# Patient Record
Sex: FEMALE | Race: WHITE | NOT HISPANIC OR LATINO | Employment: OTHER | URBAN - METROPOLITAN AREA
[De-identification: names, ages, dates, MRNs, and addresses within clinical notes are randomized per-mention and may not be internally consistent; named-entity substitution may affect disease eponyms.]

---

## 2017-01-16 ENCOUNTER — HOSPITAL ENCOUNTER (OUTPATIENT)
Dept: URGENT CARE | Facility: CLINIC | Age: 72
Setting detail: SPECIMEN
Discharge: HOME | End: 2017-01-16
Attending: FAMILY MEDICINE | Admitting: FAMILY MEDICINE

## 2017-01-16 LAB
AMPICILLIN SUSC ISLT: NORMAL
AZTREONAM SUSC ISLT: NORMAL
BACTERIA ISLT: NORMAL
BACTERIA SPEC AEROBE CULT: NORMAL
CEFAZOLIN SUSC ISLT: NORMAL
CEFEPIME SUSC ISLT: NORMAL
CEFTRIAXONE SUSC ISLT: NORMAL
CIPROFLOXACIN SUSC ISLT: NORMAL
COLONY COUNT: NORMAL
ERTAPENEM SUSC ISLT: NORMAL
LEVOFLOXACIN SUSC ISLT: NORMAL
Lab: NORMAL
MEROPENEM SUSC ISLT: NORMAL
MICRO REPORT STATUS: NORMAL
NITROFURANTOIN SUSC ISLT: NORMAL
PIP+TAZO SUSC ISLT: NORMAL
SPECIMEN SOURCE: NORMAL
SUSC METH SPEC: NORMAL
TETRACYCLINE SUSC ISLT: NORMAL
TOBRAMYCIN SUSC ISLT: NORMAL
TRIMETHOPRIM/SULFA: NORMAL

## 2017-06-19 ENCOUNTER — HOSPITAL ENCOUNTER (OUTPATIENT)
Dept: URGENT CARE | Facility: CLINIC | Age: 72
Setting detail: SPECIMEN
Discharge: HOME | End: 2017-06-19
Attending: FAMILY MEDICINE | Admitting: FAMILY MEDICINE

## 2017-09-07 ENCOUNTER — HOSPITAL ENCOUNTER (OUTPATIENT)
Dept: OTHER | Facility: HOSPITAL | Age: 72
Discharge: HOME | End: 2017-09-07
Attending: UROLOGY | Admitting: UROLOGY

## 2017-12-15 ENCOUNTER — HOSPITAL ENCOUNTER (OUTPATIENT)
Dept: FAMILY MEDICINE CLINIC | Facility: CLINIC | Age: 72
Setting detail: SPECIMEN
Discharge: HOME | End: 2017-12-15
Attending: FAMILY MEDICINE | Admitting: FAMILY MEDICINE

## 2017-12-15 LAB
ALBUMIN SERPL-MCNC: 3.8 G/DL (ref 3.5–4.8)
ALBUMIN/GLOB SERPL: 1.3 {RATIO} (ref 1–1.7)
ALP SERPL-CCNC: 79 IU/L (ref 32–91)
ALT SERPL-CCNC: 25 IU/L (ref 14–54)
ANION GAP SERPL CALC-SCNC: 11.6 MMOL/L (ref 10–20)
AST SERPL-CCNC: 25 IU/L (ref 15–41)
BASOPHILS # BLD AUTO: 0 10*3/UL (ref 0–0.2)
BASOPHILS NFR BLD AUTO: 0 % (ref 0–2)
BILIRUB SERPL-MCNC: 1.1 MG/DL (ref 0.3–1.2)
BUN SERPL-MCNC: 14 MG/DL (ref 8–20)
BUN/CREAT SERPL: 20 (ref 5.4–26.2)
CALCIUM SERPL-MCNC: 9.5 MG/DL (ref 8.9–10.3)
CHLORIDE SERPL-SCNC: 104 MMOL/L (ref 101–111)
CONV CO2: 28 MMOL/L (ref 22–32)
CONV TOTAL PROTEIN: 6.7 G/DL (ref 6.1–7.9)
CREAT UR-MCNC: 0.7 MG/DL (ref 0.4–1)
DIFFERENTIAL METHOD BLD: (no result)
EOSINOPHIL # BLD AUTO: 0.1 10*3/UL (ref 0–0.3)
EOSINOPHIL # BLD AUTO: 1 % (ref 0–3)
ERYTHROCYTE [DISTWIDTH] IN BLOOD BY AUTOMATED COUNT: 13.7 % (ref 11.5–14.5)
GLOBULIN UR ELPH-MCNC: 2.9 G/DL (ref 2.5–3.8)
GLUCOSE SERPL-MCNC: 98 MG/DL (ref 65–99)
HCT VFR BLD AUTO: 40.2 % (ref 35–49)
HGB BLD-MCNC: 13.3 G/DL (ref 12–15)
LYMPHOCYTES # BLD AUTO: 1.6 10*3/UL (ref 0.8–4.8)
LYMPHOCYTES NFR BLD AUTO: 20 % (ref 18–42)
MCH RBC QN AUTO: 30.1 PG (ref 26–32)
MCHC RBC AUTO-ENTMCNC: 33.1 G/DL (ref 32–36)
MCV RBC AUTO: 90.9 FL (ref 80–94)
MONOCYTES # BLD AUTO: 1.1 10*3/UL (ref 0.1–1.3)
MONOCYTES NFR BLD AUTO: 13 % (ref 2–11)
NEUTROPHILS # BLD AUTO: 5.3 10*3/UL (ref 2.3–8.6)
NEUTROPHILS NFR BLD AUTO: 66 % (ref 50–75)
NRBC BLD AUTO-RTO: 0 /100{WBCS}
NRBC/RBC NFR BLD MANUAL: 0 10*3/UL
PLATELET # BLD AUTO: 294 10*3/UL (ref 150–450)
PMV BLD AUTO: 8.3 FL (ref 7.4–10.4)
POTASSIUM SERPL-SCNC: 4.6 MMOL/L (ref 3.6–5.1)
RBC # BLD AUTO: 4.42 10*6/UL (ref 4–5.4)
SODIUM SERPL-SCNC: 139 MMOL/L (ref 136–144)
WBC # BLD AUTO: 8 10*3/UL (ref 4.5–11.5)

## 2017-12-18 ENCOUNTER — OFFICE (OUTPATIENT)
Dept: URBAN - METROPOLITAN AREA CLINIC 64 | Facility: CLINIC | Age: 72
End: 2017-12-18
Payer: COMMERCIAL

## 2017-12-18 VITALS
WEIGHT: 170 LBS | SYSTOLIC BLOOD PRESSURE: 110 MMHG | HEART RATE: 79 BPM | HEIGHT: 67 IN | DIASTOLIC BLOOD PRESSURE: 72 MMHG

## 2017-12-18 DIAGNOSIS — R10.13 EPIGASTRIC PAIN: ICD-10-CM

## 2017-12-18 DIAGNOSIS — R19.7 DIARRHEA, UNSPECIFIED: ICD-10-CM

## 2017-12-18 DIAGNOSIS — K62.5 HEMORRHAGE OF ANUS AND RECTUM: ICD-10-CM

## 2017-12-18 LAB
AMYLASE, SERUM: 91 U/L (ref 31–124)
C-REACTIVE PROTEIN, QUANT: 13.2 MG/L — HIGH (ref 0–4.9)
CBC WITH DIFFERENTIAL/PLATELET: BASO (ABSOLUTE): 0 X10E3/UL (ref 0–0.2)
CBC WITH DIFFERENTIAL/PLATELET: BASOS: 0 %
CBC WITH DIFFERENTIAL/PLATELET: EOS (ABSOLUTE): 0.2 X10E3/UL (ref 0–0.4)
CBC WITH DIFFERENTIAL/PLATELET: EOS: 2 %
CBC WITH DIFFERENTIAL/PLATELET: HEMATOCRIT: 40.2 % (ref 34–46.6)
CBC WITH DIFFERENTIAL/PLATELET: HEMATOLOGY COMMENTS: (no result)
CBC WITH DIFFERENTIAL/PLATELET: HEMOGLOBIN: 13.1 G/DL (ref 11.1–15.9)
CBC WITH DIFFERENTIAL/PLATELET: IMMATURE CELLS: (no result)
CBC WITH DIFFERENTIAL/PLATELET: IMMATURE GRANS (ABS): 0 X10E3/UL (ref 0–0.1)
CBC WITH DIFFERENTIAL/PLATELET: IMMATURE GRANULOCYTES: 0 %
CBC WITH DIFFERENTIAL/PLATELET: LYMPHS (ABSOLUTE): 1.5 X10E3/UL (ref 0.7–3.1)
CBC WITH DIFFERENTIAL/PLATELET: LYMPHS: 23 %
CBC WITH DIFFERENTIAL/PLATELET: MCH: 29.7 PG (ref 26.6–33)
CBC WITH DIFFERENTIAL/PLATELET: MCHC: 32.6 G/DL (ref 31.5–35.7)
CBC WITH DIFFERENTIAL/PLATELET: MCV: 91 FL (ref 79–97)
CBC WITH DIFFERENTIAL/PLATELET: MONOCYTES(ABSOLUTE): 0.7 X10E3/UL (ref 0.1–0.9)
CBC WITH DIFFERENTIAL/PLATELET: MONOCYTES: 10 %
CBC WITH DIFFERENTIAL/PLATELET: NEUTROPHILS (ABSOLUTE): 4.3 X10E3/UL (ref 1.4–7)
CBC WITH DIFFERENTIAL/PLATELET: NEUTROPHILS: 65 %
CBC WITH DIFFERENTIAL/PLATELET: NRBC: (no result)
CBC WITH DIFFERENTIAL/PLATELET: PLATELETS: 336 X10E3/UL (ref 150–379)
CBC WITH DIFFERENTIAL/PLATELET: RBC: 4.41 X10E6/UL (ref 3.77–5.28)
CBC WITH DIFFERENTIAL/PLATELET: RDW: 14.2 % (ref 12.3–15.4)
CBC WITH DIFFERENTIAL/PLATELET: WBC: 6.6 X10E3/UL (ref 3.4–10.8)
COMP. METABOLIC PANEL (14): A/G RATIO: 2.2 (ref 1.2–2.2)
COMP. METABOLIC PANEL (14): ALBUMIN, SERUM: 4.3 G/DL (ref 3.5–4.8)
COMP. METABOLIC PANEL (14): ALKALINE PHOSPHATASE, S: 99 IU/L (ref 39–117)
COMP. METABOLIC PANEL (14): ALT (SGPT): 39 IU/L — HIGH (ref 0–32)
COMP. METABOLIC PANEL (14): AST (SGOT): 24 IU/L (ref 0–40)
COMP. METABOLIC PANEL (14): BILIRUBIN, TOTAL: 0.9 MG/DL (ref 0–1.2)
COMP. METABOLIC PANEL (14): BUN/CREATININE RATIO: 30 — HIGH (ref 12–28)
COMP. METABOLIC PANEL (14): BUN: 19 MG/DL (ref 8–27)
COMP. METABOLIC PANEL (14): CALCIUM, SERUM: 9.4 MG/DL (ref 8.7–10.3)
COMP. METABOLIC PANEL (14): CARBON DIOXIDE, TOTAL: 26 MMOL/L (ref 18–29)
COMP. METABOLIC PANEL (14): CHLORIDE, SERUM: 101 MMOL/L (ref 96–106)
COMP. METABOLIC PANEL (14): CREATININE, SERUM: 0.64 MG/DL (ref 0.57–1)
COMP. METABOLIC PANEL (14): EGFR IF AFRICN AM: 105 ML/MIN/1.73 (ref 59–?)
COMP. METABOLIC PANEL (14): EGFR IF NONAFRICN AM: 91 ML/MIN/1.73 (ref 59–?)
COMP. METABOLIC PANEL (14): GLOBULIN, TOTAL: 2 G/DL (ref 1.5–4.5)
COMP. METABOLIC PANEL (14): GLUCOSE, SERUM: 86 MG/DL (ref 65–99)
COMP. METABOLIC PANEL (14): POTASSIUM, SERUM: 4.6 MMOL/L (ref 3.5–5.2)
COMP. METABOLIC PANEL (14): PROTEIN, TOTAL, SERUM: 6.3 G/DL (ref 6–8.5)
COMP. METABOLIC PANEL (14): SODIUM, SERUM: 143 MMOL/L (ref 134–144)
LIPASE, SERUM: 31 U/L (ref 14–72)

## 2017-12-18 PROCEDURE — 99214 OFFICE O/P EST MOD 30 MIN: CPT | Performed by: NURSE PRACTITIONER

## 2017-12-18 RX ORDER — METRONIDAZOLE 500 MG/1
1500 TABLET, FILM COATED ORAL
Qty: 42 | Refills: 0 | Status: ACTIVE
Start: 2017-12-18

## 2018-01-11 ENCOUNTER — ON CAMPUS - OUTPATIENT (OUTPATIENT)
Dept: URBAN - METROPOLITAN AREA HOSPITAL 2 | Facility: HOSPITAL | Age: 73
End: 2018-01-11
Payer: COMMERCIAL

## 2018-01-11 VITALS
DIASTOLIC BLOOD PRESSURE: 74 MMHG | OXYGEN SATURATION: 97 % | HEART RATE: 75 BPM | HEART RATE: 67 BPM | SYSTOLIC BLOOD PRESSURE: 135 MMHG | DIASTOLIC BLOOD PRESSURE: 62 MMHG | WEIGHT: 168 LBS | RESPIRATION RATE: 18 BRPM | OXYGEN SATURATION: 99 % | RESPIRATION RATE: 12 BRPM | DIASTOLIC BLOOD PRESSURE: 73 MMHG | TEMPERATURE: 97.5 F | SYSTOLIC BLOOD PRESSURE: 101 MMHG | SYSTOLIC BLOOD PRESSURE: 111 MMHG | OXYGEN SATURATION: 93 % | HEART RATE: 78 BPM | DIASTOLIC BLOOD PRESSURE: 77 MMHG | RESPIRATION RATE: 16 BRPM | SYSTOLIC BLOOD PRESSURE: 140 MMHG | SYSTOLIC BLOOD PRESSURE: 138 MMHG | DIASTOLIC BLOOD PRESSURE: 79 MMHG | SYSTOLIC BLOOD PRESSURE: 124 MMHG | DIASTOLIC BLOOD PRESSURE: 59 MMHG | SYSTOLIC BLOOD PRESSURE: 92 MMHG | OXYGEN SATURATION: 100 % | HEART RATE: 84 BPM | DIASTOLIC BLOOD PRESSURE: 71 MMHG | OXYGEN SATURATION: 98 % | DIASTOLIC BLOOD PRESSURE: 61 MMHG | HEART RATE: 83 BPM | OXYGEN SATURATION: 95 % | HEART RATE: 82 BPM | HEART RATE: 70 BPM | HEART RATE: 88 BPM | SYSTOLIC BLOOD PRESSURE: 137 MMHG | SYSTOLIC BLOOD PRESSURE: 110 MMHG | DIASTOLIC BLOOD PRESSURE: 66 MMHG | HEIGHT: 67 IN | HEART RATE: 71 BPM

## 2018-01-11 DIAGNOSIS — K57.30 DIVERTICULOSIS OF LARGE INTESTINE WITHOUT PERFORATION OR ABS: ICD-10-CM

## 2018-01-11 DIAGNOSIS — K62.5 HEMORRHAGE OF ANUS AND RECTUM: ICD-10-CM

## 2018-01-11 DIAGNOSIS — Z48.815 ENCOUNTER FOR SURGICAL AFTERCARE FOLLOWING SURGERY ON THE DI: ICD-10-CM

## 2018-01-11 DIAGNOSIS — R10.13 EPIGASTRIC PAIN: ICD-10-CM

## 2018-01-11 PROCEDURE — 45378 DIAGNOSTIC COLONOSCOPY: CPT | Performed by: INTERNAL MEDICINE

## 2018-01-11 PROCEDURE — 43235 EGD DIAGNOSTIC BRUSH WASH: CPT | Performed by: INTERNAL MEDICINE

## 2018-01-11 RX ORDER — HYOSCYAMINE SULFATE 0.12 MG/1
0.38 TABLET ORAL; SUBLINGUAL
Qty: 60 | Refills: 6 | Status: COMPLETED
Start: 2018-01-11 | End: 2018-01-29

## 2018-01-11 RX ADMIN — HYOSCYAMINE SULFATE: 0.12 TABLET, ORALLY DISINTEGRATING ORAL; SUBLINGUAL at 16:00

## 2018-01-11 RX ADMIN — PROPOFOL: 10 INJECTION, EMULSION INTRAVENOUS at 15:15

## 2018-01-18 ENCOUNTER — HOSPITAL ENCOUNTER (OUTPATIENT)
Dept: FAMILY MEDICINE CLINIC | Facility: CLINIC | Age: 73
Setting detail: SPECIMEN
Discharge: HOME | End: 2018-01-18
Attending: FAMILY MEDICINE | Admitting: FAMILY MEDICINE

## 2018-01-18 LAB
AZTREONAM SUSC ISLT: ABNORMAL
BACTERIA ISLT: ABNORMAL
BACTERIA SPEC AEROBE CULT: ABNORMAL
BILIRUB UR QL STRIP: NEGATIVE MG/DL
CASTS URNS QL MICRO: ABNORMAL /[LPF]
CEFAZOLIN SUSC ISLT: ABNORMAL
CEFEPIME SUSC ISLT: ABNORMAL
CEFTRIAXONE SUSC ISLT: ABNORMAL
CHOLEST SERPL-MCNC: 177 MG/DL
CHOLEST/HDLC SERPL: 2.3 {RATIO}
CIPROFLOXACIN SUSC ISLT: ABNORMAL
COLONY COUNT: ABNORMAL
COLOR UR: YELLOW
CONV BACTERIA IN URINE MICRO: ABNORMAL
CONV CLARITY OF URINE: CLEAR
CONV HYALINE CASTS IN URINE MICRO: 5 /[LPF] (ref 0–5)
CONV LDL CHOLESTEROL DIRECT: 82 MG/DL (ref 0–100)
CONV PROTEIN IN URINE BY AUTOMATED TEST STRIP: NEGATIVE MG/DL
CONV SMALL ROUND CELLS: ABNORMAL /[HPF]
CONV UROBILINOGEN IN URINE BY AUTOMATED TEST STRIP: 0.2 MG/DL
CULTURE INDICATED?: ABNORMAL
ERTAPENEM SUSC ISLT: ABNORMAL
GLUCOSE UR QL: NEGATIVE MG/DL
HDLC SERPL-MCNC: 78 MG/DL
HGB UR QL STRIP: NEGATIVE
KETONES UR QL STRIP: NEGATIVE MG/DL
LDLC/HDLC SERPL: 1 {RATIO}
LEUKOCYTE ESTERASE UR QL STRIP: ABNORMAL
LEVOFLOXACIN SUSC ISLT: ABNORMAL
LIPID INTERPRETATION: NORMAL
Lab: ABNORMAL
MEROPENEM SUSC ISLT: ABNORMAL
MICRO REPORT STATUS: ABNORMAL
NITRITE UR QL STRIP: NEGATIVE
NITROFURANTOIN SUSC ISLT: ABNORMAL
PH UR STRIP.AUTO: 5 [PH] (ref 4.5–8)
PIP+TAZO SUSC ISLT: ABNORMAL
RBC #/AREA URNS HPF: 1 /[HPF] (ref 0–3)
SP GR UR: 1.02 (ref 1–1.03)
SPECIMEN SOURCE: ABNORMAL
SPERM URNS QL MICRO: ABNORMAL /[HPF]
SQUAMOUS SPT QL MICRO: 1 /[HPF] (ref 0–5)
SUSC METH SPEC: ABNORMAL
TETRACYCLINE SUSC ISLT: ABNORMAL
TOBRAMYCIN SUSC ISLT: ABNORMAL
TRIGL SERPL-MCNC: 68 MG/DL
TRIMETHOPRIM/SULFA: ABNORMAL
UNIDENT CRYS URNS QL MICRO: ABNORMAL /[HPF]
VLDLC SERPL CALC-MCNC: 17.1 MG/DL
WBC #/AREA URNS HPF: 30 /[HPF] (ref 0–5)
YEAST SPEC QL WET PREP: ABNORMAL /[HPF]

## 2018-01-19 LAB
HCV AB SER DONR QL: NORMAL
HCV AB SER DONR QL: NORMAL

## 2018-01-29 ENCOUNTER — OFFICE (OUTPATIENT)
Dept: URBAN - METROPOLITAN AREA CLINIC 64 | Facility: CLINIC | Age: 73
End: 2018-01-29
Payer: COMMERCIAL

## 2018-01-29 VITALS
HEIGHT: 67 IN | DIASTOLIC BLOOD PRESSURE: 60 MMHG | SYSTOLIC BLOOD PRESSURE: 105 MMHG | HEART RATE: 80 BPM | WEIGHT: 176 LBS

## 2018-01-29 DIAGNOSIS — R74.0 NONSPECIFIC ELEVATION OF LEVELS OF TRANSAMINASE AND LACTIC A: ICD-10-CM

## 2018-01-29 DIAGNOSIS — R10.13 EPIGASTRIC PAIN: ICD-10-CM

## 2018-01-29 DIAGNOSIS — K62.5 HEMORRHAGE OF ANUS AND RECTUM: ICD-10-CM

## 2018-01-29 DIAGNOSIS — R19.7 DIARRHEA, UNSPECIFIED: ICD-10-CM

## 2018-01-29 PROCEDURE — 99213 OFFICE O/P EST LOW 20 MIN: CPT | Performed by: NURSE PRACTITIONER

## 2018-04-23 ENCOUNTER — OFFICE (OUTPATIENT)
Dept: URBAN - METROPOLITAN AREA CLINIC 64 | Facility: CLINIC | Age: 73
End: 2018-04-23
Payer: COMMERCIAL

## 2018-04-23 VITALS
SYSTOLIC BLOOD PRESSURE: 109 MMHG | WEIGHT: 175 LBS | HEIGHT: 67 IN | HEART RATE: 92 BPM | DIASTOLIC BLOOD PRESSURE: 67 MMHG

## 2018-04-23 DIAGNOSIS — R74.0 NONSPECIFIC ELEVATION OF LEVELS OF TRANSAMINASE AND LACTIC A: ICD-10-CM

## 2018-04-23 DIAGNOSIS — R19.7 DIARRHEA, UNSPECIFIED: ICD-10-CM

## 2018-04-23 PROCEDURE — 99214 OFFICE O/P EST MOD 30 MIN: CPT | Performed by: NURSE PRACTITIONER

## 2018-04-23 RX ORDER — HYOSCYAMINE SULFATE 0.12 MG/1
0.38 TABLET ORAL; SUBLINGUAL
Qty: 60 | Refills: 12 | Status: COMPLETED
Start: 2018-04-23 | End: 2024-04-01

## 2018-05-11 ENCOUNTER — HOSPITAL ENCOUNTER (OUTPATIENT)
Dept: FAMILY MEDICINE CLINIC | Facility: CLINIC | Age: 73
Setting detail: SPECIMEN
Discharge: HOME | End: 2018-05-11
Attending: FAMILY MEDICINE | Admitting: FAMILY MEDICINE

## 2018-05-11 LAB
BACTERIA ISLT: ABNORMAL
BACTERIA SPEC AEROBE CULT: ABNORMAL
COLONY COUNT: ABNORMAL
LEVOFLOXACIN SUSC ISLT: ABNORMAL
Lab: ABNORMAL
MEROPENEM SUSC ISLT: ABNORMAL
MICRO REPORT STATUS: ABNORMAL
NITROFURANTOIN SUSC ISLT: ABNORMAL
SPECIMEN SOURCE: ABNORMAL
SUSC METH SPEC: ABNORMAL
TIGECYCLINE ISLT MIC: ABNORMAL
TOBRAMYCIN SUSC ISLT: ABNORMAL
TRIMETHOPRIM/SULFA: ABNORMAL

## 2018-07-23 ENCOUNTER — OFFICE (OUTPATIENT)
Dept: URBAN - METROPOLITAN AREA CLINIC 64 | Facility: CLINIC | Age: 73
End: 2018-07-23
Payer: COMMERCIAL

## 2018-07-23 VITALS
WEIGHT: 172 LBS | HEART RATE: 83 BPM | SYSTOLIC BLOOD PRESSURE: 112 MMHG | HEIGHT: 67 IN | DIASTOLIC BLOOD PRESSURE: 60 MMHG

## 2018-07-23 DIAGNOSIS — R14.0 ABDOMINAL DISTENSION (GASEOUS): ICD-10-CM

## 2018-07-23 DIAGNOSIS — K58.0 IRRITABLE BOWEL SYNDROME WITH DIARRHEA: ICD-10-CM

## 2018-07-23 DIAGNOSIS — R15.2 FECAL URGENCY: ICD-10-CM

## 2018-07-23 PROCEDURE — 99214 OFFICE O/P EST MOD 30 MIN: CPT | Performed by: NURSE PRACTITIONER

## 2018-07-23 RX ORDER — RIFAXIMIN 550 MG/1
1650 TABLET ORAL
Qty: 42 | Refills: 2 | Status: COMPLETED
Start: 2018-07-23 | End: 2018-10-24

## 2018-10-24 ENCOUNTER — OFFICE (OUTPATIENT)
Dept: URBAN - METROPOLITAN AREA CLINIC 64 | Facility: CLINIC | Age: 73
End: 2018-10-24
Payer: COMMERCIAL

## 2018-10-24 VITALS
HEIGHT: 67 IN | HEART RATE: 74 BPM | DIASTOLIC BLOOD PRESSURE: 67 MMHG | WEIGHT: 169 LBS | SYSTOLIC BLOOD PRESSURE: 102 MMHG

## 2018-10-24 DIAGNOSIS — K58.0 IRRITABLE BOWEL SYNDROME WITH DIARRHEA: ICD-10-CM

## 2018-10-24 PROCEDURE — 99213 OFFICE O/P EST LOW 20 MIN: CPT | Performed by: INTERNAL MEDICINE

## 2018-10-24 RX ORDER — SACCHAROMYCES BOULARDII 50 MG
CAPSULE ORAL
Qty: 0 | Refills: 0 | Status: COMPLETED
End: 2018-10-24 | Stop reason: NON-AVAIL

## 2019-04-11 ENCOUNTER — HOSPITAL ENCOUNTER (OUTPATIENT)
Dept: FAMILY MEDICINE CLINIC | Facility: CLINIC | Age: 74
Setting detail: SPECIMEN
Discharge: HOME | End: 2019-04-11
Attending: FAMILY MEDICINE | Admitting: FAMILY MEDICINE

## 2019-04-11 LAB
ALBUMIN SERPL-MCNC: 3.9 G/DL (ref 3.5–4.8)
ALBUMIN/GLOB SERPL: 1.4 {RATIO} (ref 1–1.7)
ALP SERPL-CCNC: 75 IU/L (ref 32–91)
ALT SERPL-CCNC: 30 IU/L (ref 14–54)
ANION GAP SERPL CALC-SCNC: 16.7 MMOL/L (ref 10–20)
AST SERPL-CCNC: 29 IU/L (ref 15–41)
BASOPHILS # BLD AUTO: 0 10*3/UL (ref 0–0.2)
BASOPHILS NFR BLD AUTO: 1 % (ref 0–2)
BILIRUB SERPL-MCNC: 1.1 MG/DL (ref 0.3–1.2)
BUN SERPL-MCNC: 15 MG/DL (ref 8–20)
BUN/CREAT SERPL: 21.4 (ref 5.4–26.2)
CALCIUM SERPL-MCNC: 9.2 MG/DL (ref 8.9–10.3)
CHLORIDE SERPL-SCNC: 103 MMOL/L (ref 101–111)
CHOLEST SERPL-MCNC: 160 MG/DL
CHOLEST/HDLC SERPL: 2.1 {RATIO}
CONV CO2: 26 MMOL/L (ref 22–32)
CONV LDL CHOLESTEROL DIRECT: 61 MG/DL (ref 0–100)
CONV TOTAL PROTEIN: 6.6 G/DL (ref 6.1–7.9)
CREAT UR-MCNC: 0.7 MG/DL (ref 0.4–1)
DIFFERENTIAL METHOD BLD: (no result)
EOSINOPHIL # BLD AUTO: 0.1 10*3/UL (ref 0–0.3)
EOSINOPHIL # BLD AUTO: 3 % (ref 0–3)
ERYTHROCYTE [DISTWIDTH] IN BLOOD BY AUTOMATED COUNT: 14.3 % (ref 11.5–14.5)
GLOBULIN UR ELPH-MCNC: 2.7 G/DL (ref 2.5–3.8)
GLUCOSE SERPL-MCNC: 88 MG/DL (ref 65–99)
HCT VFR BLD AUTO: 38.4 % (ref 35–49)
HDLC SERPL-MCNC: 77 MG/DL
HGB BLD-MCNC: 12.6 G/DL (ref 12–15)
LDLC/HDLC SERPL: 0.8 {RATIO}
LIPID INTERPRETATION: ABNORMAL
LYMPHOCYTES # BLD AUTO: 1.5 10*3/UL (ref 0.8–4.8)
LYMPHOCYTES NFR BLD AUTO: 29 % (ref 18–42)
MCH RBC QN AUTO: 30 PG (ref 26–32)
MCHC RBC AUTO-ENTMCNC: 32.8 G/DL (ref 32–36)
MCV RBC AUTO: 91.4 FL (ref 80–94)
MONOCYTES # BLD AUTO: 0.6 10*3/UL (ref 0.1–1.3)
MONOCYTES NFR BLD AUTO: 12 % (ref 2–11)
NEUTROPHILS # BLD AUTO: 2.8 10*3/UL (ref 2.3–8.6)
NEUTROPHILS NFR BLD AUTO: 55 % (ref 50–75)
NRBC BLD AUTO-RTO: 0 /100{WBCS}
NRBC/RBC NFR BLD MANUAL: 0 10*3/UL
PLATELET # BLD AUTO: 331 10*3/UL (ref 150–450)
PMV BLD AUTO: 8.7 FL (ref 7.4–10.4)
POTASSIUM SERPL-SCNC: 4.7 MMOL/L (ref 3.6–5.1)
RBC # BLD AUTO: 4.2 10*6/UL (ref 4–5.4)
SODIUM SERPL-SCNC: 141 MMOL/L (ref 136–144)
TRIGL SERPL-MCNC: 117 MG/DL
VLDLC SERPL CALC-MCNC: 22.7 MG/DL
WBC # BLD AUTO: 5 10*3/UL (ref 4.5–11.5)

## 2019-09-10 PROBLEM — R26.9 GAIT ABNORMALITY: Status: ACTIVE | Noted: 2018-01-09

## 2019-09-10 PROBLEM — M54.50 LOW BACK PAIN: Status: ACTIVE | Noted: 2017-03-02

## 2019-09-10 PROBLEM — Z12.31 VISIT FOR SCREENING MAMMOGRAM: Status: ACTIVE | Noted: 2019-04-10

## 2019-09-10 PROBLEM — Z00.00 ENCOUNTER FOR GENERAL ADULT MEDICAL EXAMINATION WITHOUT ABNORMAL FINDINGS: Status: ACTIVE | Noted: 2019-04-10

## 2019-09-10 PROBLEM — M25.551 HIP PAIN, BILATERAL: Status: ACTIVE | Noted: 2018-01-09

## 2019-09-10 PROBLEM — M85.80 OSTEOPENIA: Status: ACTIVE | Noted: 2018-01-17

## 2019-09-10 PROBLEM — M25.552 HIP PAIN, BILATERAL: Status: ACTIVE | Noted: 2018-01-09

## 2019-09-10 PROBLEM — M41.9 SCOLIOSIS: Status: ACTIVE | Noted: 2017-03-08

## 2019-09-10 RX ORDER — MONTELUKAST SODIUM 4 MG/1
TABLET, CHEWABLE ORAL EVERY 24 HOURS
COMMUNITY
Start: 2017-12-06 | End: 2019-09-11

## 2019-09-10 RX ORDER — CYCLOBENZAPRINE HCL 5 MG
TABLET ORAL
COMMUNITY
Start: 2017-03-02 | End: 2019-09-11

## 2019-09-10 RX ORDER — ERGOCALCIFEROL 1.25 MG/1
1 CAPSULE ORAL
COMMUNITY
Start: 2018-04-13 | End: 2019-09-11

## 2019-09-10 RX ORDER — DICYCLOMINE HCL 20 MG
TABLET ORAL
COMMUNITY
Start: 2016-04-11 | End: 2019-09-11

## 2019-09-10 RX ORDER — FEXOFENADINE HCL 180 MG/1
180 TABLET ORAL DAILY
COMMUNITY
Start: 2014-09-29 | End: 2022-10-03 | Stop reason: HOSPADM

## 2019-09-11 ENCOUNTER — OFFICE VISIT (OUTPATIENT)
Dept: FAMILY MEDICINE CLINIC | Facility: CLINIC | Age: 74
End: 2019-09-11

## 2019-09-11 ENCOUNTER — LAB (OUTPATIENT)
Dept: FAMILY MEDICINE CLINIC | Facility: CLINIC | Age: 74
End: 2019-09-11

## 2019-09-11 VITALS
WEIGHT: 179 LBS | RESPIRATION RATE: 16 BRPM | SYSTOLIC BLOOD PRESSURE: 115 MMHG | OXYGEN SATURATION: 98 % | HEART RATE: 97 BPM | BODY MASS INDEX: 27.13 KG/M2 | DIASTOLIC BLOOD PRESSURE: 59 MMHG | TEMPERATURE: 97.3 F | HEIGHT: 68 IN

## 2019-09-11 DIAGNOSIS — G89.29 CHRONIC BILATERAL LOW BACK PAIN WITHOUT SCIATICA: Primary | ICD-10-CM

## 2019-09-11 DIAGNOSIS — M25.552 HIP PAIN, BILATERAL: ICD-10-CM

## 2019-09-11 DIAGNOSIS — M25.551 HIP PAIN, BILATERAL: ICD-10-CM

## 2019-09-11 DIAGNOSIS — E55.9 VITAMIN D DEFICIENCY: ICD-10-CM

## 2019-09-11 DIAGNOSIS — M54.50 CHRONIC BILATERAL LOW BACK PAIN WITHOUT SCIATICA: ICD-10-CM

## 2019-09-11 DIAGNOSIS — R26.89 BALANCE PROBLEM: ICD-10-CM

## 2019-09-11 DIAGNOSIS — M54.50 CHRONIC BILATERAL LOW BACK PAIN WITHOUT SCIATICA: Primary | ICD-10-CM

## 2019-09-11 DIAGNOSIS — G89.29 CHRONIC BILATERAL LOW BACK PAIN WITHOUT SCIATICA: ICD-10-CM

## 2019-09-11 LAB
ALBUMIN SERPL-MCNC: 3.8 G/DL (ref 3.5–4.8)
ALBUMIN/GLOB SERPL: 1.5 G/DL (ref 1–1.7)
ALP SERPL-CCNC: 62 U/L (ref 32–91)
ALT SERPL W P-5'-P-CCNC: 18 U/L (ref 14–54)
ANION GAP SERPL CALCULATED.3IONS-SCNC: 12.8 MMOL/L (ref 5–15)
AST SERPL-CCNC: 21 U/L (ref 15–41)
BILIRUB SERPL-MCNC: 1.2 MG/DL (ref 0.3–1.2)
BUN BLD-MCNC: 19 MG/DL (ref 8–20)
BUN/CREAT SERPL: 21.1 (ref 5.4–26.2)
CALCIUM SPEC-SCNC: 9.2 MG/DL (ref 8.9–10.3)
CHLORIDE SERPL-SCNC: 105 MMOL/L (ref 101–111)
CO2 SERPL-SCNC: 26 MMOL/L (ref 22–32)
CREAT BLD-MCNC: 0.9 MG/DL (ref 0.4–1)
CRP SERPL-MCNC: 0.23 MG/DL (ref 0–0.7)
ERYTHROCYTE [SEDIMENTATION RATE] IN BLOOD: 17 MM/HR (ref 0–30)
GFR SERPL CREATININE-BSD FRML MDRD: 62 ML/MIN/1.73
GLOBULIN UR ELPH-MCNC: 2.5 GM/DL (ref 2.5–3.8)
GLUCOSE BLD-MCNC: 115 MG/DL (ref 65–99)
POTASSIUM BLD-SCNC: 3.8 MMOL/L (ref 3.6–5.1)
PROT SERPL-MCNC: 6.3 G/DL (ref 6.1–7.9)
SODIUM BLD-SCNC: 140 MMOL/L (ref 136–144)
VIT B12 BLD-MCNC: 258 PG/ML (ref 180–914)

## 2019-09-11 PROCEDURE — 82607 VITAMIN B-12: CPT | Performed by: FAMILY MEDICINE

## 2019-09-11 PROCEDURE — 86431 RHEUMATOID FACTOR QUANT: CPT | Performed by: FAMILY MEDICINE

## 2019-09-11 PROCEDURE — 85652 RBC SED RATE AUTOMATED: CPT | Performed by: FAMILY MEDICINE

## 2019-09-11 PROCEDURE — 86038 ANTINUCLEAR ANTIBODIES: CPT | Performed by: FAMILY MEDICINE

## 2019-09-11 PROCEDURE — 86140 C-REACTIVE PROTEIN: CPT | Performed by: FAMILY MEDICINE

## 2019-09-11 PROCEDURE — 36415 COLL VENOUS BLD VENIPUNCTURE: CPT | Performed by: FAMILY MEDICINE

## 2019-09-11 PROCEDURE — 99214 OFFICE O/P EST MOD 30 MIN: CPT | Performed by: FAMILY MEDICINE

## 2019-09-11 PROCEDURE — 80053 COMPREHEN METABOLIC PANEL: CPT | Performed by: FAMILY MEDICINE

## 2019-09-11 PROCEDURE — 82306 VITAMIN D 25 HYDROXY: CPT | Performed by: FAMILY MEDICINE

## 2019-09-11 RX ORDER — SODIUM PHOSPHATE,MONO-DIBASIC 19G-7G/118
ENEMA (ML) RECTAL 2 TIMES DAILY
COMMUNITY
End: 2020-07-01

## 2019-09-11 RX ORDER — NITROFURANTOIN 25; 75 MG/1; MG/1
100 CAPSULE ORAL DAILY
COMMUNITY
End: 2019-11-18

## 2019-09-11 RX ORDER — PHENOL 1.4 %
600 AEROSOL, SPRAY (ML) MUCOUS MEMBRANE DAILY
COMMUNITY

## 2019-09-11 RX ORDER — MELATONIN
1000 DAILY
COMMUNITY

## 2019-09-11 RX ORDER — GABAPENTIN 100 MG/1
100 CAPSULE ORAL 3 TIMES DAILY
Qty: 90 CAPSULE | Refills: 0 | Status: SHIPPED | OUTPATIENT
Start: 2019-09-11 | End: 2019-10-07 | Stop reason: SDUPTHER

## 2019-09-11 RX ORDER — ACETAMINOPHEN 500 MG
1 TABLET ORAL EVERY 6 HOURS PRN
COMMUNITY
Start: 2020-01-26

## 2019-09-11 NOTE — PROGRESS NOTES
Subjective   Chief Complaint   Patient presents with   • Back Pain   • Osteoarthritis     Discussed pain, balance issues     Abida Becker is a 74 y.o. female.     Patient Care Team:  Darlene Matute MD as PCP - General  Maggie Pop APRN as PCP - Claims Attributed  Provider, No Known as PCP - Family Medicine    She is coming in today to discuss her chronic pain and arthritis.  She has been dealing with a back pain and leg pain for a long time.  She had low back surgery due to scoliosis in 2011.  She however reports that over the last several months may be even longer her pain has been getting worse.  She has harder time to move around and she feels more stiff at times.  She is currently taking only Tylenol over-the-counter as needed, but that helps only some.  She was previously advised by her GI doctor not to take anti-inflammatories due to her GI issues.  She is concerned about the way she walks and her balance overall and possible risk of fall.  She however denies any recent falls.  She wants to know about some options which she can do to improve her functionality.  She was seen by orthopedist and back specialist in the past.  Her medications are being reviewed today and she takes vitamin D and calcium.  She has tried over-the-counter medicines to help with inflammation, but did not have a lot of success with it.         The following portions of the patient's history were reviewed and updated as appropriate: allergies, current medications, past family history, past medical history, past social history, past surgical history and problem list.  Past Medical History:   Diagnosis Date   • GERD (gastroesophageal reflux disease)    • History of recurrent UTIs    • IBS (irritable bowel syndrome)    • Osteoarthritis    • Osteoporosis    • Pulmonary embolism (CMS/HCC) 2011    after scoliosis surgery   • Vitamin D deficiency      Past Surgical History:   Procedure Laterality Date   • BREAST BIOPSY     •  "CHOLECYSTECTOMY     • HERNIA REPAIR     • REIMPLANT URETER IN BLADDER     • SPINAL FUSION       The patient has a family history of  Family History   Problem Relation Age of Onset   • Heart failure Mother      Social History     Socioeconomic History   • Marital status: Single     Spouse name: Not on file   • Number of children: Not on file   • Years of education: Not on file   • Highest education level: Not on file   Tobacco Use   • Smoking status: Never Smoker   • Smokeless tobacco: Never Used   Substance and Sexual Activity   • Alcohol use: No     Frequency: Never   • Drug use: No   • Sexual activity: No       Review of Systems   Constitutional: Negative for activity change, fatigue and fever.   Respiratory: Negative for cough, shortness of breath and wheezing.    Cardiovascular: Negative for chest pain, palpitations and leg swelling.   Gastrointestinal: Negative for constipation, diarrhea and indigestion.   Musculoskeletal: Positive for arthralgias, back pain, gait problem and myalgias. Negative for joint swelling, neck pain and bursitis.   Skin: Negative for color change, dry skin and rash.   Neurological: Negative for tremors, weakness, light-headedness, numbness, headache and memory problem.     Visit Vitals  /59 (BP Location: Left arm, Patient Position: Sitting, Cuff Size: Large Adult)   Pulse 97   Temp 97.3 °F (36.3 °C) (Oral)   Resp 16   Ht 172.7 cm (68\")   Wt 81.2 kg (179 lb)   SpO2 98%   BMI 27.22 kg/m²       Current Outpatient Medications:   •  acetaminophen (TYLENOL) 500 MG tablet, Take 500 mg by mouth Every 6 (Six) Hours As Needed., Disp: , Rfl:   •  calcium carbonate (OS-STEVEN) 600 MG tablet, Take 600 mg by mouth Daily., Disp: , Rfl:   •  cholecalciferol (VITAMIN D3) 1000 units tablet, Take 1,000 Units by mouth Daily., Disp: , Rfl:   •  fexofenadine (ALLEGRA) 180 MG tablet, FEXOFENADINE  MG TABS, Disp: , Rfl:   •  glucosamine-chondroitin 500-400 MG capsule capsule, Take  by mouth 2 (Two) " Times a Day., Disp: , Rfl:   •  Mirabegron ER (MYRBETRIQ) 50 MG tablet sustained-release 24 hour 24 hr tablet, Daily., Disp: , Rfl:   •  nitrofurantoin, macrocrystal-monohydrate, (MACROBID) 100 MG capsule, Take 100 mg by mouth Daily., Disp: , Rfl:   •  gabapentin (NEURONTIN) 100 MG capsule, Take 1 capsule by mouth 3 (Three) Times a Day., Disp: 90 capsule, Rfl: 0    Objective   Physical Exam   Constitutional: She is oriented to person, place, and time. She appears well-developed and well-nourished.   Slow gait with a cane, but stable.   HENT:   Head: Normocephalic and atraumatic.   Eyes: Conjunctivae and EOM are normal. Pupils are equal, round, and reactive to light.   Neck: Normal range of motion. Neck supple.   Cardiovascular: Normal rate, regular rhythm, normal heart sounds and intact distal pulses. Exam reveals no gallop.   No murmur heard.  Pulmonary/Chest: Effort normal and breath sounds normal. No respiratory distress. She has no rales. She exhibits no tenderness.   Musculoskeletal: Normal range of motion. She exhibits no edema, tenderness or deformity.   Neurological: She is alert and oriented to person, place, and time. She displays normal reflexes. No cranial nerve deficit. Coordination normal.   Skin: Skin is warm and dry.   Nursing note and vitals reviewed.                Assessment/Plan   Problems Addressed this Visit        Digestive    Vitamin D deficiency    Relevant Orders    Vitamin D 25 Hydroxy       Nervous and Auditory    Hip pain, bilateral    Relevant Orders    DELLA    Rheumatoid Factor    C-reactive Protein    Sedimentation Rate    Comprehensive Metabolic Panel    Vitamin B12    Ambulatory Referral to Physical Therapy Evaluate and treat    Low back pain - Primary    Relevant Orders    DELLA    Rheumatoid Factor    C-reactive Protein    Sedimentation Rate    Comprehensive Metabolic Panel    Vitamin B12    Ambulatory Referral to Physical Therapy Evaluate and treat       Other    Balance problem       We talked at length about her osteoarthritis and chronic pain.  Her symptoms are getting worse and it affects her daily living and her functionality.  I will be getting blood work to check and rule out inflammation.  She may continue Tylenol over-the-counter.  She cannot take anti-inflammatories due to GI issues.  She will continue vitamin D supplementation and I will recheck level.  I will start her on the gabapentin to see if this will add to her pain control.  I will be referring her to physical therapy to work on her pain balance.       Requested Prescriptions     Signed Prescriptions Disp Refills   • gabapentin (NEURONTIN) 100 MG capsule 90 capsule 0     Sig: Take 1 capsule by mouth 3 (Three) Times a Day.

## 2019-09-12 LAB — 25(OH)D3 SERPL-MCNC: 41.8 NG/ML (ref 30–100)

## 2019-09-13 LAB
ANA SER QL: NEGATIVE
CHROMATIN AB SERPL-ACNC: <20 IU/ML (ref 0–20)

## 2019-09-24 ENCOUNTER — TREATMENT (OUTPATIENT)
Dept: PHYSICAL THERAPY | Facility: CLINIC | Age: 74
End: 2019-09-24

## 2019-09-24 DIAGNOSIS — G89.29 CHRONIC BILATERAL LOW BACK PAIN WITHOUT SCIATICA: Primary | ICD-10-CM

## 2019-09-24 DIAGNOSIS — M54.50 CHRONIC BILATERAL LOW BACK PAIN WITHOUT SCIATICA: Primary | ICD-10-CM

## 2019-09-24 PROCEDURE — 97162 PT EVAL MOD COMPLEX 30 MIN: CPT | Performed by: PHYSICAL THERAPIST

## 2019-09-24 PROCEDURE — 97110 THERAPEUTIC EXERCISES: CPT | Performed by: PHYSICAL THERAPIST

## 2019-09-24 NOTE — PROGRESS NOTES
Physical Therapy Initial Evaluation and Plan of Care    Patient: Abida Becker   : 1945  Diagnosis/ICD-10 Code:  Chronic bilateral low back pain without sciatica [M54.5, G89.29]  Referring practitioner: Darlene Matute MD  Date of Initial Visit: 2019  Today's Date: 2019  Patient seen for 1 sessions           Subjective Questionnaire: Oswestry: 46%      Subjective Evaluation    History of Present Illness  Mechanism of injury: Patient presents to physical therapy with chief complaint of pain in lower back and right hip.  Patient reports that she had back surgery in  (thoracic-lumbar fusion T12-L4).  Patient reports that two months ago she began having pain in right hip, as well as bilateral feet.  Reports that she saw MD for her feet and has begun wearing compression garmets which have helped pain in feet.  Patient continues to have right hip pain, which is worse with standing and in the morning.  Patient reports balance issues when walking on uneven surfaces.  Patient wishes to have improved balance and less pain in her lower back/right hip with household chore activities.     Quality of life: good    Pain  Current pain ratin  At best pain ratin  At worst pain rating: 10  Quality: knife-like and throbbing (constant)  Relieving factors: change in position  Aggravating factors: sleeping, prolonged positioning, standing and stairs  Progression: no change    Diagnostic Tests  X-ray: normal    Patient Goals  Patient goals for therapy: decreased pain, increased strength, independence with ADLs/IADLs and improved balance             Objective       Palpation     Additional Palpation Details  TTP at L5-S1 facet joint on right   Noted firm mass in right lateral gluteus muna, patient reports pain with palpation.  Recommended patient see MD to have this area examined further.     Neurological Testing     Additional Neurological Details  Dermatomes:  wnl     Active Range of Motion      Additional Active Range of Motion Details  Lumbar:  Flexion wnl  R SB 75% limited  LB 75% limited   Extension 90% limited and painful     Strength/Myotome Testing     Left Hip   Planes of Motion   Flexion: 5  Abduction: 5  External rotation: 5  Internal rotation: 5    Right Hip   Planes of Motion   Flexion: 5  Abduction: 5  External rotation: 5  Internal rotation: 5    Left Knee   Flexion: 4  Extension: 4+    Right Knee   Flexion: 4+  Extension: 4+    Left Ankle/Foot   Dorsiflexion: 4    Right Ankle/Foot   Dorsiflexion: 4+    Tests     Lumbar     Left   Positive quadrant.   Negative passive SLR.     Right   Positive quadrant.   Negative passive SLR.          Assessment & Plan     Assessment  Impairments: abnormal muscle firing, abnormal or restricted ROM, impaired balance, impaired physical strength, lacks appropriate home exercise program and pain with function  Assessment details: Patient presents to physical therapy with s/s congruent with MD diagnosis of low back pain/right hip pain.  Patient demonstrates severe limitations in lumbar AROM, weakness in BLE's (specificially in right hip abduction), and pain with ADL's.  Observed abnormal soft tissue in gluteals on right, recommended to patient that she see MD for further examination of this area.  Patient appropriate for PT intervention in order to address these deficits so that she may return to ADL's with less pain in right hip and lumbar spine.   Prognosis: good  Functional Limitations: carrying objects, lifting, sleeping, walking, pulling, pushing, uncomfortable because of pain, moving in bed, sitting and standing  Goals  Plan Goals: In two weeks, patient will report at least 25% reduction in pain level.    In two weeks, patient will demonstrate at least 25% improvement in AROM in lumbar spine.     In four weeks, patient will demonstrate at least 50% improvement in lumbar AROM.    In four weeks, patient will demonstrate lumbar AROM WFL without pain level  over 2/10.  In four weeks, patient will demonstrate decreased perceived disability by decreasing score on Oswestry by at least 12%.     Plan  Therapy options: will be seen for skilled physical therapy services  Planned modality interventions: cryotherapy, TENS and thermotherapy (hydrocollator packs)  Planned therapy interventions: abdominal trunk stabilization, home exercise program, IADL retraining, manual therapy, neuromuscular re-education, soft tissue mobilization, strengthening, stretching and therapeutic activities  Frequency: 2x week  Plan details: 30 visits or 90 day recertification period        Manual Therapy:         mins  36274;  Therapeutic Exercise:    30     mins  44461;     Neuromuscular Luiza:        mins  61117;    Therapeutic Activity:          mins  38843;     Gait Training:           mins  42436;     Ultrasound:          mins  65445;    Electrical Stimulation:         mins  17406 ( );  Dry Needling          mins self-pay    Timed Treatment:   30   mins   Total Treatment:     55   mins    PT SIGNATURE: Jas Middleton PT   DATE TREATMENT INITIATED: 9/24/2019    Initial Certification  Certification Period: 12/23/2019  I certify that the therapy services are furnished while this patient is under my care.  The services outlined above are required by this patient, and will be reviewed every 90 days.     PHYSICIAN: Darlene Matute MD      DATE:     Please sign and return via fax to [phone].. Thank you, [facility].

## 2019-09-27 ENCOUNTER — OFFICE VISIT (OUTPATIENT)
Dept: FAMILY MEDICINE CLINIC | Facility: CLINIC | Age: 74
End: 2019-09-27

## 2019-09-27 VITALS
TEMPERATURE: 97 F | OXYGEN SATURATION: 98 % | RESPIRATION RATE: 16 BRPM | DIASTOLIC BLOOD PRESSURE: 76 MMHG | SYSTOLIC BLOOD PRESSURE: 128 MMHG | BODY MASS INDEX: 26.67 KG/M2 | HEIGHT: 68 IN | WEIGHT: 176 LBS | HEART RATE: 80 BPM

## 2019-09-27 DIAGNOSIS — R26.89 BALANCE PROBLEM: ICD-10-CM

## 2019-09-27 DIAGNOSIS — M25.562 ACUTE PAIN OF LEFT KNEE: ICD-10-CM

## 2019-09-27 DIAGNOSIS — R26.9 GAIT ABNORMALITY: ICD-10-CM

## 2019-09-27 DIAGNOSIS — M25.551 HIP PAIN, RIGHT: Primary | ICD-10-CM

## 2019-09-27 PROBLEM — M25.552 HIP PAIN, BILATERAL: Status: RESOLVED | Noted: 2018-01-09 | Resolved: 2019-09-27

## 2019-09-27 PROCEDURE — 99214 OFFICE O/P EST MOD 30 MIN: CPT | Performed by: FAMILY MEDICINE

## 2019-09-27 NOTE — PROGRESS NOTES
Subjective   Chief Complaint   Patient presents with   • Knee Pain     left   • Hip Pain     right   • Balance Issues     Abida Becker is a 74 y.o. female.     Patient Care Team:  Darlene Matute MD as PCP - General  Maggie Pop APRN as PCP - Claims Attributed  Provider, No Known as PCP - Family Medicine    She is coming in today to talk about her left knee pain, but also other issues.  She has ongoing chronic pain in her back in her hips and her knees.  She is been having issues with balance and difficulty walking due to her osteoarthritis.  She reports that about 1 week ago when she was walking downstairs she suddenly felt pretty decent noise and sharp pain in her knee and she felt like something a ruptured in her knee.  Since then she has been having increased pain especially when she is trying to talk up and down stairs.  She is pretty decent when she walks straight, but she is afraid to make turns while walking as the pain is so much worse.  She reports that prior to that incident she had more pain in the right hip and she was trying to protect it when walking, however since the incident because pain in her left knee is pretty significant the right hip does not really hurt that bad, however she is very concerned about that as well.  She recently started physical therapy to work on her back and balance issues as well as her gait problems.  She had her first evaluation there last week.         The following portions of the patient's history were reviewed and updated as appropriate: allergies, current medications, past family history, past medical history, past social history, past surgical history and problem list.  Past Medical History:   Diagnosis Date   • GERD (gastroesophageal reflux disease)    • History of recurrent UTIs    • IBS (irritable bowel syndrome)    • Osteoarthritis    • Osteoporosis    • Pulmonary embolism (CMS/[facility]) 2011    after scoliosis surgery   • Vitamin D deficiency   "    Past Surgical History:   Procedure Laterality Date   • BREAST BIOPSY     • CHOLECYSTECTOMY     • HERNIA REPAIR     • REIMPLANT URETER IN BLADDER     • SPINAL FUSION       The patient has a family history of  Family History   Problem Relation Age of Onset   • Heart failure Mother      Social History     Socioeconomic History   • Marital status: Single     Spouse name: Not on file   • Number of children: Not on file   • Years of education: Not on file   • Highest education level: Not on file   Tobacco Use   • Smoking status: Never Smoker   • Smokeless tobacco: Never Used   Substance and Sexual Activity   • Alcohol use: No     Frequency: Never   • Drug use: No   • Sexual activity: No       Review of Systems   Constitutional: Negative for fatigue and fever.   Musculoskeletal: Positive for arthralgias, back pain, gait problem and joint swelling. Negative for myalgias, neck pain, neck stiffness and bursitis.   Skin: Negative for pallor, rash and skin lesions.   Neurological: Negative for tremors, weakness and numbness.     Visit Vitals  /76 (BP Location: Right arm, Patient Position: Sitting, Cuff Size: Adult)   Pulse 80   Temp 97 °F (36.1 °C) (Oral)   Resp 16   Ht 172.7 cm (68\")   Wt 79.8 kg (176 lb)   SpO2 98%   BMI 26.76 kg/m²       Current Outpatient Medications:   •  acetaminophen (TYLENOL) 500 MG tablet, Take 500 mg by mouth Every 6 (Six) Hours As Needed., Disp: , Rfl:   •  calcium carbonate (OS-STEVEN) 600 MG tablet, Take 600 mg by mouth Daily., Disp: , Rfl:   •  cholecalciferol (VITAMIN D3) 1000 units tablet, Take 1,000 Units by mouth Daily., Disp: , Rfl:   •  fexofenadine (ALLEGRA) 180 MG tablet, FEXOFENADINE  MG TABS, Disp: , Rfl:   •  gabapentin (NEURONTIN) 100 MG capsule, Take 1 capsule by mouth 3 (Three) Times a Day., Disp: 90 capsule, Rfl: 0  •  glucosamine-chondroitin 500-400 MG capsule capsule, Take  by mouth 2 (Two) Times a Day., Disp: , Rfl:   •  Mirabegron ER (MYRBETRIQ) 50 MG tablet " sustained-release 24 hour 24 hr tablet, Daily., Disp: , Rfl:   •  nitrofurantoin, macrocrystal-monohydrate, (MACROBID) 100 MG capsule, Take 100 mg by mouth Daily., Disp: , Rfl:     Objective   Physical Exam   Constitutional: She is oriented to person, place, and time. She appears well-developed and well-nourished.   Her gait is slow and antalgic, but stable.   HENT:   Head: Normocephalic and atraumatic.   Eyes: Conjunctivae and EOM are normal. Pupils are equal, round, and reactive to light.   Neck: Normal range of motion. Neck supple.   Musculoskeletal:   Her left knee is slightly swollen, there is palpation tenderness on the medial joint line.  Full range of motion, crepitance noted with passive range of motion.  The right hip show some decreased range of motion due to pain.   Neurological: She is alert and oriented to person, place, and time.   Skin: Skin is warm and dry.   Nursing note and vitals reviewed.         Assessment/Plan   Problems Addressed this Visit        Nervous and Auditory    Hip pain, right - Primary       Musculoskeletal and Integument    Acute pain of left knee    Relevant Orders    XR Knee 1 or 2 View Left (In Office)       Other    Gait abnormality    Balance problem        She has history of pretty advanced degenerative disc disease and osteoarthritis.  I suspect that her history of the event as well as the symptoms and exam are due to possible soft tissue injury.  I will get x-ray of the left knee and plan to schedule MRI once this result is available.  I will also get x-ray of the right hip.  Considering her ongoing balance issues and gait issues I stressed with her today multiple times to be very careful when she walks and avoid any situation which can increase the risk of fall.  She may take Tylenol as needed for pain as she typically does that.  I also advised for her to try elastic bandage on the left knee when active.             Requested Prescriptions      No prescriptions requested  or ordered in this encounter

## 2019-09-30 ENCOUNTER — TREATMENT (OUTPATIENT)
Dept: PHYSICAL THERAPY | Facility: CLINIC | Age: 74
End: 2019-09-30

## 2019-10-02 ENCOUNTER — TREATMENT (OUTPATIENT)
Dept: PHYSICAL THERAPY | Facility: CLINIC | Age: 74
End: 2019-10-02

## 2019-10-02 DIAGNOSIS — G89.29 CHRONIC BILATERAL LOW BACK PAIN WITHOUT SCIATICA: Primary | ICD-10-CM

## 2019-10-02 DIAGNOSIS — M54.50 CHRONIC BILATERAL LOW BACK PAIN WITHOUT SCIATICA: Primary | ICD-10-CM

## 2019-10-02 PROCEDURE — 97140 MANUAL THERAPY 1/> REGIONS: CPT | Performed by: PHYSICAL THERAPIST

## 2019-10-02 PROCEDURE — 97110 THERAPEUTIC EXERCISES: CPT | Performed by: PHYSICAL THERAPIST

## 2019-10-02 NOTE — PROGRESS NOTES
Physical Therapy Daily Progress Note      Patient: Abida Becker   : 1945  Diagnosis/ICD-10 Code:  Chronic bilateral low back pain without sciatica [M54.5, G89.29]  Referring practitioner: Darlene Matute MD  Date of Initial Visit: Type: THERAPY  Noted: 2019  Today's Date: 10/2/2019  Patient seen for 2 sessions         Abida Becker reports:  Increased pain in right hip, as well as increased tingling in RLE into right foot.      Objective   See Exercise, Manual, and Modality Logs for complete treatment.       Assessment/Plan     Patient educated on proper technique with positional distraction.  Reports feeling better after today's treatment.     Progress per Plan of Care           Manual Therapy:    10     mins  67777;  Therapeutic Exercise:    15     mins  09827;     Neuromuscular Luiza:        mins  00412;    Therapeutic Activity:          mins  76476;     Gait Training:           mins  35992;     Ultrasound:          mins  72782;    Electrical Stimulation:         mins  02913 ( );  Dry Needling          mins self-pay    Timed Treatment:   25   mins   Total Treatment:     25   mins    Jas Middleton PT  Physical Therapist

## 2019-10-03 DIAGNOSIS — M25.562 ACUTE PAIN OF LEFT KNEE: Primary | ICD-10-CM

## 2019-10-07 ENCOUNTER — TREATMENT (OUTPATIENT)
Dept: PHYSICAL THERAPY | Facility: CLINIC | Age: 74
End: 2019-10-07

## 2019-10-07 DIAGNOSIS — M54.50 CHRONIC BILATERAL LOW BACK PAIN WITHOUT SCIATICA: Primary | ICD-10-CM

## 2019-10-07 DIAGNOSIS — G89.29 CHRONIC BILATERAL LOW BACK PAIN WITHOUT SCIATICA: Primary | ICD-10-CM

## 2019-10-07 PROCEDURE — 97110 THERAPEUTIC EXERCISES: CPT | Performed by: PHYSICAL THERAPIST

## 2019-10-07 PROCEDURE — 97140 MANUAL THERAPY 1/> REGIONS: CPT | Performed by: PHYSICAL THERAPIST

## 2019-10-07 RX ORDER — GABAPENTIN 100 MG/1
CAPSULE ORAL
Qty: 90 CAPSULE | Refills: 0 | Status: SHIPPED | OUTPATIENT
Start: 2019-10-07 | End: 2019-11-12 | Stop reason: SDUPTHER

## 2019-10-07 NOTE — PROGRESS NOTES
Physical Therapy Daily Progress Note    Patient: Abida Becker   : 1945  Diagnosis/ICD-10 Code:  Chronic bilateral low back pain without sciatica [M54.5, G89.29]  Referring practitioner: Darlene Matute MD  Date of Initial Visit: Type: THERAPY  Noted: 2019  Today's Date: 10/7/2019  Patient seen for 3 sessions         Abida Becker reports:  Feeling much better with decreased pain in lumbar spine.      Objective   See Exercise, Manual, and Modality Logs for complete treatment.       Assessment/Plan     Tolerates manual therapy and progression of exercise well.  Hypomobility continues to be present in lumbar spine.     Progress per Plan of Care           Manual Therapy:    10     mins  80713;  Therapeutic Exercise:    15     mins  45517;     Neuromuscular Luiza:       mins  78330;    Therapeutic Activity:          mins  16573;     Gait Training:           mins  83307;     Ultrasound:          mins  43722;    Electrical Stimulation:         mins  11474 (MC );  Dry Needling          mins self-pay    Timed Treatment:   25   mins   Total Treatment:     25   mins    Jas Middleton PT  Physical Therapist

## 2019-10-09 ENCOUNTER — TREATMENT (OUTPATIENT)
Dept: PHYSICAL THERAPY | Facility: CLINIC | Age: 74
End: 2019-10-09

## 2019-10-09 DIAGNOSIS — M54.50 CHRONIC BILATERAL LOW BACK PAIN WITHOUT SCIATICA: Primary | ICD-10-CM

## 2019-10-09 DIAGNOSIS — G89.29 CHRONIC BILATERAL LOW BACK PAIN WITHOUT SCIATICA: Primary | ICD-10-CM

## 2019-10-09 PROCEDURE — 97110 THERAPEUTIC EXERCISES: CPT | Performed by: PHYSICAL THERAPIST

## 2019-10-09 NOTE — PROGRESS NOTES
Physical Therapy Daily Progress Note      Patient: Abida Becker   : 1945  Diagnosis/ICD-10 Code:  Chronic bilateral low back pain without sciatica [M54.5, G89.29]  Referring practitioner: Darlene Matute MD  Date of Initial Visit: Type: THERAPY  Noted: 2019  Today's Date: 10/9/2019  Patient seen for 4 sessions         Abida Becker reports: Lower back feeling much better.  Reports no pain at this time.      Objective   See Exercise, Manual, and Modality Logs for complete treatment.       Assessment/Plan     Demonstrates proper technique with home exercises.  Patient to continue with I HEP over the next week and follow-up if symptoms return.   Progress per Plan of Care and Anticipate DC next Visit           Manual Therapy:         mins  93139;  Therapeutic Exercise:    30     mins  58754;     Neuromuscular Luiza:        mins  96323;    Therapeutic Activity:          mins  47417;     Gait Training:           mins  36999;     Ultrasound:          mins  59184;    Electrical Stimulation:         mins  82647 ( );  Dry Needling          mins self-pay    Timed Treatment:   30   mins   Total Treatment:     30   mins    Jas Middleton PT  Physical Therapist

## 2019-10-14 ENCOUNTER — TELEPHONE (OUTPATIENT)
Dept: FAMILY MEDICINE CLINIC | Facility: CLINIC | Age: 74
End: 2019-10-14

## 2019-10-14 NOTE — TELEPHONE ENCOUNTER
Please get a hand written order for the CT of the left knee without contrast.  I cannot find order in our computer system for that.  Thank you.

## 2019-10-16 ENCOUNTER — TELEPHONE (OUTPATIENT)
Dept: FAMILY MEDICINE CLINIC | Facility: CLINIC | Age: 74
End: 2019-10-16

## 2019-10-16 NOTE — TELEPHONE ENCOUNTER
Patient is calling about MRI that was ordered but she couldn't have it done because of her electronic device implant and she should get with you about what to do about her knee.

## 2019-10-16 NOTE — TELEPHONE ENCOUNTER
We already got a phone call about that from the radiology and they recommended to get a CT instead.  I put order in for the CT.

## 2019-10-28 DIAGNOSIS — M25.562 ACUTE PAIN OF LEFT KNEE: Primary | ICD-10-CM

## 2019-11-12 RX ORDER — GABAPENTIN 100 MG/1
CAPSULE ORAL
Qty: 90 CAPSULE | Refills: 0 | Status: SHIPPED | OUTPATIENT
Start: 2019-11-12 | End: 2020-02-15

## 2019-11-18 ENCOUNTER — OFFICE VISIT (OUTPATIENT)
Dept: ORTHOPEDIC SURGERY | Facility: CLINIC | Age: 74
End: 2019-11-18

## 2019-11-18 VITALS
DIASTOLIC BLOOD PRESSURE: 64 MMHG | SYSTOLIC BLOOD PRESSURE: 97 MMHG | HEART RATE: 87 BPM | WEIGHT: 180 LBS | BODY MASS INDEX: 28.25 KG/M2 | HEIGHT: 67 IN

## 2019-11-18 DIAGNOSIS — M17.12 PRIMARY OSTEOARTHRITIS OF LEFT KNEE: Primary | ICD-10-CM

## 2019-11-18 DIAGNOSIS — E66.3 OVERWEIGHT (BMI 25.0-29.9): ICD-10-CM

## 2019-11-18 DIAGNOSIS — S83.412A TEAR OF MCL (MEDIAL COLLATERAL LIGAMENT) OF KNEE, LEFT, INITIAL ENCOUNTER: ICD-10-CM

## 2019-11-18 PROBLEM — H52.00 HYPERMETROPIA: Status: ACTIVE | Noted: 2019-11-18

## 2019-11-18 PROBLEM — H25.813 COMBINED FORMS OF AGE-RELATED CATARACT, BILATERAL: Status: ACTIVE | Noted: 2019-11-18

## 2019-11-18 PROBLEM — H25.10 SENILE NUCLEAR SCLEROSIS: Status: ACTIVE | Noted: 2019-11-18

## 2019-11-18 PROBLEM — H52.4 BILATERAL PRESBYOPIA: Status: ACTIVE | Noted: 2019-11-18

## 2019-11-18 PROCEDURE — 99203 OFFICE O/P NEW LOW 30 MIN: CPT | Performed by: PHYSICIAN ASSISTANT

## 2019-11-18 RX ORDER — LANOLIN ALCOHOL/MO/W.PET/CERES
1000 CREAM (GRAM) TOPICAL DAILY
COMMUNITY

## 2019-11-18 NOTE — PATIENT INSTRUCTIONS
"Osteoarthritis    Osteoarthritis is a type of arthritis that affects tissue that covers the ends of bones in joints (cartilage). Cartilage acts as a cushion between the bones and helps them move smoothly. Osteoarthritis results when cartilage in the joints gets worn down. Osteoarthritis is sometimes called \"wear and tear\" arthritis.  Osteoarthritis is the most common form of arthritis. It often occurs in older people. It is a condition that gets worse over time (a progressive condition). Joints that are most often affected by this condition are in:  · Fingers.  · Toes.  · Hips.  · Knees.  · Spine, including neck and lower back.  What are the causes?  This condition is caused by age-related wearing down of cartilage that covers the ends of bones.  What increases the risk?  The following factors may make you more likely to develop this condition:  · Older age.  · Being overweight or obese.  · Overuse of joints, such as in athletes.  · Past injury of a joint.  · Past surgery on a joint.  · Family history of osteoarthritis.  What are the signs or symptoms?  The main symptoms of this condition are pain, swelling, and stiffness in the joint. The joint may lose its shape over time. Small pieces of bone or cartilage may break off and float inside of the joint, which may cause more pain and damage to the joint. Small deposits of bone (osteophytes) may grow on the edges of the joint. Other symptoms may include:  · A grating or scraping feeling inside the joint when you move it.  · Popping or creaking sounds when you move.  Symptoms may affect one or more joints. Osteoarthritis in a major joint, such as your knee or hip, can make it painful to walk or exercise. If you have osteoarthritis in your hands, you might not be able to  items, twist your hand, or control small movements of your hands and fingers (fine motor skills).  How is this diagnosed?  This condition may be diagnosed based on:  · Your medical history.  · A " physical exam.  · Your symptoms.  · X-rays of the affected joint(s).  · Blood tests to rule out other types of arthritis.  How is this treated?  There is no cure for this condition, but treatment can help to control pain and improve joint function. Treatment plans may include:  · A prescribed exercise program that allows for rest and joint relief. You may work with a physical therapist.  · A weight control plan.  · Pain relief techniques, such as:  ? Applying heat and cold to the joint.  ? Electric pulses delivered to nerve endings under the skin (transcutaneous electrical nerve stimulation, or TENS).  ? Massage.  ? Certain nutritional supplements.  · NSAIDs or prescription medicines to help relieve pain.  · Medicine to help relieve pain and inflammation (corticosteroids). This can be given by mouth (orally) or as an injection.  · Assistive devices, such as a brace, wrap, splint, specialized glove, or cane.  · Surgery, such as:  ? An osteotomy. This is done to reposition the bones and relieve pain or to remove loose pieces of bone and cartilage.  ? Joint replacement surgery. You may need this surgery if you have very bad (advanced) osteoarthritis.  Follow these instructions at home:  Activity  · Rest your affected joints as directed by your health care provider.  · Do not drive or use heavy machinery while taking prescription pain medicine.  · Exercise as directed. Your health care provider or physical therapist may recommend specific types of exercise, such as:  ? Strengthening exercises. These are done to strengthen the muscles that support joints that are affected by arthritis. They can be performed with weights or with exercise bands to add resistance.  ? Aerobic activities. These are exercises, such as brisk walking or water aerobics, that get your heart pumping.  ? Range-of-motion activities. These keep your joints easy to move.  ? Balance and agility exercises.  Managing pain, stiffness, and swelling          · If directed, apply heat to the affected area as often as told by your health care provider. Use the heat source that your health care provider recommends, such as a moist heat pack or a heating pad.  ? If you have a removable assistive device, remove it as told by your health care provider.  ? Place a towel between your skin and the heat source. If your health care provider tells you to keep the assistive device on while you apply heat, place a towel between the assistive device and the heat source.  ? Leave the heat on for 20-30 minutes.  ? Remove the heat if your skin turns bright red. This is especially important if you are unable to feel pain, heat, or cold. You may have a greater risk of getting burned.  · If directed, put ice on the affected joint:  ? If you have a removable assistive device, remove it as told by your health care provider.  ? Put ice in a plastic bag.  ? Place a towel between your skin and the bag. If your health care provider tells you to keep the assistive device on during icing, place a towel between the assistive device and the bag.  ? Leave the ice on for 20 minutes, 2-3 times a day.  General instructions  · Take over-the-counter and prescription medicines only as told by your health care provider.  · Maintain a healthy weight. Follow instructions from your health care provider for weight control. These may include dietary restrictions.  · Do not use any products that contain nicotine or tobacco, such as cigarettes and e-cigarettes. These can delay bone healing. If you need help quitting, ask your health care provider.  · Use assistive devices as directed by your health care provider.  · Keep all follow-up visits as told by your health care provider. This is important.  Where to find more information  · National Jackson of Arthritis and Musculoskeletal and Skin Diseases: www.niams.nih.gov  · National Jackson on Aging: www.carlee.nih.gov  · American College of Rheumatology:  www.rheumatology.org  Contact a health care provider if:  · Your skin turns red.  · You develop a rash.  · You have pain that gets worse.  · You have a fever along with joint or muscle aches.  Get help right away if:  · You lose a lot of weight.  · You suddenly lose your appetite.  · You have night sweats.  Summary  · Osteoarthritis is a type of arthritis that affects tissue covering the ends of bones in joints (cartilage).  · This condition is caused by age-related wearing down of cartilage that covers the ends of bones.  · The main symptom of this condition is pain, swelling, and stiffness in the joint.  · There is no cure for this condition, but treatment can help to control pain and improve joint function.  This information is not intended to replace advice given to you by your health care provider. Make sure you discuss any questions you have with your health care provider.  Document Released: 12/18/2006 Document Revised: 09/24/2018 Document Reviewed: 08/21/2017  [employer] Patient Education © 2019 [employer]      Medial Collateral Knee Ligament Sprain    The medial collateral ligament (MCL) is a tough band of tissue in the knee that connects the thigh bone to the shin bone. Your MCL prevents your knee from moving too far inward and helps to keep your knee stable. An MCL sprain is an injury that is caused by stretching the MCL too far. The injury can involve a tear in the MCL.  What are the causes?  This condition may be caused by:  · A hard, direct hit (blow) to the inside of your knee (common).  · Your knee falling inward when you run, change directions quickly (cut), jump, or pivot.  · Repeatedly overstretching the MCL.  What increases the risk?  The following factors make you more likely to develop this condition:  · Playing contact sports, such as wrestling or football.  · Participating in sports that involve cutting, like hockey, skiing, or soccer.  · Having weak hip and core muscles.  What  are the signs or symptoms?  Symptoms of this condition include:  · A popping sound at the time of injury.  · Pain on the inside of the knee.  · Swelling in the knee.  · Bruising around the knee.  · Tenderness when pressing the inside of the knee.  · Feeling unstable when you stand, like your knee will give way.  · Difficulty walking on uneven surfaces.  How is this diagnosed?  This condition may be diagnosed based on:  · Your medical history.  · A physical exam.  · Tests, such as an X-ray or MRI.  During your physical exam, your health care provider will check for pain, limited motion, and instability.  How is this treated?  Treatment for this condition depends on how severe the injury is. Treatment may include:  · Keeping weight off the knee until swelling and pain improve.  · Raising (elevating) the knee above the level of your heart. This helps to reduce swelling.  · Icing the knee. This helps to reduce swelling.  · Taking an NSAID. This helps to reduce pain and swelling.  · Using a knee brace, elastic sleeve, or crutches while the injury heals.  · Using a knee brace when participating in athletic activities.  · Doing rehab exercises (physical therapy).  · Surgery. This may be needed if:  ? Your MCL tore all the way through.  ? Your knee is unstable.  ? Your knee is not getting better with other treatments.  Follow these instructions at home:  If you have a brace or sleeve:  · Wear it as told by your health care provider. Remove it only as told by your health care provider.  · Loosen the brace or remove the sleeve if your toes tingle, become numb, or turn cold and blue.  · Do not let your brace or sleeve get wet if it is not waterproof.  · Keep the brace or sleeve clean.  Managing pain, stiffness, and swelling  · If directed, apply ice to the inside of your knee.  ? Put ice in a plastic bag.  ? Place a towel between your skin and the bag.  ? Leave the ice on for 20 minutes, 2-3 times a day.  · Move your foot and  toes often to avoid stiffness and to lessen swelling.  · Elevate your knee above the level of your heart while you are sitting or lying down.  Driving  · Ask your health care provider when it is safe to drive if you have a brace or sleeve on your leg.  Activity  · Return to your normal activities as told by your health care provider. Ask your health care provider what activities are safe for you.  · Do exercises as told by your health care provider.  Safety  · Do not use the injured limb to support your body weight until your health care provider says that you can. Use crutches as told by your health care provider.  General instructions  · Take over-the-counter and prescription medicines only as told by your health care provider.  · Keep all follow-up visits as told by your health care provider. This is important.  How is this prevented?  · Warm up and stretch before being active.  · Cool down and stretch after being active.  · Give your body time to rest between periods of activity.  · Make sure to use equipment that fits you.  · Be safe and responsible while being active to avoid falls.  · Do at least 150 minutes of moderate-intensity exercise each week, such as brisk walking or water aerobics.  · Maintain physical fitness, including:  ? Strength.  ? Flexibility.  ? Cardiovascular fitness.  ? Endurance.  Contact a health care provider if:  · Your symptoms do not improve.  · Your symptoms get worse.  This information is not intended to replace advice given to you by your health care provider. Make sure you discuss any questions you have with your health care provider.  Document Released: 12/18/2006 Document Revised: 08/22/2017 Document Reviewed: 10/29/2016  [employer] Interactive Patient Education © 2019 [employer]

## 2019-11-18 NOTE — PROGRESS NOTES
ORTHOPEDIC VISIT    Referring Provider: No ref. provider found  Primary Care Provider: Darlene Matute MD         Subjective:  Chief Complaint:  Chief Complaint   Patient presents with   • Left Knee - Pain, Edema, Initial Evaluation       HPI:  Abida Becker is a 74 y.o. female who presents for her initial visit for left knee pain ongoing for the last 6 weeks.  She denies any injury.  She describes a sharp, shooting pain on the medial aspect of the knee with weightbearing and certain movements.  She denies any radiation, numbness, or tingling.  She does report that the pain is improving with time and wrapping.  She has not taken any anti-inflammatories.  She has used Tylenol which is helping.  She denies any mechanical symptoms but does report some instability.  She does have slight stiffness in the knee but is not bothered by this.  Referred for consultation by Dr. Matute.    Past Medical History:   Diagnosis Date   • GERD (gastroesophageal reflux disease)    • History of recurrent UTIs    • IBS (irritable bowel syndrome)    • Osteoarthritis    • Osteoporosis    • Pulmonary embolism (CMS/HCC) 2011    after scoliosis surgery   • Vitamin D deficiency        Past Surgical History:   Procedure Laterality Date   • BREAST BIOPSY     • CHOLECYSTECTOMY     • HERNIA REPAIR     • REIMPLANT URETER IN BLADDER     • SPINAL FUSION         Family History   Problem Relation Age of Onset   • Heart failure Mother    • Cancer Father        Social History     Occupational History   • Not on file   Tobacco Use   • Smoking status: Never Smoker   • Smokeless tobacco: Never Used   Substance and Sexual Activity   • Alcohol use: No     Frequency: Never   • Drug use: No   • Sexual activity: No        Medications:    Current Outpatient Medications:   •  acetaminophen (TYLENOL) 500 MG tablet, Take 500 mg by mouth Every 6 (Six) Hours As Needed., Disp: , Rfl:   •  calcium carbonate (OS-STEVEN) 600 MG tablet, Take 600 mg by mouth Daily.,  "Disp: , Rfl:   •  cholecalciferol (VITAMIN D3) 1000 units tablet, Take 1,000 Units by mouth Daily., Disp: , Rfl:   •  CRANBERRY CONCENTRATE PO, Take  by mouth Daily., Disp: , Rfl:   •  fexofenadine (ALLEGRA) 180 MG tablet, FEXOFENADINE  MG TABS, Disp: , Rfl:   •  gabapentin (NEURONTIN) 100 MG capsule, TAKE ONE CAPSULE BY MOUTH THREE TIMES A DAY, Disp: 90 capsule, Rfl: 0  •  glucosamine-chondroitin 500-400 MG capsule capsule, Take  by mouth 2 (Two) Times a Day., Disp: , Rfl:   •  Mirabegron ER (MYRBETRIQ) 50 MG tablet sustained-release 24 hour 24 hr tablet, Daily., Disp: , Rfl:   •  Riboflavin (B2 PO), Take  by mouth Daily., Disp: , Rfl:   •  vitamin B-12 (CYANOCOBALAMIN) 1000 MCG tablet, Take 1,000 mcg by mouth Daily., Disp: , Rfl:     Allergies:  Allergies   Allergen Reactions   • Penicillins Nausea And Vomiting   • Amoxicillin Other (See Comments)      unsure of type of reaction - states it was so long ago she can't remember          Review of Systems:  Gen -no fever, chills , sweats, headache   Eyes - no irritation or discharge   ENT -  no ear pain , runny nose , sore throat , difficulty swallowing   Resp - no cough , congestion , excessive expectoration   [facility] - no chest pain , palpitations.   Abd - no pain , nausea , vomiting , diarrhea   Skin - no rash , lesions.   Neuro - no dizziness  Patient reports urinary problems.    Please see HPI for any other pertinent positives.  All other systems were reviewed and are negative.       Objective   Objective:    BP 97/64 (BP Location: Left arm, Patient Position: Sitting, Cuff Size: Adult)   Pulse 87   Ht 170.2 cm (67\")   Wt 81.6 kg (180 lb)   BMI 28.19 kg/m²     Physical Examination:  Alert, oriented, overweight individual in no acute distress, ambulating unassisted  Left lower extremity shows no erythema, rashes, or open skin lesions. There is a minimal amount of swelling. It is grossly well aligned, and the patient is neurovascularly intact distally. The " knee is stable to varus and valgus stress, there is no patellar maltracking or crepitus noted, and plantar and dorsiflexion is 5/5. There is mild tenderness to palpation over the MCL and with range of motion, which is about 0-115.         Imaging:  Reviewed outside xrays of left knee done at [facility], my impression below: Shows mild to moderate tricompartmental DJD.  No fractures or dislocations are appreciated.  Osteopenia present.  Reviewed CT report of left knee done at [facility], summary of impression below:  1. Minimal thickening of approximately ligament with mild adjacent edema. A tiny ossification along the medial femoral condyle. These findings are suspicious for partial tearing medial collateral ligament and possible incomplete medial capsular avulsion.   2. Osteopenia.   3. Moderate medial compartment and mild to moderate lateral patellofemoral compartment arthritis. 3 ossified intra-articular bodies are seen within the anterior joint space.             Assessment:  1. Primary osteoarthritis of left knee    2. Tear of MCL (medial collateral ligament) of knee, left, initial encounter    3. Overweight (BMI 25.0-29.9)                 Plan:  She is not interested in any medications or injections for her arthritis.  For her partial tear of the MCL, we will try home exercises and a brace.  She is deferred formal physical therapy at this time.  Weight loss is also recommended.  I would like to see her back in 6 weeks for recheck.  Natural history and expected course discussed. Questions answered.  Educational materials distributed.  Rest, ice, compression, and elevation (RICE) therapy.  Reduction in offending activity.  OTC analgesics as needed.  physical therapy  bracing  weight loss  activtiy modification  assistive devices             MARLO Pagan  11/18/19  9:15 AM

## 2019-12-30 ENCOUNTER — OFFICE VISIT (OUTPATIENT)
Dept: FAMILY MEDICINE CLINIC | Facility: CLINIC | Age: 74
End: 2019-12-30

## 2019-12-30 VITALS
DIASTOLIC BLOOD PRESSURE: 71 MMHG | BODY MASS INDEX: 26.37 KG/M2 | TEMPERATURE: 98.5 F | WEIGHT: 174 LBS | HEART RATE: 109 BPM | OXYGEN SATURATION: 91 % | HEIGHT: 68 IN | SYSTOLIC BLOOD PRESSURE: 105 MMHG

## 2019-12-30 DIAGNOSIS — J01.01 ACUTE RECURRENT MAXILLARY SINUSITIS: Primary | ICD-10-CM

## 2019-12-30 DIAGNOSIS — K21.9 GASTROESOPHAGEAL REFLUX DISEASE WITHOUT ESOPHAGITIS: ICD-10-CM

## 2019-12-30 DIAGNOSIS — Z12.31 VISIT FOR SCREENING MAMMOGRAM: ICD-10-CM

## 2019-12-30 DIAGNOSIS — J30.1 SEASONAL ALLERGIC RHINITIS DUE TO POLLEN: ICD-10-CM

## 2019-12-30 PROBLEM — H52.00 HYPERMETROPIA: Status: RESOLVED | Noted: 2019-11-18 | Resolved: 2019-12-30

## 2019-12-30 PROBLEM — H52.4 BILATERAL PRESBYOPIA: Status: RESOLVED | Noted: 2019-11-18 | Resolved: 2019-12-30

## 2019-12-30 PROBLEM — H25.10 SENILE NUCLEAR SCLEROSIS: Status: RESOLVED | Noted: 2019-11-18 | Resolved: 2019-12-30

## 2019-12-30 PROBLEM — H25.813 COMBINED FORMS OF AGE-RELATED CATARACT, BILATERAL: Status: RESOLVED | Noted: 2019-11-18 | Resolved: 2019-12-30

## 2019-12-30 PROCEDURE — 99214 OFFICE O/P EST MOD 30 MIN: CPT | Performed by: FAMILY MEDICINE

## 2019-12-30 RX ORDER — AZITHROMYCIN 250 MG/1
250 TABLET, FILM COATED ORAL DAILY
Qty: 6 TABLET | Refills: 0 | Status: SHIPPED | OUTPATIENT
Start: 2019-12-30 | End: 2020-01-04

## 2019-12-30 NOTE — PROGRESS NOTES
Subjective   Chief Complaint   Patient presents with   • Sinusitis   • URI   • Allergic Rhinitis   • Nasal Congestion   • Heartburn     Abida Becker is a 74 y.o. female.     Patient Care Team:  Darlene Matute MD as PCP - General  Maggie Pop APRN as PCP - Claims Attributed    She is coming in today to talk about her upper respiratory issues.  She has been dealing with some congestion, postnasal drainage, and some pressure in her sinuses day.  She has been causing some as well. She denies any breathing issues. She noted some abdominal bloating and discomfort in the epigastric area yesterday, but it is better now. She is being treated for GERD. She has good appetite. She denies N/V/D.       The following portions of the patient's history were reviewed and updated as appropriate: allergies, current medications, past family history, past medical history, past social history, past surgical history and problem list.  Past Medical History:   Diagnosis Date   • GERD (gastroesophageal reflux disease)    • History of recurrent UTIs    • IBS (irritable bowel syndrome)    • Osteoarthritis    • Osteoporosis    • Pulmonary embolism (CMS/HCC) 2011    after scoliosis surgery   • Vitamin D deficiency      Past Surgical History:   Procedure Laterality Date   • BREAST BIOPSY     • CHOLECYSTECTOMY     • HERNIA REPAIR     • REIMPLANT URETER IN BLADDER     • SPINAL FUSION       The patient has a family history of  Family History   Problem Relation Age of Onset   • Heart failure Mother    • Cancer Father      Social History     Socioeconomic History   • Marital status: Single     Spouse name: Not on file   • Number of children: Not on file   • Years of education: Not on file   • Highest education level: Not on file   Tobacco Use   • Smoking status: Never Smoker   • Smokeless tobacco: Never Used   Substance and Sexual Activity   • Alcohol use: No     Frequency: Never   • Drug use: No   • Sexual activity: Never  "      Review of Systems   Constitutional: Negative for activity change, appetite change, chills and fever.   HENT: Positive for congestion, postnasal drip and sinus pressure. Negative for ear pain, sore throat and swollen glands.    Respiratory: Negative for cough, choking, chest tightness, shortness of breath, wheezing and stridor.    Cardiovascular: Negative for chest pain.   Gastrointestinal: Positive for abdominal pain, GERD and indigestion.   Skin: Negative for dry skin and rash.     Visit Vitals  /71 (BP Location: Left arm, Patient Position: Sitting, Cuff Size: Adult)   Pulse 109   Temp 98.5 °F (36.9 °C) (Axillary)   Ht 172.7 cm (68\")   Wt 78.9 kg (174 lb)   SpO2 91%   BMI 26.46 kg/m²       Current Outpatient Medications:   •  acetaminophen (TYLENOL) 500 MG tablet, Take 500 mg by mouth Every 6 (Six) Hours As Needed., Disp: , Rfl:   •  azithromycin (ZITHROMAX) 250 MG tablet, Take 1 tablet by mouth Daily for 5 days. Take 2 tablets the first day, then 1 tablet daily for 4 days., Disp: 6 tablet, Rfl: 0  •  calcium carbonate (OS-STEVEN) 600 MG tablet, Take 600 mg by mouth Daily., Disp: , Rfl:   •  cholecalciferol (VITAMIN D3) 1000 units tablet, Take 1,000 Units by mouth Daily., Disp: , Rfl:   •  CRANBERRY CONCENTRATE PO, Take  by mouth Daily., Disp: , Rfl:   •  fexofenadine (ALLEGRA) 180 MG tablet, FEXOFENADINE  MG TABS, Disp: , Rfl:   •  gabapentin (NEURONTIN) 100 MG capsule, TAKE ONE CAPSULE BY MOUTH THREE TIMES A DAY, Disp: 90 capsule, Rfl: 0  •  glucosamine-chondroitin 500-400 MG capsule capsule, Take  by mouth 2 (Two) Times a Day., Disp: , Rfl:   •  Mirabegron ER (MYRBETRIQ) 50 MG tablet sustained-release 24 hour 24 hr tablet, Daily., Disp: , Rfl:   •  Riboflavin (B2 PO), Take  by mouth Daily., Disp: , Rfl:   •  vitamin B-12 (CYANOCOBALAMIN) 1000 MCG tablet, Take 1,000 mcg by mouth Daily., Disp: , Rfl:     Objective   Physical Exam   Constitutional: She appears well-developed and well-nourished. No " distress.   HENT:   Head: Normocephalic and atraumatic.   Right Ear: External ear normal.   Left Ear: External ear normal.   Mouth/Throat: Oropharynx is clear and moist. No oropharyngeal exudate.   Palpation tenderness to both maxillary sinuses noted. Congestion noted.   Eyes: Pupils are equal, round, and reactive to light. Conjunctivae and EOM are normal.   Neck: Normal range of motion. Neck supple.   Cardiovascular: Normal rate, regular rhythm, normal heart sounds and intact distal pulses.   Pulmonary/Chest: Effort normal and breath sounds normal. No respiratory distress. She has no wheezes. She has no rales.   Abdominal: Soft. Bowel sounds are normal. She exhibits no distension and no mass. There is no tenderness. There is no rebound and no guarding. No hernia.   Skin: Skin is warm and dry. No rash noted.   Nursing note and vitals reviewed.           Assessment/Plan   Problems Addressed this Visit        Respiratory    Allergic rhinitis    Acute recurrent maxillary sinusitis - Primary       Digestive    Gastroesophageal reflux disease       Other    Visit for screening mammogram    Relevant Orders    Mammo Screening Digital Tomosynthesis Bilateral With CAD        I will start her on Zpak to treat her sinusitis. She will also continue her allergy medications. I suspect that her abdominal issues are due to GERD flare up. I advised for her to increase her PPI to 2 pills a day for the next week or so. She is to call us back if not getting better. She will need to keep up with good hydration.             Requested Prescriptions     Signed Prescriptions Disp Refills   • azithromycin (ZITHROMAX) 250 MG tablet 6 tablet 0     Sig: Take 1 tablet by mouth Daily for 5 days. Take 2 tablets the first day, then 1 tablet daily for 4 days.

## 2020-01-06 ENCOUNTER — OFFICE VISIT (OUTPATIENT)
Dept: ORTHOPEDIC SURGERY | Facility: CLINIC | Age: 75
End: 2020-01-06

## 2020-01-06 VITALS
WEIGHT: 174 LBS | HEART RATE: 96 BPM | BODY MASS INDEX: 27.31 KG/M2 | HEIGHT: 67 IN | DIASTOLIC BLOOD PRESSURE: 71 MMHG | SYSTOLIC BLOOD PRESSURE: 115 MMHG

## 2020-01-06 DIAGNOSIS — S83.412D TEAR OF MEDIAL COLLATERAL LIGAMENT OF LEFT KNEE, SUBSEQUENT ENCOUNTER: ICD-10-CM

## 2020-01-06 DIAGNOSIS — M17.12 PRIMARY OSTEOARTHRITIS OF LEFT KNEE: Primary | ICD-10-CM

## 2020-01-06 DIAGNOSIS — E66.3 OVERWEIGHT (BMI 25.0-29.9): ICD-10-CM

## 2020-01-06 PROCEDURE — 99212 OFFICE O/P EST SF 10 MIN: CPT | Performed by: PHYSICIAN ASSISTANT

## 2020-01-06 NOTE — PROGRESS NOTES
ORTHO FOLLOW UP       Subjective:    HPI:   Abida Becker is a 74 y.o. female who presents in follow-up for her left knee DJD and MCL tear.  She has been wearing a knee brace, which she reports helps tremendously with her feelings of instability.  She reports that the pain has improved and she is still not interested in any treatment options for her arthritis at this time.    Past Medical History:   Diagnosis Date   • GERD (gastroesophageal reflux disease)    • History of recurrent UTIs    • IBS (irritable bowel syndrome)    • Osteoarthritis    • Osteoporosis    • Pulmonary embolism (CMS/HCC) 2011    after scoliosis surgery   • Vitamin D deficiency        Past Surgical History:   Procedure Laterality Date   • BREAST BIOPSY     • CHOLECYSTECTOMY     • HERNIA REPAIR     • REIMPLANT URETER IN BLADDER     • SPINAL FUSION         Social History     Occupational History   • Not on file   Tobacco Use   • Smoking status: Never Smoker   • Smokeless tobacco: Never Used   Substance and Sexual Activity   • Alcohol use: No     Frequency: Never   • Drug use: No   • Sexual activity: Never        Medications:    Current Outpatient Medications:   •  acetaminophen (TYLENOL) 500 MG tablet, Take 500 mg by mouth Every 6 (Six) Hours As Needed., Disp: , Rfl:   •  calcium carbonate (OS-STEVEN) 600 MG tablet, Take 600 mg by mouth Daily., Disp: , Rfl:   •  cholecalciferol (VITAMIN D3) 1000 units tablet, Take 1,000 Units by mouth Daily., Disp: , Rfl:   •  CRANBERRY CONCENTRATE PO, Take  by mouth Daily., Disp: , Rfl:   •  fexofenadine (ALLEGRA) 180 MG tablet, FEXOFENADINE  MG TABS, Disp: , Rfl:   •  gabapentin (NEURONTIN) 100 MG capsule, TAKE ONE CAPSULE BY MOUTH THREE TIMES A DAY, Disp: 90 capsule, Rfl: 0  •  glucosamine-chondroitin 500-400 MG capsule capsule, Take  by mouth 2 (Two) Times a Day., Disp: , Rfl:   •  Mirabegron ER (MYRBETRIQ) 50 MG tablet sustained-release 24 hour 24 hr tablet, Daily., Disp: , Rfl:   •  Riboflavin (B2  "PO), Take  by mouth Daily., Disp: , Rfl:   •  vitamin B-12 (CYANOCOBALAMIN) 1000 MCG tablet, Take 1,000 mcg by mouth Daily., Disp: , Rfl:     Allergies:  Allergies   Allergen Reactions   • Penicillins Nausea And Vomiting   • Amoxicillin Other (See Comments)      unsure of type of reaction - states it was so long ago she can't remember        Review of Systems:  Gen -no fever, chills , sweats, headache   Eyes - no irritation or discharge   ENT -  no ear pain , runny nose , sore throat , difficulty swallowing   Resp - no cough , congestion , excessive expectoration   CVS - no chest pain , palpitations.   Abd - no pain , nausea , vomiting , diarrhea   Skin - no rash , lesions.   Neuro - no dizziness    Please see HPI for any other pertinent positives.  All other systems were reviewed and are negative.       Objective   Objective:    /71 (BP Location: Left arm, Patient Position: Sitting, Cuff Size: Adult)   Pulse 96   Ht 170.2 cm (67\")   Wt 78.9 kg (174 lb)   BMI 27.25 kg/m²     Physical Examination:  Alert, oriented, overweight individual in no acute distress, ambulating unassisted  Left lower extremity shows no erythema, rashes, or open skin lesions. There is a minimal amount of swelling. It is grossly well aligned, and the patient is neurovascularly intact distally. The knee is stable to varus and valgus stress, there is no patellar maltracking or crepitus noted, and plantar and dorsiflexion is 5/5. There is no tenderness to palpation or with range of motion, which is about 0-115.         Imaging:  no diagnostic testing performed this visit            Assessment:  1. Primary osteoarthritis of left knee    2. Tear of medial collateral ligament of left knee, subsequent encounter    3. Overweight (BMI 25.0-29.9)                 Plan:  She may follow-up as needed.             MARLO Pagan  01/06/20  9:14 AM      "

## 2020-02-15 RX ORDER — GABAPENTIN 100 MG/1
CAPSULE ORAL
Qty: 90 CAPSULE | Refills: 0 | Status: SHIPPED | OUTPATIENT
Start: 2020-02-15 | End: 2020-06-01

## 2020-06-01 RX ORDER — GABAPENTIN 100 MG/1
CAPSULE ORAL
Qty: 90 CAPSULE | Refills: 0 | Status: SHIPPED | OUTPATIENT
Start: 2020-06-01 | End: 2020-09-23

## 2020-07-01 ENCOUNTER — OFFICE VISIT (OUTPATIENT)
Dept: FAMILY MEDICINE CLINIC | Facility: CLINIC | Age: 75
End: 2020-07-01

## 2020-07-01 VITALS
DIASTOLIC BLOOD PRESSURE: 67 MMHG | SYSTOLIC BLOOD PRESSURE: 106 MMHG | HEART RATE: 84 BPM | WEIGHT: 178 LBS | HEIGHT: 67 IN | TEMPERATURE: 97 F | OXYGEN SATURATION: 95 % | BODY MASS INDEX: 27.94 KG/M2 | RESPIRATION RATE: 16 BRPM

## 2020-07-01 DIAGNOSIS — Z00.00 MEDICARE ANNUAL WELLNESS VISIT, SUBSEQUENT: Primary | ICD-10-CM

## 2020-07-01 DIAGNOSIS — Z78.0 POSTMENOPAUSAL: ICD-10-CM

## 2020-07-01 DIAGNOSIS — M25.562 CHRONIC PAIN OF LEFT KNEE: ICD-10-CM

## 2020-07-01 DIAGNOSIS — Z12.31 VISIT FOR SCREENING MAMMOGRAM: ICD-10-CM

## 2020-07-01 DIAGNOSIS — G89.29 CHRONIC PAIN OF LEFT KNEE: ICD-10-CM

## 2020-07-01 PROCEDURE — G0439 PPPS, SUBSEQ VISIT: HCPCS | Performed by: FAMILY MEDICINE

## 2020-07-01 NOTE — PROGRESS NOTES
Subsequent Medicare Wellness Visit   The ABC's of the Annual Wellness Visit    Chief Complaint   Patient presents with   • Medicare Wellness-subsequent       HPI:  Abida Becker, -1945, is a 75 y.o. female who presents for a Subsequent Medicare Wellness Visit.  She reports that few days ago she tripped and she injured her left knee by twisting.  She has been having pain on the inner side of that knee ever since.  The pain is especially worse when she is trying to walk and put weight on it.  She has been using brace since then and that seems to be helping.  She had issues with that knee last year and had x-rays and MRI, which showed ligamental tear and subsequently she was seen by orthopedist and they recommended just a brace.  She has ongoing issues with arthritis and chronic pain and she currently takes gabapentin once a day.  She has had some balance issues in the past, but she denies any falls for quite some time.  She denies any issues with depression or any memory problems. Patient denies any chest pain, shortness of breath, dizziness, nausea, vomiting, or diarrhea. No visual issues reported. No headaches, numbness or tingling. No urinary issues. Patient has good appetite and denies any weight changes. No swelling reported. No emotional issues.      Recent Hospitalizations:  No hospitalization(s) within the last year..    Current Medical Providers:  Patient Care Team:  Darlene Matute MD as PCP - General  Maggie Pop APRN as PCP - Claims Attributed    Health Habits and Functional and Cognitive Screening and Depression Screening:  Functional & Cognitive Status 2020   Do you have difficulty preparing food and eating? No   Do you have difficulty bathing yourself, getting dressed or grooming yourself? No   Do you have difficulty using the toilet? No   Do you have difficulty moving around from place to place? No   Do you have trouble with steps or getting out of a bed or a chair? No    Current Diet Well Balanced Diet   Dental Exam Up to date   Eye Exam Up to date   Exercise (times per week) 0 times per week   Current Exercise Activities Include None   Do you need help using the phone?  No   Are you deaf or do you have serious difficulty hearing?  No   Do you need help with transportation? No   Do you need help shopping? No   Do you need help preparing meals?  No   Do you need help with housework?  No   Do you need help with laundry? No   Do you need help taking your medications? No   Do you need help managing money? No   Do you ever drive or ride in a car without wearing a seat belt? No   Have you felt unusual stress, anger or loneliness in the last month? No   Who do you live with? Alone   If you need help, do you have trouble finding someone available to you? No   Have you been bothered in the last four weeks by sexual problems? No   Do you have difficulty concentrating, remembering or making decisions? No       Compared to one year ago, the patient feels her physical health is the same and her mental health is the same.    Depression Screen:  PHQ-2/PHQ-9 Depression Screening 7/1/2020   Little interest or pleasure in doing things 0   Feeling down, depressed, or hopeless 0   Total Score 0         Past Medical/Family/Social History:  The following portions of the patient's history were reviewed and updated as appropriate: allergies, current medications, past family history, past medical history, past social history, past surgical history and problem list.    Allergies   Allergen Reactions   • Penicillins Nausea And Vomiting   • Amoxicillin Other (See Comments)      unsure of type of reaction - states it was so long ago she can't remember          Current Outpatient Medications:   •  acetaminophen (TYLENOL) 500 MG tablet, Take 500 mg by mouth Every 6 (Six) Hours As Needed., Disp: , Rfl:   •  calcium carbonate (OS-STEVEN) 600 MG tablet, Take 600 mg by mouth Daily., Disp: , Rfl:   •  cholecalciferol  (VITAMIN D3) 1000 units tablet, Take 1,000 Units by mouth Daily., Disp: , Rfl:   •  CRANBERRY CONCENTRATE PO, Take  by mouth Daily., Disp: , Rfl:   •  diclofenac (VOLTAREN) 1 % gel gel, Apply 4 g topically to the appropriate area as directed 4 (Four) Times a Day As Needed (pain)., Disp: 1 tube, Rfl: 0  •  fexofenadine (ALLEGRA) 180 MG tablet, FEXOFENADINE  MG TABS, Disp: , Rfl:   •  gabapentin (NEURONTIN) 100 MG capsule, TAKE ONE CAPSULE BY MOUTH THREE TIMES A DAY, Disp: 90 capsule, Rfl: 0  •  Mirabegron ER (MYRBETRIQ) 50 MG tablet sustained-release 24 hour 24 hr tablet, Daily., Disp: , Rfl:   •  Riboflavin (B2 PO), Take  by mouth Daily., Disp: , Rfl:   •  vitamin B-12 (CYANOCOBALAMIN) 1000 MCG tablet, Take 1,000 mcg by mouth Daily., Disp: , Rfl:     Aspirin use counseling: Does not need ASA (and currently is not on it)    Current medication list contains no high risk medications.  No harmful drug interactions have been identified.     Family History   Problem Relation Age of Onset   • Heart failure Mother    • Cancer Father        Social History     Tobacco Use   • Smoking status: Never Smoker   • Smokeless tobacco: Never Used   Substance Use Topics   • Alcohol use: No     Frequency: Never       Past Surgical History:   Procedure Laterality Date   • BREAST BIOPSY     • CHOLECYSTECTOMY     • HERNIA REPAIR     • REIMPLANT URETER IN BLADDER     • SPINAL FUSION         Patient Active Problem List   Diagnosis   • Allergic rhinitis   • Gait abnormality   • Gastroesophageal reflux disease   • Hip pain, right   • Irritable bowel syndrome   • Low back pain   • Osteopenia   • Recurrent urinary tract infection   • Scoliosis   • Vaginitis, atrophic   • Medicare annual wellness visit, subsequent   • Balance problem   • Vitamin D deficiency   • Acute pain of left knee   • Primary osteoarthritis of left knee   • Tear of MCL (medial collateral ligament) of knee, left, initial encounter   • Acute recurrent maxillary  "sinusitis   • Overweight (BMI 25.0-29.9)   • Visit for screening mammogram   • Postmenopausal   • Chronic pain of left knee       Review of Systems   Constitutional: Negative for activity change, fatigue and fever.   Respiratory: Negative for cough, shortness of breath and wheezing.    Cardiovascular: Negative for chest pain, palpitations and leg swelling.   Gastrointestinal: Negative for constipation, diarrhea and indigestion.   Musculoskeletal: Positive for arthralgias, back pain, gait problem and joint swelling. Negative for myalgias and neck pain.   Skin: Negative for color change, dry skin and rash.   Neurological: Negative for tremors, weakness, numbness and headache.       Objective     Vitals:    07/01/20 1304   BP: 106/67   BP Location: Left arm   Patient Position: Sitting   Cuff Size: Large Adult   Pulse: 84   Resp: 16   Temp: 97 °F (36.1 °C)   TempSrc: Temporal   SpO2: 95%   Weight: 80.7 kg (178 lb)   Height: 170.2 cm (67\")       Patient's Body mass index is 27.88 kg/m². BMI is above normal parameters. Recommendations include: educational material.      No exam data present    The patient has no evidence of cognitve impairment.     Physical Exam   Constitutional: She is oriented to person, place, and time. She appears well-developed and well-nourished.   HENT:   Head: Normocephalic and atraumatic.   Eyes: Pupils are equal, round, and reactive to light. Conjunctivae and EOM are normal.   Neck: Normal range of motion. Neck supple.   Cardiovascular: Normal rate, regular rhythm, normal heart sounds and intact distal pulses. Exam reveals no gallop.   No murmur heard.  Pulmonary/Chest: Effort normal and breath sounds normal. No respiratory distress. She has no rales. She exhibits no tenderness.   Musculoskeletal: Normal range of motion. She exhibits no edema or deformity.   There is some chronic deformity of the left knee noted, there is palpation tenderness along the medial joint line.  Full range of motion, " crepitus noted with passive movement.   Neurological: She is alert and oriented to person, place, and time.   Skin: Skin is warm and dry.   Nursing note and vitals reviewed.      Recent Lab Results:     Lab Results   Component Value Date    CHOL 160 04/11/2019    TRIG 117 04/11/2019    HDL 77 04/11/2019    LDLHDL 0.8 04/11/2019       Assessment/Plan   Age-appropriate Screening Schedule:  Refer to the list below for future screening recommendations based on patient's age, sex and/or medical conditions.      Health Maintenance   Topic Date Due   • ZOSTER VACCINE (2 of 3) 10/27/2013   • MAMMOGRAM  01/22/2020   • DXA SCAN  01/22/2020   • INFLUENZA VACCINE  08/01/2020   • COLONOSCOPY  01/11/2028   • TDAP/TD VACCINES (2 - Td) 12/12/2028       Medicare Risks and Personalized Health Plan:  Advance Directive Discussion  Breast Cancer/Mammogram Screening  Cardiovascular risk  Chronic Pain   Colon Cancer Screening  Dementia/Memory   Depression/Dysphoria  Diabetic Lab Screening   Fall Risk  Immunizations Discussed/Encouraged (specific immunizations; Influenza )      CMS-Preventive Services Quick Reference  Medicare Preventive Services Addressed:  Annual Wellness Visit (AWV)  Bone Density Measurements  Cardiovascular Disease Screening Tests (may do this order every 5 years in beneficiaries without signs or symptoms of cardiovascular disease)  Colorectal Cancer Screening, Colonoscopy  Screening Mammography     Advance Care Planning:  ACP discussion was held with the patient during this visit. Patient has an advance directive in EMR which is still valid.     Diagnoses and all orders for this visit:    1. Medicare annual wellness visit, subsequent (Primary)    2. Visit for screening mammogram  -     Mammo Screening Digital Tomosynthesis Bilateral With CAD; Future    3. Postmenopausal  -     DEXA Bone Density Axial; Future    4. Chronic pain of left knee  -     Ambulatory Referral to Orthopedic Surgery    Other orders  -      diclofenac (VOLTAREN) 1 % gel gel; Apply 4 g topically to the appropriate area as directed 4 (Four) Times a Day As Needed (pain).  Dispense: 1 tube; Refill: 0    Medicare wellness exam was done today.  I reviewed her medical problems and her medications.  If it comes to her left knee issues at this point I recommend to just follow-up with orthopedist as she already has a lot of arthritis in that knee and CAT scan from last year showed ligamental injury.  She possibly could have hurt soft tissue with her current injury again.  We talked about possible need for knee replacement down the road and she will discuss it with the orthopedist.  I gave her prescription for a topical anti-inflammatories.  I reviewed her health maintenance and I gave her order for mammogram and bone density and she was encouraged to schedule those tests.  Her last colonoscopy was in January 2018.  I also reviewed her immunization and she is up to speed with pneumonia shots.  Healthy lifestyle was discussed and encouraged.  Fall prevention was also discussed and stressed.    An After Visit Summary and PPPS with all of these plans were given to the patient.      Follow Up:  Return in about 1 year (around 7/1/2021) for Medicare Wellness.

## 2020-07-06 ENCOUNTER — OFFICE VISIT (OUTPATIENT)
Dept: ORTHOPEDIC SURGERY | Facility: CLINIC | Age: 75
End: 2020-07-06

## 2020-07-06 VITALS
HEIGHT: 67 IN | DIASTOLIC BLOOD PRESSURE: 62 MMHG | WEIGHT: 178 LBS | BODY MASS INDEX: 27.94 KG/M2 | SYSTOLIC BLOOD PRESSURE: 98 MMHG | HEART RATE: 97 BPM

## 2020-07-06 DIAGNOSIS — M17.12 PRIMARY OSTEOARTHRITIS OF LEFT KNEE: Primary | ICD-10-CM

## 2020-07-06 DIAGNOSIS — E66.3 OVERWEIGHT WITH BODY MASS INDEX (BMI) OF 27 TO 27.9 IN ADULT: ICD-10-CM

## 2020-07-06 DIAGNOSIS — S83.412S TEAR OF MCL (MEDIAL COLLATERAL LIGAMENT) OF KNEE, LEFT, SEQUELA: ICD-10-CM

## 2020-07-06 PROBLEM — S83.412D TEAR OF MCL (MEDIAL COLLATERAL LIGAMENT) OF KNEE, LEFT, SUBSEQUENT ENCOUNTER: Status: ACTIVE | Noted: 2019-11-18

## 2020-07-06 PROCEDURE — 99213 OFFICE O/P EST LOW 20 MIN: CPT | Performed by: PHYSICIAN ASSISTANT

## 2020-07-06 RX ORDER — FAMOTIDINE 20 MG/1
20 TABLET, FILM COATED ORAL DAILY
COMMUNITY
End: 2022-10-03 | Stop reason: HOSPADM

## 2020-07-06 RX ORDER — LOPERAMIDE HYDROCHLORIDE 2 MG/1
2 CAPSULE ORAL 4 TIMES DAILY PRN
COMMUNITY
End: 2022-09-15

## 2020-07-06 NOTE — PATIENT INSTRUCTIONS
Medial Collateral Knee Ligament Sprain    The medial collateral ligament (MCL) is a tough band of tissue in the knee that connects the thigh bone to the shin bone. Your MCL prevents your knee from moving too far inward and helps to keep your knee stable. An MCL sprain is a stretch or tear in the MCL.  What are the causes?  This condition may be caused by:  · A hard, direct hit (trauma) to the inside of your knee. This is a common cause.  · Your knee falling inward when you run, change directions quickly (cut), jump, or pivot.  · Repeatedly overstretching the MCL.  What increases the risk?  The following factors make you more likely to develop this condition:  · Playing contact sports, such as wrestling or football.  · Participating in sports that involve sudden movements of cutting, twisting, or turning. These movements are common in hockey, skiing, and soccer.  · Having weak hip and core muscles.  What are the signs or symptoms?  Symptoms of this condition include:  · Feeling or hearing a popping at the time of injury.  · Pain on the inside of the knee.  · Swelling in the knee.  · Bruising around the knee.  · Tenderness when pressing the inside of the knee.  · Feeling unstable when you stand, like your knee will give way.  · Difficulty walking on uneven surfaces.  How is this diagnosed?  This condition may be diagnosed based on:  · Your medical history.  · A physical exam.  · Imaging tests, such as an X-ray, ultrasound, or MRI.  During your physical exam, your health care provider will check for pain, limited motion, and instability.  How is this treated?  Treatment for this condition depends on how severe the injury is. Treatment may include:  · Keeping weight off the knee until swelling and pain improve.  · Raising (elevating) the knee above the level of your heart. This helps to reduce swelling.  · Icing the knee. This helps to reduce swelling.  · Taking an NSAID, such as ibuprofen. This helps to reduce pain and  swelling.  · Using a knee brace, elastic sleeve, or crutches while the injury heals.  · Using a knee brace when participating in athletic activities.  · Doing rehab exercises (physical therapy).  · Surgery. This may be needed if:  ? Your MCL tore all the way through.  ? Your knee is unstable.  ? Your knee is not getting better with other treatments.  Follow these instructions at home:  If you have a brace or sleeve:  · Wear it as told by your health care provider. Remove it only as told by your health care provider.  · Loosen the brace or remove the sleeve if your toes tingle, become numb, or turn cold and blue.  · Keep the brace or sleeve clean.  · If the brace or sleeve is not waterproof:  ? Do not let it get wet.  ? Cover it with a watertight covering when you take a bath or shower.  Managing pain, stiffness, and swelling    · If directed, put ice on the inside area of your knee.  ? If you have a removable brace or sleeve, remove it as told by your health care provider.  ? Put ice in a plastic bag.  ? Place a towel between your skin and the bag.  ? Leave the ice on for 20 minutes, 2-3 times a day.  · Move your foot and toes often to reduce stiffness and swelling.  · Elevate the injured area above the level of your heart while you are sitting or lying down.  Activity  · Ask your health care provider when it is safe to drive if you have a brace or sleeve on your leg.  · Return to your normal activities as told by your health care provider. Ask your health care provider what activities are safe for you.  · Do exercises as told by your health care provider.  · Do not use the injured leg to support your body weight until your health care provider says that you can. Use crutches as told by your health care provider.  General instructions  · Take over-the-counter and prescription medicines only as told by your health care provider.  · Do not use any products that contain nicotine or tobacco, such as cigarettes,  e-cigarettes, and chewing tobacco. These can delay healing. If you need help quitting, ask your health care provider.  · Keep all follow-up visits as told by your health care provider. This is important.  How is this prevented?  · Warm up and stretch before being active.  · Cool down and stretch after being active.  · Give your body time to rest between periods of activity.  · Make sure to use equipment that fits you.  · Be safe and responsible while being active. This will help you avoid falls.  · Do at least 150 minutes of moderate-intensity exercise each week, such as brisk walking or water aerobics.  · Maintain physical fitness, including:  ? Strength.  ? Flexibility.  ? Cardiovascular fitness.  ? Endurance.  Contact a health care provider if:  · Your symptoms do not improve.  · Your symptoms get worse.  Summary  · An MCL sprain is a knee injury that is caused by stretching the MCL too far. The injury can involve a tear in the MCL.  · Treatment for this condition depends on how severe the injury is. It may include rest, wearing a brace, or surgery.  · Do not use the injured leg to support your body weight until your health care provider says that you can. Use crutches as told by your health care provider.  · Contact a health care provider if your symptoms do not get better or they get worse.  · Keep all follow-up visits as told by your health care provider. This is important.  This information is not intended to replace advice given to you by your health care provider. Make sure you discuss any questions you have with your health care provider.  Document Released: 12/18/2006 Document Revised: 08/07/2019 Document Reviewed: 08/07/2019  [employer] Patient Education © 2020 [employer]

## 2020-07-06 NOTE — PROGRESS NOTES
ORTHO FOLLOW UP       Subjective:    HPI:   Abida Becker is a 75 y.o. female who presents in follow-up for her left knee.  She reports that approximately 1 week ago she was walking to the mailbox and felt sharp pain along the medial aspect of her knee.  She reports that she was not wearing her knee brace at the time.  She states that after the incident she did resume wearing her knee brace and her knee is feeling much better currently.  She reports no current problems with arthritic type pain.  Her previous imaging and CT were reviewed today.      Past Medical History:   Diagnosis Date   • GERD (gastroesophageal reflux disease)    • History of recurrent UTIs    • IBS (irritable bowel syndrome)    • Osteoarthritis    • Osteoporosis    • Pulmonary embolism (CMS/HCC) 2011    after scoliosis surgery   • Vitamin D deficiency        Past Surgical History:   Procedure Laterality Date   • BREAST BIOPSY     • CHOLECYSTECTOMY     • HERNIA REPAIR     • REIMPLANT URETER IN BLADDER     • SPINAL FUSION         Social History     Occupational History   • Not on file   Tobacco Use   • Smoking status: Never Smoker   • Smokeless tobacco: Never Used   Substance and Sexual Activity   • Alcohol use: No     Frequency: Never   • Drug use: No   • Sexual activity: Never      The following portions of the patient's history were reviewed and updated as appropriate: allergies, current medications, past family history, past medical history, past social history, past surgical history and problem list.    Medications:    Current Outpatient Medications:   •  acetaminophen (TYLENOL) 500 MG tablet, Take 500 mg by mouth Every 6 (Six) Hours As Needed., Disp: , Rfl:   •  calcium carbonate (OS-STEVEN) 600 MG tablet, Take 600 mg by mouth Daily., Disp: , Rfl:   •  cholecalciferol (VITAMIN D3) 1000 units tablet, Take 1,000 Units by mouth Daily., Disp: , Rfl:   •  CRANBERRY CONCENTRATE PO, Take  by mouth Daily., Disp: , Rfl:   •  famotidine (PEPCID) 20  "MG tablet, Take 20 mg by mouth 2 (Two) Times a Day., Disp: , Rfl:   •  fexofenadine (ALLEGRA) 180 MG tablet, FEXOFENADINE  MG TABS, Disp: , Rfl:   •  gabapentin (NEURONTIN) 100 MG capsule, TAKE ONE CAPSULE BY MOUTH THREE TIMES A DAY, Disp: 90 capsule, Rfl: 0  •  loperamide (IMODIUM) 2 MG capsule, Take 2 mg by mouth 4 (Four) Times a Day As Needed for Diarrhea., Disp: , Rfl:   •  Mirabegron ER (MYRBETRIQ) 50 MG tablet sustained-release 24 hour 24 hr tablet, Daily., Disp: , Rfl:   •  Riboflavin (B2 PO), Take  by mouth Daily., Disp: , Rfl:   •  vitamin B-12 (CYANOCOBALAMIN) 1000 MCG tablet, Take 1,000 mcg by mouth Daily., Disp: , Rfl:     Allergies:  Allergies   Allergen Reactions   • Amoxicillin Other (See Comments)      unsure of type of reaction - states it was so long ago she can't remember    • Penicillins Nausea And Vomiting       Review of Systems:  Gen -no fever, chills , sweats, headache   Eyes - no irritation or discharge   ENT -  no ear pain , runny nose , sore throat , difficulty swallowing   Resp - no cough , congestion , excessive expectoration   CVS - no chest pain , palpitations.   Abd - no pain , nausea , vomiting , diarrhea   Skin - no rash , lesions.   Neuro - no dizziness    Please see HPI for any other pertinent positives.  All other systems were reviewed and are negative.       Objective   Objective:    BP 98/62 (BP Location: Right arm, Patient Position: Sitting, Cuff Size: Large Adult)   Pulse 97   Ht 170.2 cm (67\")   Wt 80.7 kg (178 lb)   BMI 27.88 kg/m²     Physical Examination:  Alert, oriented, overweight individual in no acute distress, ambulating unassisted  Left lower extremity shows no erythema, rashes, or open skin lesions. There is a minimal amount of swelling.  There is a valgus malalignment, and the patient is neurovascularly intact distally. The knee is stable to varus and valgus stress, there is no patellar maltracking or crepitus noted, and plantar and dorsiflexion is " 5/5. There is mild to moderate tenderness to palpation over the MCL but not with range of motion, which is about 0-130.         Imaging:  no diagnostic testing performed this visit            Assessment:  1. Primary osteoarthritis of left knee    2. Tear of MCL (medial collateral ligament) of knee, left, sequela    3. Overweight with body mass index (BMI) of 27 to 27.9 in adult                 Plan:  The patient has had a previous MCL tear, and it appears as though she may have reinjured her MCL.  She is going to be predisposed to reinjury due to her previous injury and do to over stretching of her MCL as she has a valgus malalignment due to to her arthritis.  She will continue with her bracing at this time and her topical medication, both of which have helped substantially.  She is not interested in treatment for her arthritis at this time.  She may follow-up as needed.             MARLO Pagan  07/06/20  14:00

## 2020-09-23 RX ORDER — GABAPENTIN 100 MG/1
CAPSULE ORAL
Qty: 90 CAPSULE | Refills: 0 | Status: SHIPPED | OUTPATIENT
Start: 2020-09-23 | End: 2021-01-11

## 2020-11-20 ENCOUNTER — OFFICE VISIT (OUTPATIENT)
Dept: FAMILY MEDICINE CLINIC | Facility: CLINIC | Age: 75
End: 2020-11-20

## 2020-11-20 VITALS
TEMPERATURE: 97.7 F | HEIGHT: 67 IN | BODY MASS INDEX: 27.94 KG/M2 | RESPIRATION RATE: 16 BRPM | SYSTOLIC BLOOD PRESSURE: 119 MMHG | DIASTOLIC BLOOD PRESSURE: 74 MMHG | OXYGEN SATURATION: 97 % | WEIGHT: 178 LBS | HEART RATE: 77 BPM

## 2020-11-20 DIAGNOSIS — N64.4 BREAST PAIN, RIGHT: Primary | ICD-10-CM

## 2020-11-20 PROCEDURE — 99213 OFFICE O/P EST LOW 20 MIN: CPT | Performed by: FAMILY MEDICINE

## 2020-11-20 NOTE — PROGRESS NOTES
Subjective   Chief Complaint   Patient presents with   • Breast Pain     Right; on and off for 1 week     Abida Becker is a 75 y.o. female.     Patient Care Team:  Darlene Matute MD as PCP - General    She is coming in today due to right breast pain.  She reports that about a week ago she started noticing discomfort and burning sensation in the right breast.  The pain seems to be getting worse and it even kept her up last night.  She pretty much feels constant discomfort, but once in a while she feels even shooting pain in her breast.  She also has warm feeling in the breast.  She feels that the pain is somehow radiating into the right side of the back.  She has ongoing chronic issues with degenerative disc disease affecting whole spine and she wonders if this might be the cause of it.  She is up to speed with her mammogram.  She denies feeling any lumps in her breast.  She denies any skin changes.  No rashes.  No nipple discharge.       The following portions of the patient's history were reviewed and updated as appropriate: allergies, current medications, past family history, past medical history, past social history, past surgical history and problem list.  Past Medical History:   Diagnosis Date   • GERD (gastroesophageal reflux disease)    • History of recurrent UTIs    • IBS (irritable bowel syndrome)    • Osteoarthritis    • Osteoporosis    • Pulmonary embolism (CMS/HCC) 2011    after scoliosis surgery   • Vitamin D deficiency      Past Surgical History:   Procedure Laterality Date   • BREAST BIOPSY     • CHOLECYSTECTOMY     • HERNIA REPAIR     • REIMPLANT URETER IN BLADDER     • SPINAL FUSION       The patient has a family history of  Family History   Problem Relation Age of Onset   • Heart failure Mother    • Cancer Father      Social History     Socioeconomic History   • Marital status: Single     Spouse name: Not on file   • Number of children: Not on file   • Years of education: Not on file   •  "Highest education level: Not on file   Tobacco Use   • Smoking status: Never Smoker   • Smokeless tobacco: Never Used   Substance and Sexual Activity   • Alcohol use: No     Frequency: Never   • Drug use: No   • Sexual activity: Never       Review of Systems   Constitutional: Negative for chills, fatigue and fever.   Genitourinary: Positive for breast pain. Negative for breast discharge and breast lump.   Skin: Negative for color change, pallor, rash and skin lesions.   Hematological: Negative for adenopathy. Does not bruise/bleed easily.     Visit Vitals  /74 (BP Location: Left arm, Patient Position: Sitting, Cuff Size: Large Adult)   Pulse 77   Temp 97.7 °F (36.5 °C) (Temporal)   Resp 16   Ht 170.2 cm (67\")   Wt 80.7 kg (178 lb)   SpO2 97%   BMI 27.88 kg/m²       Current Outpatient Medications:   •  acetaminophen (TYLENOL) 500 MG tablet, Take 500 mg by mouth Every 6 (Six) Hours As Needed., Disp: , Rfl:   •  calcium carbonate (OS-STEVEN) 600 MG tablet, Take 600 mg by mouth Daily., Disp: , Rfl:   •  cholecalciferol (VITAMIN D3) 1000 units tablet, Take 1,000 Units by mouth Daily., Disp: , Rfl:   •  CRANBERRY CONCENTRATE PO, Take  by mouth Daily., Disp: , Rfl:   •  famotidine (PEPCID) 20 MG tablet, Take 20 mg by mouth 2 (Two) Times a Day., Disp: , Rfl:   •  fexofenadine (ALLEGRA) 180 MG tablet, FEXOFENADINE  MG TABS, Disp: , Rfl:   •  gabapentin (NEURONTIN) 100 MG capsule, TAKE ONE CAPSULE BY MOUTH THREE TIMES A DAY, Disp: 90 capsule, Rfl: 0  •  loperamide (IMODIUM) 2 MG capsule, Take 2 mg by mouth 4 (Four) Times a Day As Needed for Diarrhea., Disp: , Rfl:   •  Mirabegron ER (MYRBETRIQ) 50 MG tablet sustained-release 24 hour 24 hr tablet, Daily., Disp: , Rfl:   •  Riboflavin (B2 PO), Take  by mouth Daily., Disp: , Rfl:   •  vitamin B-12 (CYANOCOBALAMIN) 1000 MCG tablet, Take 1,000 mcg by mouth Daily., Disp: , Rfl:     Objective   Physical Exam  Constitutional:       General: She is not in acute distress.     " Appearance: Normal appearance. She is well-developed. She is not diaphoretic.   HENT:      Head: Normocephalic and atraumatic.   Eyes:      Conjunctiva/sclera: Conjunctivae normal.      Pupils: Pupils are equal, round, and reactive to light.   Neck:      Musculoskeletal: Normal range of motion and neck supple.   Pulmonary:      Effort: Pulmonary effort is normal. No respiratory distress.   Chest:      Breasts:         Right: Tenderness present. No swelling, bleeding, inverted nipple, mass, nipple discharge or skin change.         Left: No swelling, bleeding, inverted nipple, mass, nipple discharge, skin change or tenderness.      Comments: There is a palpation tenderness noted on the lateral part of the right breast, I do not appreciate any lumps.  There are no skin associated changes, no rashes, no bruising.  No nipple discharge.  Musculoskeletal: Normal range of motion.   Neurological:      General: No focal deficit present.      Mental Status: She is alert and oriented to person, place, and time. Mental status is at baseline.      Cranial Nerves: No cranial nerve deficit.              No visits with results within 7 Day(s) from this visit.   Latest known visit with results is:   Lab on 09/11/2019   Component Date Value Ref Range Status   • Antinuclear Antibodies (DELLA) 09/11/2019 Negative  Negative Final   • Rheumatoid Factor Quantitative 09/11/2019 <20.0  0.0 - 20.0 IU/mL Final                 Assessment/Plan   Problems Addressed this Visit        Nervous and Auditory    Breast pain, right - Primary    Relevant Orders    Mammo Diagnostic Digital Tomosynthesis Right With CAD    US Breast Right Limited      Diagnoses       Codes Comments    Breast pain, right    -  Primary ICD-10-CM: N64.4  ICD-9-CM: 611.71         Her last screening mammogram was in July of this year and it was negative.  I will be getting a right breast diagnostic mammogram and ultrasound and further recommendations will follow.  Her symptoms of  right breast pain possibly might be originating in the back and not in the breast and this was discussed with the patient today.             Requested Prescriptions      No prescriptions requested or ordered in this encounter

## 2020-12-15 DIAGNOSIS — N64.4 BREAST PAIN, RIGHT: ICD-10-CM

## 2021-01-11 RX ORDER — GABAPENTIN 100 MG/1
CAPSULE ORAL
Qty: 90 CAPSULE | Refills: 0 | Status: SHIPPED | OUTPATIENT
Start: 2021-01-11 | End: 2021-04-25

## 2021-04-02 ENCOUNTER — OFFICE VISIT (OUTPATIENT)
Dept: FAMILY MEDICINE CLINIC | Facility: CLINIC | Age: 76
End: 2021-04-02

## 2021-04-02 VITALS
RESPIRATION RATE: 16 BRPM | TEMPERATURE: 97.1 F | HEART RATE: 86 BPM | DIASTOLIC BLOOD PRESSURE: 59 MMHG | HEIGHT: 67 IN | SYSTOLIC BLOOD PRESSURE: 118 MMHG | BODY MASS INDEX: 28.88 KG/M2 | WEIGHT: 184 LBS | OXYGEN SATURATION: 96 %

## 2021-04-02 DIAGNOSIS — M54.16 RIGHT LUMBAR RADICULOPATHY: Primary | ICD-10-CM

## 2021-04-02 PROBLEM — R14.0 ABDOMINAL BLOATING: Status: ACTIVE | Noted: 2021-04-02

## 2021-04-02 PROBLEM — G25.0 TREMOR, ESSENTIAL: Status: ACTIVE | Noted: 2021-04-02

## 2021-04-02 PROCEDURE — 99213 OFFICE O/P EST LOW 20 MIN: CPT | Performed by: FAMILY MEDICINE

## 2021-04-02 RX ORDER — METHYLPREDNISOLONE 4 MG/1
TABLET ORAL
Qty: 1 EACH | Refills: 0 | Status: SHIPPED | OUTPATIENT
Start: 2021-04-02 | End: 2021-06-22

## 2021-04-02 NOTE — PROGRESS NOTES
Subjective   Chief Complaint   Patient presents with   • Leg Pain     Right     Abida Becker is a 75 y.o. female.     Patient Care Team:  Darlene Matute MD as PCP - General    She is coming in today due to right leg symptoms.  She tells me that for the last 2 weeks or so she has been noticing some numbness and tingling feeling and also some discomfort on the side of the right leg, this is at the knee level but spreads slightly beneath the level of the knee and goes up to her thigh.  She has been having ongoing problems with chronic pain due to osteoarthritis in several joints and also advanced degenerative disc disease.  She does not feel any worsening of the lower back pain.  She is currently on gabapentin.  She typically takes only 1 capsule a day, but lately she tried to increase the dose and started taking couple of capsules a day.  She thinks that this might be helping some.       The following portions of the patient's history were reviewed and updated as appropriate: allergies, current medications, past family history, past medical history, past social history, past surgical history and problem list.  Past Medical History:   Diagnosis Date   • GERD (gastroesophageal reflux disease)    • History of recurrent UTIs    • IBS (irritable bowel syndrome)    • Osteoarthritis    • Osteoporosis    • Pulmonary embolism (CMS/HCC) 2011    after scoliosis surgery   • Vitamin D deficiency      Past Surgical History:   Procedure Laterality Date   • BREAST BIOPSY     • CHOLECYSTECTOMY     • HERNIA REPAIR     • REIMPLANT URETER IN BLADDER     • SPINAL FUSION       The patient has a family history of  Family History   Problem Relation Age of Onset   • Heart failure Mother    • Cancer Father      Social History     Socioeconomic History   • Marital status: Single     Spouse name: Not on file   • Number of children: Not on file   • Years of education: Not on file   • Highest education level: Not on file   Tobacco Use   •  "Smoking status: Never Smoker   • Smokeless tobacco: Never Used   Substance and Sexual Activity   • Alcohol use: No   • Drug use: No   • Sexual activity: Never       Review of Systems   Constitutional: Negative for activity change, fatigue and fever.   Respiratory: Negative for shortness of breath and wheezing.    Cardiovascular: Negative for chest pain, palpitations and leg swelling.   Musculoskeletal: Positive for arthralgias, back pain and gait problem.   Skin: Negative for rash.   Neurological: Positive for tremors and numbness. Negative for weakness and headache.     Visit Vitals  /59 (BP Location: Left arm, Patient Position: Sitting, Cuff Size: Adult)   Pulse 86   Temp 97.1 °F (36.2 °C) (Temporal)   Resp 16   Ht 170.2 cm (67\")   Wt 83.5 kg (184 lb)   SpO2 96%   BMI 28.82 kg/m²       Current Outpatient Medications:   •  acetaminophen (TYLENOL) 500 MG tablet, Take 500 mg by mouth Every 6 (Six) Hours As Needed., Disp: , Rfl:   •  calcium carbonate (OS-STEVEN) 600 MG tablet, Take 600 mg by mouth Daily., Disp: , Rfl:   •  cholecalciferol (VITAMIN D3) 1000 units tablet, Take 1,000 Units by mouth Daily., Disp: , Rfl:   •  CRANBERRY CONCENTRATE PO, Take  by mouth Daily., Disp: , Rfl:   •  famotidine (PEPCID) 20 MG tablet, Take 20 mg by mouth 2 (Two) Times a Day., Disp: , Rfl:   •  fexofenadine (ALLEGRA) 180 MG tablet, FEXOFENADINE  MG TABS, Disp: , Rfl:   •  gabapentin (NEURONTIN) 100 MG capsule, TAKE ONE CAPSULE BY MOUTH THREE TIMES A DAY, Disp: 90 capsule, Rfl: 0  •  loperamide (IMODIUM) 2 MG capsule, Take 2 mg by mouth 4 (Four) Times a Day As Needed for Diarrhea., Disp: , Rfl:   •  methylPREDNISolone (Medrol) 4 MG dose pack, Take as directed on package instructions., Disp: 1 each, Rfl: 0  •  Mirabegron ER (MYRBETRIQ) 50 MG tablet sustained-release 24 hour 24 hr tablet, Daily., Disp: , Rfl:   •  Riboflavin (B2 PO), Take  by mouth Daily., Disp: , Rfl:   •  vitamin B-12 (CYANOCOBALAMIN) 1000 MCG tablet, Take " 1,000 mcg by mouth Daily., Disp: , Rfl:     Objective   Physical Exam  Constitutional:       General: She is not in acute distress.     Appearance: Normal appearance. She is well-developed. She is not ill-appearing or diaphoretic.      Comments: Patient is in no distress, patient has normal voice and speech.  Normal respiratory effort.   HENT:      Head: Normocephalic and atraumatic.   Pulmonary:      Effort: Pulmonary effort is normal.   Musculoskeletal:      Cervical back: Normal range of motion and neck supple.   Neurological:      General: No focal deficit present.      Mental Status: She is alert and oriented to person, place, and time. Mental status is at baseline.   Psychiatric:         Mood and Affect: Mood normal.         Assessment/Plan   Diagnoses and all orders for this visit:    1. Right lumbar radiculopathy (Primary)  -     methylPREDNISolone (Medrol) 4 MG dose pack; Take as directed on package instructions.  Dispense: 1 each; Refill: 0      I suspect that her right leg symptoms are due to right lumbar radiculopathy.  I will be starting her on steroid pack.  She may continue gabapentin.  She has had over the years multiple x-rays done.  I advised her to monitor her symptoms and if no improvement she will need to follow-up with her spine specialist and possibly even consider epidural shots.          Return if symptoms worsen or fail to improve, for Recheck.    Requested Prescriptions     Signed Prescriptions Disp Refills   • methylPREDNISolone (Medrol) 4 MG dose pack 1 each 0     Sig: Take as directed on package instructions.

## 2021-04-25 RX ORDER — GABAPENTIN 100 MG/1
CAPSULE ORAL
Qty: 90 CAPSULE | Refills: 0 | Status: SHIPPED | OUTPATIENT
Start: 2021-04-25 | End: 2021-06-16

## 2021-06-16 RX ORDER — GABAPENTIN 100 MG/1
CAPSULE ORAL
Qty: 90 CAPSULE | Refills: 0 | Status: SHIPPED | OUTPATIENT
Start: 2021-06-16 | End: 2021-06-22 | Stop reason: SDUPTHER

## 2021-06-22 ENCOUNTER — OFFICE VISIT (OUTPATIENT)
Dept: FAMILY MEDICINE CLINIC | Facility: CLINIC | Age: 76
End: 2021-06-22

## 2021-06-22 ENCOUNTER — LAB (OUTPATIENT)
Dept: FAMILY MEDICINE CLINIC | Facility: CLINIC | Age: 76
End: 2021-06-22

## 2021-06-22 VITALS
HEIGHT: 67 IN | OXYGEN SATURATION: 95 % | WEIGHT: 182 LBS | TEMPERATURE: 97.1 F | BODY MASS INDEX: 28.56 KG/M2 | SYSTOLIC BLOOD PRESSURE: 111 MMHG | RESPIRATION RATE: 16 BRPM | DIASTOLIC BLOOD PRESSURE: 69 MMHG | HEART RATE: 85 BPM

## 2021-06-22 DIAGNOSIS — K21.9 GASTROESOPHAGEAL REFLUX DISEASE WITHOUT ESOPHAGITIS: ICD-10-CM

## 2021-06-22 DIAGNOSIS — E55.9 VITAMIN D DEFICIENCY: ICD-10-CM

## 2021-06-22 DIAGNOSIS — R53.83 OTHER FATIGUE: Primary | ICD-10-CM

## 2021-06-22 DIAGNOSIS — Z91.81 AT HIGH RISK FOR FALLS: ICD-10-CM

## 2021-06-22 DIAGNOSIS — K58.0 IRRITABLE BOWEL SYNDROME WITH DIARRHEA: ICD-10-CM

## 2021-06-22 DIAGNOSIS — M15.9 OSTEOARTHRITIS, GENERALIZED: ICD-10-CM

## 2021-06-22 PROBLEM — R79.9 ABNORMAL FINDING OF BLOOD CHEMISTRY, UNSPECIFIED: Status: ACTIVE | Noted: 2021-06-22

## 2021-06-22 LAB
25(OH)D3 SERPL-MCNC: 42.1 NG/ML
ALBUMIN SERPL-MCNC: 4.4 G/DL (ref 3.5–5.2)
ALBUMIN/GLOB SERPL: 2.1 G/DL
ALP SERPL-CCNC: 73 U/L (ref 39–117)
ALT SERPL W P-5'-P-CCNC: 13 U/L (ref 1–33)
ANION GAP SERPL CALCULATED.3IONS-SCNC: 8.7 MMOL/L (ref 5–15)
AST SERPL-CCNC: 14 U/L (ref 1–32)
BASOPHILS # BLD AUTO: 0.04 10*3/MM3 (ref 0–0.2)
BASOPHILS NFR BLD AUTO: 0.7 % (ref 0–1.5)
BILIRUB SERPL-MCNC: 1.1 MG/DL (ref 0–1.2)
BILIRUB UR QL STRIP: NEGATIVE
BUN SERPL-MCNC: 21 MG/DL (ref 8–23)
BUN/CREAT SERPL: 33.9 (ref 7–25)
CALCIUM SPEC-SCNC: 9.2 MG/DL (ref 8.6–10.5)
CHLORIDE SERPL-SCNC: 107 MMOL/L (ref 98–107)
CHOLEST SERPL-MCNC: 149 MG/DL (ref 0–200)
CLARITY UR: ABNORMAL
CO2 SERPL-SCNC: 27.3 MMOL/L (ref 22–29)
COLOR UR: ABNORMAL
CREAT SERPL-MCNC: 0.62 MG/DL (ref 0.57–1)
DEPRECATED RDW RBC AUTO: 48.2 FL (ref 37–54)
EOSINOPHIL # BLD AUTO: 0.11 10*3/MM3 (ref 0–0.4)
EOSINOPHIL NFR BLD AUTO: 2 % (ref 0.3–6.2)
ERYTHROCYTE [DISTWIDTH] IN BLOOD BY AUTOMATED COUNT: 13.9 % (ref 12.3–15.4)
GFR SERPL CREATININE-BSD FRML MDRD: 94 ML/MIN/1.73
GLOBULIN UR ELPH-MCNC: 2.1 GM/DL
GLUCOSE SERPL-MCNC: 88 MG/DL (ref 65–99)
GLUCOSE UR STRIP-MCNC: NEGATIVE MG/DL
HCT VFR BLD AUTO: 40 % (ref 34–46.6)
HDLC SERPL-MCNC: 72 MG/DL (ref 40–60)
HGB BLD-MCNC: 12.7 G/DL (ref 12–15.9)
HGB UR QL STRIP.AUTO: NEGATIVE
IMM GRANULOCYTES # BLD AUTO: 0.01 10*3/MM3 (ref 0–0.05)
IMM GRANULOCYTES NFR BLD AUTO: 0.2 % (ref 0–0.5)
KETONES UR QL STRIP: ABNORMAL
LDLC SERPL CALC-MCNC: 62 MG/DL (ref 0–100)
LDLC/HDLC SERPL: 0.86 {RATIO}
LEUKOCYTE ESTERASE UR QL STRIP.AUTO: NEGATIVE
LYMPHOCYTES # BLD AUTO: 1.58 10*3/MM3 (ref 0.7–3.1)
LYMPHOCYTES NFR BLD AUTO: 28.6 % (ref 19.6–45.3)
MCH RBC QN AUTO: 29.9 PG (ref 26.6–33)
MCHC RBC AUTO-ENTMCNC: 31.8 G/DL (ref 31.5–35.7)
MCV RBC AUTO: 94.1 FL (ref 79–97)
MONOCYTES # BLD AUTO: 0.56 10*3/MM3 (ref 0.1–0.9)
MONOCYTES NFR BLD AUTO: 10.1 % (ref 5–12)
NEUTROPHILS NFR BLD AUTO: 3.22 10*3/MM3 (ref 1.7–7)
NEUTROPHILS NFR BLD AUTO: 58.4 % (ref 42.7–76)
NITRITE UR QL STRIP: NEGATIVE
NRBC BLD AUTO-RTO: 0 /100 WBC (ref 0–0.2)
PH UR STRIP.AUTO: 5.5 [PH] (ref 5–8)
PLATELET # BLD AUTO: 314 10*3/MM3 (ref 140–450)
PMV BLD AUTO: 11.3 FL (ref 6–12)
POTASSIUM SERPL-SCNC: 4.3 MMOL/L (ref 3.5–5.2)
PROT SERPL-MCNC: 6.5 G/DL (ref 6–8.5)
PROT UR QL STRIP: ABNORMAL
RBC # BLD AUTO: 4.25 10*6/MM3 (ref 3.77–5.28)
SODIUM SERPL-SCNC: 143 MMOL/L (ref 136–145)
SP GR UR STRIP: >=1.03 (ref 1–1.03)
TRIGL SERPL-MCNC: 75 MG/DL (ref 0–150)
TSH SERPL DL<=0.05 MIU/L-ACNC: 1.36 UIU/ML (ref 0.27–4.2)
UROBILINOGEN UR QL STRIP: ABNORMAL
VIT B12 BLD-MCNC: 632 PG/ML (ref 211–946)
VLDLC SERPL-MCNC: 15 MG/DL (ref 5–40)
WBC # BLD AUTO: 5.52 10*3/MM3 (ref 3.4–10.8)

## 2021-06-22 PROCEDURE — 80053 COMPREHEN METABOLIC PANEL: CPT | Performed by: FAMILY MEDICINE

## 2021-06-22 PROCEDURE — 36415 COLL VENOUS BLD VENIPUNCTURE: CPT | Performed by: FAMILY MEDICINE

## 2021-06-22 PROCEDURE — 84443 ASSAY THYROID STIM HORMONE: CPT | Performed by: FAMILY MEDICINE

## 2021-06-22 PROCEDURE — 85025 COMPLETE CBC W/AUTO DIFF WBC: CPT | Performed by: FAMILY MEDICINE

## 2021-06-22 PROCEDURE — 82306 VITAMIN D 25 HYDROXY: CPT | Performed by: FAMILY MEDICINE

## 2021-06-22 PROCEDURE — 80061 LIPID PANEL: CPT | Performed by: FAMILY MEDICINE

## 2021-06-22 PROCEDURE — 82607 VITAMIN B-12: CPT | Performed by: FAMILY MEDICINE

## 2021-06-22 PROCEDURE — 99214 OFFICE O/P EST MOD 30 MIN: CPT | Performed by: FAMILY MEDICINE

## 2021-06-22 PROCEDURE — 81003 URINALYSIS AUTO W/O SCOPE: CPT | Performed by: FAMILY MEDICINE

## 2021-06-22 RX ORDER — GABAPENTIN 100 MG/1
200 CAPSULE ORAL 2 TIMES DAILY
Qty: 120 CAPSULE | Refills: 0 | Status: SHIPPED | OUTPATIENT
Start: 2021-06-22 | End: 2021-07-18

## 2021-06-22 RX ORDER — DICYCLOMINE HYDROCHLORIDE 10 MG/1
10 CAPSULE ORAL 3 TIMES DAILY PRN
Qty: 90 CAPSULE | Refills: 0 | Status: SHIPPED | OUTPATIENT
Start: 2021-06-22 | End: 2021-07-18

## 2021-06-22 NOTE — PROGRESS NOTES
Subjective   Chief Complaint   Patient presents with   • Fatigue   • GI Problem   • Osteoarthritis   • Pain     Abida Becker is a 76 y.o. female.     Patient Care Team:  Darlene Matute MD as PCP - General  [place] MD as Consulting Physician (Urology)    She is coming in today to discuss some of her symptoms and concerns.  For quite some time she has noted some fatigue and tiredness.  She has been having ongoing issues with chronic pain and aches in multiple joints and sometimes even in her muscles.  This definitely affects her life and the way she walks.  She is afraid of falling and she took few falls even in the past.  She was previously diagnosed with osteoarthritis in multiple joints as well as degenerative disc disease.  She currently takes gabapentin 100 mg twice a day and she wonders if this actually can be increased.  She also wants to talk about her ongoing issues with bowel movements.  For many years she has noted some stool urgency and even at times not being able to make it to the bathroom.  She was previously evaluated by GI and had colonoscopy in the past.  She currently does not take any medication, but she was advised by GI to take some Lomotil over-the-counter, however that throws her into the constipation and she does not like to deal with that.       The following portions of the patient's history were reviewed and updated as appropriate: allergies, current medications, past family history, past medical history, past social history, past surgical history and problem list.  Past Medical History:   Diagnosis Date   • GERD (gastroesophageal reflux disease)    • History of recurrent UTIs    • IBS (irritable bowel syndrome)    • Osteoarthritis    • Osteoporosis    • Pulmonary embolism (CMS/HCC) 2011    after scoliosis surgery   • Vitamin D deficiency      Past Surgical History:   Procedure Laterality Date   • BREAST BIOPSY     • CHOLECYSTECTOMY     • COLONOSCOPY  01/18/2018   • HERNIA  "REPAIR     • REIMPLANT URETER IN BLADDER     • SPINAL FUSION       The patient has a family history of  Family History   Problem Relation Age of Onset   • Heart failure Mother    • Cancer Father      Social History     Socioeconomic History   • Marital status: Single     Spouse name: Not on file   • Number of children: Not on file   • Years of education: Not on file   • Highest education level: Not on file   Tobacco Use   • Smoking status: Never Smoker   • Smokeless tobacco: Never Used   Substance and Sexual Activity   • Alcohol use: No   • Drug use: No   • Sexual activity: Never       Review of Systems   Constitutional: Positive for fatigue. Negative for activity change and fever.   Respiratory: Negative for cough, shortness of breath and wheezing.    Cardiovascular: Negative for chest pain, palpitations and leg swelling.   Gastrointestinal: Positive for diarrhea. Negative for constipation and indigestion.   Musculoskeletal: Positive for arthralgias, back pain, gait problem and neck pain.   Skin: Negative for color change, dry skin and rash.   Neurological: Negative for tremors and headache.     Visit Vitals  /69 (BP Location: Left arm, Patient Position: Sitting, Cuff Size: Adult)   Pulse 85   Temp 97.1 °F (36.2 °C) (Temporal)   Resp 16   Ht 170.2 cm (67\")   Wt 82.6 kg (182 lb)   SpO2 95%   BMI 28.51 kg/m²       Current Outpatient Medications:   •  acetaminophen (TYLENOL) 500 MG tablet, Take 500 mg by mouth Every 6 (Six) Hours As Needed., Disp: , Rfl:   •  calcium carbonate (OS-STEVEN) 600 MG tablet, Take 600 mg by mouth Daily., Disp: , Rfl:   •  cholecalciferol (VITAMIN D3) 1000 units tablet, Take 1,000 Units by mouth Daily., Disp: , Rfl:   •  CRANBERRY CONCENTRATE PO, Take  by mouth Daily., Disp: , Rfl:   •  dicyclomine (Bentyl) 10 MG capsule, Take 1 capsule by mouth 3 (Three) Times a Day As Needed (IBS)., Disp: 90 capsule, Rfl: 0  •  famotidine (PEPCID) 20 MG tablet, Take 20 mg by mouth 2 (Two) Times a Day., " Disp: , Rfl:   •  fexofenadine (ALLEGRA) 180 MG tablet, FEXOFENADINE  MG TABS, Disp: , Rfl:   •  gabapentin (NEURONTIN) 100 MG capsule, Take 2 capsules by mouth 2 (two) times a day for 30 days., Disp: 120 capsule, Rfl: 0  •  loperamide (IMODIUM) 2 MG capsule, Take 2 mg by mouth 4 (Four) Times a Day As Needed for Diarrhea., Disp: , Rfl:   •  Mirabegron ER (MYRBETRIQ) 50 MG tablet sustained-release 24 hour 24 hr tablet, Daily., Disp: , Rfl:   •  Riboflavin (B2 PO), Take  by mouth Daily., Disp: , Rfl:   •  vitamin B-12 (CYANOCOBALAMIN) 1000 MCG tablet, Take 1,000 mcg by mouth Daily., Disp: , Rfl:     Objective   Physical Exam  Vitals and nursing note reviewed.   Constitutional:       General: She is not in acute distress.     Appearance: Normal appearance. She is well-developed. She is not ill-appearing.   HENT:      Head: Normocephalic and atraumatic.   Cardiovascular:      Rate and Rhythm: Normal rate and regular rhythm.      Heart sounds: Normal heart sounds. No murmur heard.   No gallop.    Pulmonary:      Effort: Pulmonary effort is normal. No respiratory distress.      Breath sounds: Normal breath sounds. No wheezing, rhonchi or rales.   Chest:      Chest wall: No tenderness.   Musculoskeletal:      Cervical back: Normal range of motion and neck supple.   Neurological:      General: No focal deficit present.      Mental Status: She is alert and oriented to person, place, and time. Mental status is at baseline.   Psychiatric:         Mood and Affect: Mood normal.         No visits with results within 7 Day(s) from this visit.   Latest known visit with results is:   Lab on 09/11/2019   Component Date Value Ref Range Status   • Antinuclear Antibodies (DELLA) 09/11/2019 Negative  Negative Final   • Rheumatoid Factor Quantitative 09/11/2019 <20.0  0.0 - 20.0 IU/mL Final         Assessment/Plan   Diagnoses and all orders for this visit:    1. Other fatigue (Primary)  -     CBC Auto Differential  -      Comprehensive Metabolic Panel  -     Lipid Panel  -     TSH  -     Urinalysis With Culture If Indicated - Urine, Clean Catch  -     Vitamin B12    2. Gastroesophageal reflux disease without esophagitis  -     CBC Auto Differential  -     Comprehensive Metabolic Panel  -     Lipid Panel  -     TSH    3. Vitamin D deficiency  -     Vitamin D 25 Hydroxy    4. Irritable bowel syndrome with diarrhea  -     dicyclomine (Bentyl) 10 MG capsule; Take 1 capsule by mouth 3 (Three) Times a Day As Needed (IBS).  Dispense: 90 capsule; Refill: 0    5. Osteoarthritis, generalized  -     gabapentin (NEURONTIN) 100 MG capsule; Take 2 capsules by mouth 2 (two) times a day for 30 days.  Dispense: 120 capsule; Refill: 0  -     Ambulatory Referral to Physical Therapy Evaluate and treat    6. At high risk for falls  -     Ambulatory Referral to Physical Therapy Evaluate and treat      I reviewed her medical problems and her medications.  Considering her reported fatigue I will be getting blood work to rule out medical causes of it.  Her pain is not well controlled.  I will be increasing her gabapentin to 200 mg twice a day and even that might need to be increased down the road.  I will be also referring her to physical therapy as she definitely is in a very high risk of fall.  We also talked at length about her GI issues due to irritable bowel syndrome.  I reviewed her records and she is up to speed with her colonoscopy and the last one was in 1/2018.  I will be starting her on dicyclomine to see if this will help with her symptoms.  I asked her to schedule a follow-up appointment with me for reevaluation on her issues in 1 month.          Return in about 4 weeks (around 7/20/2021) for Medicare Wellness.    Requested Prescriptions     Signed Prescriptions Disp Refills   • gabapentin (NEURONTIN) 100 MG capsule 120 capsule 0     Sig: Take 2 capsules by mouth 2 (two) times a day for 30 days.   • dicyclomine (Bentyl) 10 MG capsule 90 capsule  0     Sig: Take 1 capsule by mouth 3 (Three) Times a Day As Needed (IBS).

## 2021-07-18 DIAGNOSIS — K58.0 IRRITABLE BOWEL SYNDROME WITH DIARRHEA: ICD-10-CM

## 2021-07-18 DIAGNOSIS — M15.9 OSTEOARTHRITIS, GENERALIZED: ICD-10-CM

## 2021-07-18 RX ORDER — GABAPENTIN 100 MG/1
CAPSULE ORAL
Qty: 90 CAPSULE | Refills: 0 | Status: SHIPPED | OUTPATIENT
Start: 2021-07-18 | End: 2021-07-20

## 2021-07-18 RX ORDER — DICYCLOMINE HYDROCHLORIDE 10 MG/1
CAPSULE ORAL
Qty: 90 CAPSULE | Refills: 0 | Status: SHIPPED | OUTPATIENT
Start: 2021-07-18 | End: 2022-01-31

## 2021-07-20 ENCOUNTER — OFFICE VISIT (OUTPATIENT)
Dept: FAMILY MEDICINE CLINIC | Facility: CLINIC | Age: 76
End: 2021-07-20

## 2021-07-20 VITALS
RESPIRATION RATE: 16 BRPM | WEIGHT: 183 LBS | BODY MASS INDEX: 28.72 KG/M2 | DIASTOLIC BLOOD PRESSURE: 71 MMHG | SYSTOLIC BLOOD PRESSURE: 114 MMHG | OXYGEN SATURATION: 96 % | HEIGHT: 67 IN | TEMPERATURE: 97.1 F | HEART RATE: 87 BPM

## 2021-07-20 DIAGNOSIS — M15.9 OSTEOARTHRITIS, GENERALIZED: ICD-10-CM

## 2021-07-20 DIAGNOSIS — K58.0 IRRITABLE BOWEL SYNDROME WITH DIARRHEA: ICD-10-CM

## 2021-07-20 DIAGNOSIS — Z00.00 MEDICARE ANNUAL WELLNESS VISIT, SUBSEQUENT: Primary | ICD-10-CM

## 2021-07-20 PROBLEM — N64.4 BREAST PAIN, RIGHT: Status: RESOLVED | Noted: 2020-11-20 | Resolved: 2021-07-20

## 2021-07-20 PROBLEM — R14.0 ABDOMINAL BLOATING: Status: RESOLVED | Noted: 2021-04-02 | Resolved: 2021-07-20

## 2021-07-20 PROBLEM — J01.01 ACUTE RECURRENT MAXILLARY SINUSITIS: Status: RESOLVED | Noted: 2019-12-30 | Resolved: 2021-07-20

## 2021-07-20 PROCEDURE — G0439 PPPS, SUBSEQ VISIT: HCPCS | Performed by: FAMILY MEDICINE

## 2021-07-20 RX ORDER — GABAPENTIN 300 MG/1
300 CAPSULE ORAL 2 TIMES DAILY
Qty: 60 CAPSULE | Refills: 0 | Status: SHIPPED | OUTPATIENT
Start: 2021-07-20 | End: 2021-08-25

## 2021-07-20 NOTE — PROGRESS NOTES
Subsequent Medicare Wellness Visit   The ABC's of the Annual Wellness Visit    Chief Complaint   Patient presents with   • Medicare Wellness-subsequent   • Follow-up no new medications       HPI:  Abida Becker, -1945, is a 76 y.o. female who presents for a Subsequent Medicare Wellness Visit.  She still has ongoing issues with chronic pain due to osteoarthritis and degenerative disc disease.  She has had issues with balance and even prior falls.  She recently started in physical therapy and that seems to be helping.  She is currently on gabapentin and about a month ago her dose was increased to 200 mg twice a day and she is not sure if this is helping that much.  She was also given prescription for dicyclomine at her last appointment and she tells me that this is actually helping with her GI issues and IBS with diarrhea symptoms.  She denies any major memory problems or depressive symptoms. Patient does not report any chest pain, shortness of breath, dizziness, nausea, vomiting, or diarrhea, visual issues, headaches, numbness or tingling. No urinary issues reported like urgency, frequency, or discomfort upon urination.  No significant weight changes reported.  No swelling reported.  No rashes or any other skin issues reported. No emotional issues or insomnia.      Recent Hospitalizations:  No hospitalization(s) within the last year..    Current Medical Providers:  Patient Care Team:  Darlene Matute MD as PCP - General  [place] MD as Consulting Physician (Urology)  Banet, Duane Edward, MD as Consulting Physician (Dermatology)    Health Habits and Functional and Cognitive Screening and Depression Screening:  Functional & Cognitive Status 2021   Do you have difficulty preparing food and eating? No   Do you have difficulty bathing yourself, getting dressed or grooming yourself? No   Do you have difficulty using the toilet? No   Do you have difficulty moving around from place to place? No   Do  you have trouble with steps or getting out of a bed or a chair? No   Current Diet Well Balanced Diet   Dental Exam Up to date   Eye Exam Up to date   Exercise (times per week) 0 times per week   Current Exercises Include No Regular Exercise   Current Exercise Activities Include -   Do you need help using the phone?  No   Are you deaf or do you have serious difficulty hearing?  No   Do you need help with transportation? No   Do you need help shopping? No   Do you need help preparing meals?  No   Do you need help with housework?  No   Do you need help with laundry? No   Do you need help taking your medications? No   Do you need help managing money? No   Do you ever drive or ride in a car without wearing a seat belt? No   Have you felt unusual stress, anger or loneliness in the last month? No   Who do you live with? Alone   If you need help, do you have trouble finding someone available to you? No   Have you been bothered in the last four weeks by sexual problems? -   Do you have difficulty concentrating, remembering or making decisions? No       Compared to one year ago, the patient feels her physical health is the same and her mental health is the same.    Depression Screen:  PHQ-2/PHQ-9 Depression Screening 6/22/2021   Little interest or pleasure in doing things 0   Feeling down, depressed, or hopeless 0   Total Score 0         Past Medical/Family/Social History:  The following portions of the patient's history were reviewed and updated as appropriate: allergies, current medications, past family history, past medical history, past social history, past surgical history and problem list.    Allergies   Allergen Reactions   • Amoxicillin Other (See Comments)      unsure of type of reaction - states it was so long ago she can't remember    • Penicillins Nausea And Vomiting         Current Outpatient Medications:   •  acetaminophen (TYLENOL) 500 MG tablet, Take 500 mg by mouth Every 6 (Six) Hours As Needed., Disp: , Rfl:    •  calcium carbonate (OS-STEVEN) 600 MG tablet, Take 600 mg by mouth Daily., Disp: , Rfl:   •  cholecalciferol (VITAMIN D3) 1000 units tablet, Take 1,000 Units by mouth Daily., Disp: , Rfl:   •  CRANBERRY CONCENTRATE PO, Take  by mouth Daily., Disp: , Rfl:   •  dicyclomine (BENTYL) 10 MG capsule, TAKE ONE CAPSULE BY MOUTH THREE TIMES A DAY AS NEEDED FOR IBS, Disp: 90 capsule, Rfl: 0  •  famotidine (PEPCID) 20 MG tablet, Take 20 mg by mouth 2 (Two) Times a Day., Disp: , Rfl:   •  fexofenadine (ALLEGRA) 180 MG tablet, FEXOFENADINE  MG TABS, Disp: , Rfl:   •  gabapentin (NEURONTIN) 300 MG capsule, Take 1 capsule by mouth 2 (two) times a day., Disp: 60 capsule, Rfl: 0  •  loperamide (IMODIUM) 2 MG capsule, Take 2 mg by mouth 4 (Four) Times a Day As Needed for Diarrhea., Disp: , Rfl:   •  Mirabegron ER (MYRBETRIQ) 50 MG tablet sustained-release 24 hour 24 hr tablet, Daily., Disp: , Rfl:   •  Riboflavin (B2 PO), Take  by mouth Daily., Disp: , Rfl:   •  vitamin B-12 (CYANOCOBALAMIN) 1000 MCG tablet, Take 1,000 mcg by mouth Daily., Disp: , Rfl:     Aspirin use counseling: Does not need ASA (and currently is not on it)    Current medication list contains no high risk medications.  No harmful drug interactions have been identified.     Family History   Problem Relation Age of Onset   • Heart failure Mother    • Cancer Father        Social History     Tobacco Use   • Smoking status: Never Smoker   • Smokeless tobacco: Never Used   Substance Use Topics   • Alcohol use: No       Past Surgical History:   Procedure Laterality Date   • BREAST BIOPSY     • CHOLECYSTECTOMY     • COLONOSCOPY  01/18/2018   • HERNIA REPAIR     • REIMPLANT URETER IN BLADDER     • SPINAL FUSION         Patient Active Problem List   Diagnosis   • Allergic rhinitis   • Gait abnormality   • Gastroesophageal reflux disease   • Hip pain, right   • Low back pain   • Osteopenia   • Recurrent urinary tract infection   • Scoliosis   • Vaginitis, atrophic   •  "Medicare annual wellness visit, subsequent   • Balance problem   • Vitamin D deficiency   • Acute pain of left knee   • Primary osteoarthritis of left knee   • Tear of MCL (medial collateral ligament) of knee, left, subsequent encounter   • Overweight (BMI 25.0-29.9)   • Visit for screening mammogram   • Postmenopausal   • Chronic pain of left knee   • Right lumbar radiculopathy   • Tremor, essential   • Other fatigue   • Abnormal finding of blood chemistry, unspecified    • Osteoarthritis, generalized   • Irritable bowel syndrome with diarrhea       Review of Systems   Constitutional: Negative for activity change, fatigue and fever.   Respiratory: Negative for cough, shortness of breath and wheezing.    Cardiovascular: Negative for chest pain, palpitations and leg swelling.   Gastrointestinal: Negative for constipation, diarrhea and indigestion.   Musculoskeletal: Positive for arthralgias and back pain. Negative for gait problem.   Skin: Negative for color change, dry skin and rash.   Neurological: Negative for tremors and headache.       Objective     Vitals:    07/20/21 1053   BP: 114/71   BP Location: Left arm   Patient Position: Sitting   Cuff Size: Adult   Pulse: 87   Resp: 16   Temp: 97.1 °F (36.2 °C)   TempSrc: Temporal   SpO2: 96%   Weight: 83 kg (183 lb)   Height: 170.2 cm (67\")       Patient's Body mass index is 28.66 kg/m². indicating that she is overweight (BMI 25-29.9). Obesity-related health conditions include the following: dyslipidemias and osteoarthritis. Obesity is unchanged. BMI is is above average; BMI management plan is completed. We discussed portion control and increasing exercise..      No exam data present    The patient has no evidence of cognitve impairment.     Physical Exam  Constitutional:       General: She is not in acute distress.     Appearance: Normal appearance. She is well-developed. She is not ill-appearing or diaphoretic.      Comments: Patient is in no distress, patient has " normal voice and speech.  Normal respiratory effort.   HENT:      Head: Normocephalic and atraumatic.   Pulmonary:      Effort: Pulmonary effort is normal.   Musculoskeletal:      Cervical back: Normal range of motion and neck supple.   Neurological:      General: No focal deficit present.      Mental Status: She is alert and oriented to person, place, and time. Mental status is at baseline.   Psychiatric:         Mood and Affect: Mood normal.         Recent Lab Results:     Lab Results   Component Value Date    CHOL 149 06/22/2021    TRIG 75 06/22/2021    HDL 72 (H) 06/22/2021    VLDL 15 06/22/2021    LDLHDL 0.86 06/22/2021       Assessment/Plan   Age-appropriate Screening Schedule:  Refer to the list below for future screening recommendations based on patient's age, sex and/or medical conditions.      Health Maintenance   Topic Date Due   • ZOSTER VACCINE (2 of 3) 10/27/2013   • INFLUENZA VACCINE  10/01/2021   • DXA SCAN  07/13/2022   • MAMMOGRAM  12/14/2022   • TDAP/TD VACCINES (2 - Td or Tdap) 12/12/2028       Medicare Risks and Personalized Health Plan:  Advance Directive Discussion  Breast Cancer/Mammogram Screening  Cardiovascular risk  Chronic Pain   Colon Cancer Screening  Dementia/Memory   Depression/Dysphoria  Diabetic Lab Screening   Fall Risk  Immunizations Discussed/Encouraged (specific immunizations; Shingrix )      CMS-Preventive Services Quick Reference  Medicare Preventive Services Addressed:  Annual Wellness Visit (AWV)  Bone Density Measurements  Cardiovascular Disease Screening Tests (may do this order every 5 years in beneficiaries without signs or symptoms of cardiovascular disease)  Colorectal Cancer Screening, Colonoscopy  Screening Mammography     Advance Care Planning:  ACP discussion was held with the patient during this visit. Patient has an advance directive in EMR which is still valid.     Diagnoses and all orders for this visit:    1. Medicare annual wellness visit, subsequent  (Primary)    2. Irritable bowel syndrome with diarrhea    3. Osteoarthritis, generalized    Other orders  -     gabapentin (NEURONTIN) 300 MG capsule; Take 1 capsule by mouth 2 (two) times a day.  Dispense: 60 capsule; Refill: 0    Medicare wellness exam was done today.  I reviewed her medical problems and her medications.  Considering her ongoing issues with chronic pain I will be further increasing gabapentin to 300 mg twice a day.  I reviewed and discuss her recent labs with the patient.  I also reviewed her health maintenance.  Her last colonoscopy was in 1/2018.  Her last mammogram was in 12/2020 and her last DEXA scan was in 7/2020.  Her immunization was reviewed as well.  She is up to speed with her both pneumonia shots and she already completed her COVID-19 vaccination series.  We also discussed Shingrix, which was recommended and she was advised to check on that with her pharmacy.  Healthy lifestyle was reinforced.  She definitely is in a high risk of fall and fall prevention was stressed.  She will continue physical therapy to work on her chronic pain and risk of fall.    An After Visit Summary and PPPS with all of these plans were given to the patient.      Follow Up:  Return in about 1 year (around 7/20/2022) for Medicare Wellness.        Requested Prescriptions     Signed Prescriptions Disp Refills   • gabapentin (NEURONTIN) 300 MG capsule 60 capsule 0     Sig: Take 1 capsule by mouth 2 (two) times a day.

## 2021-07-20 NOTE — PATIENT INSTRUCTIONS

## 2021-08-25 RX ORDER — GABAPENTIN 300 MG/1
CAPSULE ORAL
Qty: 60 CAPSULE | Refills: 0 | Status: SHIPPED | OUTPATIENT
Start: 2021-08-25 | End: 2021-09-26

## 2021-09-26 RX ORDER — GABAPENTIN 300 MG/1
CAPSULE ORAL
Qty: 180 CAPSULE | Refills: 0 | Status: SHIPPED | OUTPATIENT
Start: 2021-09-26 | End: 2021-10-27

## 2021-10-27 RX ORDER — GABAPENTIN 300 MG/1
CAPSULE ORAL
Qty: 180 CAPSULE | Refills: 0 | Status: SHIPPED | OUTPATIENT
Start: 2021-10-27 | End: 2022-04-16

## 2021-12-22 ENCOUNTER — TELEPHONE (OUTPATIENT)
Dept: FAMILY MEDICINE CLINIC | Facility: CLINIC | Age: 76
End: 2021-12-22

## 2021-12-22 NOTE — TELEPHONE ENCOUNTER
Thank you for clarifying.  So I am taking that as she is going to call her urology office now, correct question

## 2021-12-22 NOTE — TELEPHONE ENCOUNTER
Which office is she referring to?  Who tested her urine sample?  I have not seen her since 7/2021.  As she gone to see her urologist or urgent care?

## 2021-12-22 NOTE — TELEPHONE ENCOUNTER
Caller: Abida Becker    Relationship: Self    Best call back number:     [phone]    What is the best time to reach you: ANYTIME    Who are you requesting to speak with (clinical staff, provider,  specific staff member):     Do you know the name of the person who called:     What was the call regarding: PATIENT IS CALLING IN STATING THAT SHE HAS HAD URINE TESTED FOR A UTI AND THE OFFICE IS TELLING HER THAT HER SAMPLE HAS NOT BEEN READ YET.  SHE SAID SHE IS FALLING APART AND NEEDS MEDICATION FOR THE PAIN SHE IS HAVING.  SHE WANTS TO BE CALLED BACK TO DISCUSS.  PATIENT SAID THE URINE WAS TESTED A WEEK BEFORE LAST ON 12/14/21  SHE USES THE [email or web address] ON [facility].        Do you require a callback: YES

## 2022-01-24 ENCOUNTER — OFFICE VISIT (OUTPATIENT)
Dept: ORTHOPEDIC SURGERY | Facility: CLINIC | Age: 77
End: 2022-01-24

## 2022-01-24 VITALS
SYSTOLIC BLOOD PRESSURE: 114 MMHG | HEIGHT: 67 IN | BODY MASS INDEX: 30.51 KG/M2 | WEIGHT: 194.4 LBS | HEART RATE: 89 BPM | DIASTOLIC BLOOD PRESSURE: 70 MMHG

## 2022-01-24 DIAGNOSIS — M25.561 CHRONIC PAIN OF BOTH KNEES: ICD-10-CM

## 2022-01-24 DIAGNOSIS — M17.0 PRIMARY OSTEOARTHRITIS OF BOTH KNEES: Primary | ICD-10-CM

## 2022-01-24 DIAGNOSIS — M25.562 CHRONIC PAIN OF BOTH KNEES: ICD-10-CM

## 2022-01-24 DIAGNOSIS — G89.29 CHRONIC PAIN OF BOTH KNEES: ICD-10-CM

## 2022-01-24 DIAGNOSIS — E66.9 OBESITY (BMI 30.0-34.9): ICD-10-CM

## 2022-01-24 PROBLEM — E66.811 OBESITY (BMI 30.0-34.9): Status: ACTIVE | Noted: 2022-01-24

## 2022-01-24 PROCEDURE — 20610 DRAIN/INJ JOINT/BURSA W/O US: CPT | Performed by: PHYSICIAN ASSISTANT

## 2022-01-24 PROCEDURE — 99213 OFFICE O/P EST LOW 20 MIN: CPT | Performed by: PHYSICIAN ASSISTANT

## 2022-01-24 RX ORDER — NAPROXEN SODIUM 220 MG
220 TABLET ORAL DAILY PRN
COMMUNITY
End: 2022-04-22

## 2022-01-24 RX ORDER — LEVOFLOXACIN 500 MG/1
TABLET, FILM COATED ORAL
COMMUNITY
Start: 2022-01-13 | End: 2022-04-22

## 2022-01-24 RX ORDER — LIDOCAINE HYDROCHLORIDE 10 MG/ML
2 INJECTION, SOLUTION EPIDURAL; INFILTRATION; INTRACAUDAL; PERINEURAL
Status: COMPLETED | OUTPATIENT
Start: 2022-01-24 | End: 2022-01-24

## 2022-01-24 RX ORDER — TRIAMCINOLONE ACETONIDE 40 MG/ML
80 INJECTION, SUSPENSION INTRA-ARTICULAR; INTRAMUSCULAR
Status: COMPLETED | OUTPATIENT
Start: 2022-01-24 | End: 2022-01-24

## 2022-01-24 RX ADMIN — LIDOCAINE HYDROCHLORIDE 2 ML: 10 INJECTION, SOLUTION EPIDURAL; INFILTRATION; INTRACAUDAL; PERINEURAL at 12:42

## 2022-01-24 RX ADMIN — TRIAMCINOLONE ACETONIDE 80 MG: 40 INJECTION, SUSPENSION INTRA-ARTICULAR; INTRAMUSCULAR at 12:42

## 2022-01-24 NOTE — PROGRESS NOTES
ORTHOPEDIC VISIT    Referring Provider: No ref. provider found  Primary Care Provider: Darlene Matute MD         Subjective:  Chief Complaint:  Chief Complaint   Patient presents with   • Left Knee - Follow-up   • Right Knee - Follow-up, Initial Evaluation     X 6 WEEKS, NKI       HPI:  Abida Becker is a 76 y.o. female who presents for initial visit for bilateral knee pain ongoing for the last few months.  She denies any specific injury.  She describes both a dull, achy pain, as well as an intermittent sharp, shooting pain, mainly located on the lateral aspect of her knees.  The pain does increase at night.  Pain does radiate up her legs.  She denies any numbness or tingling.  She does report popping, but denies any other mechanical symptoms.  The pain does not increase with weightbearing.  She is using Aleve, which does help significantly with her discomfort.  She denies any previous history of surgery or injections in the knee.  She is using a knee brace on the left knee.    Past Medical History:   Diagnosis Date   • GERD (gastroesophageal reflux disease)    • History of recurrent UTIs    • IBS (irritable bowel syndrome)    • Osteoarthritis    • Osteoporosis    • Pulmonary embolism (HCC) 2011    after scoliosis surgery   • Vitamin D deficiency        Past Surgical History:   Procedure Laterality Date   • BREAST BIOPSY     • CHOLECYSTECTOMY     • COLONOSCOPY  01/18/2018   • HERNIA REPAIR     • REIMPLANT URETER IN BLADDER     • SPINAL FUSION         Family History   Problem Relation Age of Onset   • Heart failure Mother    • Cancer Father        Social History     Occupational History   • Not on file   Tobacco Use   • Smoking status: Never Smoker   • Smokeless tobacco: Never Used   Substance and Sexual Activity   • Alcohol use: No   • Drug use: No   • Sexual activity: Never        Medications:    Current Outpatient Medications:   •  acetaminophen (TYLENOL) 500 MG tablet, Take 500 mg by mouth Every 6 (Six)  Hours As Needed., Disp: , Rfl:   •  calcium carbonate (OS-STEVEN) 600 MG tablet, Take 600 mg by mouth Daily., Disp: , Rfl:   •  cholecalciferol (VITAMIN D3) 1000 units tablet, Take 1,000 Units by mouth Daily., Disp: , Rfl:   •  CRANBERRY CONCENTRATE PO, Take  by mouth Daily., Disp: , Rfl:   •  dicyclomine (BENTYL) 10 MG capsule, TAKE ONE CAPSULE BY MOUTH THREE TIMES A DAY AS NEEDED FOR IBS, Disp: 90 capsule, Rfl: 0  •  famotidine (PEPCID) 20 MG tablet, Take 20 mg by mouth 2 (Two) Times a Day., Disp: , Rfl:   •  fexofenadine (ALLEGRA) 180 MG tablet, FEXOFENADINE  MG TABS, Disp: , Rfl:   •  gabapentin (NEURONTIN) 300 MG capsule, TAKE ONE CAPSULE BY MOUTH TWICE A DAY, Disp: 180 capsule, Rfl: 0  •  levoFLOXacin (LEVAQUIN) 500 MG tablet, , Disp: , Rfl:   •  loperamide (IMODIUM) 2 MG capsule, Take 2 mg by mouth 4 (Four) Times a Day As Needed for Diarrhea., Disp: , Rfl:   •  Mirabegron ER (MYRBETRIQ) 50 MG tablet sustained-release 24 hour 24 hr tablet, Daily., Disp: , Rfl:   •  naproxen sodium (ALEVE) 220 MG tablet, Take 220 mg by mouth Daily As Needed., Disp: , Rfl:   •  Riboflavin (B2 PO), Take  by mouth Daily., Disp: , Rfl:   •  vitamin B-12 (CYANOCOBALAMIN) 1000 MCG tablet, Take 1,000 mcg by mouth Daily., Disp: , Rfl:     Allergies:  Allergies   Allergen Reactions   • Amoxicillin Other (See Comments)      unsure of type of reaction - states it was so long ago she can't remember    • Penicillins Nausea And Vomiting         Review of Systems:  Gen -no fever, chills , sweats, headache   Eyes - no irritation or discharge   ENT -  no ear pain , runny nose , sore throat , difficulty swallowing   Resp - no cough , congestion , excessive expectoration   CVS - no chest pain , palpitations.   Abd - no pain , nausea , vomiting , diarrhea   Skin - no rash , lesions.   Neuro - no dizziness    Please see HPI for any other pertinent positives.  All other systems were reviewed and are negative.       Objective   Objective:    BP  "114/70 (BP Location: Left arm, Patient Position: Sitting, Cuff Size: Large Adult)   Pulse 89   Ht 170.2 cm (67\")   Wt 88.2 kg (194 lb 6.4 oz)   BMI 30.45 kg/m²     Physical Examination:  Alert, oriented, obese individual in no acute distress, ambulating unassisted  Right lower extremity shows no erythema, rashes, or open skin lesions. There is a minimal amount of swelling.  There is a slight valgus malalignment, and the patient is neurovascularly intact distally. The knee is stable to varus and valgus stress, there is no crepitus noted, and plantar and dorsiflexion is 5/5.  Patella seems to track lateral..  There is no tenderness to palpation or with range of motion, which is about 0-130.  Left lower extremity shows no erythema, rashes, or open skin lesions. There is a minimal amount of swelling.  There is a slight valgus malalignment, and the patient is neurovascularly intact distally. The knee is stable to varus and valgus stress, there is no crepitus noted, and plantar and dorsiflexion is 5/5.  Patella seems to track lateral.  There is no tenderness to palpation or with range of motion, which is about 0-130.             Imaging:  xrays obtained today  bilateral Knee X-Ray    Date of exam: 1/24/2022    Indication: Bilateral knee pain    AP, Lateral, [place] views    Findings:Shows mild to moderate tricompartmental DJD. There is subchondral sclerosis, subchondral cysts, and osteophytosis present., No fractures or dislocations are appreciated and patellar sublaxation on the right.    subnormal joint spaces    Hardware appropriately positioned not applicable    yes prior studies available for comparison.    This patient's x-ray report was graded according to the Kellgren and Swapnil classification.  This took into account the joint space narrowing, osteophyte formation, sclerosis of the distal femur/proximal tibia along with deformity of those bones.  The findings were indicative of K L grade 3.    X-RAY was " ordered and reviewed by MARLO Pagan          Assessment:  1. Primary osteoarthritis of both knees    2. Chronic pain of both knees    3. Obesity (BMI 30.0-34.9)                 Plan:  I am recommending treatment with conservative measures at this time.  Weight loss is highly recommended.  She should continue her bracing on the left knee and she will be fitted for hinged knee brace for her right knee today to help with stability and fall prevention.  She will continue her Aleve as needed.  Exercise program with low or no impact.  Bilateral intra-articular steroid injections today, risks and benefits were discussed and postinjection instructions were given.  I will plan to see her back in 3 months or as needed.  Natural history and expected course discussed. Questions answered.  Educational materials distributed.  Rest, ice, compression, and elevation (RICE) therapy.  NSAIDs per medication orders.  OTC analgesics as needed.  Arthrocentesis. See procedure note.  cortisone injections  viscosupplementation  physical therapy  bracing  weight loss  activtiy modification  assistive devices  Large Joint Arthrocentesis  Date/Time: 1/24/2022 12:42 PM  Consent given by: patient  Timeout: Immediately prior to procedure a time out was called to verify the correct patient, procedure, equipment, support staff and site/side marked as required   Supporting Documentation  Indications: pain   Procedure Details  Location: knee - Knee joint: Bilateral.  Preparation: Patient was prepped and draped in the usual sterile fashion  Needle size: 25 G  Approach: anteromedial  Medications administered: 2 mL lidocaine PF 1% 1 %; 80 mg triamcinolone acetonide 40 MG/ML  Patient tolerance: patient tolerated the procedure well with no immediate complications      After consent was obtained and a time out was properly performed, the right knee was prepped with alcohol and chlorhexidine. Sterile technique was used, along with a 25 gauge needle,  "to inject 2 cc of 1% lidocaine and 2 cc of 40 mg/mL kenalog into the knee. The patient tolerated the procedure well.  After consent was obtained and a time out was properly performed, the left knee was prepped with alcohol and chlorhexidine. Sterile technique was used, along with a 25 gauge needle, to inject 2 cc of 1% lidocaine and 2 cc of 40 mg/mL kenalog into the knee. The patient tolerated the procedure well.           MARLO Pagan  01/24/22  12:38 EST    \"Please note that portions of this note were completed with a voice recognition program\".   "

## 2022-01-24 NOTE — PATIENT INSTRUCTIONS
"Osteoarthritis    Osteoarthritis is a type of arthritis. It refers to joint pain or joint disease. Osteoarthritis affects tissue that covers the ends of bones in joints (cartilage). Cartilage acts as a cushion between the bones and helps them move smoothly. Osteoarthritis occurs when cartilage in the joints gets worn down. Osteoarthritis is sometimes called \"wear and tear\" arthritis.  Osteoarthritis is the most common form of arthritis. It often occurs in older people. It is a condition that gets worse over time. The joints most often affected by this condition are in the fingers, toes, hips, knees, and spine, including the neck and lower back.  What are the causes?  This condition is caused by the wearing down of cartilage that covers the ends of bones.  What increases the risk?  The following factors may make you more likely to develop this condition:  · Being age 50 or older.  · Obesity.  · Overuse of joints.  · Past injury of a joint.  · Past surgery on a joint.  · Family history of osteoarthritis.  What are the signs or symptoms?  The main symptoms of this condition are pain, swelling, and stiffness in the joint. Other symptoms may include:  · An enlarged joint.  · More pain and further damage caused by small pieces of bone or cartilage that break off and float inside of the joint.  · Small deposits of bone (osteophytes) that grow on the edges of the joint.  · A grating or scraping feeling inside the joint when you move it.  · Popping or creaking sounds when you move.  · Difficulty walking or exercising.  · An inability to  items, twist your hand(s), or control the movements of your hands and fingers.  How is this diagnosed?  This condition may be diagnosed based on:  · Your medical history.  · A physical exam.  · Your symptoms.  · X-rays of the affected joint(s).  · Blood tests to rule out other types of arthritis.  How is this treated?  There is no cure for this condition, but treatment can help control " pain and improve joint function. Treatment may include a combination of therapies, such as:  · Pain relief techniques, such as:  ? Applying heat and cold to the joint.  ? Massage.  ? A form of talk therapy called cognitive behavioral therapy (CBT). This therapy helps you set goals and follow up on the changes that you make.  · Medicines for pain and inflammation. The medicines can be taken by mouth or applied to the skin. They include:  ? NSAIDs, such as ibuprofen.  ? Prescription medicines.  ? Strong anti-inflammatory medicines (corticosteroids).  ? Certain nutritional supplements.  · A prescribed exercise program. You may work with a physical therapist.  · Assistive devices, such as a brace, wrap, splint, specialized glove, or cane.  · A weight control plan.  · Surgery, such as:  ? An osteotomy. This is done to reposition the bones and relieve pain or to remove loose pieces of bone and cartilage.  ? Joint replacement surgery. You may need this surgery if you have advanced osteoarthritis.  Follow these instructions at home:  Activity  · Rest your affected joints as told by your health care provider.  · Exercise as told by your health care provider. He or she may recommend specific types of exercise, such as:  ? Strengthening exercises. These are done to strengthen the muscles that support joints affected by arthritis.  ? Aerobic activities. These are exercises, such as brisk walking or water aerobics, that increase your heart rate.  ? Range-of-motion activities. These help your joints move more easily.  ? Balance and agility exercises.  Managing pain, stiffness, and swelling         · If directed, apply heat to the affected area as often as told by your health care provider. Use the heat source that your health care provider recommends, such as a moist heat pack or a heating pad.  ? If you have a removable assistive device, remove it as told by your health care provider.  ? Place a towel between your skin and the  heat source. If your health care provider tells you to keep the assistive device on while you apply heat, place a towel between the assistive device and the heat source.  ? Leave the heat on for 20-30 minutes.  ? Remove the heat if your skin turns bright red. This is especially important if you are unable to feel pain, heat, or cold. You may have a greater risk of getting burned.  · If directed, put ice on the affected area. To do this:  ? If you have a removable assistive device, remove it as told by your health care provider.  ? Put ice in a plastic bag.  ? Place a towel between your skin and the bag. If your health care provider tells you to keep the assistive device on during icing, place a towel between the assistive device and the bag.  ? Leave the ice on for 20 minutes, 2-3 times a day.  ? Move your fingers or toes often to reduce stiffness and swelling.  ? Raise (elevate) the injured area above the level of your heart while you are sitting or lying down.  General instructions  · Take over-the-counter and prescription medicines only as told by your health care provider.  · Maintain a healthy weight. Follow instructions from your health care provider for weight control.  · Do not use any products that contain nicotine or tobacco, such as cigarettes, e-cigarettes, and chewing tobacco. If you need help quitting, ask your health care provider.  · Use assistive devices as told by your health care provider.  · Keep all follow-up visits as told by your health care provider. This is important.  Where to find more information  · National [place] of Arthritis and Musculoskeletal and Skin Diseases: www.niams.nih.gov  · National [place] on Aging: www.[place].nih.gov  · American College of Rheumatology: www.rheumatology.org  Contact a health care provider if:  · You have redness, swelling, or a feeling of warmth in a joint that gets worse.  · You have a fever along with joint or muscle aches.  · You develop a rash.  · You  have trouble doing your normal activities.  Get help right away if:  · You have pain that gets worse and is not relieved by pain medicine.  Summary  · Osteoarthritis is a type of arthritis that affects tissue covering the ends of bones in joints (cartilage).  · This condition is caused by the wearing down of cartilage that covers the ends of bones.  · The main symptom of this condition is pain, swelling, and stiffness in the joint.  · There is no cure for this condition, but treatment can help control pain and improve joint function.  This information is not intended to replace advice given to you by your health care provider. Make sure you discuss any questions you have with your health care provider.  Document Revised: 12/14/2020 Document Reviewed: 12/14/2020  [employer] Patient Education © 2021 [employer]      Knee Injection  A knee injection is a procedure to get medicine into your knee joint to relieve the pain, swelling, and stiffness of arthritis. Your health care provider uses a needle to inject medicine, which may also help to lubricate and cushion your knee joint. You may need more than one injection.  Tell a health care provider about:  · Any allergies you have.  · All medicines you are taking, including vitamins, herbs, eye drops, creams, and over-the-counter medicines.  · Any problems you or family members have had with anesthetic medicines.  · Any blood disorders you have.  · Any surgeries you have had.  · Any medical conditions you have.  · Whether you are pregnant or may be pregnant.  What are the risks?  Generally, this is a safe procedure. However, problems may occur, including:  · Infection.  · Bleeding.  · Symptoms that get worse.  · Damage to the area around your knee.  · Allergic reaction to any of the medicines.  · Skin reactions from repeated injections.  What happens before the procedure?  · Ask your health care provider about:  ? Changing or stopping your regular medicines. This is  especially important if you are taking diabetes medicines or blood thinners.  ? Taking medicines such as aspirin and ibuprofen. These medicines can thin your blood. Do not take these medicines unless your health care provider tells you to take them.  ? Taking over-the-counter medicines, vitamins, herbs, and supplements.  · Plan to have a responsible adult take you home from the hospital or clinic.  What happens during the procedure?    · You will sit or lie down in a position for your knee to be treated.  · The skin over your kneecap will be cleaned with a germ-killing soap.  · You will be given a medicine that numbs the area (local anesthetic). You may feel some stinging.  · The medicine will be injected into your knee. The needle is carefully placed between your kneecap and your knee. The medicine is injected into the joint space.  · The needle will be removed at the end of the procedure.  · A bandage (dressing) may be placed over the injection site.  The procedure may vary among health care providers and hospitals.  What can I expect after the procedure?  · Your blood pressure, heart rate, breathing rate, and blood oxygen level will be monitored until you leave the hospital or clinic.  · You may have to move your knee through its full range of motion. This helps to get all the medicine into your joint space.  · You will be watched to make sure that you do not have a reaction to the injected medicine.  · You may feel more pain, swelling, and warmth than you did before the injection. This reaction may last about 1-2 days.  Follow these instructions at home:  Medicines  · Take over-the-counter and prescription medicines only as told by your health care provider.  · Ask your health care provider if the medicine prescribed to you requires you to avoid driving or using machinery.  · Do not take medicines such as aspirin and ibuprofen unless your health care provider tells you to take them.  Injection site  care  · Follow instructions from your health care provider about:  ? How to take care of your puncture site.  ? When and how you should change your dressing.  ? When you should remove your dressing.  · Check your injection area every day for signs of infection. Check for:  ? More redness, swelling, or pain after 2 days.  ? Fluid or blood.  ? Pus or a bad smell.  ? Warmth.  Managing pain, stiffness, and swelling    · If directed, put ice on the injection area. To do this:  ? Put ice in a plastic bag.  ? Place a towel between your skin and the bag.  ? Leave the ice on for 20 minutes, 2-3 times per day.  ? Remove the ice if your skin turns bright red. This is very important. If you cannot feel pain, heat, or cold, you have a greater risk of damage to the area.  · Do not apply heat to your knee.  · Raise (elevate) the injection area above the level of your heart while you are sitting or lying down.    General instructions  · If you were given a dressing, keep it dry until your health care provider says it can be removed. Ask your health care provider when you can start showering or bathing.  · Avoid strenuous activities for as long as directed by your health care provider. Ask your health care provider when you can return to your normal activities.  · Keep all follow-up visits. This is important. You may need more injections.  Contact a health care provider if you have:  · A fever.  · Warmth in your injection area.  · Fluid, blood, or pus coming from your injection site.  · Symptoms at your injection site that last longer than 2 days after your procedure.  Get help right away if:  · Your knee turns very red.  · Your knee becomes very swollen.  · Your knee is in severe pain.  Summary  · A knee injection is a procedure to get medicine into your knee joint to relieve the pain, swelling, and stiffness of arthritis.  · A needle is carefully placed between your kneecap and your knee to inject medicine into the joint  space.  · Before the procedure, ask your health care provider about changing or stopping your regular medicines, especially if you are taking diabetes medicines or blood thinners.  · Contact your health care provider if you have any problems or questions after your procedure.  This information is not intended to replace advice given to you by your health care provider. Make sure you discuss any questions you have with your health care provider.  Document Revised: 06/02/2021 Document Reviewed: 06/02/2021  [employer] Patient Education © 2021 [employer]

## 2022-01-31 DIAGNOSIS — K58.0 IRRITABLE BOWEL SYNDROME WITH DIARRHEA: ICD-10-CM

## 2022-01-31 RX ORDER — DICYCLOMINE HYDROCHLORIDE 10 MG/1
CAPSULE ORAL
Qty: 90 CAPSULE | Refills: 0 | Status: SHIPPED | OUTPATIENT
Start: 2022-01-31 | End: 2022-05-10

## 2022-02-07 ENCOUNTER — LAB (OUTPATIENT)
Dept: LAB | Facility: HOSPITAL | Age: 77
End: 2022-02-07

## 2022-02-07 ENCOUNTER — HOSPITAL ENCOUNTER (OUTPATIENT)
Dept: CARDIOLOGY | Facility: HOSPITAL | Age: 77
Discharge: HOME | End: 2022-02-07

## 2022-02-07 ENCOUNTER — TRANSCRIBE ORDERS (OUTPATIENT)
Dept: ADMINISTRATIVE | Facility: HOSPITAL | Age: 77
End: 2022-02-07

## 2022-02-07 DIAGNOSIS — Z01.818 PRE-OP TESTING: Primary | ICD-10-CM

## 2022-02-07 DIAGNOSIS — Z01.818 PRE-OP TESTING: ICD-10-CM

## 2022-02-07 LAB
ANION GAP SERPL CALCULATED.3IONS-SCNC: 10.9 MMOL/L (ref 5–15)
BASOPHILS # BLD AUTO: 0.04 10*3/MM3 (ref 0–0.2)
BASOPHILS NFR BLD AUTO: 0.5 % (ref 0–1.5)
BUN SERPL-MCNC: 20 MG/DL (ref 8–23)
BUN/CREAT SERPL: 29 (ref 7–25)
CALCIUM SPEC-SCNC: 9.3 MG/DL (ref 8.6–10.5)
CHLORIDE SERPL-SCNC: 102 MMOL/L (ref 98–107)
CO2 SERPL-SCNC: 26.1 MMOL/L (ref 22–29)
CREAT SERPL-MCNC: 0.69 MG/DL (ref 0.57–1)
DEPRECATED RDW RBC AUTO: 47.1 FL (ref 37–54)
EOSINOPHIL # BLD AUTO: 0.05 10*3/MM3 (ref 0–0.4)
EOSINOPHIL NFR BLD AUTO: 0.6 % (ref 0.3–6.2)
ERYTHROCYTE [DISTWIDTH] IN BLOOD BY AUTOMATED COUNT: 13.8 % (ref 12.3–15.4)
GFR SERPL CREATININE-BSD FRML MDRD: 83 ML/MIN/1.73
GLUCOSE SERPL-MCNC: 86 MG/DL (ref 65–99)
HCT VFR BLD AUTO: 42.1 % (ref 34–46.6)
HGB BLD-MCNC: 13.6 G/DL (ref 12–15.9)
IMM GRANULOCYTES # BLD AUTO: 0.03 10*3/MM3 (ref 0–0.05)
IMM GRANULOCYTES NFR BLD AUTO: 0.4 % (ref 0–0.5)
LYMPHOCYTES # BLD AUTO: 1.86 10*3/MM3 (ref 0.7–3.1)
LYMPHOCYTES NFR BLD AUTO: 23 % (ref 19.6–45.3)
MCH RBC QN AUTO: 29.7 PG (ref 26.6–33)
MCHC RBC AUTO-ENTMCNC: 32.3 G/DL (ref 31.5–35.7)
MCV RBC AUTO: 91.9 FL (ref 79–97)
MONOCYTES # BLD AUTO: 0.73 10*3/MM3 (ref 0.1–0.9)
MONOCYTES NFR BLD AUTO: 9 % (ref 5–12)
NEUTROPHILS NFR BLD AUTO: 5.36 10*3/MM3 (ref 1.7–7)
NEUTROPHILS NFR BLD AUTO: 66.5 % (ref 42.7–76)
NRBC BLD AUTO-RTO: 0 /100 WBC (ref 0–0.2)
PLATELET # BLD AUTO: 441 10*3/MM3 (ref 140–450)
PMV BLD AUTO: 11 FL (ref 6–12)
POTASSIUM SERPL-SCNC: 4.1 MMOL/L (ref 3.5–5.2)
RBC # BLD AUTO: 4.58 10*6/MM3 (ref 3.77–5.28)
SODIUM SERPL-SCNC: 139 MMOL/L (ref 136–145)
WBC NRBC COR # BLD: 8.07 10*3/MM3 (ref 3.4–10.8)

## 2022-02-07 PROCEDURE — 93010 ELECTROCARDIOGRAM REPORT: CPT | Performed by: INTERNAL MEDICINE

## 2022-02-07 PROCEDURE — 85025 COMPLETE CBC W/AUTO DIFF WBC: CPT

## 2022-02-07 PROCEDURE — 80048 BASIC METABOLIC PNL TOTAL CA: CPT

## 2022-02-07 PROCEDURE — 36415 COLL VENOUS BLD VENIPUNCTURE: CPT

## 2022-02-07 PROCEDURE — 93005 ELECTROCARDIOGRAM TRACING: CPT

## 2022-02-14 LAB — QT INTERVAL: 385 MS

## 2022-04-16 RX ORDER — GABAPENTIN 300 MG/1
CAPSULE ORAL
Qty: 180 CAPSULE | Refills: 0 | Status: SHIPPED | OUTPATIENT
Start: 2022-04-16 | End: 2022-08-02

## 2022-04-21 ENCOUNTER — TELEPHONE (OUTPATIENT)
Dept: ORTHOPEDIC SURGERY | Facility: CLINIC | Age: 77
End: 2022-04-21

## 2022-04-21 NOTE — TELEPHONE ENCOUNTER
Caller: LIZA GRAVES    Relationship to patient: SELF    Best call back number: [phone]    Chief complaint: BILATERAL KNEE PAIN    Type of visit: CORTISONE INJECTION    Requested date: ASAP    If rescheduling, when is the original appointment: N/A    Additional notes: LAST INJECTION 1/24

## 2022-04-22 ENCOUNTER — OFFICE VISIT (OUTPATIENT)
Dept: FAMILY MEDICINE CLINIC | Facility: CLINIC | Age: 77
End: 2022-04-22

## 2022-04-22 ENCOUNTER — LAB (OUTPATIENT)
Dept: FAMILY MEDICINE CLINIC | Facility: CLINIC | Age: 77
End: 2022-04-22

## 2022-04-22 VITALS
RESPIRATION RATE: 16 BRPM | SYSTOLIC BLOOD PRESSURE: 94 MMHG | DIASTOLIC BLOOD PRESSURE: 57 MMHG | TEMPERATURE: 98 F | OXYGEN SATURATION: 97 % | HEIGHT: 67 IN | HEART RATE: 75 BPM | BODY MASS INDEX: 29.32 KG/M2 | WEIGHT: 186.8 LBS

## 2022-04-22 DIAGNOSIS — K21.9 GASTROESOPHAGEAL REFLUX DISEASE WITHOUT ESOPHAGITIS: Primary | ICD-10-CM

## 2022-04-22 DIAGNOSIS — M81.0 AGE-RELATED OSTEOPOROSIS WITHOUT CURRENT PATHOLOGICAL FRACTURE: ICD-10-CM

## 2022-04-22 DIAGNOSIS — R15.9 INCONTINENCE OF FECES WITH FECAL URGENCY: ICD-10-CM

## 2022-04-22 DIAGNOSIS — Z12.31 VISIT FOR SCREENING MAMMOGRAM: ICD-10-CM

## 2022-04-22 DIAGNOSIS — E55.9 VITAMIN D DEFICIENCY: ICD-10-CM

## 2022-04-22 DIAGNOSIS — R15.2 INCONTINENCE OF FECES WITH FECAL URGENCY: ICD-10-CM

## 2022-04-22 DIAGNOSIS — K44.9 HIATAL HERNIA: ICD-10-CM

## 2022-04-22 PROBLEM — R79.9 ABNORMAL FINDING OF BLOOD CHEMISTRY, UNSPECIFIED: Status: RESOLVED | Noted: 2021-06-22 | Resolved: 2022-04-22

## 2022-04-22 PROBLEM — M85.80 OSTEOPENIA: Status: RESOLVED | Noted: 2018-01-17 | Resolved: 2022-04-22

## 2022-04-22 LAB
25(OH)D3 SERPL-MCNC: 36.6 NG/ML (ref 30–100)
ANION GAP SERPL CALCULATED.3IONS-SCNC: 9.6 MMOL/L (ref 5–15)
BASOPHILS # BLD AUTO: 0.04 10*3/MM3 (ref 0–0.2)
BASOPHILS NFR BLD AUTO: 0.7 % (ref 0–1.5)
BUN SERPL-MCNC: 18 MG/DL (ref 8–23)
BUN/CREAT SERPL: 24.7 (ref 7–25)
CALCIUM SPEC-SCNC: 8.9 MG/DL (ref 8.6–10.5)
CHLORIDE SERPL-SCNC: 105 MMOL/L (ref 98–107)
CO2 SERPL-SCNC: 26.4 MMOL/L (ref 22–29)
CREAT SERPL-MCNC: 0.73 MG/DL (ref 0.57–1)
DEPRECATED RDW RBC AUTO: 50.8 FL (ref 37–54)
EGFRCR SERPLBLD CKD-EPI 2021: 86.4 ML/MIN/1.73
EOSINOPHIL # BLD AUTO: 0.16 10*3/MM3 (ref 0–0.4)
EOSINOPHIL NFR BLD AUTO: 2.9 % (ref 0.3–6.2)
ERYTHROCYTE [DISTWIDTH] IN BLOOD BY AUTOMATED COUNT: 14.2 % (ref 12.3–15.4)
GLUCOSE SERPL-MCNC: 80 MG/DL (ref 65–99)
HCT VFR BLD AUTO: 40.6 % (ref 34–46.6)
HGB BLD-MCNC: 12.7 G/DL (ref 12–15.9)
IMM GRANULOCYTES # BLD AUTO: 0.03 10*3/MM3 (ref 0–0.05)
IMM GRANULOCYTES NFR BLD AUTO: 0.5 % (ref 0–0.5)
LYMPHOCYTES # BLD AUTO: 1.28 10*3/MM3 (ref 0.7–3.1)
LYMPHOCYTES NFR BLD AUTO: 23 % (ref 19.6–45.3)
MCH RBC QN AUTO: 30.4 PG (ref 26.6–33)
MCHC RBC AUTO-ENTMCNC: 31.3 G/DL (ref 31.5–35.7)
MCV RBC AUTO: 97.1 FL (ref 79–97)
MONOCYTES # BLD AUTO: 0.55 10*3/MM3 (ref 0.1–0.9)
MONOCYTES NFR BLD AUTO: 9.9 % (ref 5–12)
NEUTROPHILS NFR BLD AUTO: 3.51 10*3/MM3 (ref 1.7–7)
NEUTROPHILS NFR BLD AUTO: 63 % (ref 42.7–76)
NRBC BLD AUTO-RTO: 0.2 /100 WBC (ref 0–0.2)
PLATELET # BLD AUTO: 344 10*3/MM3 (ref 140–450)
PMV BLD AUTO: 10.7 FL (ref 6–12)
POTASSIUM SERPL-SCNC: 4.3 MMOL/L (ref 3.5–5.2)
RBC # BLD AUTO: 4.18 10*6/MM3 (ref 3.77–5.28)
SODIUM SERPL-SCNC: 141 MMOL/L (ref 136–145)
WBC NRBC COR # BLD: 5.57 10*3/MM3 (ref 3.4–10.8)

## 2022-04-22 PROCEDURE — 82306 VITAMIN D 25 HYDROXY: CPT | Performed by: FAMILY MEDICINE

## 2022-04-22 PROCEDURE — 80048 BASIC METABOLIC PNL TOTAL CA: CPT | Performed by: FAMILY MEDICINE

## 2022-04-22 PROCEDURE — 36415 COLL VENOUS BLD VENIPUNCTURE: CPT | Performed by: FAMILY MEDICINE

## 2022-04-22 PROCEDURE — 85025 COMPLETE CBC W/AUTO DIFF WBC: CPT | Performed by: FAMILY MEDICINE

## 2022-04-22 PROCEDURE — 99214 OFFICE O/P EST MOD 30 MIN: CPT | Performed by: FAMILY MEDICINE

## 2022-04-22 RX ORDER — MONTELUKAST SODIUM 4 MG/1
1 TABLET, CHEWABLE ORAL 2 TIMES DAILY PRN
Qty: 30 TABLET | Refills: 0 | Status: SHIPPED | OUTPATIENT
Start: 2022-04-22 | End: 2022-05-03

## 2022-04-22 RX ORDER — PANTOPRAZOLE SODIUM 40 MG/1
40 TABLET, DELAYED RELEASE ORAL DAILY
Qty: 30 TABLET | Refills: 0 | Status: SHIPPED | OUTPATIENT
Start: 2022-04-22 | End: 2022-05-18

## 2022-04-22 NOTE — PROGRESS NOTES
Subjective   Chief Complaint   Patient presents with   • Abdominal Pain     Belching a lot/ x 2 weeks   • GI Problem   • Heartburn   • Osteoporosis   • BM issues     Abida Becker is a 76 y.o. female.     Patient Care Team:  Darlene Matute MD as PCP - General  ColumbianaRolando MD as Consulting Physician (Urology)  Banet, Duane Edward, MD as Consulting Physician (Dermatology)    She is coming in today to address some of her symptoms and concerns and also follow-up on her medical issues.  She tells me that for the last 2 weeks she has been experiencing some burping and upset in her epigastric area.  It comes and goes.  She feels that her symptoms are similar to her issues with hiatal hernia.  She had surgery for hiatal hernia about 10 years ago.  She currently takes Pepcid over-the-counter.  No vomiting or diarrhea reported.  She also has been having issues with stool urgency and has multiple situations when she was not able to make it to the bathroom.  This has been going on for many years and she feels that this started actually after her lower back surgery over 10 years ago.  She was previously evaluated by GI and even had colonoscopy since then.  Her appetite is good and she denies any chest pains, shortness of breath, dizziness, or syncope.  We are also addressing her osteoporosis, her last DEXA scan was in 6/2020, she currently takes calcium and vitamin D supplementation, but she is not on any prescription medication.       The following portions of the patient's history were reviewed and updated as appropriate: allergies, current medications, past family history, past medical history, past social history, past surgical history and problem list.  Past Medical History:   Diagnosis Date   • GERD (gastroesophageal reflux disease)    • History of recurrent UTIs    • IBS (irritable bowel syndrome)    • Osteoarthritis    • Osteoporosis    • Pulmonary embolism (HCC) 2011    after scoliosis surgery   • Vitamin D  "deficiency      Past Surgical History:   Procedure Laterality Date   • BREAST BIOPSY     • CHOLECYSTECTOMY     • COLONOSCOPY  01/18/2018   • HIATAL HERNIA REPAIR  2013   • REIMPLANT URETER IN BLADDER     • SPINAL FUSION       The patient has a family history of  Family History   Problem Relation Age of Onset   • Heart failure Mother    • Cancer Father      Social History     Socioeconomic History   • Marital status: Single   Tobacco Use   • Smoking status: Never Smoker   • Smokeless tobacco: Never Used   Substance and Sexual Activity   • Alcohol use: No   • Drug use: No   • Sexual activity: Never       Review of Systems   Gastrointestinal: Positive for diarrhea. Negative for abdominal pain, constipation, nausea, vomiting, GERD and indigestion.     Visit Vitals  BP 94/57 (BP Location: Left arm, Patient Position: Sitting, Cuff Size: Adult)   Pulse 75   Temp 98 °F (36.7 °C)   Resp 16   Ht 170.2 cm (67.01\")   Wt 84.7 kg (186 lb 12.8 oz)   SpO2 97%   BMI 29.25 kg/m²       Current Outpatient Medications:   •  acetaminophen (TYLENOL) 500 MG tablet, Take 500 mg by mouth Every 6 (Six) Hours As Needed., Disp: , Rfl:   •  calcium carbonate (OS-STEVEN) 600 MG tablet, Take 600 mg by mouth Daily., Disp: , Rfl:   •  cholecalciferol (VITAMIN D3) 1000 units tablet, Take 1,000 Units by mouth Daily., Disp: , Rfl:   •  colestipol (Colestid) 1 g tablet, Take 1 tablet by mouth 2 (Two) Times a Day As Needed (stool urgency)., Disp: 30 tablet, Rfl: 0  •  CRANBERRY CONCENTRATE PO, Take  by mouth Daily., Disp: , Rfl:   •  dicyclomine (BENTYL) 10 MG capsule, TAKE ONE CAPSULE BY MOUTH THREE TIMES A DAY AS NEEDED FOR IBS, Disp: 90 capsule, Rfl: 0  •  famotidine (PEPCID) 20 MG tablet, Take 20 mg by mouth 2 (Two) Times a Day., Disp: , Rfl:   •  fexofenadine (ALLEGRA) 180 MG tablet, FEXOFENADINE  MG TABS, Disp: , Rfl:   •  gabapentin (NEURONTIN) 300 MG capsule, TAKE ONE CAPSULE BY MOUTH TWICE A DAY, Disp: 180 capsule, Rfl: 0  •  loperamide " (IMODIUM) 2 MG capsule, Take 2 mg by mouth 4 (Four) Times a Day As Needed for Diarrhea., Disp: , Rfl:   •  Mirabegron ER (MYRBETRIQ) 50 MG tablet sustained-release 24 hour 24 hr tablet, Daily., Disp: , Rfl:   •  pantoprazole (PROTONIX) 40 MG EC tablet, Take 1 tablet by mouth Daily., Disp: 30 tablet, Rfl: 0  •  Riboflavin (B2 PO), Take  by mouth Daily., Disp: , Rfl:   •  vitamin B-12 (CYANOCOBALAMIN) 1000 MCG tablet, Take 1,000 mcg by mouth Daily., Disp: , Rfl:     Objective   Physical Exam  Constitutional:       General: She is not in acute distress.     Appearance: Normal appearance. She is well-developed. She is not ill-appearing or diaphoretic.      Comments: Patient is in no distress, patient has normal voice and speech.  Normal respiratory effort.   HENT:      Head: Normocephalic and atraumatic.   Pulmonary:      Effort: Pulmonary effort is normal.   Abdominal:      General: There is no distension.      Palpations: There is no mass.      Tenderness: There is no right CVA tenderness, left CVA tenderness, guarding or rebound.      Comments: There is a very mild palpation tenderness in the epigastric area noted.   Musculoskeletal:      Cervical back: Normal range of motion and neck supple.   Neurological:      General: No focal deficit present.      Mental Status: She is alert and oriented to person, place, and time. Mental status is at baseline.   Psychiatric:         Mood and Affect: Mood normal.       BMP    BMP 6/22/21 2/7/22   BUN 21 20   Creatinine 0.62 0.69   Sodium 143 139   Potassium 4.3 4.1   Chloride 107 102   CO2 27.3 26.1   Calcium 9.2 9.3               Assessment/Plan   Diagnoses and all orders for this visit:    1. Gastroesophageal reflux disease without esophagitis (Primary)  -     pantoprazole (PROTONIX) 40 MG EC tablet; Take 1 tablet by mouth Daily.  Dispense: 30 tablet; Refill: 0  -     Ambulatory Referral to Gastroenterology  -     Basic Metabolic Panel  -     CBC Auto Differential    2. Hiatal  hernia  -     pantoprazole (PROTONIX) 40 MG EC tablet; Take 1 tablet by mouth Daily.  Dispense: 30 tablet; Refill: 0  -     Ambulatory Referral to Gastroenterology    3. Incontinence of feces with fecal urgency  -     colestipol (Colestid) 1 g tablet; Take 1 tablet by mouth 2 (Two) Times a Day As Needed (stool urgency).  Dispense: 30 tablet; Refill: 0  -     Ambulatory Referral to Gastroenterology    4. Age-related osteoporosis without current pathological fracture  -     Ambulatory Referral to Orthopedic Surgery    5. Vitamin D deficiency  -     Vitamin D 25 Hydroxy    6. Visit for screening mammogram  -     Mammo Screening Digital Tomosynthesis Bilateral With CAD; Future      I reviewed her symptoms and concerns.  Her GI symptoms are poorly controlled.  I will be starting her on PPI to help with her acid reflux symptoms.  We also addressed her stool incontinence, which has been going on for many years.  I gave her prescription for colestipol to try.  Her last colonoscopy was in 2018.  I will be referring her to see the GI to address her symptoms to see if she needs another endoscopy.  I also reviewed her DEXA scan from 6/2020 which showed bone loss in osteoporosis range with increased risk of fracture.  I will be referring her to see an osteoporosis clinic to discuss Prolia injections.  She is not the best candidate for biphosphonate's due to GI problems.  I also reviewed her health maintenance.  Her last mammogram was in 12/2020 and the new order was given and she will be scheduling the test.            Return in about 6 months (around 10/22/2022) for Medicare Wellness.    Requested Prescriptions     Signed Prescriptions Disp Refills   • pantoprazole (PROTONIX) 40 MG EC tablet 30 tablet 0     Sig: Take 1 tablet by mouth Daily.   • colestipol (Colestid) 1 g tablet 30 tablet 0     Sig: Take 1 tablet by mouth 2 (Two) Times a Day As Needed (stool urgency).

## 2022-04-26 ENCOUNTER — TELEPHONE (OUTPATIENT)
Dept: ORTHOPEDIC SURGERY | Facility: CLINIC | Age: 77
End: 2022-04-26

## 2022-04-26 NOTE — TELEPHONE ENCOUNTER
Caller: LIZA GRAVES    Relationship to patient: SELF    Best call back number:     Chief complaint: AGE RELATED OSTEOPOROSIS    Type of visit: NEW PROBLEM    Requested date: ASAP    Additional notes: THIS IS A NEW PROBLEM, BUT WHEN I SELECTED THAT TEMPLATE TO SCHEDULE THE APPOINTMENT NO DATES SHOWED UP EVEN PAST 10/2022 SO I WAS UNABLE TO SCHEDULE THE PATIENT AND IT WAS AFTER 4:30 SO I COULD NOT TRANSFER TO THE OFFICE.

## 2022-05-03 DIAGNOSIS — R15.2 INCONTINENCE OF FECES WITH FECAL URGENCY: ICD-10-CM

## 2022-05-03 DIAGNOSIS — R15.9 INCONTINENCE OF FECES WITH FECAL URGENCY: ICD-10-CM

## 2022-05-03 RX ORDER — MONTELUKAST SODIUM 4 MG/1
TABLET, CHEWABLE ORAL
Qty: 30 TABLET | Refills: 0 | Status: SHIPPED | OUTPATIENT
Start: 2022-05-03 | End: 2022-06-13

## 2022-05-06 ENCOUNTER — OFFICE VISIT (OUTPATIENT)
Dept: ORTHOPEDIC SURGERY | Facility: CLINIC | Age: 77
End: 2022-05-06

## 2022-05-06 VITALS
HEART RATE: 77 BPM | WEIGHT: 184.4 LBS | BODY MASS INDEX: 28.94 KG/M2 | HEIGHT: 67 IN | SYSTOLIC BLOOD PRESSURE: 124 MMHG | DIASTOLIC BLOOD PRESSURE: 76 MMHG

## 2022-05-06 DIAGNOSIS — E66.3 OVERWEIGHT (BMI 25.0-29.9): ICD-10-CM

## 2022-05-06 DIAGNOSIS — M17.0 PRIMARY OSTEOARTHRITIS OF BOTH KNEES: Primary | ICD-10-CM

## 2022-05-06 PROCEDURE — 20610 DRAIN/INJ JOINT/BURSA W/O US: CPT | Performed by: PHYSICIAN ASSISTANT

## 2022-05-06 RX ORDER — LIDOCAINE HYDROCHLORIDE 10 MG/ML
2 INJECTION, SOLUTION EPIDURAL; INFILTRATION; INTRACAUDAL; PERINEURAL
Status: COMPLETED | OUTPATIENT
Start: 2022-05-06 | End: 2022-05-06

## 2022-05-06 RX ORDER — TRIAMCINOLONE ACETONIDE 40 MG/ML
80 INJECTION, SUSPENSION INTRA-ARTICULAR; INTRAMUSCULAR
Status: COMPLETED | OUTPATIENT
Start: 2022-05-06 | End: 2022-05-06

## 2022-05-06 RX ORDER — SULFAMETHOXAZOLE AND TRIMETHOPRIM 800; 160 MG/1; MG/1
TABLET ORAL
COMMUNITY
Start: 2022-05-04 | End: 2022-05-17

## 2022-05-06 RX ADMIN — TRIAMCINOLONE ACETONIDE 80 MG: 40 INJECTION, SUSPENSION INTRA-ARTICULAR; INTRAMUSCULAR at 10:06

## 2022-05-06 RX ADMIN — LIDOCAINE HYDROCHLORIDE 2 ML: 10 INJECTION, SOLUTION EPIDURAL; INFILTRATION; INTRACAUDAL; PERINEURAL at 10:06

## 2022-05-06 NOTE — PROGRESS NOTES
ORTHO FOLLOW UP       Subjective:    HPI:   Abida Becker is a 76 y.o. female who presents for follow-up for her bilateral knee pain with a known history of bilateral knee DJD.  She last had bilateral intra-articular steroid injections on 1/24/2022, which did help to reduce her pain.      Past Medical History:   Diagnosis Date   • GERD (gastroesophageal reflux disease)    • History of recurrent UTIs    • IBS (irritable bowel syndrome)    • Osteoarthritis    • Osteoporosis    • Primary osteoarthritis of both knees 1/24/2022   • Pulmonary embolism (HCC) 2011    after scoliosis surgery   • Vitamin D deficiency        Past Surgical History:   Procedure Laterality Date   • BREAST BIOPSY     • CHOLECYSTECTOMY     • COLONOSCOPY  01/18/2018   • HIATAL HERNIA REPAIR  2013   • REIMPLANT URETER IN BLADDER     • SPINAL FUSION         Social History     Occupational History   • Not on file   Tobacco Use   • Smoking status: Never Smoker   • Smokeless tobacco: Never Used   Substance and Sexual Activity   • Alcohol use: No   • Drug use: No   • Sexual activity: Never      The following portions of the patient's history were reviewed and updated as appropriate: allergies, current medications, past family history, past medical history, past social history, past surgical history and problem list.    Medications:    Current Outpatient Medications:   •  acetaminophen (TYLENOL) 500 MG tablet, Take 500 mg by mouth Every 6 (Six) Hours As Needed., Disp: , Rfl:   •  calcium carbonate (OS-STEVEN) 600 MG tablet, Take 600 mg by mouth Daily., Disp: , Rfl:   •  cholecalciferol (VITAMIN D3) 1000 units tablet, Take 1,000 Units by mouth Daily., Disp: , Rfl:   •  colestipol (COLESTID) 1 g tablet, TAKE ONE TABLET BY MOUTH TWICE A DAY AS NEEDED, Disp: 30 tablet, Rfl: 0  •  CRANBERRY CONCENTRATE PO, Take  by mouth Daily., Disp: , Rfl:   •  dicyclomine (BENTYL) 10 MG capsule, TAKE ONE CAPSULE BY MOUTH THREE TIMES A DAY AS NEEDED FOR IBS, Disp: 90  "capsule, Rfl: 0  •  famotidine (PEPCID) 20 MG tablet, Take 20 mg by mouth 2 (Two) Times a Day., Disp: , Rfl:   •  fexofenadine (ALLEGRA) 180 MG tablet, FEXOFENADINE  MG TABS, Disp: , Rfl:   •  gabapentin (NEURONTIN) 300 MG capsule, TAKE ONE CAPSULE BY MOUTH TWICE A DAY, Disp: 180 capsule, Rfl: 0  •  loperamide (IMODIUM) 2 MG capsule, Take 2 mg by mouth 4 (Four) Times a Day As Needed for Diarrhea., Disp: , Rfl:   •  Mirabegron ER (MYRBETRIQ) 50 MG tablet sustained-release 24 hour 24 hr tablet, Daily., Disp: , Rfl:   •  pantoprazole (PROTONIX) 40 MG EC tablet, Take 1 tablet by mouth Daily., Disp: 30 tablet, Rfl: 0  •  Riboflavin (B2 PO), Take  by mouth Daily., Disp: , Rfl:   •  sulfamethoxazole-trimethoprim (BACTRIM DS,SEPTRA DS) 800-160 MG per tablet, , Disp: , Rfl:   •  vitamin B-12 (CYANOCOBALAMIN) 1000 MCG tablet, Take 1,000 mcg by mouth Daily., Disp: , Rfl:     Allergies:  Allergies   Allergen Reactions   • Amoxicillin Other (See Comments)      unsure of type of reaction - states it was so long ago she can't remember    • Penicillins Nausea And Vomiting       Review of Systems:  Gen -no fever, chills , sweats, headache   Eyes - no irritation or discharge   ENT -  no ear pain , runny nose , sore throat , difficulty swallowing   Resp - no cough , congestion , excessive expectoration   CVS - no chest pain , palpitations.   Abd - no pain , nausea , vomiting , diarrhea   Skin - no rash , lesions.   Neuro - no dizziness    Please see HPI for any other pertinent positives.  All other systems were reviewed and are negative.       Objective   Objective:    /76 (BP Location: Right arm, Patient Position: Sitting, Cuff Size: Large Adult)   Pulse 77   Ht 170.2 cm (67\")   Wt 83.6 kg (184 lb 6.4 oz)   BMI 28.88 kg/m²     Physical Examination:  Alert, oriented, obese individual in no acute distress, ambulating unassisted  Right lower extremity shows no erythema, rashes, or open skin lesions. There is a minimal " "amount of swelling.  There is a slight valgus malalignment, and the patient is neurovascularly intact distally. The knee is stable to varus and valgus stress, there is no crepitus noted, and plantar and dorsiflexion is 5/5.  Patella seems to track lateral..  There is no tenderness to palpation or with range of motion, which is about 0-130.  Left lower extremity shows no erythema, rashes, or open skin lesions. There is a minimal amount of swelling.  There is a slight valgus malalignment, and the patient is neurovascularly intact distally. The knee is stable to varus and valgus stress, there is no crepitus noted, and plantar and dorsiflexion is 5/5.  Patella seems to track lateral.  There is no tenderness to palpation or with range of motion, which is about 0-130.         Imaging:  no diagnostic testing performed this visit            Assessment:  1. Primary osteoarthritis of both knees    2. Overweight (BMI 25.0-29.9)                 Plan:  Bilateral intra-articular steroid injections today, risks and benefits were discussed and postinjection instructions were given.  Continue weight loss efforts.  Follow-up in 3 months.  - Large Joint Arthrocentesis: bilateral knee on 5/6/2022 10:06 AM  Indications: pain  Details: 25 G needle, anterolateral approach  Medications (Right): 2 mL lidocaine PF 1% 1 %; 80 mg triamcinolone acetonide 40 MG/ML  Medications (Left): 2 mL lidocaine PF 1% 1 %; 80 mg triamcinolone acetonide 40 MG/ML  Outcome: tolerated well, no immediate complications  Procedure, treatment alternatives, risks and benefits explained, specific risks discussed. Consent was given by the patient. Immediately prior to procedure a time out was called to verify the correct patient, procedure, equipment, support staff and site/side marked as required. Patient was prepped and draped in the usual sterile fashion.                    MARLO Pagan  05/06/22  10:05 EDT    \"Please note that portions of this note were " "completed with a voice recognition program\".   "

## 2022-05-10 DIAGNOSIS — K58.0 IRRITABLE BOWEL SYNDROME WITH DIARRHEA: ICD-10-CM

## 2022-05-10 RX ORDER — DICYCLOMINE HYDROCHLORIDE 10 MG/1
CAPSULE ORAL
Qty: 90 CAPSULE | Refills: 0 | Status: SHIPPED | OUTPATIENT
Start: 2022-05-10 | End: 2022-06-11

## 2022-05-16 ENCOUNTER — TELEPHONE (OUTPATIENT)
Dept: FAMILY MEDICINE CLINIC | Facility: CLINIC | Age: 77
End: 2022-05-16

## 2022-05-16 NOTE — TELEPHONE ENCOUNTER
The fact that she is vaccinated and boosted should give her a significant protection against severe illness.  I recommend to take over-the-counter medications like Tylenol or ibuprofen as needed for fever and discomfort.  She may also try some Mucinex if needed.  She would qualify for a monoclonal antibody injection or possibly medication called Paxlovid, if she is interested in that she will need to schedule a video appointment with me to address that and that can be scheduled for tomorrow.  If any concerning symptoms which she needs an in person evaluation for I recommend for her to go to the urgent care.  Continued monitoring is also recommended.  Thank you.

## 2022-05-16 NOTE — TELEPHONE ENCOUNTER
Caller: Abida Becker    Relationship to patient: Self    Best call back number: [phone]    Date of positive COVID19 test: 05/16/2022 ON COVID HOME TEST    COVID19 symptoms: FEVER .7 THIS MORNING, SNEEZING, PAIN IN EYES, TEETH PAIN, CHILLS     Additional information or concerns: PATIENT STATED THAT THE SNEEZING STARTED Sunday, 05/16/2022 AND THEN SHE STARTED DEVELOPING THE OTHER SYMPTOMS AFTER. PATIENT STATED THAT SHE DID GET HER FOURTH COVID SHOT ON 05/12/2022. PATIENT WOULD LIKE TO KNOW WHAT SHE SHOULD DO NEXT. PLEASE ADVISE.     What is the patients preferred pharmacy:   [facility] [address] [place] [address], [place] [facility] [address] [phone] [phone] FX

## 2022-05-18 DIAGNOSIS — K44.9 HIATAL HERNIA: ICD-10-CM

## 2022-05-18 DIAGNOSIS — K21.9 GASTROESOPHAGEAL REFLUX DISEASE WITHOUT ESOPHAGITIS: ICD-10-CM

## 2022-05-18 RX ORDER — PANTOPRAZOLE SODIUM 40 MG/1
TABLET, DELAYED RELEASE ORAL
Qty: 30 TABLET | Refills: 0 | Status: SHIPPED | OUTPATIENT
Start: 2022-05-18 | End: 2022-06-21

## 2022-06-01 ENCOUNTER — TELEPHONE (OUTPATIENT)
Dept: FAMILY MEDICINE CLINIC | Facility: CLINIC | Age: 77
End: 2022-06-01

## 2022-06-01 RX ORDER — HYDROCHLOROTHIAZIDE 25 MG/1
25 TABLET ORAL DAILY
Qty: 30 TABLET | Refills: 0 | Status: SHIPPED | OUTPATIENT
Start: 2022-06-01 | End: 2022-06-28

## 2022-06-01 NOTE — TELEPHONE ENCOUNTER
Please advise the patient that I can give her a few water pills to try to take as needed until she is seen here for evaluation.  She can possibly consider to go to urgent care to get evaluated sooner.  Thank you.

## 2022-06-01 NOTE — TELEPHONE ENCOUNTER
Caller: Abida Becker    Relationship to patient: Self    Best call back number: [phone]    Patient is needing: PATIENT STATES HER FEET AND ANKLES ARE SWELLING AGAIN THIS MORNING. PATIENT HAS APPT WITH DR SCHULZ 6-8-22  IS THERE ANYTHING SHE CAN DO TO REDUCE SWELLING UNTIL SHE CAN BE SEEN?  PLEASE ADVISE

## 2022-06-01 NOTE — TELEPHONE ENCOUNTER
I called the patient back and she would like some water pills called into the [facility] on [facility] in [place]. Thank You

## 2022-06-10 ENCOUNTER — OFFICE (OUTPATIENT)
Dept: URBAN - METROPOLITAN AREA CLINIC 64 | Facility: CLINIC | Age: 77
End: 2022-06-10
Payer: COMMERCIAL

## 2022-06-10 VITALS
HEIGHT: 67 IN | DIASTOLIC BLOOD PRESSURE: 64 MMHG | WEIGHT: 175 LBS | HEART RATE: 80 BPM | SYSTOLIC BLOOD PRESSURE: 102 MMHG

## 2022-06-10 DIAGNOSIS — K21.9 GASTRO-ESOPHAGEAL REFLUX DISEASE WITHOUT ESOPHAGITIS: ICD-10-CM

## 2022-06-10 DIAGNOSIS — R15.9 FULL INCONTINENCE OF FECES: ICD-10-CM

## 2022-06-10 DIAGNOSIS — R15.2 FECAL URGENCY: ICD-10-CM

## 2022-06-10 DIAGNOSIS — K44.9 DIAPHRAGMATIC HERNIA WITHOUT OBSTRUCTION OR GANGRENE: ICD-10-CM

## 2022-06-10 PROCEDURE — 99204 OFFICE O/P NEW MOD 45 MIN: CPT | Performed by: INTERNAL MEDICINE

## 2022-06-11 DIAGNOSIS — K58.0 IRRITABLE BOWEL SYNDROME WITH DIARRHEA: ICD-10-CM

## 2022-06-11 RX ORDER — DICYCLOMINE HYDROCHLORIDE 10 MG/1
CAPSULE ORAL
Qty: 90 CAPSULE | Refills: 0 | Status: SHIPPED | OUTPATIENT
Start: 2022-06-11 | End: 2022-07-11

## 2022-06-13 DIAGNOSIS — R15.9 INCONTINENCE OF FECES WITH FECAL URGENCY: ICD-10-CM

## 2022-06-13 DIAGNOSIS — R15.2 INCONTINENCE OF FECES WITH FECAL URGENCY: ICD-10-CM

## 2022-06-13 RX ORDER — MONTELUKAST SODIUM 4 MG/1
TABLET, CHEWABLE ORAL
Qty: 30 TABLET | Refills: 0 | Status: SHIPPED | OUTPATIENT
Start: 2022-06-13 | End: 2022-06-26

## 2022-06-21 DIAGNOSIS — K21.9 GASTROESOPHAGEAL REFLUX DISEASE WITHOUT ESOPHAGITIS: ICD-10-CM

## 2022-06-21 DIAGNOSIS — K44.9 HIATAL HERNIA: ICD-10-CM

## 2022-06-21 RX ORDER — PANTOPRAZOLE SODIUM 40 MG/1
TABLET, DELAYED RELEASE ORAL
Qty: 30 TABLET | Refills: 0 | Status: SHIPPED | OUTPATIENT
Start: 2022-06-21 | End: 2022-07-23

## 2022-06-26 DIAGNOSIS — R15.9 INCONTINENCE OF FECES WITH FECAL URGENCY: ICD-10-CM

## 2022-06-26 DIAGNOSIS — R15.2 INCONTINENCE OF FECES WITH FECAL URGENCY: ICD-10-CM

## 2022-06-26 RX ORDER — MONTELUKAST SODIUM 4 MG/1
TABLET, CHEWABLE ORAL
Qty: 30 TABLET | Refills: 0 | Status: SHIPPED | OUTPATIENT
Start: 2022-06-26 | End: 2022-07-08

## 2022-06-28 RX ORDER — HYDROCHLOROTHIAZIDE 25 MG/1
TABLET ORAL
Qty: 30 TABLET | Refills: 0 | Status: SHIPPED | OUTPATIENT
Start: 2022-06-28 | End: 2022-07-27

## 2022-07-08 DIAGNOSIS — R15.2 INCONTINENCE OF FECES WITH FECAL URGENCY: ICD-10-CM

## 2022-07-08 DIAGNOSIS — R15.9 INCONTINENCE OF FECES WITH FECAL URGENCY: ICD-10-CM

## 2022-07-08 RX ORDER — MONTELUKAST SODIUM 4 MG/1
TABLET, CHEWABLE ORAL
Qty: 30 TABLET | Refills: 0 | Status: SHIPPED | OUTPATIENT
Start: 2022-07-08 | End: 2022-07-23

## 2022-07-11 DIAGNOSIS — K58.0 IRRITABLE BOWEL SYNDROME WITH DIARRHEA: ICD-10-CM

## 2022-07-11 RX ORDER — DICYCLOMINE HYDROCHLORIDE 10 MG/1
CAPSULE ORAL
Qty: 90 CAPSULE | Refills: 0 | Status: SHIPPED | OUTPATIENT
Start: 2022-07-11

## 2022-07-23 DIAGNOSIS — K21.9 GASTROESOPHAGEAL REFLUX DISEASE WITHOUT ESOPHAGITIS: ICD-10-CM

## 2022-07-23 DIAGNOSIS — K44.9 HIATAL HERNIA: ICD-10-CM

## 2022-07-23 DIAGNOSIS — R15.2 INCONTINENCE OF FECES WITH FECAL URGENCY: ICD-10-CM

## 2022-07-23 DIAGNOSIS — R15.9 INCONTINENCE OF FECES WITH FECAL URGENCY: ICD-10-CM

## 2022-07-23 RX ORDER — MONTELUKAST SODIUM 4 MG/1
TABLET, CHEWABLE ORAL
Qty: 30 TABLET | Refills: 0 | Status: SHIPPED | OUTPATIENT
Start: 2022-07-23 | End: 2022-10-08 | Stop reason: SDUPTHER

## 2022-07-23 RX ORDER — PANTOPRAZOLE SODIUM 40 MG/1
TABLET, DELAYED RELEASE ORAL
Qty: 30 TABLET | Refills: 0 | Status: SHIPPED | OUTPATIENT
Start: 2022-07-23 | End: 2022-08-23

## 2022-07-27 RX ORDER — HYDROCHLOROTHIAZIDE 25 MG/1
TABLET ORAL
Qty: 30 TABLET | Refills: 0 | Status: SHIPPED | OUTPATIENT
Start: 2022-07-27 | End: 2022-08-23

## 2022-08-02 RX ORDER — GABAPENTIN 300 MG/1
CAPSULE ORAL
Qty: 180 CAPSULE | Refills: 0 | Status: SHIPPED | OUTPATIENT
Start: 2022-08-02 | End: 2022-10-03 | Stop reason: HOSPADM

## 2022-08-15 ENCOUNTER — OFFICE VISIT (OUTPATIENT)
Dept: FAMILY MEDICINE CLINIC | Facility: CLINIC | Age: 77
End: 2022-08-15

## 2022-08-15 ENCOUNTER — LAB (OUTPATIENT)
Dept: FAMILY MEDICINE CLINIC | Facility: CLINIC | Age: 77
End: 2022-08-15

## 2022-08-15 ENCOUNTER — LAB (OUTPATIENT)
Dept: LAB | Facility: HOSPITAL | Age: 77
End: 2022-08-15

## 2022-08-15 VITALS
WEIGHT: 177.6 LBS | SYSTOLIC BLOOD PRESSURE: 108 MMHG | BODY MASS INDEX: 27.88 KG/M2 | DIASTOLIC BLOOD PRESSURE: 71 MMHG | RESPIRATION RATE: 16 BRPM | OXYGEN SATURATION: 96 % | HEART RATE: 77 BPM | HEIGHT: 67 IN | TEMPERATURE: 98 F

## 2022-08-15 DIAGNOSIS — K21.9 GASTROESOPHAGEAL REFLUX DISEASE WITHOUT ESOPHAGITIS: ICD-10-CM

## 2022-08-15 DIAGNOSIS — M81.0 AGE-RELATED OSTEOPOROSIS WITHOUT CURRENT PATHOLOGICAL FRACTURE: ICD-10-CM

## 2022-08-15 DIAGNOSIS — Z00.00 MEDICARE ANNUAL WELLNESS VISIT, SUBSEQUENT: Primary | ICD-10-CM

## 2022-08-15 DIAGNOSIS — T14.8XXA BRUISING: ICD-10-CM

## 2022-08-15 LAB
BASOPHILS # BLD AUTO: 0.04 10*3/MM3 (ref 0–0.2)
BASOPHILS NFR BLD AUTO: 0.7 % (ref 0–1.5)
CHOLEST SERPL-MCNC: 192 MG/DL (ref 0–200)
DEPRECATED RDW RBC AUTO: 44.9 FL (ref 37–54)
EOSINOPHIL # BLD AUTO: 0.08 10*3/MM3 (ref 0–0.4)
EOSINOPHIL NFR BLD AUTO: 1.5 % (ref 0.3–6.2)
ERYTHROCYTE [DISTWIDTH] IN BLOOD BY AUTOMATED COUNT: 12.8 % (ref 12.3–15.4)
HCT VFR BLD AUTO: 39.4 % (ref 34–46.6)
HDLC SERPL-MCNC: 104 MG/DL (ref 40–60)
HGB BLD-MCNC: 12.8 G/DL (ref 12–15.9)
IMM GRANULOCYTES # BLD AUTO: 0.02 10*3/MM3 (ref 0–0.05)
IMM GRANULOCYTES NFR BLD AUTO: 0.4 % (ref 0–0.5)
INR PPP: 0.96 (ref 0.93–1.1)
LDLC SERPL CALC-MCNC: 78 MG/DL (ref 0–100)
LDLC/HDLC SERPL: 0.74 {RATIO}
LYMPHOCYTES # BLD AUTO: 1.26 10*3/MM3 (ref 0.7–3.1)
LYMPHOCYTES NFR BLD AUTO: 23.4 % (ref 19.6–45.3)
MCH RBC QN AUTO: 30.9 PG (ref 26.6–33)
MCHC RBC AUTO-ENTMCNC: 32.5 G/DL (ref 31.5–35.7)
MCV RBC AUTO: 95.2 FL (ref 79–97)
MONOCYTES # BLD AUTO: 0.54 10*3/MM3 (ref 0.1–0.9)
MONOCYTES NFR BLD AUTO: 10 % (ref 5–12)
NEUTROPHILS NFR BLD AUTO: 3.44 10*3/MM3 (ref 1.7–7)
NEUTROPHILS NFR BLD AUTO: 64 % (ref 42.7–76)
NRBC BLD AUTO-RTO: 0 /100 WBC (ref 0–0.2)
PLATELET # BLD AUTO: 353 10*3/MM3 (ref 140–450)
PMV BLD AUTO: 10.3 FL (ref 6–12)
PROTHROMBIN TIME: 9.9 SECONDS (ref 9.6–11.7)
RBC # BLD AUTO: 4.14 10*6/MM3 (ref 3.77–5.28)
TRIGL SERPL-MCNC: 54 MG/DL (ref 0–150)
VLDLC SERPL-MCNC: 10 MG/DL (ref 5–40)
WBC NRBC COR # BLD: 5.38 10*3/MM3 (ref 3.4–10.8)

## 2022-08-15 PROCEDURE — 36415 COLL VENOUS BLD VENIPUNCTURE: CPT | Performed by: FAMILY MEDICINE

## 2022-08-15 PROCEDURE — 80061 LIPID PANEL: CPT | Performed by: FAMILY MEDICINE

## 2022-08-15 PROCEDURE — 85610 PROTHROMBIN TIME: CPT | Performed by: FAMILY MEDICINE

## 2022-08-15 PROCEDURE — 1159F MED LIST DOCD IN RCRD: CPT | Performed by: FAMILY MEDICINE

## 2022-08-15 PROCEDURE — 85025 COMPLETE CBC W/AUTO DIFF WBC: CPT | Performed by: FAMILY MEDICINE

## 2022-08-15 PROCEDURE — 1125F AMNT PAIN NOTED PAIN PRSNT: CPT | Performed by: FAMILY MEDICINE

## 2022-08-15 PROCEDURE — G0439 PPPS, SUBSEQ VISIT: HCPCS | Performed by: FAMILY MEDICINE

## 2022-08-15 PROCEDURE — 1170F FXNL STATUS ASSESSED: CPT | Performed by: FAMILY MEDICINE

## 2022-08-15 NOTE — PROGRESS NOTES
Subsequent Medicare Wellness Visit   The ABC's of the Annual Wellness Visit    Chief Complaint   Patient presents with   • Bleeding/Bruising     X 2 months all over body   • Medicare Wellness-subsequent       HPI:  Abida Becker, -1945, is a 77 y.o. female who presents for a Subsequent Medicare Wellness Visit.  She lives by herself and she is fully independent with activities of daily living.  She denies any issues with depression or significant memory problems.  No balance issues or frequent falls are being reported.  She noted over the last couple of months bruising which is primarily affecting her shins and forearms.  She does not take baby aspirin and she does not take any anti-inflammatory on a regular basis. Patient does not report any chest pain, shortness of breath, dizziness, nausea, vomiting, or diarrhea, visual issues, headaches, numbness or tingling. No urinary issues reported like urgency, frequency, or discomfort upon urination.  No significant weight changes reported.  No swelling reported.  No rashes or any other skin issues reported. No emotional issues or insomnia.    Recent Hospitalizations:  No hospitalization(s) within the last year..    Current Medical Providers:  Patient Care Team:  Darlene Matute MD as PCP - Rolando Gunter MD as Consulting Physician (Urology)  Banet, Duane Edward, MD as Consulting Physician (Dermatology)    Health Habits and Functional and Cognitive Screening and Depression Screening:  Functional & Cognitive Status 8/15/2022   Do you have difficulty preparing food and eating? No   Do you have difficulty bathing yourself, getting dressed or grooming yourself? No   Do you have difficulty using the toilet? No   Do you have difficulty moving around from place to place? No   Do you have trouble with steps or getting out of a bed or a chair? No   Current Diet Well Balanced Diet   Dental Exam Up to date   Eye Exam Up to date   Exercise (times per week) 0  times per week   Current Exercises Include No Regular Exercise   Current Exercise Activities Include -   Do you need help using the phone?  No   Are you deaf or do you have serious difficulty hearing?  No   Do you need help with transportation? No   Do you need help shopping? No   Do you need help preparing meals?  No   Do you need help with housework?  No   Do you need help with laundry? No   Do you need help taking your medications? No   Do you need help managing money? No   Do you ever drive or ride in a car without wearing a seat belt? No   Have you felt unusual stress, anger or loneliness in the last month? No   Who do you live with? Alone   If you need help, do you have trouble finding someone available to you? No   Have you been bothered in the last four weeks by sexual problems? -   Do you have difficulty concentrating, remembering or making decisions? No       Compared to one year ago, the patient feels her physical health is the same and her mental health is the same.    Depression Screen:  PHQ-2/PHQ-9 Depression Screening 8/15/2022   Retired PHQ-9 Total Score -   Retired Total Score -   Little Interest or Pleasure in Doing Things 0-->not at all   Feeling Down, Depressed or Hopeless 0-->not at all   PHQ-9: Brief Depression Severity Measure Score 0         Past Medical/Family/Social History:  The following portions of the patient's history were reviewed and updated as appropriate: allergies, current medications, past family history, past medical history, past social history, past surgical history and problem list.    Allergies   Allergen Reactions   • Amoxicillin Other (See Comments)      unsure of type of reaction - states it was so long ago she can't remember    • Penicillins Nausea And Vomiting         Current Outpatient Medications:   •  acetaminophen (TYLENOL) 500 MG tablet, Take 500 mg by mouth Every 6 (Six) Hours As Needed., Disp: , Rfl:   •  calcium carbonate (OS-STEVEN) 600 MG tablet, Take 600 mg by  mouth Daily., Disp: , Rfl:   •  cholecalciferol (VITAMIN D3) 1000 units tablet, Take 1,000 Units by mouth Daily., Disp: , Rfl:   •  colestipol (COLESTID) 1 g tablet, TAKE ONE TABLET BY MOUTH TWICE A DAY AS NEEDED, Disp: 30 tablet, Rfl: 0  •  CRANBERRY CONCENTRATE PO, Take  by mouth Daily., Disp: , Rfl:   •  dicyclomine (BENTYL) 10 MG capsule, TAKE ONE CAPSULE BY MOUTH THREE TIMES A DAY AS NEEDED FOR IBS, Disp: 90 capsule, Rfl: 0  •  famotidine (PEPCID) 20 MG tablet, Take 20 mg by mouth 2 (Two) Times a Day., Disp: , Rfl:   •  fexofenadine (ALLEGRA) 180 MG tablet, FEXOFENADINE  MG TABS, Disp: , Rfl:   •  gabapentin (NEURONTIN) 300 MG capsule, TAKE ONE CAPSULE BY MOUTH TWICE A DAY, Disp: 180 capsule, Rfl: 0  •  hydroCHLOROthiazide (HYDRODIURIL) 25 MG tablet, TAKE ONE TABLET BY MOUTH DAILY, Disp: 30 tablet, Rfl: 0  •  loperamide (IMODIUM) 2 MG capsule, Take 2 mg by mouth 4 (Four) Times a Day As Needed for Diarrhea., Disp: , Rfl:   •  Mirabegron ER (MYRBETRIQ) 50 MG tablet sustained-release 24 hour 24 hr tablet, Daily., Disp: , Rfl:   •  pantoprazole (PROTONIX) 40 MG EC tablet, TAKE ONE TABLET BY MOUTH DAILY, Disp: 30 tablet, Rfl: 0  •  Riboflavin (B2 PO), Take  by mouth Daily., Disp: , Rfl:   •  vitamin B-12 (CYANOCOBALAMIN) 1000 MCG tablet, Take 1,000 mcg by mouth Daily., Disp: , Rfl:     Aspirin use counseling: Does not need ASA (and currently is not on it)    Current medication list contains no high risk medications.  No harmful drug interactions have been identified.     Family History   Problem Relation Age of Onset   • Heart failure Mother    • Cancer Father        Social History     Tobacco Use   • Smoking status: Never Smoker   • Smokeless tobacco: Never Used   Substance Use Topics   • Alcohol use: No       Past Surgical History:   Procedure Laterality Date   • BREAST BIOPSY     • CHOLECYSTECTOMY     • COLONOSCOPY  01/18/2018   • HIATAL HERNIA REPAIR  2013   • REIMPLANT URETER IN BLADDER     • SPINAL  "FUSION         Patient Active Problem List   Diagnosis   • Allergic rhinitis   • Gait abnormality   • Gastroesophageal reflux disease   • Hip pain, right   • Low back pain   • Recurrent urinary tract infection   • Scoliosis   • Vaginitis, atrophic   • Medicare annual wellness visit, subsequent   • Balance problem   • Vitamin D deficiency   • Acute pain of left knee   • Primary osteoarthritis of left knee   • Tear of MCL (medial collateral ligament) of knee, left, subsequent encounter   • Overweight (BMI 25.0-29.9)   • Visit for screening mammogram   • Postmenopausal   • Chronic pain of left knee   • Right lumbar radiculopathy   • Tremor, essential   • Other fatigue   • Osteoarthritis, generalized   • Irritable bowel syndrome with diarrhea   • Primary osteoarthritis of both knees   • Obesity (BMI 30.0-34.9)   • Hiatal hernia   • Incontinence of feces with fecal urgency   • Age-related osteoporosis without current pathological fracture   • Bruising       Review of Systems   Constitutional: Negative for activity change, fatigue and fever.   Respiratory: Negative for cough, shortness of breath and wheezing.    Cardiovascular: Negative for chest pain, palpitations and leg swelling.   Gastrointestinal: Negative for constipation, diarrhea and indigestion.   Skin: Negative for color change, dry skin and rash.   Neurological: Negative for tremors and headache.   Hematological: Bruises/bleeds easily.       Objective     Vitals:    08/15/22 0843   BP: 108/71   BP Location: Left arm   Patient Position: Sitting   Cuff Size: Adult   Pulse: 77   Resp: 16   Temp: 98 °F (36.7 °C)   SpO2: 96%   Weight: 80.6 kg (177 lb 9.6 oz)   Height: 170.2 cm (67.01\")   PainSc:   2       BMI is >= 25 and <30. (Overweight) The following options were offered after discussion;: exercise counseling/recommendations      No exam data present    The patient has no evidence of cognitve impairment.     Physical Exam  Vitals and nursing note reviewed. "   Constitutional:       General: She is not in acute distress.     Appearance: Normal appearance. She is well-developed. She is not ill-appearing.   HENT:      Head: Normocephalic and atraumatic.   Cardiovascular:      Rate and Rhythm: Normal rate and regular rhythm.      Heart sounds: Normal heart sounds. No murmur heard.    No gallop.   Pulmonary:      Effort: Pulmonary effort is normal. No respiratory distress.      Breath sounds: Normal breath sounds. No wheezing, rhonchi or rales.   Chest:      Chest wall: No tenderness.   Musculoskeletal:      Cervical back: Normal range of motion and neck supple.   Skin:     Comments: Few small petechial bruises noted on both forearms and shins.   Neurological:      General: No focal deficit present.      Mental Status: She is alert and oriented to person, place, and time. Mental status is at baseline.   Psychiatric:         Mood and Affect: Mood normal.         Recent Lab Results:     Lab Results   Component Value Date    CHOL 149 06/22/2021    TRIG 75 06/22/2021    HDL 72 (H) 06/22/2021    VLDL 15 06/22/2021    LDLHDL 0.86 06/22/2021       Assessment & Plan   Age-appropriate Screening Schedule:  Refer to the list below for future screening recommendations based on patient's age, sex and/or medical conditions.      Health Maintenance   Topic Date Due   • ZOSTER VACCINE (2 of 3) 10/27/2013   • DXA SCAN  07/13/2022   • INFLUENZA VACCINE  10/01/2022   • TDAP/TD VACCINES (2 - Td or Tdap) 12/12/2028       Medicare Risks and Personalized Health Plan:  Advance Directive Discussion  Breast Cancer/Mammogram Screening  Cardiovascular risk  Colon Cancer Screening  Dementia/Memory   Depression/Dysphoria  Diabetic Lab Screening   Fall Risk  Immunizations Discussed/Encouraged (specific immunizations; Shingrix and COVID19 )  Osteoporosis Risk      CMS-Preventive Services Quick Reference  Medicare Preventive Services Addressed:  Annual Wellness Visit (AWV)  Bone Density  Measurements  Cardiovascular Disease Screening Tests (may do this order every 5 years in beneficiaries without signs or symptoms of cardiovascular disease)  Colorectal Cancer Screening, Colonoscopy  Screening Mammography     Advance Care Planning:  ACP discussion was held with the patient during this visit. Patient has an advance directive in EMR which is still valid.     Diagnoses and all orders for this visit:    1. Medicare annual wellness visit, subsequent (Primary)    2. Bruising  -     CBC Auto Differential  -     Protime-INR    3. Gastroesophageal reflux disease without esophagitis  -     Lipid Panel    4. Age-related osteoporosis without current pathological fracture    Medicare wellness exam was done today.  I reviewed her medical problems and her medications.  I will be getting some blood work today.  I also reviewed her health maintenance.  Her last mammogram was in 4/2022.  Her last DEXA scan was in 7/2020 and she is to follow-up with osteoporosis clinic.  Her last colonoscopy was in 1/2018 and patient reports that no recall was recommended.  She is vaccinated and boosted against COVID-19.  She is up to speed with the pneumonia shot.  Healthy lifestyle was reinforced.    An After Visit Summary and PPPS with all of these plans were given to the patient.      Follow Up:  Return in about 1 year (around 8/15/2023) for Medicare Wellness.        Requested Prescriptions      No prescriptions requested or ordered in this encounter

## 2022-08-18 ENCOUNTER — LAB (OUTPATIENT)
Dept: LAB | Facility: HOSPITAL | Age: 77
End: 2022-08-18

## 2022-08-18 ENCOUNTER — OFFICE VISIT (OUTPATIENT)
Dept: ORTHOPEDIC SURGERY | Facility: CLINIC | Age: 77
End: 2022-08-18

## 2022-08-18 VITALS
HEIGHT: 66 IN | DIASTOLIC BLOOD PRESSURE: 75 MMHG | BODY MASS INDEX: 28.93 KG/M2 | HEART RATE: 75 BPM | SYSTOLIC BLOOD PRESSURE: 117 MMHG | WEIGHT: 180 LBS | OXYGEN SATURATION: 95 %

## 2022-08-18 DIAGNOSIS — Z98.890 PERSONAL HISTORY OF SPINE SURGERY: ICD-10-CM

## 2022-08-18 DIAGNOSIS — M81.0 AGE-RELATED OSTEOPOROSIS WITHOUT CURRENT PATHOLOGICAL FRACTURE: Primary | ICD-10-CM

## 2022-08-18 DIAGNOSIS — Z82.62 FAMILY HX OSTEOPOROSIS: ICD-10-CM

## 2022-08-18 DIAGNOSIS — E55.9 VITAMIN D DEFICIENCY: ICD-10-CM

## 2022-08-18 DIAGNOSIS — Z91.81 AT HIGH RISK FOR FALLS: ICD-10-CM

## 2022-08-18 DIAGNOSIS — M81.0 AGE-RELATED OSTEOPOROSIS WITHOUT CURRENT PATHOLOGICAL FRACTURE: ICD-10-CM

## 2022-08-18 DIAGNOSIS — R26.89 BALANCE PROBLEM: ICD-10-CM

## 2022-08-18 LAB
25(OH)D3 SERPL-MCNC: 35.1 NG/ML (ref 30–100)
ALBUMIN SERPL-MCNC: 4.6 G/DL (ref 3.5–5.2)
ALBUMIN/GLOB SERPL: 2 G/DL
ALP SERPL-CCNC: 75 U/L (ref 39–117)
ALT SERPL W P-5'-P-CCNC: 19 U/L (ref 1–33)
ANION GAP SERPL CALCULATED.3IONS-SCNC: 12.3 MMOL/L (ref 5–15)
AST SERPL-CCNC: 19 U/L (ref 1–32)
BILIRUB SERPL-MCNC: 0.9 MG/DL (ref 0–1.2)
BUN SERPL-MCNC: 16 MG/DL (ref 8–23)
BUN/CREAT SERPL: 20 (ref 7–25)
CA-I BLD-MCNC: 5.1 MG/DL (ref 4.6–5.4)
CA-I SERPL ISE-MCNC: 1.28 MMOL/L (ref 1.15–1.35)
CALCIUM SPEC-SCNC: 9.4 MG/DL (ref 8.6–10.5)
CHLORIDE SERPL-SCNC: 101 MMOL/L (ref 98–107)
CO2 SERPL-SCNC: 26.7 MMOL/L (ref 22–29)
CREAT SERPL-MCNC: 0.8 MG/DL (ref 0.57–1)
EGFRCR SERPLBLD CKD-EPI 2021: 76.9 ML/MIN/1.73
GLOBULIN UR ELPH-MCNC: 2.3 GM/DL
GLUCOSE SERPL-MCNC: 89 MG/DL (ref 65–99)
MAGNESIUM SERPL-MCNC: 2.5 MG/DL (ref 1.6–2.4)
PHOSPHATE SERPL-MCNC: 3.6 MG/DL (ref 2.5–4.5)
POTASSIUM SERPL-SCNC: 4.1 MMOL/L (ref 3.5–5.2)
PROT SERPL-MCNC: 6.9 G/DL (ref 6–8.5)
PTH-INTACT SERPL-MCNC: 93.1 PG/ML (ref 15–65)
SODIUM SERPL-SCNC: 140 MMOL/L (ref 136–145)

## 2022-08-18 PROCEDURE — 82330 ASSAY OF CALCIUM: CPT

## 2022-08-18 PROCEDURE — 82306 VITAMIN D 25 HYDROXY: CPT

## 2022-08-18 PROCEDURE — 80053 COMPREHEN METABOLIC PANEL: CPT

## 2022-08-18 PROCEDURE — 84100 ASSAY OF PHOSPHORUS: CPT

## 2022-08-18 PROCEDURE — 83970 ASSAY OF PARATHORMONE: CPT

## 2022-08-18 PROCEDURE — 99215 OFFICE O/P EST HI 40 MIN: CPT | Performed by: PHYSICIAN ASSISTANT

## 2022-08-18 PROCEDURE — 36415 COLL VENOUS BLD VENIPUNCTURE: CPT

## 2022-08-18 PROCEDURE — 83735 ASSAY OF MAGNESIUM: CPT

## 2022-08-18 NOTE — PROGRESS NOTES
ORTHOPEDIC VISIT    Referring Provider: No ref. provider found  Primary Care Provider: Darlene Matute MD         Subjective:  Chief Complaint:  Chief Complaint   Patient presents with   • Osteoporosis       HPI:  Abida Becker is a 77 y.o. female who presents for her initial visit for bone health evaluation.  The patient complains of Complains of: back pain, loss of height and Joint pain.  And denies Denies: generalized bone pain and Any significant fractures over the age of 50.  Their risk factors include risk factors: low calcium intake, Vitamin D deficiency, Low sun exposure, Family history of fracture, Elevated falls risk, Hypogonadism and Medication use.  The patient has the following associated illnesses: Associated illnesses: Esophagitis, Gastritis, Vitamin D deficiency and PE, scoliosis, tremor, balance problems, history of spine surgery with hardware.  Treatment to date has included Treatment to date: Calcium supplementation, Vitamin D supplementation and Weightbearing exercise.  The patient has never taken any osteoporosis medications.  She currently takes 600 mg of calcium + 1000 international units of D daily.  Fracture history over the age of 50 includes: None. Family history of osteoporosis includes: Mother, who sustained a hip fracture, and father, who sustained vertebral fractures.  She reports menopause in her early 50s.  She does not smoke or drink alcohol.  Physical activity includes: Stationary bike.  She denies any history of steroid use, cancer, radiation treatment, chronic diarrhea, heart attack, or stroke.  She may have had a kidney stone many years ago.  She is on long-term PPIs and Pepcid for GERD.  She currently takes gabapentin.  She does see a dentist regularly.  She has fallen in the last year, does at times feel unsteady with walking, and is worried about falling.  She scored an 8 out of 14 on the STEADI risk for falling assessment.  Chart review completed today, along with  recent and previous labs, as well as any previous imaging.  She did have a DEXA scan on 7/13/2020 at [facility] radiology, which revealed a T score of -2.2 in the left femoral neck and left one third radius, with FRAX scores of 22% and 10%.  Referred for consultation by Dr. Matute.    KHANG Fall Risk Assessment was completed, and patient is at HIGH risk for falls. Assessment completed on:8/18/2022      Past Medical History:   Diagnosis Date   • GERD (gastroesophageal reflux disease)    • History of recurrent UTIs    • IBS (irritable bowel syndrome)    • Osteoarthritis    • Osteoporosis    • Primary osteoarthritis of both knees 1/24/2022   • Pulmonary embolism (HCC) 2011    after scoliosis surgery   • Vitamin D deficiency        Past Surgical History:   Procedure Laterality Date   • BREAST BIOPSY     • CHOLECYSTECTOMY     • COLONOSCOPY  01/18/2018   • HIATAL HERNIA REPAIR  2013   • REIMPLANT URETER IN BLADDER     • SPINAL FUSION         Family History   Problem Relation Age of Onset   • Heart failure Mother    • Cancer Father        Social History     Occupational History   • Not on file   Tobacco Use   • Smoking status: Never Smoker   • Smokeless tobacco: Never Used   Vaping Use   • Vaping Use: Never used   Substance and Sexual Activity   • Alcohol use: No   • Drug use: No   • Sexual activity: Never        Medications:    Current Outpatient Medications:   •  acetaminophen (TYLENOL) 500 MG tablet, Take 500 mg by mouth Every 6 (Six) Hours As Needed., Disp: , Rfl:   •  calcium carbonate (OS-STEVEN) 600 MG tablet, Take 600 mg by mouth Daily., Disp: , Rfl:   •  cholecalciferol (VITAMIN D3) 1000 units tablet, Take 1,000 Units by mouth Daily., Disp: , Rfl:   •  colestipol (COLESTID) 1 g tablet, TAKE ONE TABLET BY MOUTH TWICE A DAY AS NEEDED, Disp: 30 tablet, Rfl: 0  •  CRANBERRY CONCENTRATE PO, Take  by mouth Daily., Disp: , Rfl:   •  dicyclomine (BENTYL) 10 MG capsule, TAKE ONE CAPSULE BY MOUTH THREE TIMES A DAY AS NEEDED  "FOR IBS, Disp: 90 capsule, Rfl: 0  •  famotidine (PEPCID) 20 MG tablet, Take 20 mg by mouth 2 (Two) Times a Day., Disp: , Rfl:   •  fexofenadine (ALLEGRA) 180 MG tablet, FEXOFENADINE  MG TABS, Disp: , Rfl:   •  gabapentin (NEURONTIN) 300 MG capsule, TAKE ONE CAPSULE BY MOUTH TWICE A DAY, Disp: 180 capsule, Rfl: 0  •  hydroCHLOROthiazide (HYDRODIURIL) 25 MG tablet, TAKE ONE TABLET BY MOUTH DAILY, Disp: 30 tablet, Rfl: 0  •  loperamide (IMODIUM) 2 MG capsule, Take 2 mg by mouth 4 (Four) Times a Day As Needed for Diarrhea., Disp: , Rfl:   •  Mirabegron ER (MYRBETRIQ) 50 MG tablet sustained-release 24 hour 24 hr tablet, Daily., Disp: , Rfl:   •  pantoprazole (PROTONIX) 40 MG EC tablet, TAKE ONE TABLET BY MOUTH DAILY, Disp: 30 tablet, Rfl: 0  •  Riboflavin (B2 PO), Take  by mouth Daily., Disp: , Rfl:   •  vitamin B-12 (CYANOCOBALAMIN) 1000 MCG tablet, Take 1,000 mcg by mouth Daily., Disp: , Rfl:     Allergies:  Allergies   Allergen Reactions   • Amoxicillin Other (See Comments)      unsure of type of reaction - states it was so long ago she can't remember    • Penicillins Nausea And Vomiting         Review of Systems:  Gen -no fever, chills , sweats, headache   Eyes - no irritation or discharge   ENT -  no ear pain , runny nose , sore throat , difficulty swallowing   Resp - no cough , congestion , excessive expectoration   CVS - no chest pain , palpitations.   Abd - no pain , nausea , vomiting , diarrhea   Skin - no rash , lesions.   Neuro - no dizziness    Please see HPI for any other pertinent positives.  All other systems were reviewed and are negative.       Objective   Objective:    /75 (BP Location: Left arm, Patient Position: Sitting, Cuff Size: Adult)   Pulse 75   Ht 167.6 cm (66\")   Wt 81.6 kg (180 lb)   SpO2 95%   BMI 29.05 kg/m²     Physical Examination:  Overweight individual in no acute distress, patient is alert and cooperative with the exam, appears to have normal mood and affect with a " normal attention span and concentration, ambulating unassisted  normocephalic, atraumatic, extraocular movements intact, conjunctiva and sclera are clear, grossly normal hearing  no lymphadenopathy or thyromegaly  normal respiratory effort  abdomen is nontender, without guarding or rebound and nondistended  no gross joint abnormalities  no LE edema noted  cranial nerves II-XII grossly intact with no focal defects  skin intact without lesions or rashes visible         Imagin2020-DEXA scan at [facility] radiology, revealed a T score of -2.2 in the left femoral neck and left one third radius and -2.0 in the left total hip, with FRAX scores of 22% and 10%.            Assessment:  1. Age-related osteoporosis without current pathological fracture    2. Vitamin D deficiency    3. Family hx osteoporosis    4. Personal history of spine surgery    5. At high risk for falls    6. Balance problem                 Plan:  Today, I would like to check a CMP, ionized calcium, magnesium, phosphorus, PTH, vitamin D level to rule out secondary causes.  I would also like to get a new DEXA scan at this time.  I do recommend 1200 mg of calcium daily, either through diet or supplementation, as well as 2000 international units of D3.  According to her hip FRAX score of 10%, she is at very high risk for hip fracture in the next 10 years.  We did discuss multiple treatment options today.  Provided nothing significantly changes on her new DEXA scan and pending labs, I have recommended treatment with Prolia.  We did discuss the risks and benefits of this medication today, including but not limited to, rash, hypocalcemia, osteonecrosis of the jaw, atypical femur fractures, and the risk of multiple vertebral fractures upon discontinuation.  She voiced understanding and would like to proceed.  She will be given an osteoporosis folder today containing everything discussed, including weightbearing exercises and fall prevention.  She will also  "be referred for formal physical therapy.  She will be notified when medication has been approved and the injection is available here in the office.  I will plan to see her yearly and as needed, and she should call with any questions or concerns.  More than 40 minutes was spent with previsit planning, chart review, face-to-face encounter with counseling and education, and final documentation of the visit.  PATIENT EDUCATION:    Education was provided to: patient  Patient response: expressed understanding and receptive  Topics discussed: medical condition, workup results, treatment options, therapeutic risks and benefits, medication use, health promotion, diet and nutrition, support systems available, drug interactions, compliance with medication, osteoporosis risks, Prolia  Informed how: verbally, demonstration, literature               MARLO Pagan  08/18/22  17:21 EDT    \"Please note that portions of this note were completed with a voice recognition program\".   "

## 2022-08-18 NOTE — PATIENT INSTRUCTIONS
Osteoporosis    Osteoporosis is when the bones get thin and weak. This can cause your bones to break (fracture) more easily.  What are the causes?  The exact cause of this condition is not known.  What increases the risk?  Having family members with this condition.  Not eating enough healthy foods.  Taking certain medicines.  Being female.  Being age 50 or older.  Smoking or using other products that contain nicotine or tobacco, such as e-cigarettes or chewing tobacco.  Not exercising.  Being of  or  ancestry.  Having a small body frame.  What are the signs or symptoms?  A broken bone might be the first sign, especially if the break results from a fall or injury that usually would not cause a bone to break.  Other signs and symptoms include:  Pain in the neck or low back.  Being hunched over (stooped posture).  Getting shorter.  How is this treated?  Eating more foods with more calcium and vitamin D in them.  Doing exercises.  Stopping tobacco use.  Limiting how much alcohol you drink.  Taking medicines to slow bone loss or help make the bones stronger.  Taking supplements of calcium and vitamin D every day.  Taking medicines to replace chemicals in the body (hormone replacement medicines).  Monitoring your levels of calcium and vitamin D.  The goal of treatment is to strengthen your bones and lower your risk for a bone break.  Follow these instructions at home:  Eating and drinking  Eat plenty of calcium and vitamin D. These nutrients are good for your bones. Good sources of calcium and vitamin D include:  Some fish, such as salmon and tuna.  Foods that have calcium and vitamin D added to them (fortified foods), such as some breakfast cereals.  Egg yolks.  Cheese.  Liver.    Activity  Do exercises as told by your doctor. Ask your doctor what exercises are safe for you. You should do:  Exercises that make your muscles work to hold your body weight up (weight-bearing exercises). These include lolyd chi,  yoga, and walking.  Exercises to make your muscles stronger. One example is lifting weights.  Lifestyle  Do not drink alcohol if:  Your doctor tells you not to drink.  You are pregnant, may be pregnant, or are planning to become pregnant.  If you drink alcohol:  Limit how much you use to:  0-1 drink a day for women.  0-2 drinks a day for men.  Know how much alcohol is in your drink. In the [place] one drink equals one 12 oz bottle of beer (355 mL), one 5 oz glass of wine (148 mL), or one 1½ oz glass of hard liquor (44 mL).  Do not smoke or use any products that contain nicotine or tobacco. If you need help quitting, ask your doctor.  Preventing falls  Use tools to help you move around (mobility aids) as needed. These include canes, walkers, scooters, and crutches.  Keep rooms well-lit.  Put away things on the floor that could make you trip. These include cords and rugs.  Install safety rails on stairs. Install grab bars in bathrooms.  Use rubber mats in slippery areas, like bathrooms.  Wear shoes that:  Fit you well.  Support your feet.  Have closed toes.  Have rubber soles or low heels.  Tell your doctor about all of the medicines you are taking. Some medicines can make you more likely to fall.  General instructions  Take over-the-counter and prescription medicines only as told by your doctor.  Keep all follow-up visits.  Contact a doctor if:  You have not been tested (screened) for osteoporosis and you are:  A woman who is age 65 or older.  A man who is age 70 or older.  Get help right away if:  You fall.  You get hurt.  Summary  Osteoporosis happens when your bones get thin and weak.  Weak bones can break (fracture) more easily.  Eat plenty of calcium and vitamin D. These are good for your bones.  Tell your doctor about all of the medicines that you take.  This information is not intended to replace advice given to you by your health care provider. Make sure you discuss any questions you have with your health care  provider.  Document Revised: 06/03/2021 Document Reviewed: 06/03/2021  Elsevier Patient Education © 2021 [employer]

## 2022-08-23 DIAGNOSIS — K44.9 HIATAL HERNIA: ICD-10-CM

## 2022-08-23 DIAGNOSIS — K21.9 GASTROESOPHAGEAL REFLUX DISEASE WITHOUT ESOPHAGITIS: ICD-10-CM

## 2022-08-23 RX ORDER — HYDROCHLOROTHIAZIDE 25 MG/1
TABLET ORAL
Qty: 30 TABLET | Refills: 0 | Status: SHIPPED | OUTPATIENT
Start: 2022-08-23 | End: 2022-09-21

## 2022-08-23 RX ORDER — PANTOPRAZOLE SODIUM 40 MG/1
TABLET, DELAYED RELEASE ORAL
Qty: 90 TABLET | Refills: 0 | Status: SHIPPED | OUTPATIENT
Start: 2022-08-23 | End: 2022-10-27 | Stop reason: SDUPTHER

## 2022-08-26 ENCOUNTER — TELEPHONE (OUTPATIENT)
Dept: ORTHOPEDIC SURGERY | Facility: CLINIC | Age: 77
End: 2022-08-26

## 2022-08-26 NOTE — TELEPHONE ENCOUNTER
Caller: Abida Becker    Relationship to patient: Self    Best call back number: [phone]    Chief complaint: BILATERAL KNEE PAIN    Type of visit: INJECTION    Requested date: AS SOON AS CAN BE SCHEDULED    Additional notes: PT LAST RECEIVED ON 5.6.2022. WAS WALKING THIS AM AND HER LEFT KNEE GAVE OUT.

## 2022-08-29 ENCOUNTER — OFFICE VISIT (OUTPATIENT)
Dept: ORTHOPEDIC SURGERY | Facility: CLINIC | Age: 77
End: 2022-08-29

## 2022-08-29 VITALS — WEIGHT: 180 LBS | HEIGHT: 66 IN | BODY MASS INDEX: 28.93 KG/M2 | HEART RATE: 77 BPM

## 2022-08-29 DIAGNOSIS — E66.3 OVERWEIGHT (BMI 25.0-29.9): ICD-10-CM

## 2022-08-29 DIAGNOSIS — M17.0 PRIMARY OSTEOARTHRITIS OF BOTH KNEES: Primary | ICD-10-CM

## 2022-08-29 PROCEDURE — 20610 DRAIN/INJ JOINT/BURSA W/O US: CPT | Performed by: PHYSICIAN ASSISTANT

## 2022-08-29 RX ORDER — METHYLPREDNISOLONE ACETATE 80 MG/ML
160 INJECTION, SUSPENSION INTRA-ARTICULAR; INTRALESIONAL; INTRAMUSCULAR; SOFT TISSUE
Status: COMPLETED | OUTPATIENT
Start: 2022-08-29 | End: 2022-08-29

## 2022-08-29 RX ORDER — LIDOCAINE HYDROCHLORIDE 10 MG/ML
2 INJECTION, SOLUTION EPIDURAL; INFILTRATION; INTRACAUDAL; PERINEURAL
Status: COMPLETED | OUTPATIENT
Start: 2022-08-29 | End: 2022-08-29

## 2022-08-29 RX ORDER — SULFAMETHOXAZOLE AND TRIMETHOPRIM 800; 160 MG/1; MG/1
TABLET ORAL
COMMUNITY
Start: 2022-08-24 | End: 2022-09-15

## 2022-08-29 RX ADMIN — METHYLPREDNISOLONE ACETATE 160 MG: 80 INJECTION, SUSPENSION INTRA-ARTICULAR; INTRALESIONAL; INTRAMUSCULAR; SOFT TISSUE at 10:06

## 2022-08-29 RX ADMIN — LIDOCAINE HYDROCHLORIDE 2 ML: 10 INJECTION, SOLUTION EPIDURAL; INFILTRATION; INTRACAUDAL; PERINEURAL at 10:06

## 2022-08-29 NOTE — PROGRESS NOTES
ORTHO FOLLOW UP       Subjective:    HPI:   Abida Becker is a 77 y.o. female who presents for follow-up for her bilateral knee pain with a known history of bilateral knee DJD.  She last had bilateral intra-articular steroid injections on 5/6/2022, which did help to reduce her pain.      Past Medical History:   Diagnosis Date   • GERD (gastroesophageal reflux disease)    • History of recurrent UTIs    • IBS (irritable bowel syndrome)    • Osteoarthritis    • Osteoporosis    • Primary osteoarthritis of both knees 1/24/2022   • Pulmonary embolism (HCC) 2011    after scoliosis surgery   • Vitamin D deficiency        Past Surgical History:   Procedure Laterality Date   • BREAST BIOPSY     • CHOLECYSTECTOMY     • COLONOSCOPY  01/18/2018   • HIATAL HERNIA REPAIR  2013   • REIMPLANT URETER IN BLADDER     • SPINAL FUSION         Social History     Occupational History   • Not on file   Tobacco Use   • Smoking status: Never Smoker   • Smokeless tobacco: Never Used   Vaping Use   • Vaping Use: Never used   Substance and Sexual Activity   • Alcohol use: No   • Drug use: No   • Sexual activity: Never      The following portions of the patient's history were reviewed and updated as appropriate: allergies, current medications, past family history, past medical history, past social history, past surgical history and problem list.    Medications:    Current Outpatient Medications:   •  acetaminophen (TYLENOL) 500 MG tablet, Take 500 mg by mouth Every 6 (Six) Hours As Needed., Disp: , Rfl:   •  calcium carbonate (OS-STEVEN) 600 MG tablet, Take 600 mg by mouth Daily., Disp: , Rfl:   •  cholecalciferol (VITAMIN D3) 1000 units tablet, Take 1,000 Units by mouth Daily., Disp: , Rfl:   •  colestipol (COLESTID) 1 g tablet, TAKE ONE TABLET BY MOUTH TWICE A DAY AS NEEDED, Disp: 30 tablet, Rfl: 0  •  CRANBERRY CONCENTRATE PO, Take  by mouth Daily., Disp: , Rfl:   •  dicyclomine (BENTYL) 10 MG capsule, TAKE ONE CAPSULE BY MOUTH THREE TIMES A  "DAY AS NEEDED FOR IBS, Disp: 90 capsule, Rfl: 0  •  famotidine (PEPCID) 20 MG tablet, Take 20 mg by mouth 2 (Two) Times a Day., Disp: , Rfl:   •  fexofenadine (ALLEGRA) 180 MG tablet, FEXOFENADINE  MG TABS, Disp: , Rfl:   •  gabapentin (NEURONTIN) 300 MG capsule, TAKE ONE CAPSULE BY MOUTH TWICE A DAY, Disp: 180 capsule, Rfl: 0  •  hydroCHLOROthiazide (HYDRODIURIL) 25 MG tablet, TAKE ONE TABLET BY MOUTH DAILY, Disp: 30 tablet, Rfl: 0  •  loperamide (IMODIUM) 2 MG capsule, Take 2 mg by mouth 4 (Four) Times a Day As Needed for Diarrhea., Disp: , Rfl:   •  Mirabegron ER (MYRBETRIQ) 50 MG tablet sustained-release 24 hour 24 hr tablet, Daily., Disp: , Rfl:   •  pantoprazole (PROTONIX) 40 MG EC tablet, TAKE ONE TABLET BY MOUTH DAILY, Disp: 90 tablet, Rfl: 0  •  Riboflavin (B2 PO), Take  by mouth Daily., Disp: , Rfl:   •  sulfamethoxazole-trimethoprim (BACTRIM DS,SEPTRA DS) 800-160 MG per tablet, , Disp: , Rfl:   •  vitamin B-12 (CYANOCOBALAMIN) 1000 MCG tablet, Take 1,000 mcg by mouth Daily., Disp: , Rfl:     Allergies:  Allergies   Allergen Reactions   • Amoxicillin Other (See Comments)      unsure of type of reaction - states it was so long ago she can't remember    • Penicillins Nausea And Vomiting       Review of Systems:  Gen -no fever, chills , sweats, headache   Eyes - no irritation or discharge   ENT -  no ear pain , runny nose , sore throat , difficulty swallowing   Resp - no cough , congestion , excessive expectoration   CVS - no chest pain , palpitations.   Abd - no pain , nausea , vomiting , diarrhea   Skin - no rash , lesions.   Neuro - no dizziness    Please see HPI for any other pertinent positives.  All other systems were reviewed and are negative.       Objective   Objective:    Pulse 77   Ht 167.6 cm (66\")   Wt 81.6 kg (180 lb)   BMI 29.05 kg/m²     Physical Examination:  Alert, oriented, obese individual in no acute distress, ambulating unassisted  Right lower extremity shows no erythema, " rashes, or open skin lesions. There is a minimal amount of swelling.  There is a slight valgus malalignment, and the patient is neurovascularly intact distally. The knee is stable to varus and valgus stress, there is no crepitus noted, and plantar and dorsiflexion is 5/5.  Patella seems to track lateral..  There is no tenderness to palpation or with range of motion, which is about 0-130.  Left lower extremity shows no erythema, rashes, or open skin lesions. There is a minimal amount of swelling.  There is a slight valgus malalignment, and the patient is neurovascularly intact distally. The knee is stable to varus and valgus stress, there is no crepitus noted, and plantar and dorsiflexion is 5/5.  Patella seems to track lateral.  There is no tenderness to palpation or with range of motion, which is about 0-130.         Imaging:  no diagnostic testing performed this visit            Assessment:  1. Primary osteoarthritis of both knees    2. Overweight (BMI 25.0-29.9)                 Plan:  Bilateral intra-articular steroid injections today, risks and benefits were discussed and postinjection instructions were given.  Continue weight loss efforts.  Follow-up in 3 months.  - Large Joint Arthrocentesis: bilateral knee on 8/29/2022 10:06 AM  Indications: pain  Details: 25 G needle, anterolateral approach  Medications (Right): 2 mL lidocaine PF 1% 1 %; 160 mg methylPREDNISolone acetate 80 MG/ML  Medications (Left): 2 mL lidocaine PF 1% 1 %; 160 mg methylPREDNISolone acetate 80 MG/ML  Outcome: tolerated well, no immediate complications  Procedure, treatment alternatives, risks and benefits explained, specific risks discussed. Consent was given by the patient. Immediately prior to procedure a time out was called to verify the correct patient, procedure, equipment, support staff and site/side marked as required. Patient was prepped and draped in the usual sterile fashion.                    Inocencia Wade,  "PA  08/29/22  10:05 EDT    \"Please note that portions of this note were completed with a voice recognition program\".   "

## 2022-09-19 ENCOUNTER — TELEPHONE (OUTPATIENT)
Dept: ORTHOPEDIC SURGERY | Facility: CLINIC | Age: 77
End: 2022-09-19

## 2022-09-21 RX ORDER — HYDROCHLOROTHIAZIDE 25 MG/1
TABLET ORAL
Qty: 30 TABLET | Refills: 0 | Status: SHIPPED | OUTPATIENT
Start: 2022-09-21 | End: 2022-10-03 | Stop reason: HOSPADM

## 2022-09-27 NOTE — TELEPHONE ENCOUNTER
[employer] Prolia SOB returned. No PA req, 0% OOP for Prolia and admin fee.    Primary medical benefit COVERAGE DETAILS: Benefits subject to a $233.00 deductible ($233.00 met) and 20% co-insurance for the administration and cost of Prolia. The benefits provided on this Verification of Benefits form are Medical enefits and are the patient's In-Network benefits for Prolia. If you would like Pharmacy Benefits for Prolia, please call [phone].    Secondary medical benefit COVERAGE DETAILS: For the Secondary MD Purchase access option, this plan is a Medicare Supplement Plan G and it covers the Medicare Part B co-insurance and 100% of the excess charges. This plan does not cover the Medicare Part B deductible.    Call placed to patient, advised of benefits and that can start Prolia now if she would like. Patient agreeable with this and appt given per patient request.

## 2022-09-29 ENCOUNTER — OFFICE VISIT (OUTPATIENT)
Dept: FAMILY MEDICINE CLINIC | Facility: CLINIC | Age: 77
End: 2022-09-29

## 2022-09-29 VITALS
RESPIRATION RATE: 16 BRPM | DIASTOLIC BLOOD PRESSURE: 76 MMHG | SYSTOLIC BLOOD PRESSURE: 112 MMHG | HEIGHT: 66 IN | BODY MASS INDEX: 28.61 KG/M2 | TEMPERATURE: 99.6 F | OXYGEN SATURATION: 90 % | WEIGHT: 178 LBS | HEART RATE: 108 BPM

## 2022-09-29 DIAGNOSIS — R51.9 NONINTRACTABLE HEADACHE, UNSPECIFIED CHRONICITY PATTERN, UNSPECIFIED HEADACHE TYPE: ICD-10-CM

## 2022-09-29 DIAGNOSIS — R50.9 FEVER, UNSPECIFIED FEVER CAUSE: Primary | ICD-10-CM

## 2022-09-29 LAB
EXPIRATION DATE: NORMAL
FLUAV AG UPPER RESP QL IA.RAPID: NOT DETECTED
FLUBV AG UPPER RESP QL IA.RAPID: NOT DETECTED
INTERNAL CONTROL: NORMAL
Lab: NORMAL
SARS-COV-2 AG UPPER RESP QL IA.RAPID: NOT DETECTED

## 2022-09-29 PROCEDURE — 87428 SARSCOV & INF VIR A&B AG IA: CPT | Performed by: FAMILY MEDICINE

## 2022-09-29 PROCEDURE — 99213 OFFICE O/P EST LOW 20 MIN: CPT | Performed by: FAMILY MEDICINE

## 2022-09-29 NOTE — ASSESSMENT & PLAN NOTE
COVID-19 and flu test negative.  Discussed conservative management encourage fluid and rest.  Call us back if symptoms do not get better.

## 2022-09-29 NOTE — PROGRESS NOTES
Subjective   Abida Becker is a 77 y.o. female.     Fever   This is a new problem. The current episode started yesterday. The maximum temperature noted was 101 to 101.9 F. Associated symptoms include congestion, ear pain, headaches and muscle aches. Pertinent negatives include no abdominal pain, chest pain, coughing, nausea, sore throat or wheezing. She has tried nothing for the symptoms.        The following portions of the patient's history were reviewed and updated as appropriate: past medical history, past social history, past surgical history and problem list.    Review of Systems   Constitutional: Positive for fatigue and fever.        Body aches   HENT: Positive for congestion and ear pain. Negative for sore throat.    Respiratory: Negative for cough and wheezing.    Cardiovascular: Negative for chest pain.   Gastrointestinal: Negative for abdominal pain and nausea.   Neurological: Positive for headache.       Objective   Physical Exam  Vitals reviewed.   Constitutional:       General: She is not in acute distress.  HENT:      Right Ear: Tympanic membrane, ear canal and external ear normal.      Left Ear: Tympanic membrane, ear canal and external ear normal.      Nose: No rhinorrhea.   Eyes:      Conjunctiva/sclera: Conjunctivae normal.   Cardiovascular:      Heart sounds: Normal heart sounds.   Pulmonary:      Effort: Pulmonary effort is normal.      Breath sounds: No wheezing.   Neurological:      Mental Status: She is alert and oriented to person, place, and time.       Vitals:    09/29/22 1145   BP: 112/76   Pulse: 108   Resp: 16   Temp: 99.6 °F (37.6 °C)   SpO2: 90%     Current Outpatient Medications on File Prior to Visit   Medication Sig Dispense Refill   • acetaminophen (TYLENOL) 500 MG tablet Take 500 mg by mouth Every 6 (Six) Hours As Needed.     • calcium carbonate (OS-STEVEN) 600 MG tablet Take 600 mg by mouth Daily.     • cholecalciferol (VITAMIN D3) 1000 units tablet Take 1,000 Units by mouth  Daily.     • colestipol (COLESTID) 1 g tablet TAKE ONE TABLET BY MOUTH TWICE A DAY AS NEEDED 30 tablet 0   • CRANBERRY CONCENTRATE PO Take  by mouth Daily.     • dicyclomine (BENTYL) 10 MG capsule TAKE ONE CAPSULE BY MOUTH THREE TIMES A DAY AS NEEDED FOR IBS 90 capsule 0   • famotidine (PEPCID) 20 MG tablet Take 20 mg by mouth 2 (Two) Times a Day.     • fexofenadine (ALLEGRA) 180 MG tablet FEXOFENADINE  MG TABS     • gabapentin (NEURONTIN) 300 MG capsule TAKE ONE CAPSULE BY MOUTH TWICE A  capsule 0   • hydroCHLOROthiazide (HYDRODIURIL) 25 MG tablet TAKE ONE TABLET BY MOUTH DAILY 30 tablet 0   • Mirabegron ER (MYRBETRIQ) 50 MG tablet sustained-release 24 hour 24 hr tablet Daily.     • pantoprazole (PROTONIX) 40 MG EC tablet TAKE ONE TABLET BY MOUTH DAILY 90 tablet 0   • Riboflavin (B2 PO) Take  by mouth Daily.     • vitamin B-12 (CYANOCOBALAMIN) 1000 MCG tablet Take 1,000 mcg by mouth Daily.       No current facility-administered medications on file prior to visit.           Assessment & Plan   Problems Addressed this Visit        Neuro    Nonintractable headache    Relevant Orders    POCT SARS-CoV-2 Antigen SURYA (Completed)       Symptoms and Signs    Fever - Primary     COVID-19 and flu test negative.  Discussed conservative management encourage fluid and rest.  Call us back if symptoms do not get better.         Relevant Orders    POCT SARS-CoV-2 Antigen SURYA (Completed)      Diagnoses       Codes Comments    Fever, unspecified fever cause    -  Primary ICD-10-CM: R50.9  ICD-9-CM: 780.60     Nonintractable headache, unspecified chronicity pattern, unspecified headache type     ICD-10-CM: R51.9  ICD-9-CM: 784.0

## 2022-09-30 ENCOUNTER — TELEPHONE (OUTPATIENT)
Dept: FAMILY MEDICINE CLINIC | Facility: CLINIC | Age: 77
End: 2022-09-30

## 2022-09-30 NOTE — TELEPHONE ENCOUNTER
Caller: Abida Becker    Relationship to patient: Self    Best call back number: [phone]    Where are you experiencing symptoms: HEADACHE, HEAD CONGESTION, DID NOT SLEEP WELL LAST NIGHT    PATIENT WAS SEEN ON 9/29 AND WAS ASKED TO CALL IF NOT FEELING BETTER    If a prescription is needed, what is your preferred pharmacy:   [facility] [id number] - [place], [place] - [address] - [phone]  - [phone] FX  [address] [place] [address]  Phone: [phone] Fax: [phone]

## 2022-09-30 NOTE — TELEPHONE ENCOUNTER
Patient was seen by Dr. Hoffman. Please send her this message as I am out of the office this week. Thank you.

## 2022-10-01 ENCOUNTER — APPOINTMENT (OUTPATIENT)
Dept: CT IMAGING | Facility: HOSPITAL | Age: 77
End: 2022-10-01

## 2022-10-01 ENCOUNTER — HOSPITAL ENCOUNTER (OUTPATIENT)
Facility: HOSPITAL | Age: 77
Setting detail: OBSERVATION
Discharge: HOME | End: 2022-10-03
Attending: EMERGENCY MEDICINE | Admitting: HOSPITALIST

## 2022-10-01 DIAGNOSIS — R26.9 GAIT ABNORMALITY: ICD-10-CM

## 2022-10-01 DIAGNOSIS — J12.3 HUMAN METAPNEUMOVIRUS (HMPV) PNEUMONIA: ICD-10-CM

## 2022-10-01 DIAGNOSIS — J18.9 MULTIFOCAL PNEUMONIA: Primary | ICD-10-CM

## 2022-10-01 PROBLEM — J12.9 VIRAL PNEUMONIA: Status: ACTIVE | Noted: 2022-10-01

## 2022-10-01 PROBLEM — I10 ESSENTIAL HYPERTENSION: Status: ACTIVE | Noted: 2022-10-01

## 2022-10-01 LAB
ALBUMIN SERPL-MCNC: 3.9 G/DL (ref 3.5–5.2)
ALBUMIN/GLOB SERPL: 1.4 G/DL
ALP SERPL-CCNC: 94 U/L (ref 39–117)
ALT SERPL W P-5'-P-CCNC: 21 U/L (ref 1–33)
ANION GAP SERPL CALCULATED.3IONS-SCNC: 14 MMOL/L (ref 5–15)
AST SERPL-CCNC: 21 U/L (ref 1–32)
B PARAPERT DNA SPEC QL NAA+PROBE: NOT DETECTED
B PERT DNA SPEC QL NAA+PROBE: NOT DETECTED
BASOPHILS # BLD AUTO: 0 10*3/MM3 (ref 0–0.2)
BASOPHILS NFR BLD AUTO: 0.5 % (ref 0–1.5)
BILIRUB SERPL-MCNC: 0.7 MG/DL (ref 0–1.2)
BUN SERPL-MCNC: 12 MG/DL (ref 8–23)
BUN/CREAT SERPL: 19.4 (ref 7–25)
C PNEUM DNA NPH QL NAA+NON-PROBE: NOT DETECTED
CALCIUM SPEC-SCNC: 9.1 MG/DL (ref 8.6–10.5)
CHLORIDE SERPL-SCNC: 97 MMOL/L (ref 98–107)
CO2 SERPL-SCNC: 25 MMOL/L (ref 22–29)
CREAT SERPL-MCNC: 0.62 MG/DL (ref 0.57–1)
D DIMER PPP FEU-MCNC: 0.51 MG/L (FEU) (ref 0–0.59)
D-LACTATE SERPL-SCNC: 0.6 MMOL/L (ref 0.5–2)
DEPRECATED RDW RBC AUTO: 42.4 FL (ref 37–54)
EGFRCR SERPLBLD CKD-EPI 2021: 93 ML/MIN/1.73
EOSINOPHIL # BLD AUTO: 0 10*3/MM3 (ref 0–0.4)
EOSINOPHIL NFR BLD AUTO: 0.2 % (ref 0.3–6.2)
ERYTHROCYTE [DISTWIDTH] IN BLOOD BY AUTOMATED COUNT: 13.4 % (ref 12.3–15.4)
FLUAV SUBTYP SPEC NAA+PROBE: NOT DETECTED
FLUBV RNA ISLT QL NAA+PROBE: NOT DETECTED
GLOBULIN UR ELPH-MCNC: 2.7 GM/DL
GLUCOSE SERPL-MCNC: 108 MG/DL (ref 65–99)
HADV DNA SPEC NAA+PROBE: NOT DETECTED
HCOV 229E RNA SPEC QL NAA+PROBE: NOT DETECTED
HCOV HKU1 RNA SPEC QL NAA+PROBE: NOT DETECTED
HCOV NL63 RNA SPEC QL NAA+PROBE: NOT DETECTED
HCOV OC43 RNA SPEC QL NAA+PROBE: NOT DETECTED
HCT VFR BLD AUTO: 40.4 % (ref 34–46.6)
HGB BLD-MCNC: 13.3 G/DL (ref 12–15.9)
HMPV RNA NPH QL NAA+NON-PROBE: DETECTED
HOLD SPECIMEN: NORMAL
HOLD SPECIMEN: NORMAL
HPIV1 RNA ISLT QL NAA+PROBE: NOT DETECTED
HPIV2 RNA SPEC QL NAA+PROBE: NOT DETECTED
HPIV3 RNA NPH QL NAA+PROBE: NOT DETECTED
HPIV4 P GENE NPH QL NAA+PROBE: NOT DETECTED
LYMPHOCYTES # BLD AUTO: 0.6 10*3/MM3 (ref 0.7–3.1)
LYMPHOCYTES NFR BLD AUTO: 13.2 % (ref 19.6–45.3)
M PNEUMO IGG SER IA-ACNC: NOT DETECTED
MCH RBC QN AUTO: 30 PG (ref 26.6–33)
MCHC RBC AUTO-ENTMCNC: 33 G/DL (ref 31.5–35.7)
MCV RBC AUTO: 90.8 FL (ref 79–97)
MONOCYTES # BLD AUTO: 0.6 10*3/MM3 (ref 0.1–0.9)
MONOCYTES NFR BLD AUTO: 13.4 % (ref 5–12)
NEUTROPHILS NFR BLD AUTO: 3.4 10*3/MM3 (ref 1.7–7)
NEUTROPHILS NFR BLD AUTO: 72.7 % (ref 42.7–76)
NRBC BLD AUTO-RTO: 0 /100 WBC (ref 0–0.2)
PLATELET # BLD AUTO: 256 10*3/MM3 (ref 140–450)
PMV BLD AUTO: 7.3 FL (ref 6–12)
POTASSIUM SERPL-SCNC: 4 MMOL/L (ref 3.5–5.2)
PROCALCITONIN SERPL-MCNC: 0.03 NG/ML (ref 0–0.25)
PROT SERPL-MCNC: 6.6 G/DL (ref 6–8.5)
RBC # BLD AUTO: 4.44 10*6/MM3 (ref 3.77–5.28)
RHINOVIRUS RNA SPEC NAA+PROBE: NOT DETECTED
RSV RNA NPH QL NAA+NON-PROBE: NOT DETECTED
SARS-COV-2 RNA NPH QL NAA+NON-PROBE: NOT DETECTED
SODIUM SERPL-SCNC: 136 MMOL/L (ref 136–145)
TROPONIN T SERPL-MCNC: <0.01 NG/ML (ref 0–0.03)
WBC NRBC COR # BLD: 4.7 10*3/MM3 (ref 3.4–10.8)
WHOLE BLOOD HOLD COAG: NORMAL
WHOLE BLOOD HOLD SPECIMEN: NORMAL

## 2022-10-01 PROCEDURE — 96360 HYDRATION IV INFUSION INIT: CPT

## 2022-10-01 PROCEDURE — 71250 CT THORAX DX C-: CPT

## 2022-10-01 PROCEDURE — 94799 UNLISTED PULMONARY SVC/PX: CPT

## 2022-10-01 PROCEDURE — 96372 THER/PROPH/DIAG INJ SC/IM: CPT

## 2022-10-01 PROCEDURE — 84484 ASSAY OF TROPONIN QUANT: CPT | Performed by: EMERGENCY MEDICINE

## 2022-10-01 PROCEDURE — G0378 HOSPITAL OBSERVATION PER HR: HCPCS

## 2022-10-01 PROCEDURE — 84145 PROCALCITONIN (PCT): CPT | Performed by: EMERGENCY MEDICINE

## 2022-10-01 PROCEDURE — 94761 N-INVAS EAR/PLS OXIMETRY MLT: CPT

## 2022-10-01 PROCEDURE — 83605 ASSAY OF LACTIC ACID: CPT

## 2022-10-01 PROCEDURE — 93005 ELECTROCARDIOGRAM TRACING: CPT | Performed by: EMERGENCY MEDICINE

## 2022-10-01 PROCEDURE — 99284 EMERGENCY DEPT VISIT MOD MDM: CPT

## 2022-10-01 PROCEDURE — 85025 COMPLETE CBC W/AUTO DIFF WBC: CPT | Performed by: EMERGENCY MEDICINE

## 2022-10-01 PROCEDURE — 94640 AIRWAY INHALATION TREATMENT: CPT

## 2022-10-01 PROCEDURE — 87040 BLOOD CULTURE FOR BACTERIA: CPT | Performed by: EMERGENCY MEDICINE

## 2022-10-01 PROCEDURE — 0202U NFCT DS 22 TRGT SARS-COV-2: CPT | Performed by: EMERGENCY MEDICINE

## 2022-10-01 PROCEDURE — 80053 COMPREHEN METABOLIC PANEL: CPT | Performed by: EMERGENCY MEDICINE

## 2022-10-01 PROCEDURE — 25010000002 ENOXAPARIN PER 10 MG: Performed by: INTERNAL MEDICINE

## 2022-10-01 PROCEDURE — 85379 FIBRIN DEGRADATION QUANT: CPT | Performed by: EMERGENCY MEDICINE

## 2022-10-01 PROCEDURE — 63710000001 PREDNISONE PER 1 MG: Performed by: INTERNAL MEDICINE

## 2022-10-01 RX ORDER — ACETAMINOPHEN 650 MG/1
650 SUPPOSITORY RECTAL EVERY 4 HOURS PRN
Status: DISCONTINUED | OUTPATIENT
Start: 2022-10-01 | End: 2022-10-03 | Stop reason: HOSPADM

## 2022-10-01 RX ORDER — AZELASTINE 1 MG/ML
1 SPRAY, METERED NASAL 2 TIMES DAILY
Status: DISCONTINUED | OUTPATIENT
Start: 2022-10-01 | End: 2022-10-03 | Stop reason: HOSPADM

## 2022-10-01 RX ORDER — ENOXAPARIN SODIUM 100 MG/ML
40 INJECTION SUBCUTANEOUS DAILY
Status: DISCONTINUED | OUTPATIENT
Start: 2022-10-01 | End: 2022-10-03 | Stop reason: HOSPADM

## 2022-10-01 RX ORDER — SODIUM CHLORIDE 0.9 % (FLUSH) 0.9 %
10 SYRINGE (ML) INJECTION AS NEEDED
Status: DISCONTINUED | OUTPATIENT
Start: 2022-10-01 | End: 2022-10-03 | Stop reason: HOSPADM

## 2022-10-01 RX ORDER — GUAIFENESIN 600 MG/1
600 TABLET, EXTENDED RELEASE ORAL EVERY 12 HOURS SCHEDULED
Status: DISCONTINUED | OUTPATIENT
Start: 2022-10-01 | End: 2022-10-03 | Stop reason: HOSPADM

## 2022-10-01 RX ORDER — CHOLECALCIFEROL (VITAMIN D3) 125 MCG
5 CAPSULE ORAL NIGHTLY PRN
Status: DISCONTINUED | OUTPATIENT
Start: 2022-10-01 | End: 2022-10-03 | Stop reason: HOSPADM

## 2022-10-01 RX ORDER — AMOXICILLIN 250 MG
2 CAPSULE ORAL 2 TIMES DAILY
Status: DISCONTINUED | OUTPATIENT
Start: 2022-10-01 | End: 2022-10-03 | Stop reason: HOSPADM

## 2022-10-01 RX ORDER — BISACODYL 5 MG/1
5 TABLET, DELAYED RELEASE ORAL DAILY PRN
Status: DISCONTINUED | OUTPATIENT
Start: 2022-10-01 | End: 2022-10-03 | Stop reason: HOSPADM

## 2022-10-01 RX ORDER — IPRATROPIUM BROMIDE AND ALBUTEROL SULFATE 2.5; .5 MG/3ML; MG/3ML
3 SOLUTION RESPIRATORY (INHALATION)
Status: DISCONTINUED | OUTPATIENT
Start: 2022-10-01 | End: 2022-10-02

## 2022-10-01 RX ORDER — PANTOPRAZOLE SODIUM 40 MG/1
40 TABLET, DELAYED RELEASE ORAL
Status: DISCONTINUED | OUTPATIENT
Start: 2022-10-01 | End: 2022-10-03 | Stop reason: HOSPADM

## 2022-10-01 RX ORDER — ALUMINA, MAGNESIA, AND SIMETHICONE 2400; 2400; 240 MG/30ML; MG/30ML; MG/30ML
15 SUSPENSION ORAL EVERY 6 HOURS PRN
Status: DISCONTINUED | OUTPATIENT
Start: 2022-10-01 | End: 2022-10-01

## 2022-10-01 RX ORDER — ACETAMINOPHEN 325 MG/1
650 TABLET ORAL EVERY 4 HOURS PRN
Status: DISCONTINUED | OUTPATIENT
Start: 2022-10-01 | End: 2022-10-03 | Stop reason: HOSPADM

## 2022-10-01 RX ORDER — SULFAMETHOXAZOLE AND TRIMETHOPRIM 800; 160 MG/1; MG/1
1 TABLET ORAL DAILY
COMMUNITY
End: 2022-10-03 | Stop reason: HOSPADM

## 2022-10-01 RX ORDER — PREDNISONE 20 MG/1
40 TABLET ORAL
Status: DISCONTINUED | OUTPATIENT
Start: 2022-10-01 | End: 2022-10-03 | Stop reason: HOSPADM

## 2022-10-01 RX ORDER — BISACODYL 10 MG
10 SUPPOSITORY, RECTAL RECTAL DAILY PRN
Status: DISCONTINUED | OUTPATIENT
Start: 2022-10-01 | End: 2022-10-03 | Stop reason: HOSPADM

## 2022-10-01 RX ORDER — BENZONATATE 100 MG/1
100 CAPSULE ORAL 3 TIMES DAILY PRN
Status: DISCONTINUED | OUTPATIENT
Start: 2022-10-01 | End: 2022-10-03 | Stop reason: HOSPADM

## 2022-10-01 RX ORDER — SODIUM CHLORIDE 0.9 % (FLUSH) 0.9 %
10 SYRINGE (ML) INJECTION EVERY 12 HOURS SCHEDULED
Status: DISCONTINUED | OUTPATIENT
Start: 2022-10-01 | End: 2022-10-03 | Stop reason: HOSPADM

## 2022-10-01 RX ORDER — TRAZODONE HYDROCHLORIDE 50 MG/1
50 TABLET ORAL NIGHTLY
Status: DISCONTINUED | OUTPATIENT
Start: 2022-10-01 | End: 2022-10-03 | Stop reason: HOSPADM

## 2022-10-01 RX ORDER — ONDANSETRON 4 MG/1
4 TABLET, FILM COATED ORAL EVERY 6 HOURS PRN
Status: DISCONTINUED | OUTPATIENT
Start: 2022-10-01 | End: 2022-10-03 | Stop reason: HOSPADM

## 2022-10-01 RX ORDER — ACETAMINOPHEN 160 MG/5ML
650 SOLUTION ORAL EVERY 4 HOURS PRN
Status: DISCONTINUED | OUTPATIENT
Start: 2022-10-01 | End: 2022-10-03 | Stop reason: HOSPADM

## 2022-10-01 RX ORDER — NITROGLYCERIN 0.4 MG/1
0.4 TABLET SUBLINGUAL
Status: DISCONTINUED | OUTPATIENT
Start: 2022-10-01 | End: 2022-10-03 | Stop reason: HOSPADM

## 2022-10-01 RX ORDER — POLYETHYLENE GLYCOL 3350 17 G/17G
17 POWDER, FOR SOLUTION ORAL DAILY PRN
Status: DISCONTINUED | OUTPATIENT
Start: 2022-10-01 | End: 2022-10-03 | Stop reason: HOSPADM

## 2022-10-01 RX ORDER — ONDANSETRON 2 MG/ML
4 INJECTION INTRAMUSCULAR; INTRAVENOUS EVERY 6 HOURS PRN
Status: DISCONTINUED | OUTPATIENT
Start: 2022-10-01 | End: 2022-10-03 | Stop reason: HOSPADM

## 2022-10-01 RX ORDER — DOXYCYCLINE 100 MG/1
100 TABLET ORAL EVERY 12 HOURS SCHEDULED
Status: DISCONTINUED | OUTPATIENT
Start: 2022-10-01 | End: 2022-10-03 | Stop reason: HOSPADM

## 2022-10-01 RX ADMIN — IPRATROPIUM BROMIDE AND ALBUTEROL SULFATE 3 ML: .5; 3 SOLUTION RESPIRATORY (INHALATION) at 19:08

## 2022-10-01 RX ADMIN — ENOXAPARIN SODIUM 40 MG: 100 INJECTION SUBCUTANEOUS at 15:20

## 2022-10-01 RX ADMIN — PREDNISONE 40 MG: 20 TABLET ORAL at 08:30

## 2022-10-01 RX ADMIN — TRAZODONE HYDROCHLORIDE 50 MG: 50 TABLET ORAL at 20:19

## 2022-10-01 RX ADMIN — SODIUM CHLORIDE 1000 ML: 9 INJECTION, SOLUTION INTRAVENOUS at 05:37

## 2022-10-01 RX ADMIN — DOXYCYCLINE 100 MG: 100 TABLET ORAL at 06:40

## 2022-10-01 RX ADMIN — IPRATROPIUM BROMIDE AND ALBUTEROL SULFATE 3 ML: .5; 3 SOLUTION RESPIRATORY (INHALATION) at 15:42

## 2022-10-01 RX ADMIN — Medication 10 ML: at 20:20

## 2022-10-01 RX ADMIN — IPRATROPIUM BROMIDE AND ALBUTEROL SULFATE 3 ML: .5; 3 SOLUTION RESPIRATORY (INHALATION) at 13:35

## 2022-10-01 RX ADMIN — PANTOPRAZOLE SODIUM 40 MG: 40 TABLET, DELAYED RELEASE ORAL at 06:40

## 2022-10-01 RX ADMIN — AZELASTINE HYDROCHLORIDE 1 SPRAY: 137 SPRAY, METERED NASAL at 20:21

## 2022-10-01 RX ADMIN — IPRATROPIUM BROMIDE AND ALBUTEROL SULFATE 3 ML: .5; 3 SOLUTION RESPIRATORY (INHALATION) at 06:57

## 2022-10-01 RX ADMIN — GUAIFENESIN 600 MG: 600 TABLET, EXTENDED RELEASE ORAL at 08:30

## 2022-10-01 RX ADMIN — DOXYCYCLINE 100 MG: 100 TABLET ORAL at 20:19

## 2022-10-01 RX ADMIN — AZELASTINE HYDROCHLORIDE 1 SPRAY: 137 SPRAY, METERED NASAL at 15:21

## 2022-10-01 RX ADMIN — Medication 10 ML: at 08:30

## 2022-10-01 RX ADMIN — GUAIFENESIN 600 MG: 600 TABLET, EXTENDED RELEASE ORAL at 20:19

## 2022-10-01 NOTE — ED PROVIDER NOTES
Subjective   History of Present Illness  77-year-old female who has been ill since Wednesday with a nonproductive cough, fever, T-max 101, dyspnea, no chest pain.  Patient was seen in urgent care today and diagnosed with right-sided pneumonia and placed on Levaquin secondary to penicillin allergy.  Patient is a non-smoker without any history of chronic lung disease.  Patient is on chronic Bactrim prophylaxis daily for recurrent UTIs but denies any other recent antibiotic usage.  Patient states she could not sleep and feels anxious about her illness.  Patient's grandson had a cough about a week ago who she cares for.        Review of Systems   Constitutional: Positive for fatigue and fever.   Respiratory: Positive for cough and shortness of breath.    Psychiatric/Behavioral: Positive for sleep disturbance.   All other systems reviewed and are negative.      Past Medical History:   Diagnosis Date   • GERD (gastroesophageal reflux disease)    • History of recurrent UTIs    • IBS (irritable bowel syndrome)    • Osteoarthritis    • Osteoporosis    • Primary osteoarthritis of both knees 1/24/2022   • Pulmonary embolism (HCC) 2011    after scoliosis surgery   • Vitamin D deficiency        Allergies   Allergen Reactions   • Amoxicillin Other (See Comments)      unsure of type of reaction - states it was so long ago she can't remember    • Penicillins Nausea And Vomiting       Past Surgical History:   Procedure Laterality Date   • BREAST BIOPSY     • CHOLECYSTECTOMY     • COLONOSCOPY  01/18/2018   • HIATAL HERNIA REPAIR  2013   • REIMPLANT URETER IN BLADDER     • SPINAL FUSION         Family History   Problem Relation Age of Onset   • Heart failure Mother    • Cancer Father        Social History     Socioeconomic History   • Marital status: Single   Tobacco Use   • Smoking status: Never Smoker   • Smokeless tobacco: Never Used   Vaping Use   • Vaping Use: Never used   Substance and Sexual Activity   • Alcohol use: No   •  Drug use: No   • Sexual activity: Never           Objective   Physical Exam  Constitutional:       Appearance: Normal appearance.   HENT:      Head: Normocephalic and atraumatic.      Mouth/Throat:      Mouth: Mucous membranes are moist.      Pharynx: Oropharynx is clear.   Eyes:      Conjunctiva/sclera: Conjunctivae normal.      Pupils: Pupils are equal, round, and reactive to light.   Cardiovascular:      Rate and Rhythm: Normal rate and regular rhythm.      Heart sounds: Normal heart sounds.   Pulmonary:      Effort: Pulmonary effort is normal.      Comments: Right-sided rhonchi in the mid to lower lung zone, no wheezes  Abdominal:      General: Bowel sounds are normal. There is no distension.      Palpations: Abdomen is soft.   Musculoskeletal:      Comments: Healing ecchymosis right anterior leg.  There is no calf tenderness or edema. patient states her grandson hit her in the leg while playing with her.   Skin:     General: Skin is warm and dry.      Capillary Refill: Capillary refill takes less than 2 seconds.   Neurological:      General: No focal deficit present.      Mental Status: She is alert and oriented to person, place, and time.   Psychiatric:         Mood and Affect: Mood normal.         Behavior: Behavior normal.         Procedures           ED Course  ED Course as of 10/02/22 0409   Sat Oct 01, 2022   0253 EKG interpretation: Normal sinus rhythm, rate 97, no acute ST change [JR]      ED Course User Index  [JR] Edilson Chiu MD                                           MDM  Number of Diagnoses or Management Options  Diagnosis management comments: Results for orders placed or performed during the hospital encounter of 10/01/22  -Respiratory Panel PCR w/COVID-19(SARS-CoV-2) CANDIS/SHANNEN/ANA/PAD/COR/MAD/GARRY In-House, NP Swab in UTM/VTM, 3-4 HR TAT - Swab, Nasopharynx:   Specimen: Nasopharynx; Swab       Result                      Value             Ref Range           ADENOVIRUS, PCR             Not  Detected      Not Detected        Coronavirus 229E            Not Detected      Not Detected        Coronavirus HKU1            Not Detected      Not Detected        Coronavirus NL63            Not Detected      Not Detected        Coronavirus OC43            Not Detected      Not Detected        COVID19                     Not Detected      Not Detected*       Human Metapneumovirus       Detected (A)      Not Detected        Human Rhinovirus/Enter*     Not Detected      Not Detected        Influenza A PCR             Not Detected      Not Detected        Influenza B PCR             Not Detected      Not Detected        Parainfluenza Virus 1       Not Detected      Not Detected        Parainfluenza Virus 2       Not Detected      Not Detected        Parainfluenza Virus 3       Not Detected      Not Detected        Parainfluenza Virus 4       Not Detected      Not Detected        RSV, PCR                    Not Detected      Not Detected        Bordetella pertussis p*     Not Detected      Not Detected        Bordetella parapertuss*     Not Detected      Not Detected        Chlamydophila pneumoni*     Not Detected      Not Detected        Mycoplasma pneumo by P*     Not Detected      Not Detected   -Procalcitonin:   Specimen: Blood       Result                      Value             Ref Range           Procalcitonin               0.03              0.00 - 0.25 *  -Comprehensive Metabolic Panel:   Specimen: Blood       Result                      Value             Ref Range           Glucose                     108 (H)           65 - 99 mg/dL       BUN                         12                8 - 23 mg/dL        Creatinine                  0.62              0.57 - 1.00 *       Sodium                      136               136 - 145 mm*       Potassium                   4.0               3.5 - 5.2 mm*       Chloride                    97 (L)            98 - 107 mmo*       CO2                         25.0               22.0 - 29.0 *       Calcium                     9.1               8.6 - 10.5 m*       Total Protein               6.6               6.0 - 8.5 g/*       Albumin                     3.90              3.50 - 5.20 *       ALT (SGPT)                  21                1 - 33 U/L          AST (SGOT)                  21                1 - 32 U/L          Alkaline Phosphatase        94                39 - 117 U/L        Total Bilirubin             0.7               0.0 - 1.2 mg*       Globulin                    2.7               gm/dL               A/G Ratio                   1.4               g/dL                BUN/Creatinine Ratio        19.4              7.0 - 25.0          Anion Gap                   14.0              5.0 - 15.0 m*       eGFR                        93.0              >60.0 mL/min*  -Troponin:   Specimen: Blood       Result                      Value             Ref Range           Troponin T                  <0.010            0.000 - 0.03*  -D-dimer, Quantitative:   Specimen: Blood       Result                      Value             Ref Range           D-Dimer, Quantitative       0.51              0.00 - 0.59 *  -CBC Auto Differential:   Specimen: Blood       Result                      Value             Ref Range           WBC                         4.70              3.40 - 10.80*       RBC                         4.44              3.77 - 5.28 *       Hemoglobin                  13.3              12.0 - 15.9 *       Hematocrit                  40.4              34.0 - 46.6 %       MCV                         90.8              79.0 - 97.0 *       MCH                         30.0              26.6 - 33.0 *       MCHC                        33.0              31.5 - 35.7 *       RDW                         13.4              12.3 - 15.4 %       RDW-SD                      42.4              37.0 - 54.0 *       MPV                         7.3               6.0 - 12.0 fL       Platelets                   256                140 - 450 10*       Neutrophil %                72.7              42.7 - 76.0 %       Lymphocyte %                13.2 (L)          19.6 - 45.3 %       Monocyte %                  13.4 (H)          5.0 - 12.0 %        Eosinophil %                0.2 (L)           0.3 - 6.2 %         Basophil %                  0.5               0.0 - 1.5 %         Neutrophils, Absolute       3.40              1.70 - 7.00 *       Lymphocytes, Absolute       0.60 (L)          0.70 - 3.10 *       Monocytes, Absolute         0.60              0.10 - 0.90 *       Eosinophils, Absolute       0.00              0.00 - 0.40 *       Basophils, Absolute         0.00              0.00 - 0.20 *       nRBC                        0.0               0.0 - 0.2 /1*  -POC Lactate:   Specimen: Blood       Result                      Value             Ref Range           Lactate                     0.6               0.5 - 2.0 mm*  -ECG 12 Lead:        Result                      Value             Ref Range           QT Interval                 341               ms             -Green Top (Gel):        Result                      Value             Ref Range           Extra Tube                                                    Hold for add-ons.  -Lavender Top:        Result                      Value             Ref Range           Extra Tube                                                    hold for add-on  -Gold Top - SST:        Result                      Value             Ref Range           Extra Tube                                                    Hold for add-ons.  -Light Blue Top:        Result                      Value             Ref Range           Extra Tube                                                    Hold for add-ons.  CT Chest Without Contrast Diagnostic   Final Result        Multifocal pneumonia.        Electronically signed by:  Thiago Mercado D.O.      10/1/2022 3:01 AM       Patient with multifocal pneumonia  with human metapneumovirus positive and normal procalcitonin.  Patient did have oxygen desaturation to 89 to 90%, on 2 L O2 sat 95%.  Patient does report moderate dyspnea on exertion.  Will observe       Amount and/or Complexity of Data Reviewed  Clinical lab tests: ordered and reviewed  Tests in the radiology section of CPT®: reviewed and ordered  Tests in the medicine section of CPT®: reviewed and ordered  Discuss the patient with other providers: yes        Final diagnoses:   Multifocal pneumonia   Human metapneumovirus (hMPV) pneumonia       ED Disposition  ED Disposition     ED Disposition   Decision to Admit    Condition   --    Comment   Level of Care: Telemetry [5]   Diagnosis: Multifocal pneumonia [[id number]]   Admitting Physician: AMA KUMARI [[id number]]               No follow-up provider specified.       Medication List      No changes were made to your prescriptions during this visit.          Ama Chiu MD  10/02/22 0401

## 2022-10-01 NOTE — PROGRESS NOTES
Patient seen and examined, for more details refer to H&P from earlier today.  Agree with assessment and plan.

## 2022-10-01 NOTE — H&P
[facility] Medicine Services      Patient Name: Abida Becker  : 1945  MRN: [id number]  Primary Care Physician:  Darlene Matute MD  Date of admission: 10/1/2022      Subjective      Chief Complaint: Shortness of breath and cough    History of Present Illness: Abida Becker is a 77 y.o. female who presented to [facility] on 10/1/2022 with a past medical history of GERD and hypertension.  The patient was her usual state of health till Wednesday of this week.  Her release this is when she really started noticing she was starting to have some cough and congestion.  It got progressively worse and so she went to an urgent care facility on  and they diagnosed her with a right lower lobe pneumonia and they started her on levofloxacin.  She has not slept well for the past 2 or 3 nights and so tonight she just got very anxious she lives alone and so she contacted her son who brought her to the emergency room.  The ER reported that her oxygen saturation dropped but there is no recording of this in the computer.  A CT scan was performed and it showed a bronchitis type of presentation and respiratory pathogen panel was performed and she has human metapneumovirus.  The procalcitonin 0.06 which does not indicate a bacterial infection, she does not have a fever and the white counts not elevated so most likely this is the a viral pneumonia.  We will admit the patient observation and treat her as if she has an inflammatory lung disease from her viral pneumonia see the assessment and plan.  Of note the grandson had a cold prior to her getting this infection probably he transmitted the virus to her      ROS anxious, not sleeping, cough, increased mucus production, shortness of breath, wheezing, insomnia, and weakness patient denies chest pain, nausea, vomiting, diarrhea, burning on urination, increased frequency, abdominal pain, unusual joint pains and no rash on the rest the 15  essential review of systems have been reviewed and is negative    Personal History     Past Medical History:   Diagnosis Date   • GERD (gastroesophageal reflux disease)    • History of recurrent UTIs    • IBS (irritable bowel syndrome)    • Osteoarthritis    • Osteoporosis    • Primary osteoarthritis of both knees 1/24/2022   • Pulmonary embolism (HCC) 2011    after scoliosis surgery   • Vitamin D deficiency        Past Surgical History:   Procedure Laterality Date   • BREAST BIOPSY     • CHOLECYSTECTOMY     • COLONOSCOPY  01/18/2018   • HIATAL HERNIA REPAIR  2013   • REIMPLANT URETER IN BLADDER     • SPINAL FUSION         Family History: family history includes Cancer in her father; Heart failure in her mother. Otherwise pertinent FHx was reviewed and not pertinent to current issue.    Social History:  reports that she has never smoked. She has never used smokeless tobacco. She reports that she does not drink alcohol and does not use drugs.    Home Medications:  Prior to Admission Medications     Prescriptions Last Dose Informant Patient Reported? Taking?    acetaminophen (TYLENOL) 500 MG tablet   Yes No    Take 500 mg by mouth Every 6 (Six) Hours As Needed.    benzonatate (TESSALON) 200 MG capsule   No No    1 capsule p.o. every 8 hours as needed cough    calcium carbonate (OS-STEVEN) 600 MG tablet   Yes No    Take 600 mg by mouth Daily.    cholecalciferol (VITAMIN D3) 1000 units tablet   Yes No    Take 1,000 Units by mouth Daily.    colestipol (COLESTID) 1 g tablet   No No    TAKE ONE TABLET BY MOUTH TWICE A DAY AS NEEDED    CRANBERRY CONCENTRATE PO  Self Yes No    Take  by mouth Daily.    dicyclomine (BENTYL) 10 MG capsule   No No    TAKE ONE CAPSULE BY MOUTH THREE TIMES A DAY AS NEEDED FOR IBS    famotidine (PEPCID) 20 MG tablet   Yes No    Take 20 mg by mouth 2 (Two) Times a Day.    fexofenadine (ALLEGRA) 180 MG tablet   Yes No    FEXOFENADINE  MG TABS    gabapentin (NEURONTIN) 300 MG capsule   No No     TAKE ONE CAPSULE BY MOUTH TWICE A DAY    hydroCHLOROthiazide (HYDRODIURIL) 25 MG tablet   No No    TAKE ONE TABLET BY MOUTH DAILY    levoFLOXacin (LEVAQUIN) 750 MG tablet   No No    Take 1 tablet by mouth Daily.    Mirabegron ER (MYRBETRIQ) 50 MG tablet sustained-release 24 hour 24 hr tablet   Yes No    Daily.    pantoprazole (PROTONIX) 40 MG EC tablet   No No    TAKE ONE TABLET BY MOUTH DAILY    Riboflavin (B2 PO)  Self Yes No    Take  by mouth Daily.    vitamin B-12 (CYANOCOBALAMIN) 1000 MCG tablet  Self Yes No    Take 1,000 mcg by mouth Daily.            Allergies:  Allergies   Allergen Reactions   • Amoxicillin Other (See Comments)      unsure of type of reaction - states it was so long ago she can't remember    • Penicillins Nausea And Vomiting       Objective      Vitals:   Temp:  [98.3 °F (36.8 °C)] 98.3 °F (36.8 °C)  Heart Rate:  [] 92  Resp:  [20] 20  BP: (105-137)/(62-77) 122/74    Physical Exam   General patient was anxious but no distress  Head atraumatic  Eyes pupils were equal round reactive light ocular motion intact  Nares clear nasal discharge with nasal cannula in place  Oropharynx membranes slightly dry  Neck no thyromegaly lymphadenopathy  Pulmonary expiratory wheeze with occasional crackle that cleared with cough  Cardiac tachycardic hyperdynamic no murmur and regular rate  Abdomen positive bowel sounds no guarding no rebound no hepatosplenomegaly  Extremities well-developed no cyanosis no edema  Psych patient was anxious but alert and oriented x4  Neuro cranial nerves II through XII intact patient able to move all extremities no obvious focal neurologic deficit  Derm no rash    Result Review    Result Review:  I have personally reviewed the results from the time of this admission to 10/1/2022 06:17 EDT and agree with these findings:  [x]  Laboratory  [x]  Microbiology  [x]  Radiology  [x]  EKG/Telemetry   []  Cardiology/Vascular   []  Pathology  [x]  Old records  []  Other:   Most notable  findings include:   Positive for human metapneumovirus  Procalcitonin 0.06, lactic acid 0.6  D-dimer 0.51  White blood cells 4700, hemoglobin 13.3, platelets 256,000  IMPRESSION:CTA of chest   Multifocal pneumonia    Assessment & Plan        Active Hospital Problems:  Active Hospital Problems    Diagnosis    • Viral pneumonia    • Essential hypertension    • Multifocal pneumonia      Plan:   1.  Viral pneumonia secondary to human metapneumovirus -procalcitonin was negative and the respiratory pathogen panel was positive.  Place the patient empirically on doxycycline for its anti-inflammatory properties and if there is an underlying bacterial pneumonia not identified this would help, will give duo nebs scheduled, Mucinex, nasal spray, Tessalon Perles for cough and prednisone.  The evidence for the steroids is very limited but I feel this would probably help the inflammation and may speed recovery  2.  Essential hypertension because she is ill held the hydrochlorothiazide  3.  Insomnia have trazodone available tomorrow night    DVT prophylaxis:  Medical DVT prophylaxis orders are present.    CODE STATUS:    Level Of Support Discussed With: Patient  Code Status (Patient has no pulse and is not breathing): CPR (Attempt to Resuscitate)  Medical Interventions (Patient has pulse or is breathing): Full Support    Admission Status:  I believe this patient meets observation status.    I discussed the patient's findings and my recommendations with patient.    This patient has been examined wearing appropriate Personal Protective Equipment and discussed with . 10/01/22      Signature:

## 2022-10-01 NOTE — PLAN OF CARE
Goal Outcome Evaluation:  Plan of Care Reviewed With: patient        Progress: no change  Outcome Evaluation: Pt arrived to floor from ED. A&O x4, friend at bedside, call light within reach, able to make needs known. Heels red and non-blanchable, will off load.

## 2022-10-02 LAB
ANION GAP SERPL CALCULATED.3IONS-SCNC: 14 MMOL/L (ref 5–15)
BASOPHILS # BLD AUTO: 0 10*3/MM3 (ref 0–0.2)
BASOPHILS NFR BLD AUTO: 0.3 % (ref 0–1.5)
BUN SERPL-MCNC: 14 MG/DL (ref 8–23)
BUN/CREAT SERPL: 25.9 (ref 7–25)
CALCIUM SPEC-SCNC: 9.2 MG/DL (ref 8.6–10.5)
CHLORIDE SERPL-SCNC: 102 MMOL/L (ref 98–107)
CO2 SERPL-SCNC: 24 MMOL/L (ref 22–29)
CREAT SERPL-MCNC: 0.54 MG/DL (ref 0.57–1)
DEPRECATED RDW RBC AUTO: 43.3 FL (ref 37–54)
EGFRCR SERPLBLD CKD-EPI 2021: 96.1 ML/MIN/1.73
EOSINOPHIL # BLD AUTO: 0 10*3/MM3 (ref 0–0.4)
EOSINOPHIL NFR BLD AUTO: 0 % (ref 0.3–6.2)
ERYTHROCYTE [DISTWIDTH] IN BLOOD BY AUTOMATED COUNT: 13.5 % (ref 12.3–15.4)
GLUCOSE SERPL-MCNC: 101 MG/DL (ref 65–99)
HCT VFR BLD AUTO: 39.2 % (ref 34–46.6)
HGB BLD-MCNC: 13.1 G/DL (ref 12–15.9)
LYMPHOCYTES # BLD AUTO: 0.7 10*3/MM3 (ref 0.7–3.1)
LYMPHOCYTES NFR BLD AUTO: 13.5 % (ref 19.6–45.3)
MCH RBC QN AUTO: 30.4 PG (ref 26.6–33)
MCHC RBC AUTO-ENTMCNC: 33.3 G/DL (ref 31.5–35.7)
MCV RBC AUTO: 91.1 FL (ref 79–97)
MONOCYTES # BLD AUTO: 0.5 10*3/MM3 (ref 0.1–0.9)
MONOCYTES NFR BLD AUTO: 9.5 % (ref 5–12)
NEUTROPHILS NFR BLD AUTO: 4 10*3/MM3 (ref 1.7–7)
NEUTROPHILS NFR BLD AUTO: 76.7 % (ref 42.7–76)
NRBC BLD AUTO-RTO: 0.1 /100 WBC (ref 0–0.2)
PLATELET # BLD AUTO: 263 10*3/MM3 (ref 140–450)
PMV BLD AUTO: 7.6 FL (ref 6–12)
POTASSIUM SERPL-SCNC: 3.5 MMOL/L (ref 3.5–5.2)
RBC # BLD AUTO: 4.31 10*6/MM3 (ref 3.77–5.28)
SODIUM SERPL-SCNC: 140 MMOL/L (ref 136–145)
WBC NRBC COR # BLD: 5.2 10*3/MM3 (ref 3.4–10.8)

## 2022-10-02 PROCEDURE — 85025 COMPLETE CBC W/AUTO DIFF WBC: CPT | Performed by: HOSPITALIST

## 2022-10-02 PROCEDURE — 94799 UNLISTED PULMONARY SVC/PX: CPT

## 2022-10-02 PROCEDURE — 96372 THER/PROPH/DIAG INJ SC/IM: CPT

## 2022-10-02 PROCEDURE — 36415 COLL VENOUS BLD VENIPUNCTURE: CPT | Performed by: HOSPITALIST

## 2022-10-02 PROCEDURE — G0378 HOSPITAL OBSERVATION PER HR: HCPCS

## 2022-10-02 PROCEDURE — 63710000001 PREDNISONE PER 1 MG: Performed by: INTERNAL MEDICINE

## 2022-10-02 PROCEDURE — 25010000002 ENOXAPARIN PER 10 MG: Performed by: INTERNAL MEDICINE

## 2022-10-02 PROCEDURE — 80048 BASIC METABOLIC PNL TOTAL CA: CPT | Performed by: HOSPITALIST

## 2022-10-02 PROCEDURE — 94664 DEMO&/EVAL PT USE INHALER: CPT

## 2022-10-02 RX ORDER — IPRATROPIUM BROMIDE AND ALBUTEROL SULFATE 2.5; .5 MG/3ML; MG/3ML
3 SOLUTION RESPIRATORY (INHALATION)
Status: DISCONTINUED | OUTPATIENT
Start: 2022-10-02 | End: 2022-10-03 | Stop reason: HOSPADM

## 2022-10-02 RX ADMIN — GUAIFENESIN 600 MG: 600 TABLET, EXTENDED RELEASE ORAL at 20:29

## 2022-10-02 RX ADMIN — AZELASTINE HYDROCHLORIDE 1 SPRAY: 137 SPRAY, METERED NASAL at 08:30

## 2022-10-02 RX ADMIN — IPRATROPIUM BROMIDE AND ALBUTEROL SULFATE 3 ML: .5; 3 SOLUTION RESPIRATORY (INHALATION) at 15:55

## 2022-10-02 RX ADMIN — ACETAMINOPHEN 650 MG: 325 TABLET, FILM COATED ORAL at 14:54

## 2022-10-02 RX ADMIN — ENOXAPARIN SODIUM 40 MG: 100 INJECTION SUBCUTANEOUS at 16:27

## 2022-10-02 RX ADMIN — PREDNISONE 40 MG: 20 TABLET ORAL at 08:30

## 2022-10-02 RX ADMIN — DOXYCYCLINE 100 MG: 100 TABLET ORAL at 08:29

## 2022-10-02 RX ADMIN — Medication 10 ML: at 08:30

## 2022-10-02 RX ADMIN — DOXYCYCLINE 100 MG: 100 TABLET ORAL at 20:29

## 2022-10-02 RX ADMIN — AZELASTINE HYDROCHLORIDE 1 SPRAY: 137 SPRAY, METERED NASAL at 20:29

## 2022-10-02 RX ADMIN — GUAIFENESIN 600 MG: 600 TABLET, EXTENDED RELEASE ORAL at 08:29

## 2022-10-02 RX ADMIN — Medication 10 ML: at 20:31

## 2022-10-02 RX ADMIN — IPRATROPIUM BROMIDE AND ALBUTEROL SULFATE 3 ML: .5; 3 SOLUTION RESPIRATORY (INHALATION) at 11:15

## 2022-10-02 RX ADMIN — TRAZODONE HYDROCHLORIDE 50 MG: 50 TABLET ORAL at 20:29

## 2022-10-02 RX ADMIN — PANTOPRAZOLE SODIUM 40 MG: 40 TABLET, DELAYED RELEASE ORAL at 05:06

## 2022-10-02 NOTE — PROGRESS NOTES
[facility]     Progress Note    Patient Name: Abida Becker  : 1945  MRN: [id number]  Primary Care Physician:  Darlene Matute MD  Date of admission: 10/1/2022  Service date and time: 10/02/22 12:01 EDT  Subjective   Subjective     Chief Complaint: SOB and cough    HPI:  Patient Reports being very weak and tired      Objective   Objective     Vitals:   Temp:  [98.1 °F (36.7 °C)-98.4 °F (36.9 °C)] 98.1 °F (36.7 °C)  Heart Rate:  [] 94  Resp:  [16-21] 18  BP: (100-130)/(60-80) 100/63  Flow (L/min):  [2-2.5] 2.5  Physical Exam    Constitutional: Awake, alert   Eyes: PERRLA, sclerae anicteric, no conjunctival injection   HENT: NCAT, mucous membranes moist   Neck: Supple, no thyromegaly, no lymphadenopathy, trachea midline   Respiratory: Clear to auscultation bilaterally, nonlabored respirations    Cardiovascular: RRR, no murmurs, rubs, or gallops, palpable pedal pulses bilaterally   Gastrointestinal: Positive bowel sounds, soft, nontender, nondistended   Musculoskeletal: No bilateral ankle edema, no clubbing or cyanosis to extremities   Psychiatric: Appropriate affect, cooperative   Neurologic: Oriented x 3, strength symmetric in all extremities, Cranial Nerves grossly intact to confrontation, speech clear   Skin: No rashes     Result Review    Result Review:  I have personally reviewed the results from the time of this admission to 10/2/2022 12:01 EDT and agree with these findings:  [x]  Laboratory list / accordion  [x]  Microbiology  [x]  Radiology  [x]  EKG/Telemetry   [x]  Cardiology/Vascular   []  Pathology  []  Old records  []  Other:        Assessment & Plan   Assessment / Plan       Active Hospital Problems:  Active Hospital Problems    Diagnosis    • Viral pneumonia    • Essential hypertension    • Multifocal pneumonia      Plan:    - PT/OT, up with assistance, may need SNF placement, SW consult  - cont abx, not requiring oxygen but very weak  - anti tussives, IS, mucinex, nasal spray,  steroids  - respiratory panel + human metapneumonvirus  - cont home meds as able    DVT prophylaxis:  Medical DVT prophylaxis orders are present.    CODE STATUS:   Level Of Support Discussed With: Patient  Code Status (Patient has no pulse and is not breathing): CPR (Attempt to Resuscitate)  Medical Interventions (Patient has pulse or is breathing): Full Support    Disposition:  I expect patient to be discharged 1-2 days    Nazario Dean MD

## 2022-10-02 NOTE — PLAN OF CARE
Goal Outcome Evaluation:      Pt is able to make needs known. Minimal c/o pain that was controlled with medication regimen. Pt is independent with ADL's. Family is at bedside. Education is complete, call light is in reach, and no other needs at this time. Will continue to monitor.

## 2022-10-02 NOTE — PLAN OF CARE
Goal Outcome Evaluation:      Patient is doing well. Has had no complaints of pain. Gave IS and educated. Pt has been coughing well with IS and is up ad dulce with no issues. Care continuing.

## 2022-10-03 ENCOUNTER — HOME HEALTH ADMISSION (OUTPATIENT)
Dept: HOME HEALTH SERVICES | Facility: HOME HEALTHCARE | Age: 77
End: 2022-10-03

## 2022-10-03 ENCOUNTER — READMISSION MANAGEMENT (OUTPATIENT)
Dept: CALL CENTER | Facility: HOSPITAL | Age: 77
End: 2022-10-03

## 2022-10-03 ENCOUNTER — TRANSCRIBE ORDERS (OUTPATIENT)
Dept: HOME HEALTH SERVICES | Facility: HOME HEALTHCARE | Age: 77
End: 2022-10-03

## 2022-10-03 VITALS
RESPIRATION RATE: 17 BRPM | SYSTOLIC BLOOD PRESSURE: 118 MMHG | TEMPERATURE: 98 F | HEIGHT: 66 IN | OXYGEN SATURATION: 92 % | BODY MASS INDEX: 28.2 KG/M2 | DIASTOLIC BLOOD PRESSURE: 59 MMHG | HEART RATE: 99 BPM | WEIGHT: 175.49 LBS

## 2022-10-03 DIAGNOSIS — J18.9 PNEUMONIA DUE TO INFECTIOUS ORGANISM, UNSPECIFIED LATERALITY, UNSPECIFIED PART OF LUNG: Primary | ICD-10-CM

## 2022-10-03 LAB — QT INTERVAL: 341 MS

## 2022-10-03 PROCEDURE — 97165 OT EVAL LOW COMPLEX 30 MIN: CPT

## 2022-10-03 PROCEDURE — 97161 PT EVAL LOW COMPLEX 20 MIN: CPT

## 2022-10-03 PROCEDURE — G0378 HOSPITAL OBSERVATION PER HR: HCPCS

## 2022-10-03 PROCEDURE — 94664 DEMO&/EVAL PT USE INHALER: CPT

## 2022-10-03 PROCEDURE — 94799 UNLISTED PULMONARY SVC/PX: CPT

## 2022-10-03 PROCEDURE — 63710000001 PREDNISONE PER 1 MG: Performed by: INTERNAL MEDICINE

## 2022-10-03 PROCEDURE — 94761 N-INVAS EAR/PLS OXIMETRY MLT: CPT

## 2022-10-03 RX ORDER — PREDNISONE 20 MG/1
20 TABLET ORAL
Qty: 7 TABLET | Refills: 0 | Status: SHIPPED | OUTPATIENT
Start: 2022-10-04 | End: 2022-10-11

## 2022-10-03 RX ORDER — GUAIFENESIN 600 MG/1
600 TABLET, EXTENDED RELEASE ORAL EVERY 12 HOURS SCHEDULED
Qty: 14 TABLET | Refills: 0 | Status: SHIPPED | OUTPATIENT
Start: 2022-10-03 | End: 2022-10-10

## 2022-10-03 RX ORDER — DOXYCYCLINE 100 MG/1
100 TABLET ORAL EVERY 12 HOURS SCHEDULED
Qty: 9 TABLET | Refills: 0 | Status: SHIPPED | OUTPATIENT
Start: 2022-10-03 | End: 2022-10-08

## 2022-10-03 RX ADMIN — PANTOPRAZOLE SODIUM 40 MG: 40 TABLET, DELAYED RELEASE ORAL at 05:44

## 2022-10-03 RX ADMIN — Medication 10 ML: at 08:40

## 2022-10-03 RX ADMIN — PREDNISONE 40 MG: 20 TABLET ORAL at 08:41

## 2022-10-03 RX ADMIN — DOXYCYCLINE 100 MG: 100 TABLET ORAL at 08:40

## 2022-10-03 RX ADMIN — ACETAMINOPHEN 650 MG: 325 TABLET, FILM COATED ORAL at 12:48

## 2022-10-03 RX ADMIN — IPRATROPIUM BROMIDE AND ALBUTEROL SULFATE 3 ML: .5; 3 SOLUTION RESPIRATORY (INHALATION) at 11:31

## 2022-10-03 RX ADMIN — IPRATROPIUM BROMIDE AND ALBUTEROL SULFATE 3 ML: .5; 3 SOLUTION RESPIRATORY (INHALATION) at 07:00

## 2022-10-03 RX ADMIN — GUAIFENESIN 600 MG: 600 TABLET, EXTENDED RELEASE ORAL at 08:40

## 2022-10-03 RX ADMIN — AZELASTINE HYDROCHLORIDE 1 SPRAY: 137 SPRAY, METERED NASAL at 08:42

## 2022-10-03 RX ADMIN — ACETAMINOPHEN 650 MG: 325 TABLET, FILM COATED ORAL at 01:42

## 2022-10-03 NOTE — PLAN OF CARE
Goal Outcome Evaluation:  Plan of Care Reviewed With: patient        Progress: no change  Outcome Evaluation: Pt has PT/OT consult. May need a SNF. She's on 2-3 liters of o2 and sats are staying around 95%. Pt has nonproductive cough. Call light within reach.

## 2022-10-03 NOTE — CASE MANAGEMENT/SOCIAL WORK
Continued Stay Note  [facility]     Patient Name: Abida Becker  MRN: [id number]  Today's Date: 10/3/2022    Admit Date: 10/1/2022    Plan: [facility]   Discharge Plan     Row Name 10/03/22 1302       Plan    Plan [facility]    Patient/Family in Agreement with Plan yes    Provided Post Acute Provider List? Yes    Post Acute Provider List Home Health    Provided Post Acute Provider Quality & Resource List? Yes    Post Acute Provider Quality and Resource List Home Health    Plan Comments Spoke with patient at bedside. She denies dc needs other than Home health. Home health orders received from MD and patient is accepted at [facility]. Patient informed and dc orders are in    Row Name 10/03/22 1242       Plan    Plan Referral to [facility] pending acceptance. Orders in.    Patient/Family in Agreement with Plan yes    Plan Comments Spoke with patient at bedside.    Row Name 10/03/22 1238       Plan    Plan Referral to [facility] pending acceptance. Message sent to MD for orders.                            Expected Discharge Date and Time     Expected Discharge Date Expected Discharge Time    Oct 3, 2022             Ita Pinedo RN

## 2022-10-03 NOTE — DISCHARGE PLACEMENT REQUEST
"Liza Graves (77 y.o. Female)             Date of Birth   1945    Social Security Number       Address   [address] [facility]    Home Phone   [phone]    MRN   [id number]       Congregational   None    Marital Status   Single                            Admission Date   10/1/22    Admission Type   Emergency    Admitting Provider   Edilson Cleveland MD    Attending Provider   Nazario Dean MD    Department, Room/Bed   [facility] SURGICAL INPATIENT, 4121/1       Discharge Date       Discharge Disposition   Home or Self Care    Discharge Destination                               Attending Provider: Nazario Dean MD    Allergies: Amoxicillin, Penicillins    Isolation: Droplet, Contact   Infection: Human Metapneumovirus  (10/01/22)   Code Status: CPR   Advance Care Planning Activity    Ht: 167.6 cm (66\")   Wt: 79.6 kg (175 lb 7.8 oz)    Admission Cmt: None   Principal Problem: Viral pneumonia [J12.9]                 Active Insurance as of 10/1/2022     Primary Coverage     Payor Plan Insurance Group Employer/Plan Group    MEDICARE MEDICARE A & B      Payor Plan Address Payor Plan Phone Number Payor Plan Fax Number Effective Dates    [address] [phone]  5/1/2012 - None Entered    [facility] [id number]       Subscriber Name Subscriber Birth Date Member ID       LIZA GRAVES 1945 [id number]           Secondary Coverage     Payor Plan Insurance Group Employer/Plan Group    MISC MC SUP   MISC MC SUP              PLAN G     Coverage Address Coverage Phone Number Coverage Fax Number Effective Dates    [address] [address] [phone]  10/2/2019 - None Entered    [facility] [id number]       Subscriber Name Subscriber Birth Date Member ID       LIZA GRAVES 1945 [id number]                 Emergency Contacts      (Rel.) Home Phone Work Phone Mobile Phone    MONISHA GRAVES (Daughter) [phone] -- [phone]    JO-ANNJOSETERESA (Daughter) -- -- " [phone]

## 2022-10-03 NOTE — THERAPY EVALUATION
Patient Name: Abida Becker  : 1945    MRN: [id number]                              Today's Date: 10/3/2022       Admit Date: 10/1/2022    Visit Dx:     ICD-10-CM ICD-9-CM   1. Multifocal pneumonia  J18.9 486   2. Human metapneumovirus (hMPV) pneumonia  J12.3 480.8     Patient Active Problem List   Diagnosis   • Allergic rhinitis   • Gait abnormality   • Gastroesophageal reflux disease   • Hip pain, right   • Low back pain   • Recurrent urinary tract infection   • Scoliosis   • Vaginitis, atrophic   • Medicare annual wellness visit, subsequent   • Balance problem   • Vitamin D deficiency   • Acute pain of left knee   • Primary osteoarthritis of left knee   • Tear of MCL (medial collateral ligament) of knee, left, subsequent encounter   • Overweight (BMI 25.0-29.9)   • Visit for screening mammogram   • Postmenopausal   • Chronic pain of left knee   • Right lumbar radiculopathy   • Tremor, essential   • Other fatigue   • Osteoarthritis, generalized   • Irritable bowel syndrome with diarrhea   • Primary osteoarthritis of both knees   • Obesity (BMI 30.0-34.9)   • Hiatal hernia   • Incontinence of feces with fecal urgency   • Age-related osteoporosis without current pathological fracture   • Bruising   • Family hx osteoporosis   • Personal history of spine surgery   • Fever   • Nonintractable headache   • Viral pneumonia   • Essential hypertension   • Multifocal pneumonia     Past Medical History:   Diagnosis Date   • GERD (gastroesophageal reflux disease)    • History of recurrent UTIs    • IBS (irritable bowel syndrome)    • Osteoarthritis    • Osteoporosis    • Primary osteoarthritis of both knees 2022   • Pulmonary embolism (HCC)     after scoliosis surgery   • Vitamin D deficiency      Past Surgical History:   Procedure Laterality Date   • BREAST BIOPSY     • CHOLECYSTECTOMY     • COLONOSCOPY  2018   • HIATAL HERNIA REPAIR     • REIMPLANT URETER IN BLADDER     • SPINAL FUSION         General Information     Row Name 10/03/22 0948          Physical Therapy Time and Intention    Document Type evaluation  -BR     Mode of Treatment physical therapy  -BR     Row Name 10/03/22 0948          General Information    Patient Profile Reviewed yes  -BR     Prior Level of Function independent:;driving;community mobility  -BR     Existing Precautions/Restrictions oxygen therapy device and L/min  -BR     Row Name 10/03/22 0948          Living Environment    People in Home alone  -BR     Row Name 10/03/22 0948          Home Main Entrance    Number of Stairs, Main Entrance two  -BR     Stair Railings, Main Entrance none  -BR     Row Name 10/03/22 0948          Stairs Within Home, Primary    Stairs, Within Home, Primary pt's laundry is in the basement  -BR     Row Name 10/03/22 0948          Cognition    Orientation Status (Cognition) oriented x 4  -BR     Row Name 10/03/22 0948          Safety Issues, Functional Mobility    Impairments Affecting Function (Mobility) endurance/activity tolerance;shortness of breath;balance;strength  -BR           User Key  (r) = Recorded By, (t) = Taken By, (c) = Cosigned By    Initials Name Provider Type    Yari Jauregui PT Physical Therapist               Mobility     Row Name 10/03/22 0948          Sit-Stand Transfer    Sit-Stand [place] (Transfers) supervision  Simultaneous filing. User may be unaware of other data.  -BR     Assistive Device (Sit-Stand Transfers) walker, front-wheeled  Simultaneous filing. User may be unaware of other data.  -BR     Row Name 10/03/22 0948          Gait/Stairs (Locomotion)    [place] Level (Gait) contact guard  -BR     Assistive Device (Gait) walker, front-wheeled  -BR     Distance in Feet (Gait) 135  -BR     Bilateral Gait Deviations forward flexed posture  -BR           User Key  (r) = Recorded By, (t) = Taken By, (c) = Cosigned By    Initials Name Provider Type    Yari Jauregui PT Physical Therapist                Obj/Interventions     Row Name 10/03/22 0949          Range of Motion Comprehensive    General Range of Motion bilateral lower extremity ROM WFL  -BR     Row Name 10/03/22 0949          Strength Comprehensive (MMT)    Comment, General Manual Muscle Testing (MMT) Assessment grossly 3/5 BLE  -BR     Row Name 10/03/22 0949          Balance    Balance Assessment standing static balance  -BR     Static Standing Balance supervision  -BR     Position/Device Used, Standing Balance walker, rolling  -BR     Row Name 10/03/22 0949          Sensory Assessment (Somatosensory)    Sensory Assessment (Somatosensory) LE sensation intact  -BR           User Key  (r) = Recorded By, (t) = Taken By, (c) = Cosigned By    Initials Name Provider Type    BR Yari Garcia, PT Physical Therapist               Goals/Plan     Row Name 10/03/22 0957          Bed Mobility Goal 1 (PT)    Activity/Assistive Device (Bed Mobility Goal 1, PT) bed mobility activities, all  -BR     [place] Level/Cues Needed (Bed Mobility Goal 1, PT) modified independence  -BR     Time Frame (Bed Mobility Goal 1, PT) long term goal (LTG);2 weeks  -BR     Row Name 10/03/22 0957          Transfer Goal 1 (PT)    Activity/Assistive Device (Transfer Goal 1, PT) transfers, all  -BR     [place] Level/Cues Needed (Transfer Goal 1, PT) modified independence  -BR     Time Frame (Transfer Goal 1, PT) long term goal (LTG);2 weeks  -BR     Row Name 10/03/22 0957          Gait Training Goal 1 (PT)    Activity/Assistive Device (Gait Training Goal 1, PT) gait (walking locomotion)  -BR     [place] Level (Gait Training Goal 1, PT) standby assist  -BR     Distance (Gait Training Goal 1, PT) 150  -BR     Time Frame (Gait Training Goal 1, PT) long term goal (LTG);2 weeks  -BR     Row Name 10/03/22 0957          Therapy Assessment/Plan (PT)    Planned Therapy Interventions (PT) balance training;bed mobility training;gait training;neuromuscular re-education;patient/family  education;transfer training;stair training;strengthening  -BR           User Key  (r) = Recorded By, (t) = Taken By, (c) = Cosigned By    Initials Name Provider Type    BR Yari Garcia, PT Physical Therapist               Clinical Impression     Row Name 10/03/22 0950          Pain    Pretreatment Pain Rating 5/10  -BR     Posttreatment Pain Rating 5/10  -BR     Pain Location - head  -BR     Row Name 10/03/22 0950          Plan of Care Review    Plan of Care Reviewed With patient;sibling  -BR     Outcome Evaluation Pt presents as a 78 y/o F admitted to [facility] on 10/1/22 with cough and shortness of breath. Pt diagnosed with multifocal pneumonia and human metapneumovirus. Past Medical Hx: scoliosis, OP, DJD, IBS. Pt was alert and oriented x 4. Pt was sitting at rest on room air with sats at 89%. At baseline, pt lives alone and was independent with community mobility including driving.  Her laundry is in the basement.  Pt owns a rolling walker but doesn't use it.  Per PT Eval, pt required supervision for transfers and she ambulated 135 feet with rolling walker with CGA of 1 with O2 at 3 L and sats were 95% after gait. PT recommended pt use a rolling walker for mobility and PT recommendation is Home with Home Health Physical Therapy.  PT will follow.  -BR     Row Name 10/03/22 0992          Therapy Assessment/Plan (PT)    Rehab Potential (PT) good, to achieve stated therapy goals  -BR     Criteria for Skilled Interventions Met (PT) yes  -BR     Therapy Frequency (PT) 3 times/wk  -BR     Predicted Duration of Therapy Intervention (PT) until D/C  -BR     Row Name 10/03/22 0950          Vital Signs    Pretreatment Heart Rate (beats/min) 83  -BR     Pretreatment Resp Rate (breaths/min) 13  -BR     Pre SpO2 (%) 89  -BR     O2 Delivery Pre Treatment room air  -BR     O2 Delivery Intra Treatment nasal cannula  3 L  -BR     Post SpO2 (%) 95  -BR     O2 Delivery Post Treatment nasal cannula  3 L  -BR     Row Name 10/03/22 0950           Positioning and Restraints    Pre-Treatment Position sitting in chair/recliner  -BR     Post Treatment Position chair  -BR     In Chair sitting;call light within reach;encouraged to call for assist;with family/caregiver  -BR           User Key  (r) = Recorded By, (t) = Taken By, (c) = Cosigned By    Initials Name Provider Type    Yari Jauregui PT Physical Therapist               Outcome Measures     Row Name 10/03/22 0958          How much help from another person do you currently need...    Turning from your back to your side while in flat bed without using bedrails? 4  -BR     Moving from lying on back to sitting on the side of a flat bed without bedrails? 4  -BR     Moving to and from a bed to a chair (including a wheelchair)? 3  -BR     Standing up from a chair using your arms (e.g., wheelchair, bedside chair)? 4  -BR     Climbing 3-5 steps with a railing? 3  -BR     To walk in hospital room? 3  -BR     AM-PAC 6 Clicks Score (PT) 21  -BR     Highest level of mobility 6 --> Walked 10 steps or more  -BR     Row Name 10/03/22 0958          Functional Assessment    Outcome Measure Options AM-PAC 6 Clicks Basic Mobility (PT)  -BR           User Key  (r) = Recorded By, (t) = Taken By, (c) = Cosigned By    Initials Name Provider Type    Yari Jauregui PT Physical Therapist                             Physical Therapy Education                 Title: PT OT SLP Therapies (In Progress)     Topic: Physical Therapy (In Progress)     Point: Mobility training (Done)     Learning Progress Summary           Patient Acceptance, E,TB,D, VU,DU by ROSIBEL at 10/3/2022 0958                   Point: Home exercise program (Not Started)     Learner Progress:  Not documented in this visit.          Point: Body mechanics (Done)     Learning Progress Summary           Patient Acceptance, E,TB,D, VU,DU by ROSIBEL at 10/3/2022 0958                   Point: Precautions (Done)     Learning Progress Summary           Patient  Acceptance, E,TB,D, VU,DU by BR at 10/3/2022 0958                               User Key     Initials Effective Dates Name Provider Type Discipline    BR 02/01/22 -  Yari Garcia PT Physical Therapist PT              PT Recommendation and Plan  Planned Therapy Interventions (PT): balance training, bed mobility training, gait training, neuromuscular re-education, patient/family education, transfer training, stair training, strengthening  Plan of Care Reviewed With: patient, sibling  Outcome Evaluation: Pt presents as a 76 y/o F admitted to [facility] on 10/1/22 with cough and shortness of breath. Pt diagnosed with multifocal pneumonia and human metapneumovirus. Past Medical Hx: scoliosis, OP, DJD, IBS. Pt was alert and oriented x 4. Pt was sitting at rest on room air with sats at 89%. At baseline, pt lives alone and was independent with community mobility including driving.  Her laundry is in the basement.  Pt owns a rolling walker but doesn't use it.  Per PT Eval, pt required supervision for transfers and she ambulated 135 feet with rolling walker with CGA of 1 with O2 at 3 L and sats were 95% after gait. PT recommended pt use a rolling walker for mobility and PT recommendation is Home with Home Health Physical Therapy.  PT will follow.     Time Calculation:    PT Charges     Row Name 10/03/22 0959             Time Calculation    Start Time 0920  -BR      Stop Time 0945  -BR      Time Calculation (min) 25 min  -BR      PT Received On 10/03/22  -BR      PT - Next Appointment 10/04/22  -BR      PT Goal Re-Cert Due Date 10/17/22  -BR              Time Calculation- PT    Total Timed Code Minutes- PT 0 minute(s)  -BR            User Key  (r) = Recorded By, (t) = Taken By, (c) = Cosigned By    Initials Name Provider Type    BR Yari Garcia PT Physical Therapist              Therapy Charges for Today     Code Description Service Date Service Provider Modifiers Qty    [id number] HC PT EVAL LOW COMPLEXITY 4 10/3/2022  Yari Garcia, PT GP 1          PT G-Codes  Outcome Measure Options: AM-PAC 6 Clicks Basic Mobility (PT)  AM-PAC 6 Clicks Score (PT): 21    Yari Garcia, PT  10/3/2022

## 2022-10-03 NOTE — THERAPY EVALUATION
Patient Name: Abida Becker  : 1945    MRN: [id number]                              Today's Date: 10/3/2022       Admit Date: 10/1/2022    Visit Dx:     ICD-10-CM ICD-9-CM   1. Multifocal pneumonia  J18.9 486   2. Human metapneumovirus (hMPV) pneumonia  J12.3 480.8     Patient Active Problem List   Diagnosis   • Allergic rhinitis   • Gait abnormality   • Gastroesophageal reflux disease   • Hip pain, right   • Low back pain   • Recurrent urinary tract infection   • Scoliosis   • Vaginitis, atrophic   • Medicare annual wellness visit, subsequent   • Balance problem   • Vitamin D deficiency   • Acute pain of left knee   • Primary osteoarthritis of left knee   • Tear of MCL (medial collateral ligament) of knee, left, subsequent encounter   • Overweight (BMI 25.0-29.9)   • Visit for screening mammogram   • Postmenopausal   • Chronic pain of left knee   • Right lumbar radiculopathy   • Tremor, essential   • Other fatigue   • Osteoarthritis, generalized   • Irritable bowel syndrome with diarrhea   • Primary osteoarthritis of both knees   • Obesity (BMI 30.0-34.9)   • Hiatal hernia   • Incontinence of feces with fecal urgency   • Age-related osteoporosis without current pathological fracture   • Bruising   • Family hx osteoporosis   • Personal history of spine surgery   • Fever   • Nonintractable headache   • Viral pneumonia   • Essential hypertension   • Multifocal pneumonia     Past Medical History:   Diagnosis Date   • GERD (gastroesophageal reflux disease)    • History of recurrent UTIs    • IBS (irritable bowel syndrome)    • Osteoarthritis    • Osteoporosis    • Primary osteoarthritis of both knees 2022   • Pulmonary embolism (HCC)     after scoliosis surgery   • Vitamin D deficiency      Past Surgical History:   Procedure Laterality Date   • BREAST BIOPSY     • CHOLECYSTECTOMY     • COLONOSCOPY  2018   • HIATAL HERNIA REPAIR     • REIMPLANT URETER IN BLADDER     • SPINAL FUSION         General Information     Row Name 10/03/22 0946          OT Time and Intention    Document Type evaluation  -MS     Mode of Treatment physical therapy;occupational therapy;co-treatment  -MS     Row Name 10/03/22 0946          General Information    Patient Profile Reviewed yes  -MS     Prior Level of Function independent:;ADL's;driving  -MS     Existing Precautions/Restrictions oxygen therapy device and L/min  -MS     Barriers to Rehab none identified  -MS     Row Name 10/03/22 0946          Occupational Profile    Reason for Services/Referral (Occupational Profile) Pt is a 76 y/o female who presented to [facility]/22 c/o  nonproductive cough, fever, T-max 101, and dyspnea. Pt was found to have pneumonia and hMPV. PMH includes recurrent UTIs, IBS, HTN, OA, hx pulmonary embolism, hx spinal fusion, and hiatal hernia repair. Chest XR may represent atypical interstitial pneumonia. CT chest (+) multifocal pneumonia.  -MS     Successful Occupations (Occupational Profile) former teacher  -MS     Environmental Supports and Barriers (Occupational Profile) supportive family  -MS     Patient Goals (Occupational Profile) return home  -MS     Row Name 10/03/22 0946          Living Environment    People in Home alone  -MS     Row Name 10/03/22 0946          Home Main Entrance    Number of Stairs, Main Entrance two  -MS     Row Name 10/03/22 0946          Stairs Within Home, Primary    Number of Stairs, Within Home, Primary other (see comments)  one flight of stairs to basement  -MS     Row Name 10/03/22 0946          Cognition    Orientation Status (Cognition) oriented x 4  -MS     Row Name 10/03/22 0946          Safety Issues, Functional Mobility    Impairments Affecting Function (Mobility) balance;endurance/activity tolerance;shortness of breath  -MS           User Key  (r) = Recorded By, (t) = Taken By, (c) = Cosigned By    Initials Name Provider Type    MS Tessie Smith, OT Occupational Therapist                 Mobility/ADL's      Row Name 10/03/22 0948          Transfers    Transfers sit-stand transfer;stand-sit transfer  -MS     Stand-Sit [place] (Transfers) supervision  -MS     Row Name 10/03/22 0948          Stand-Sit Transfer    Assistive Device (Stand-Sit Transfers) walker, front-wheeled  -MS     Row Name 10/03/22 0948          Functional Mobility    Functional Mobility- Ind. Level contact guard assist  -MS     Functional Mobility- Device walker, front-wheeled  -MS     Row Name 10/03/22 0948          Activities of Daily Living    BADL Assessment/Intervention lower body dressing  -MS     Row Name 10/03/22 0948          Lower Body Dressing Assessment/Training    [place] Level (Lower Body Dressing) doff;don;socks;modified independence  -MS     Position (Lower Body Dressing) supported sitting  -MS           User Key  (r) = Recorded By, (t) = Taken By, (c) = Cosigned By    Initials Name Provider Type    Tessie Colon OT Occupational Therapist               Obj/Interventions     Row Name 10/03/22 0950          Sensory Assessment (Somatosensory)    Sensory Assessment (Somatosensory) UE sensation intact  -MS     Row Name 10/03/22 0950          Vision Assessment/Intervention    Visual Impairment/Limitations WNL  -MS     Row Name 10/03/22 0950          Range of Motion Comprehensive    General Range of Motion bilateral upper extremity ROM WFL  -MS     Row Name 10/03/22 0950          Strength Comprehensive (MMT)    Comment, General Manual Muscle Testing (MMT) Assessment BUE grossly 4/5  -MS     Row Name 10/03/22 0950          Balance    Balance Assessment sitting static balance;sitting dynamic balance;standing static balance;standing dynamic balance  -MS     Static Sitting Balance supervision  -MS     Dynamic Sitting Balance supervision  -MS     Position, Sitting Balance sitting in chair  -MS     Static Standing Balance supervision  -MS     Dynamic Standing Balance contact guard  -MS     Position/Device Used, Standing Balance  walker, rolling  -MS           User Key  (r) = Recorded By, (t) = Taken By, (c) = Cosigned By    Initials Name Provider Type    MS Tessie Smith OT Occupational Therapist               Goals/Plan     Row Name 10/03/22 0958          Bathing Goal 1 (OT)    Activity/Device (Bathing Goal 1, OT) bathing skills, all  -MS     [place] Level/Cues Needed (Bathing Goal 1, OT) independent  -MS     Time Frame (Bathing Goal 1, OT) 2 weeks;long term goal (LTG)  -MS     Progress/Outcomes (Bathing Goal 1, OT) new goal  -MS     Row Name 10/03/22 0958          Dressing Goal 1 (OT)    [place]/Cues Needed (Dressing Goal 1, OT) independent  -MS     Time Frame (Dressing Goal 1, OT) long term goal (LTG);2 weeks  -MS     Progress/Outcome (Dressing Goal 1, OT) new goal  -MS     Row Name 10/03/22 0958          Toileting Goal 1 (OT)    Activity/Device (Toileting Goal 1, OT) toileting skills, all  -MS     [place] Level/Cues Needed (Toileting Goal 1, OT) independent  -MS     Time Frame (Toileting Goal 1, OT) 2 weeks;long term goal (LTG)  -MS     Row Name 10/03/22 0958          Problem Specific Goal 1 (OT)    Problem Specific Goal 1 (OT) increase activity tolerance 10 minutes without rest breaks while oxygen sats >90%.  -MS     Time Frame (Problem Specific Goal 1, OT) long term goal (LTG);2 weeks  -MS     Progress/Outcome (Problem Specific Goal 1, OT) new goal  -MS     Row Name 10/03/22 0958          Therapy Assessment/Plan (OT)    Planned Therapy Interventions (OT) activity tolerance training;functional balance retraining;patient/caregiver education/training;occupation/activity based interventions  -MS           User Key  (r) = Recorded By, (t) = Taken By, (c) = Cosigned By    Initials Name Provider Type    MS Tessie Smith OT Occupational Therapist               Clinical Impression     Row Name 10/03/22 0952          Pain Assessment    Additional Documentation Pain Scale: FACES Pre/Post-Treatment (Group)  -MS     Row Name  10/03/22 0952          Pain Scale: FACES Pre/Post-Treatment    Pain: FACES Scale, Pretreatment 2-->hurts little bit  -MS     Posttreatment Pain Rating 2-->hurts little bit  -MS     Pre/Posttreatment Pain Comment headache  -MS     Row Name 10/03/22 0952          Plan of Care Review    Plan of Care Reviewed With patient;sibling  -MS     Progress no change  -MS     Outcome Evaluation Pt is a 78 y/o female who presented to [facility] 10/1/22 c/o  nonproductive cough, fever, T-max 101, and dyspnea. Pt was found to have pneumonia and hMPV. PMH includes recurrent UTIs, IBS, HTN, OA, hx pulmonary embolism, hx spinal fusion, and hiatal hernia repair. Chest XR may represent atypical interstitial pneumonia. CT chest (+) multifocal pneumonia. Pt agreeable to OT eval with sister present. Pt live in [place] with basement; laundry room is in basement. Pt has 2 entry stairs to home. Pt reports no use of AD at baseline for mobility. Pt has walk in shower. Pt reports independence with ADLs and drives at baseline. Pt demo decrease in activity tolerance during OT eval this date, likely requiring increased time to complete ADLs. Pt demo saturation with activity, requiring 3L O2 to increase O2 sats >90%. Pt able to complete STS with supervision this date and functional ambulation with RW and CGA for increased safety. OT recommend home with home health OT when medically appropriate for d/c to address deficits impacting independence with ADLs. Pt agreeable. OT to follow.  -MS     Row Name 10/03/22 0952          Therapy Assessment/Plan (OT)    Patient/Family Therapy Goal Statement (OT) return home  -MS     Rehab Potential (OT) good, to achieve stated therapy goals  -MS     Criteria for Skilled Therapeutic Interventions Met (OT) yes;meets criteria;skilled treatment is necessary  -MS     Therapy Frequency (OT) 3 times/wk  -MS     Predicted Duration of Therapy Intervention (OT) until d/c  -MS     Row Name 10/03/22 0952          Therapy Plan  Review/Discharge Plan (OT)    Anticipated Discharge Disposition (OT) home with assist;home with home health  -MS     Row Name 10/03/22 0952          Vital Signs    Pretreatment Heart Rate (beats/min) 83  -MS     Posttreatment Heart Rate (beats/min) 89  -MS     Pre SpO2 (%) 89  -MS     O2 Delivery Pre Treatment room air  -MS     Post SpO2 (%) 95  -MS     O2 Delivery Post Treatment nasal cannula  -MS     Pre Patient Position Sitting  -MS     Intra Patient Position Standing  -MS     Post Patient Position Sitting  -MS     Row Name 10/03/22 0952          Positioning and Restraints    Pre-Treatment Position sitting in chair/recliner  -MS     Post Treatment Position chair  -MS     In Chair sitting;call light within reach;encouraged to call for assist;with family/caregiver  -MS           User Key  (r) = Recorded By, (t) = Taken By, (c) = Cosigned By    Initials Name Provider Type    Tessie Colon, OT Occupational Therapist               Outcome Measures     Row Name 10/03/22 1000          How much help from another is currently needed...    Putting on and taking off regular lower body clothing? 3  -MS     Bathing (including washing, rinsing, and drying) 3  -MS     Toileting (which includes using toilet bed pan or urinal) 4  -MS     Putting on and taking off regular upper body clothing 4  -MS     Taking care of personal grooming (such as brushing teeth) 4  -MS     Eating meals 4  -MS     AM-PAC 6 Clicks Score (OT) 22  -MS     Row Name 10/03/22 0958          How much help from another person do you currently need...    Turning from your back to your side while in flat bed without using bedrails? 4  -BR     Moving from lying on back to sitting on the side of a flat bed without bedrails? 4  -BR     Moving to and from a bed to a chair (including a wheelchair)? 3  -BR     Standing up from a chair using your arms (e.g., wheelchair, bedside chair)? 4  -BR     Climbing 3-5 steps with a railing? 3  -BR     To walk in hospital  room? 3  -BR     AM-PAC 6 Clicks Score (PT) 21  -BR     Highest level of mobility 6 --> Walked 10 steps or more  -BR     Row Name 10/03/22 1000 10/03/22 0958       Functional Assessment    Outcome Measure Options AM-PAC 6 Clicks Daily Activity (OT)  -MS AM-PAC 6 Clicks Basic Mobility (PT)  -BR          User Key  (r) = Recorded By, (t) = Taken By, (c) = Cosigned By    Initials Name Provider Type    MS Tessie Smith, OT Occupational Therapist    BR Yari Garcia, PT Physical Therapist                Occupational Therapy Education                 Title: PT OT SLP Therapies (In Progress)     Topic: Occupational Therapy (Not Started)     Point: ADL training (Not Started)     Description:   Instruct learner(s) on proper safety adaptation and remediation techniques during self care or transfers.   Instruct in proper use of assistive devices.              Learner Progress:  Not documented in this visit.          Point: Precautions (Not Started)     Description:   Instruct learner(s) on prescribed precautions during self-care and functional transfers.              Learner Progress:  Not documented in this visit.          Point: Body mechanics (Not Started)     Description:   Instruct learner(s) on proper positioning and spine alignment during self-care, functional mobility activities and/or exercises.              Learner Progress:  Not documented in this visit.                          OT Recommendation and Plan  Planned Therapy Interventions (OT): activity tolerance training, functional balance retraining, patient/caregiver education/training, occupation/activity based interventions  Therapy Frequency (OT): 3 times/wk  Plan of Care Review  Plan of Care Reviewed With: patient, sibling  Progress: no change  Outcome Evaluation: Pt is a 76 y/o female who presented to [facility] 10/1/22 c/o  nonproductive cough, fever, T-max 101, and dyspnea. Pt was found to have pneumonia and hMPV. PMH includes recurrent UTIs, IBS, HTN, OA, hx  pulmonary embolism, hx spinal fusion, and hiatal hernia repair. Chest XR may represent atypical interstitial pneumonia. CT chest (+) multifocal pneumonia. Pt agreeable to OT eval with sister present. Pt live in [facility] with basement; laundry room is in basement. Pt has 2 entry stairs to home. Pt reports no use of AD at baseline for mobility. Pt has walk in shower. Pt reports independence with ADLs and drives at baseline. Pt demo decrease in activity tolerance during OT eval this date, likely requiring increased time to complete ADLs. Pt demo saturation with activity, requiring 3L O2 to increase O2 sats >90%. Pt able to complete STS with supervision this date and functional ambulation with RW and CGA for increased safety. OT recommend home with home health OT when medically appropriate for d/c to address deficits impacting independence with ADLs. Pt agreeable. OT to follow.     Time Calculation:    Time Calculation- OT     Row Name 10/03/22 1001             Time Calculation- OT    OT Start Time 0927  -MS      OT Stop Time 0946  -MS      OT Time Calculation (min) 19 min  -MS      Total Timed Code Minutes- OT 0 minute(s)  -MS      OT Received On 10/03/22  -MS      OT - Next Appointment 10/05/22  -MS      OT Goal Re-Cert Due Date 10/17/22  -MS            User Key  (r) = Recorded By, (t) = Taken By, (c) = Cosigned By    Initials Name Provider Type    Tessie Colon OT Occupational Therapist              Therapy Charges for Today     Code Description Service Date Service Provider Modifiers Qty    [id number] HC OT EVAL LOW COMPLEXITY 4 10/3/2022 Tessie Smith OT GO 1               Tessie Smith OT  10/3/2022

## 2022-10-03 NOTE — CASE MANAGEMENT/SOCIAL WORK
Case Management Discharge Note      Final Note: [facility]    Provided Post Acute Provider List?: Yes  Post Acute Provider List: Home Health  Provided Post Acute Provider Quality & Resource List?: Yes  Post Acute Provider Quality and Resource List: Home Health    Selected Continued Care - Admitted Since 10/1/2022         Home Medical Care Coordination complete.    Service Provider Selected Services Address Phone Fax Patient Preferred    [facility]  Home Health Services [address] [facility] [phone] --                  Transportation Services  Private: Car    Final Discharge Disposition Code: 06 - home with home health care

## 2022-10-03 NOTE — PROGRESS NOTES
Spoke with patient to verify demographics and explain services. Pt is agreeable and has no other agency at this time    Spoke with Dr. Matute via secure chat and she is agreeable to sign the plan of care and follow for home health orders    Pharmacy: [facility] in [place]

## 2022-10-03 NOTE — DISCHARGE SUMMARY
[facility]         DISCHARGE SUMMARY    Patient Name: Abida Becker  : 1945  MRN: [id number]    Date of Admission: 10/1/2022  Date of Discharge:  10/3/22  Primary Care Physician: Darlene Matute MD    Consults     Date and Time Order Name Status Description    10/1/2022  5:23 AM Inpatient Hospitalist Consult            Presenting Problem:   Human metapneumovirus (hMPV) pneumonia [J12.3]  Multifocal pneumonia [J18.9]    Active and Resolved Hospital Problems:  Active Hospital Problems    Diagnosis POA   • **Viral pneumonia [J12.9] Yes   • Essential hypertension [I10] Yes   • Multifocal pneumonia [J18.9] Yes   • Other fatigue [R53.83] Yes   • Gait abnormality [R26.9] Yes      Resolved Hospital Problems   No resolved problems to display.         Hospital Course     Hospital Course:  Abida Becker is a 77 y.o. female admitted with viral pneumonia and weakness. Patient was placed on PO steroids and abx and worked with PT/OT. It was deemed that she needed home health so that was setup for her on discharge. She did not require any oxygen during stay and is ambulating with a walker. She will go home with family.         DISCHARGE Follow Up Recommendations for labs and diagnostics: PCP in 1 week      Day of Discharge     Vital Signs:  Temp:  [97.3 °F (36.3 °C)-98 °F (36.7 °C)] 98 °F (36.7 °C)  Heart Rate:  [] 83  Resp:  [16-35] 16  BP: (106-110)/(69-71) 110/71  Flow (L/min):  [2-3] 2  Physical Exam:  Constitutional: Awake, alert   Eyes: PERRLA, sclerae anicteric, no conjunctival injection   HENT: NCAT, mucous membranes moist   Neck: Supple, no thyromegaly, no lymphadenopathy, trachea midline   Respiratory: Clear to auscultation bilaterally, nonlabored respirations    Cardiovascular: RRR, no murmurs, rubs, or gallops, palpable pedal pulses bilaterally   Gastrointestinal: Positive bowel sounds, soft, nontender, nondistended   Musculoskeletal: No bilateral ankle edema, no clubbing or cyanosis  to extremities   Psychiatric: Appropriate affect, cooperative   Neurologic: Oriented x 3, strength symmetric in all extremities, Cranial Nerves grossly intact to confrontation, speech clear   Skin: No rashes     Pertinent  and/or Most Recent Results     LAB RESULTS:      Lab 10/02/22  0501 10/01/22  0256 10/01/22  0253   WBC 5.20  --  4.70   HEMOGLOBIN 13.1  --  13.3   HEMATOCRIT 39.2  --  40.4   PLATELETS 263  --  256   NEUTROS ABS 4.00  --  3.40   LYMPHS ABS 0.70  --  0.60*   MONOS ABS 0.50  --  0.60   EOS ABS 0.00  --  0.00   MCV 91.1  --  90.8   PROCALCITONIN  --   --  0.03   LACTATE  --  0.6  --          Lab 10/02/22  0501 10/01/22  0253   SODIUM 140 136   POTASSIUM 3.5 4.0   CHLORIDE 102 97*   CO2 24.0 25.0   ANION GAP 14.0 14.0   BUN 14 12   CREATININE 0.54* 0.62   EGFR 96.1 93.0   GLUCOSE 101* 108*   CALCIUM 9.2 9.1         Lab 10/01/22  0253   TOTAL PROTEIN 6.6   ALBUMIN 3.90   GLOBULIN 2.7   ALT (SGPT) 21   AST (SGOT) 21   BILIRUBIN 0.7   ALK PHOS 94         Lab 10/01/22  0253   TROPONIN T <0.010                 Brief Urine Lab Results     None        Microbiology Results (last 10 days)     Procedure Component Value - Date/Time    Respiratory Panel PCR w/COVID-19(SARS-CoV-2) CANDIS/SHANNEN/ANA/PAD/COR/MAD/GARRY In-House, NP Swab in UTM/VTM, 3-4 HR TAT - Swab, Nasopharynx [[id number]]  (Abnormal) Collected: 10/01/22 0302    Lab Status: Final result Specimen: Swab from Nasopharynx Updated: 10/01/22 0401     ADENOVIRUS, PCR Not Detected     Coronavirus 229E Not Detected     Coronavirus HKU1 Not Detected     Coronavirus NL63 Not Detected     Coronavirus OC43 Not Detected     COVID19 Not Detected     Human Metapneumovirus Detected     Human Rhinovirus/Enterovirus Not Detected     Influenza A PCR Not Detected     Influenza B PCR Not Detected     Parainfluenza Virus 1 Not Detected     Parainfluenza Virus 2 Not Detected     Parainfluenza Virus 3 Not Detected     Parainfluenza Virus 4 Not Detected     RSV, PCR Not Detected      Bordetella pertussis pcr Not Detected     Bordetella parapertussis PCR Not Detected     Chlamydophila pneumoniae PCR Not Detected     Mycoplasma pneumo by PCR Not Detected    Narrative:      In the setting of a positive respiratory panel with a viral infection PLUS a negative procalcitonin without other underlying concern for bacterial infection, consider observing off antibiotics or discontinuation of antibiotics and continue supportive care. If the respiratory panel is positive for atypical bacterial infection (Bordetella pertussis, Chlamydophila pneumoniae, or Mycoplasma pneumoniae), consider antibiotic de-escalation to target atypical bacterial infection.    Blood Culture - Blood, Arm, Right [[id number]]  (Normal) Collected: 10/01/22 0259    Lab Status: Preliminary result Specimen: Blood from Arm, Right Updated: 10/03/22 0315     Blood Culture No growth at 2 days    Blood Culture - Blood, Arm, Left [[id number]]  (Normal) Collected: 10/01/22 0253    Lab Status: Preliminary result Specimen: Blood from Arm, Left Updated: 10/03/22 0301     Blood Culture No growth at 2 days          XR Chest 2 View    Result Date: 9/30/2022  Impression: 1. Questionable noncalcified bilateral lung nodules. CT chest without contrast is recommended. 2. Faint reticular densities in the right mid to lower lung zone may represent atypical interstitial pneumonia.  Electronically Signed By-Keturah Wylie MD On:9/30/2022 4:42 PM This report was finalized on [id number] by  Keturah Wylie MD.    CT Chest Without Contrast Diagnostic    Result Date: 10/1/2022  Impression: Multifocal pneumonia. Electronically signed by:  Thiago Mercado D.O.  10/1/2022 3:01 AM                  Labs Pending at Discharge:  Pending Labs     Order Current Status    Blood Culture - Blood, Arm, Left Preliminary result    Blood Culture - Blood, Arm, Right Preliminary result            Discharge Details        Discharge Medications      New Medications       Instructions Start Date   doxycycline 100 MG tablet  Commonly known as: ADOXA   100 mg, Oral, Every 12 Hours Scheduled      guaiFENesin 600 MG 12 hr tablet  Commonly known as: MUCINEX   600 mg, Oral, Every 12 Hours Scheduled      predniSONE 20 MG tablet  Commonly known as: DELTASONE   20 mg, Oral, Daily With Breakfast   Start Date: October 4, 2022        Continue These Medications      Instructions Start Date   acetaminophen 500 MG tablet  Commonly known as: TYLENOL   500 mg, Oral, Every 6 Hours PRN      B2 PO   1 tablet, Oral, Daily      benzonatate 200 MG capsule  Commonly known as: TESSALON   1 capsule p.o. every 8 hours as needed cough      calcium carbonate 600 MG tablet  Commonly known as: OS-STEVEN   600 mg, Oral, Daily      cholecalciferol 25 MCG (1000 UT) tablet  Commonly known as: VITAMIN D3   1,000 Units, Oral, Daily      colestipol 1 g tablet  Commonly known as: COLESTID   TAKE ONE TABLET BY MOUTH TWICE A DAY AS NEEDED      CRANBERRY CONCENTRATE PO   Oral, Daily      dicyclomine 10 MG capsule  Commonly known as: BENTYL   TAKE ONE CAPSULE BY MOUTH THREE TIMES A DAY AS NEEDED FOR IBS      Mirabegron ER 50 MG tablet sustained-release 24 hour 24 hr tablet  Commonly known as: MYRBETRIQ   50 mg, Oral, Every 24 Hours      pantoprazole 40 MG EC tablet  Commonly known as: PROTONIX   TAKE ONE TABLET BY MOUTH DAILY      vitamin B-12 1000 MCG tablet  Commonly known as: CYANOCOBALAMIN   1,000 mcg, Oral, Daily         Stop These Medications    famotidine 20 MG tablet  Commonly known as: PEPCID     fexofenadine 180 MG tablet  Commonly known as: ALLEGRA     gabapentin 300 MG capsule  Commonly known as: NEURONTIN     hydroCHLOROthiazide 25 MG tablet  Commonly known as: HYDRODIURIL     levoFLOXacin 750 MG tablet  Commonly known as: LEVAQUIN     sulfamethoxazole-trimethoprim 800-160 MG per tablet  Commonly known as: BACTRIM DS,SEPTRA DS            Allergies   Allergen Reactions   • Amoxicillin Other (See Comments)      unsure  of type of reaction - states it was so long ago she can't remember    • Penicillins Nausea And Vomiting         Discharge Disposition:  Home or Self Care    Diet:  Hospital:  Diet Order   Procedures   • Diet Regular         Discharge Activity: as tolerated  Discharge Condition: stable          CODE STATUS:  Code Status and Medical Interventions:   Ordered at: 10/01/22 0554     Level Of Support Discussed With:    Patient     Code Status (Patient has no pulse and is not breathing):    CPR (Attempt to Resuscitate)     Medical Interventions (Patient has pulse or is breathing):    Full Support         Future Appointments   Date Time Provider Department Center   10/6/2022 11:00 AM Inocencai Wade PA [facility]       Additional Instructions for the Follow-ups that You Need to Schedule     Ambulatory Referral to Home Health (Hospital)   As directed      Face to Face Visit Date: 10/3/2022    Follow-up provider for Plan of Care?: I treated the patient in an acute care facility and will not continue treatment after discharge.    Follow-up provider: NINA SCHULZ [[id number]]    Reason/Clinical Findings: weakness    Describe mobility limitations that make leaving home difficult: weakness    Nursing/Therapeutic Services Requested: Physical Therapy Occupational Therapy    Frequency: 1 Week 1               Time spent on Discharge including face to face service: 25 minutes    Nazario Dean MD

## 2022-10-03 NOTE — PLAN OF CARE
Goal Outcome Evaluation:  Plan of Care Reviewed With: patient, sibling        Progress: no change  Outcome Evaluation: Pt is a 76 y/o female who presented to [facility] 10/1/22 c/o  nonproductive cough, fever, T-max 101, and dyspnea. Pt was found to have pneumonia and hMPV. PMH includes recurrent UTIs, IBS, HTN, OA, hx pulmonary embolism, hx spinal fusion, and hiatal hernia repair. Chest XR may represent atypical interstitial pneumonia. CT chest (+) multifocal pneumonia. Pt agreeable to OT eval with sister present. Pt live in [facility] with basement; laundry room is in basement. Pt has 2 entry stairs to home. Pt reports no use of AD at baseline for mobility. Pt has walk in shower. Pt reports independence with ADLs and drives at baseline. Pt demo decrease in activity tolerance during OT eval this date, likely requiring increased time to complete ADLs. Pt demo saturation with activity, requiring 3L O2 to increase O2 sats >90%. Pt able to complete STS with supervision this date and functional ambulation with RW and CGA for increased safety. OT recommend home with home health OT when medically appropriate for d/c to address deficits impacting independence with ADLs. Pt agreeable. OT to follow.

## 2022-10-03 NOTE — PLAN OF CARE
Goal Outcome Evaluation:        Pt is able to make needs known. Pain is managed with medication regimen. Education is complete, call light is in reach, and no other needs at this time. Will continue to monitor.

## 2022-10-03 NOTE — OUTREACH NOTE
Prep Survey    Flowsheet Row Responses   [facility] patient discharged from? [facility]   Is LACE score < 7 ? Yes   Emergency Room discharge w/ pulse ox? No   Eligibility [facility]   Date of Admission 10/01/22   Date of Discharge 10/03/22   Discharge Disposition Home-Health Care Sv   Discharge diagnosis Viral pneumonia   Does the patient have one of the following disease processes/diagnoses(primary or secondary)? Pneumonia   Does the patient have Home health ordered? Yes   What is the Home health agency?  [facility]    Is there a DME ordered? No   Prep survey completed? Yes          SRAVAN STOVALL - Registered Nurse

## 2022-10-03 NOTE — PLAN OF CARE
Goal Outcome Evaluation:  Plan of Care Reviewed With: patient, sibling  Pt presents as a 76 y/o F admitted to [facility] on 10/1/22 with cough and shortness of breath. Pt diagnosed with multifocal pneumonia and human metapneumovirus. Past Medical Hx: scoliosis, OP, DJD, IBS. Pt was alert and oriented x 4. Pt was sitting at rest on room air with sats at 89%. At baseline, pt lives alone and was independent with community mobility including driving.  Her laundry is in the basement.  Pt owns a rolling walker but doesn't use it.  Per PT Eval, pt required supervision for transfers and she ambulated 135 feet with rolling walker with CGA of 1 with O2 at 3 L and sats were 95% after gait. PT recommended pt use a rolling walker for mobility and PT recommendation is Home with Home Health Physical Therapy.  PT will follow.

## 2022-10-04 ENCOUNTER — HOME CARE VISIT (OUTPATIENT)
Dept: HOME HEALTH SERVICES | Facility: HOME HEALTHCARE | Age: 77
End: 2022-10-04

## 2022-10-04 ENCOUNTER — TRANSITIONAL CARE MANAGEMENT TELEPHONE ENCOUNTER (OUTPATIENT)
Dept: CALL CENTER | Facility: HOSPITAL | Age: 77
End: 2022-10-04

## 2022-10-04 NOTE — PROGRESS NOTES
Please call the patient and schedule her hospital follow-up appointment sooner, it should be within 2 weeks of hospital discharge.  May unlock an acute slot for this if needed.  Thank you.

## 2022-10-04 NOTE — OUTREACH NOTE
Call Center TCM Note    Flowsheet Row Responses   [facility] patient discharged from? [facility]   Does the patient have one of the following disease processes/diagnoses(primary or secondary)? Pneumonia   TCM attempt successful? Yes   Call start time 1341   Call end time 1346   Discharge diagnosis Viral pneumonia   Meds reviewed with patient/caregiver? Yes   Is the patient having any side effects they believe may be caused by any medication additions or changes? No   Does the patient have all medications ordered at discharge? Yes   Is the patient taking all medications as directed (includes completed medication regime)? Yes   Comments States she has appt on 10/27 that they told her was the soonest they could get her in.  Will message office regarding this to see if sooner appt is available to be in the TCM time frame.   What is the Home health agency?  [facility]    Has home health visited the patient within 72 hours of discharge? Call prior to 72 hours   Pulse Ox monitoring None   Psychosocial issues? No   Is the patient/caregiver able to teach back signs and symptoms of worsening condition: Fever/chills, Chest pain, Shortness of breath   Is the patient/caregiver able to teach back importance of completing antibiotic course of treatment? Yes   TCM call completed? Yes          Molly Toribio LPN    10/4/2022, 13:47 EDT

## 2022-10-05 ENCOUNTER — HOME CARE VISIT (OUTPATIENT)
Dept: HOME HEALTH SERVICES | Facility: HOME HEALTHCARE | Age: 77
End: 2022-10-05

## 2022-10-05 PROCEDURE — G0299 HHS/HOSPICE OF RN EA 15 MIN: HCPCS

## 2022-10-06 ENCOUNTER — TELEPHONE (OUTPATIENT)
Dept: ORTHOPEDIC SURGERY | Facility: CLINIC | Age: 77
End: 2022-10-06

## 2022-10-06 VITALS — DIASTOLIC BLOOD PRESSURE: 78 MMHG | TEMPERATURE: 98.9 F | RESPIRATION RATE: 17 BRPM | SYSTOLIC BLOOD PRESSURE: 122 MMHG

## 2022-10-06 LAB
BACTERIA SPEC AEROBE CULT: NORMAL
BACTERIA SPEC AEROBE CULT: NORMAL

## 2022-10-06 NOTE — HOME HEALTH
Patient is a pleasant 77 year old female who was admitted to [facility] after becoming weak at home. Patient states that she is typically very active and baby sits her 3 year old grandchild 3 times per week. Patient was found to have viral pneumonia. Patient states that she was on IV ABT and steriods. Patient was discharged home on PO ABT and PO steriods. Patient states that she is feeling a lot better, but still has no energy. Patient is currently using a walker in her home. Patient is coughing some yellow sputum up but states that she does not get very SOB when walking around the house. Patient agrees to PT and OT. Patient has appointment on 10/6 and requests evals on Fri. Patient lives alone but has 2 daughters that check on her very frequently. Plan to teach safety/falls prevention, medication education/management, respiratory assessment/education, PNA prevention education, labs as ordered, rehospitalization prevention.    Primary focus of care: PNA

## 2022-10-06 NOTE — TELEPHONE ENCOUNTER
Patient called and stated she is on antibiotic currently coming out of the hospital for pneumonia she was wondering of she needs to keep her 11am appt for her injection with provider Sheri     Pt phone number is correct in chart for call back

## 2022-10-06 NOTE — TELEPHONE ENCOUNTER
Call placed to patient, advised that if she is not feeling up to coming in we can reschedule her appt to next week. Patient expressed understanding and agreeable with postponing until next week. Appt given per patient request.

## 2022-10-07 ENCOUNTER — HOME CARE VISIT (OUTPATIENT)
Dept: HOME HEALTH SERVICES | Facility: HOME HEALTHCARE | Age: 77
End: 2022-10-07

## 2022-10-07 VITALS
OXYGEN SATURATION: 92 % | RESPIRATION RATE: 18 BRPM | DIASTOLIC BLOOD PRESSURE: 72 MMHG | SYSTOLIC BLOOD PRESSURE: 120 MMHG | HEART RATE: 84 BPM | TEMPERATURE: 97.3 F

## 2022-10-07 PROCEDURE — G0151 HHCP-SERV OF PT,EA 15 MIN: HCPCS

## 2022-10-08 DIAGNOSIS — R15.9 INCONTINENCE OF FECES WITH FECAL URGENCY: ICD-10-CM

## 2022-10-08 DIAGNOSIS — R15.2 INCONTINENCE OF FECES WITH FECAL URGENCY: ICD-10-CM

## 2022-10-08 RX ORDER — MONTELUKAST SODIUM 4 MG/1
TABLET, CHEWABLE ORAL
Qty: 30 TABLET | Refills: 0 | Status: SHIPPED | OUTPATIENT
Start: 2022-10-08 | End: 2022-10-22

## 2022-10-08 NOTE — HOME HEALTH
"PT Evaluation Summary: The patient is a 77 year old female admitted to home health services by SN on 10/5/2022 following hospitalization with viral pneumonia.    Past Medical History includes: Osteoarthritis, substantial spinal fusion - thoracic and lumbar (2011), GERD.    Prior Functional Level: Independent with all ADL and mobility.    Social History: Patient lives alone in her home with frequent visits from neighbor, \"Fiona\" and children.    PT Assessment this day (10/7/2022) reveals the problems of lower extremity strength deficit (4/5 with weakness noted to negatively impact balance, transfers and gait, a tendency to exhibit oxygen desaturation to as low as 90% with noted shallow breathing), limited transfers (requires stand-by assistance), limited ambulation tolerance (100 feet without device with stand-by assistance to minimal assistance consisting of verbal cues to increase inspiration to improve oxygen saturation), imbalance/falls risk (low Tinetti Balance Assessment score of 20/28 indicating high falls risk).    The patient will require the PT interventions of therapeutic exercise, transfer training, gait training and patient education (including home safety and home exercise program (HEP) instruction) to address these problems and allow a return to prior functional level.     Rehab potential very good due to patient's observed ability and willingness to participate in PT session, patient's prior functional level and availability of caregiver assistance.    Plan is to discharge with HEP to supervision of caregivers as needed with patient to likely return to OP PT.    Session Notes: Was getting OP PT prior to hospitalization and hopes to return to this relatively soon. Patient consents to PT 1WK4 (including this visit).    Follow up with orthopedic surgeon on 10/13/2022.    Plan for next visit: Continue with exercises as established and increase ambulation distance per patient ability and tolerance avoiding " pushing to exhaustion. Do not perform squats due to history of knee pain.

## 2022-10-10 ENCOUNTER — HOME CARE VISIT (OUTPATIENT)
Dept: HOME HEALTH SERVICES | Facility: HOME HEALTHCARE | Age: 77
End: 2022-10-10

## 2022-10-10 ENCOUNTER — APPOINTMENT (OUTPATIENT)
Dept: GENERAL RADIOLOGY | Facility: HOSPITAL | Age: 77
End: 2022-10-10

## 2022-10-10 ENCOUNTER — TELEPHONE (OUTPATIENT)
Dept: FAMILY MEDICINE CLINIC | Facility: CLINIC | Age: 77
End: 2022-10-10

## 2022-10-10 ENCOUNTER — HOSPITAL ENCOUNTER (EMERGENCY)
Facility: HOSPITAL | Age: 77
Discharge: HOME | End: 2022-10-10
Attending: EMERGENCY MEDICINE | Admitting: EMERGENCY MEDICINE

## 2022-10-10 VITALS
HEART RATE: 86 BPM | BODY MASS INDEX: 27.8 KG/M2 | RESPIRATION RATE: 19 BRPM | SYSTOLIC BLOOD PRESSURE: 141 MMHG | WEIGHT: 173 LBS | HEIGHT: 66 IN | TEMPERATURE: 98 F | DIASTOLIC BLOOD PRESSURE: 79 MMHG | OXYGEN SATURATION: 92 %

## 2022-10-10 VITALS
HEART RATE: 125 BPM | DIASTOLIC BLOOD PRESSURE: 65 MMHG | TEMPERATURE: 97.5 F | OXYGEN SATURATION: 95 % | SYSTOLIC BLOOD PRESSURE: 105 MMHG

## 2022-10-10 DIAGNOSIS — R00.0 TACHYCARDIA: ICD-10-CM

## 2022-10-10 DIAGNOSIS — R53.83 OTHER FATIGUE: Primary | ICD-10-CM

## 2022-10-10 DIAGNOSIS — J12.3 HUMAN METAPNEUMOVIRUS (HMPV) PNEUMONIA: ICD-10-CM

## 2022-10-10 LAB
ALBUMIN SERPL-MCNC: 4.7 G/DL (ref 3.5–5.2)
ALBUMIN/GLOB SERPL: 1.5 G/DL
ALP SERPL-CCNC: 80 U/L (ref 39–117)
ALT SERPL W P-5'-P-CCNC: 18 U/L (ref 1–33)
ANION GAP SERPL CALCULATED.3IONS-SCNC: 14 MMOL/L (ref 5–15)
AST SERPL-CCNC: 18 U/L (ref 1–32)
BACTERIA UR QL AUTO: ABNORMAL /HPF
BILIRUB SERPL-MCNC: 0.8 MG/DL (ref 0–1.2)
BILIRUB UR QL STRIP: NEGATIVE
BUN SERPL-MCNC: 16 MG/DL (ref 8–23)
BUN/CREAT SERPL: 24.2 (ref 7–25)
CALCIUM SPEC-SCNC: 9.8 MG/DL (ref 8.6–10.5)
CHLORIDE SERPL-SCNC: 102 MMOL/L (ref 98–107)
CLARITY UR: CLEAR
CO2 SERPL-SCNC: 27 MMOL/L (ref 22–29)
COLOR UR: YELLOW
CREAT SERPL-MCNC: 0.66 MG/DL (ref 0.57–1)
DEPRECATED RDW RBC AUTO: 42.9 FL (ref 37–54)
EGFRCR SERPLBLD CKD-EPI 2021: 91.6 ML/MIN/1.73
ERYTHROCYTE [DISTWIDTH] IN BLOOD BY AUTOMATED COUNT: 13.4 % (ref 12.3–15.4)
GLOBULIN UR ELPH-MCNC: 3.1 GM/DL
GLUCOSE SERPL-MCNC: 95 MG/DL (ref 65–99)
GLUCOSE UR STRIP-MCNC: NEGATIVE MG/DL
HCT VFR BLD AUTO: 46.8 % (ref 34–46.6)
HGB BLD-MCNC: 15.2 G/DL (ref 12–15.9)
HGB UR QL STRIP.AUTO: NEGATIVE
HYALINE CASTS UR QL AUTO: ABNORMAL /LPF
KETONES UR QL STRIP: ABNORMAL
LEUKOCYTE ESTERASE UR QL STRIP.AUTO: ABNORMAL
LYMPHOCYTES # BLD MANUAL: 1.52 10*3/MM3 (ref 0.7–3.1)
LYMPHOCYTES NFR BLD MANUAL: 10 % (ref 5–12)
MAGNESIUM SERPL-MCNC: 2.3 MG/DL (ref 1.6–2.4)
MCH RBC QN AUTO: 29.9 PG (ref 26.6–33)
MCHC RBC AUTO-ENTMCNC: 32.5 G/DL (ref 31.5–35.7)
MCV RBC AUTO: 91.9 FL (ref 79–97)
METAMYELOCYTES NFR BLD MANUAL: 1 % (ref 0–0)
MONOCYTES # BLD: 1.27 10*3/MM3 (ref 0.1–0.9)
NEUTROPHILS # BLD AUTO: 9.78 10*3/MM3 (ref 1.7–7)
NEUTROPHILS NFR BLD MANUAL: 74 % (ref 42.7–76)
NEUTS BAND NFR BLD MANUAL: 3 % (ref 0–5)
NITRITE UR QL STRIP: NEGATIVE
PH UR STRIP.AUTO: 6 [PH] (ref 5–8)
PLAT MORPH BLD: NORMAL
PLATELET # BLD AUTO: 533 10*3/MM3 (ref 140–450)
PMV BLD AUTO: 7.3 FL (ref 6–12)
POTASSIUM SERPL-SCNC: 4.1 MMOL/L (ref 3.5–5.2)
PROT SERPL-MCNC: 7.8 G/DL (ref 6–8.5)
PROT UR QL STRIP: NEGATIVE
RBC # BLD AUTO: 5.1 10*6/MM3 (ref 3.77–5.28)
RBC # UR STRIP: ABNORMAL /HPF
RBC MORPH BLD: NORMAL
REF LAB TEST METHOD: ABNORMAL
SCAN SLIDE: NORMAL
SODIUM SERPL-SCNC: 143 MMOL/L (ref 136–145)
SP GR UR STRIP: 1.03 (ref 1–1.03)
SQUAMOUS #/AREA URNS HPF: ABNORMAL /HPF
TOXIC GRANULATION: ABNORMAL
TSH SERPL DL<=0.05 MIU/L-ACNC: 0.84 UIU/ML (ref 0.27–4.2)
UROBILINOGEN UR QL STRIP: ABNORMAL
VARIANT LYMPHS NFR BLD MANUAL: 12 % (ref 19.6–45.3)
WBC # UR STRIP: ABNORMAL /HPF
WBC NRBC COR # BLD: 12.7 10*3/MM3 (ref 3.4–10.8)

## 2022-10-10 PROCEDURE — 93005 ELECTROCARDIOGRAM TRACING: CPT | Performed by: EMERGENCY MEDICINE

## 2022-10-10 PROCEDURE — 94799 UNLISTED PULMONARY SVC/PX: CPT

## 2022-10-10 PROCEDURE — 99284 EMERGENCY DEPT VISIT MOD MDM: CPT

## 2022-10-10 PROCEDURE — 84443 ASSAY THYROID STIM HORMONE: CPT | Performed by: NURSE PRACTITIONER

## 2022-10-10 PROCEDURE — 93005 ELECTROCARDIOGRAM TRACING: CPT

## 2022-10-10 PROCEDURE — 85007 BL SMEAR W/DIFF WBC COUNT: CPT | Performed by: NURSE PRACTITIONER

## 2022-10-10 PROCEDURE — 71046 X-RAY EXAM CHEST 2 VIEWS: CPT

## 2022-10-10 PROCEDURE — 94640 AIRWAY INHALATION TREATMENT: CPT

## 2022-10-10 PROCEDURE — 85025 COMPLETE CBC W/AUTO DIFF WBC: CPT | Performed by: NURSE PRACTITIONER

## 2022-10-10 PROCEDURE — 94664 DEMO&/EVAL PT USE INHALER: CPT

## 2022-10-10 PROCEDURE — 83735 ASSAY OF MAGNESIUM: CPT | Performed by: NURSE PRACTITIONER

## 2022-10-10 PROCEDURE — G0180 MD CERTIFICATION HHA PATIENT: HCPCS | Performed by: FAMILY MEDICINE

## 2022-10-10 PROCEDURE — 80053 COMPREHEN METABOLIC PANEL: CPT | Performed by: NURSE PRACTITIONER

## 2022-10-10 PROCEDURE — G0152 HHCP-SERV OF OT,EA 15 MIN: HCPCS

## 2022-10-10 PROCEDURE — 94618 PULMONARY STRESS TESTING: CPT

## 2022-10-10 PROCEDURE — 81001 URINALYSIS AUTO W/SCOPE: CPT | Performed by: NURSE PRACTITIONER

## 2022-10-10 RX ORDER — ACETAMINOPHEN 500 MG
1000 TABLET ORAL ONCE
Status: COMPLETED | OUTPATIENT
Start: 2022-10-10 | End: 2022-10-10

## 2022-10-10 RX ORDER — ALBUTEROL SULFATE 90 UG/1
2 AEROSOL, METERED RESPIRATORY (INHALATION) EVERY 4 HOURS PRN
Qty: 6.7 G | Refills: 0 | Status: SHIPPED | OUTPATIENT
Start: 2022-10-10 | End: 2022-10-21 | Stop reason: SDUPTHER

## 2022-10-10 RX ORDER — IPRATROPIUM BROMIDE AND ALBUTEROL SULFATE 2.5; .5 MG/3ML; MG/3ML
3 SOLUTION RESPIRATORY (INHALATION) EVERY 4 HOURS PRN
Qty: 90 ML | Refills: 0 | Status: SHIPPED | OUTPATIENT
Start: 2022-10-10 | End: 2022-10-21 | Stop reason: SDUPTHER

## 2022-10-10 RX ORDER — SODIUM CHLORIDE 0.9 % (FLUSH) 0.9 %
10 SYRINGE (ML) INJECTION AS NEEDED
Status: DISCONTINUED | OUTPATIENT
Start: 2022-10-10 | End: 2022-10-11 | Stop reason: HOSPADM

## 2022-10-10 RX ORDER — IPRATROPIUM BROMIDE AND ALBUTEROL SULFATE 2.5; .5 MG/3ML; MG/3ML
3 SOLUTION RESPIRATORY (INHALATION) ONCE
Status: COMPLETED | OUTPATIENT
Start: 2022-10-10 | End: 2022-10-10

## 2022-10-10 RX ORDER — HYDROXYZINE HYDROCHLORIDE 25 MG/1
25 TABLET, FILM COATED ORAL NIGHTLY PRN
Qty: 10 TABLET | Refills: 0 | Status: SHIPPED | OUTPATIENT
Start: 2022-10-10 | End: 2022-10-27

## 2022-10-10 RX ADMIN — SODIUM CHLORIDE 1000 ML: 9 INJECTION, SOLUTION INTRAVENOUS at 22:03

## 2022-10-10 RX ADMIN — ACETAMINOPHEN 1000 MG: 500 TABLET ORAL at 23:30

## 2022-10-10 RX ADMIN — IPRATROPIUM BROMIDE AND ALBUTEROL SULFATE 3 ML: .5; 3 SOLUTION RESPIRATORY (INHALATION) at 22:25

## 2022-10-10 NOTE — CASE COMMUNICATION
OT called Dr Matute's office re: during HHOT visit, pt's resting HR at 125 bpm and up to 140-135 bpm and pt's oxygen saturation being 87-92 % with fx mob. Pt does have pain in neck, which is chronic. Pt also reports not sleeping well. Pt to get remote patient monitor from  tomorrow or Wednesday. Pt and daugther instructed to get pulse oximeter for home use as well.

## 2022-10-10 NOTE — TELEPHONE ENCOUNTER
Please find out how long has this been going on.  Does she have any chest pains or difficulty breathing.  I am not aware of her having elevated heart rate previously.  The message says that her heart rate goes up with walking, how long does she have to walk for the heart rate to go that high?  Any difficulty breathing with that?  She definitely needs to be evaluated.  Thank you.

## 2022-10-10 NOTE — HOME HEALTH
Pt was discharged from hospital approx 1 week ago after a a couple days admitted  due to a bout of viral pneumonia.    PMH: 2011 thoractic fusion with earnestine approx 17 inches, scoliosis, OA in both knees, gets injections, knee braces       PLOF: Pt was Indep will all ADL's/IADLs and drove to get her own groceries.Fx mob only on unlevel surfaces with a SC. Pt lives on one level except laundry in basement with HR on both sides of stairs.      Pt goal: Pt was to get her energy back and be able to sleep better.    Currently,scored 88/100 Mod Barthel Index- moderate dependency.  Pt is living by herself but due to her O2 sateration level not staying at an optium level without cues forslef pacing,  breaks and PLB pt is unsafe.  to provide a remote patient monitor to monitor vitals and need for O2. Pt was able to perform fx transfers toilet, shower, etc but reported lightheadedness and mod fatigue . Pt also reports she is not sleeping very well at all.  Pt's family is doing her laundry and bringing in meals and groceries.  Pt is performing fx mob inside without a AD but outside she uses a Rwx.  Pt had very poor fx endurance with any kind of upright ADL's and fx transfers, fx mob was upright approx only 3 min before needing a sitting  break. BUE Strength approx +3/5 to -4/5         Recom   OT to see [id number] for E conserva/work simplic/PLB O2 self monitoring for safety and Indep with ADL'/IADLs and fx transfers,BUE's strengthening, pt ed and training  Pt inagreement with OT POC.

## 2022-10-10 NOTE — TELEPHONE ENCOUNTER
I called the patient after hours myself.  Patient was admitted to [facility] in the recent past and discharged last week due to multifocal pneumonia.  She tells me that over the last couple of days her heart rate has been staying elevated and when she starts moving around she was noted to have even more elevated heart rate.  She does not necessarily feel palpitations, she denies any chest pain, but when she gets up and starts moving around she feels very tired and feels like she might pass out.  Advised the patient to get checked now and go to the ER for further evaluation.  She will be calling her daughter who will take her to the ER now.

## 2022-10-10 NOTE — TELEPHONE ENCOUNTER
JASON FROM [facility] CALLED. SHE WANTED TO INFORM US THAT THE PATIENTS RESTING HEART RATE IS HIGH, 125. WHILE WALKING, GETS -140. JASON STATES SHE NEEDS TO GET A PULSE OXIMETER.  JASON: [phone]  JASON ADVISED TO PLEASE CALL THE PATIENT AS WELL [phone]

## 2022-10-11 ENCOUNTER — HOME CARE VISIT (OUTPATIENT)
Dept: HOME HEALTH SERVICES | Facility: HOME HEALTHCARE | Age: 77
End: 2022-10-11

## 2022-10-11 VITALS
OXYGEN SATURATION: 83 % | DIASTOLIC BLOOD PRESSURE: 80 MMHG | HEART RATE: 94 BPM | TEMPERATURE: 98.1 F | SYSTOLIC BLOOD PRESSURE: 130 MMHG

## 2022-10-11 PROCEDURE — G0151 HHCP-SERV OF PT,EA 15 MIN: HCPCS

## 2022-10-11 NOTE — DISCHARGE INSTRUCTIONS
Plenty of water.  Resume using your incentive spirometer.  Use a pulse oximeter and return for any new or worsening symptoms.  Medications as directed.  Use DuoNeb.  Follow-up with your family doctor, call tomorrow.  Return for any new or worsening symptoms

## 2022-10-11 NOTE — HOME HEALTH
Pt reports was ER yesterday due to tachycardia. Pt states no abnormal findings. Pt states has new meds but still waiting on pharmacy to fill.     Plan for next visit: strengthening as tolerated, balance and gait training.

## 2022-10-11 NOTE — ED PROVIDER NOTES
Subjective   History of Present Illness  Chief complaint: Elevated heart rate      Context: Patient is a 77-year-old female who comes in after she was recommended to come in from her visiting nurses when she was noted to have an elevated heart rate with exertion today.  She states she did recently today finished antibiotics and Medrol and is currently on Mucinex after being treated and diagnosed with multifocal pneumonia and human metapneumovirus and discharged from this facility on October 3.  She states she has had some fatigue and insomnia that was attributed to steroid use.  She denies any unilateral leg swelling recent trauma surgery mobilization exogenous hormone use, she states she did have a prior PE after surgery in 2011.  She states she continues to have a productive cough with milky sputum.  She denies any abdominal pain nausea vomiting or diarrhea.  She denies any nasal congestion or fever.  She states she has urinary frequency that is normal for her without dysuria    Location:   Duration: Couple days  Timing: Waxes and wanes  Quality/Severity: Denies  Modifying factors: Worse with exertion  Associated symptoms: No associated nausea diaphoresis or chest pain        Additional hx provided by: self    PCP: [email or web address]             Review of Systems   Constitutional: Positive for fatigue. Negative for fever.   HENT: Negative.    Eyes: Negative for visual disturbance.   Respiratory: Positive for cough.    Cardiovascular: Negative for chest pain and leg swelling.   Gastrointestinal: Negative.    Genitourinary: Negative.    Musculoskeletal: Negative.    Skin: Negative.    Allergic/Immunologic: Negative for immunocompromised state.   Neurological: Negative.    Hematological: Bruises/bleeds easily.   Psychiatric/Behavioral: Negative for confusion.       Past Medical History:   Diagnosis Date   • GERD (gastroesophageal reflux disease)    • History of recurrent UTIs    • IBS (irritable bowel syndrome)    •  Osteoarthritis    • Osteoporosis    • Primary osteoarthritis of both knees 1/24/2022   • Pulmonary embolism (HCC) 2011    after scoliosis surgery   • Vitamin D deficiency        Allergies   Allergen Reactions   • Amoxicillin Other (See Comments)      unsure of type of reaction - states it was so long ago she can't remember    • Penicillins Nausea And Vomiting       Past Surgical History:   Procedure Laterality Date   • BREAST BIOPSY     • CHOLECYSTECTOMY     • COLONOSCOPY  01/18/2018   • HIATAL HERNIA REPAIR  2013   • REIMPLANT URETER IN BLADDER     • SPINAL FUSION         Family History   Problem Relation Age of Onset   • Heart failure Mother    • Cancer Father        Social History     Socioeconomic History   • Marital status: Single   Tobacco Use   • Smoking status: Never   • Smokeless tobacco: Never   Vaping Use   • Vaping Use: Never used   Substance and Sexual Activity   • Alcohol use: No   • Drug use: No   • Sexual activity: Never           Objective   Physical Exam     Vital signs and triage nurse note reviewed.   Constitutional: Awake, alert; well-developed and well-nourished.  Nontoxic in appearance  HEENT: Normocephalic, atraumatic; pupils with intact EOM; oropharynx is pink and moist without exudate or erythema.   Neck: Supple, full range of motion without pain\no JVD or bruit   Cardiovascular: Regular rate and rhythm, normal S1-S2.  No murmurs or rubs   Pulmonary: Respiratory effort regular nonlabored, breath sounds rhonchi right lower lobe   Abdomen: Soft, nontender nondistended with normoactive bowel sounds; no rebound or guarding.   Musculoskeletal: Independent range of motion of all extremities with no palpable tenderness or edema.  Old ecchymosis noted to the right lower extremity without pain   Neuro: Alert oriented x3, speech is clear and appropriate, GCS 15   Skin:  Fleshtone warm, dry, intact; no erythematous or petechial rash or lesion       Procedures      EKG viewed by me and interpreted by  Dr. Ochoa, sinus tachycardia rate of 107  comparison: 10/1/2022 sinus rhythm rate of 97                  ED Course  ED Course as of 10/10/22 2309   Mon Oct 10, 2022   2102 Seen after being placed in a room from triage   [JW]      ED Course User Index  [JW] Norma Pierce, JW           Labs Reviewed   URINALYSIS W/ MICROSCOPIC IF INDICATED (NO CULTURE) - Abnormal; Notable for the following components:       Result Value    Ketones, UA 15 mg/dL (1+) (*)     Leuk Esterase, UA Trace (*)     All other components within normal limits   CBC WITH AUTO DIFFERENTIAL - Abnormal; Notable for the following components:    WBC 12.70 (*)     Hematocrit 46.8 (*)     Platelets 533 (*)     All other components within normal limits    Narrative:     Modified report. Previous result was Hemogram on 10/10/2022 at 2148 EDT.  The previously reported component NRBC is no longer being reported. Previous result was 0.1 /100 WBC (Reference Range: 0.0-0.2 /100 WBC) on 10/10/2022 at 2148 EDT.   URINALYSIS, MICROSCOPIC ONLY - Abnormal; Notable for the following components:    RBC, UA 0-2 (*)     WBC, UA 3-5 (*)     Squamous Epithelial Cells, UA 3-6 (*)     All other components within normal limits   MANUAL DIFFERENTIAL - Abnormal; Notable for the following components:    Lymphocyte % 12.0 (*)     Metamyelocyte % 1.0 (*)     Neutrophils Absolute 9.78 (*)     Monocytes Absolute 1.27 (*)     All other components within normal limits   MAGNESIUM - Normal   TSH - Normal   RESPIRATORY CULTURE   COMPREHENSIVE METABOLIC PANEL    Narrative:     GFR Normal >60  Chronic Kidney Disease <60  Kidney Failure <15     SCAN SLIDE   CBC AND DIFFERENTIAL    Narrative:     The following orders were created for panel order CBC & Differential.  Procedure                               Abnormality         Status                     ---------                               -----------         ------                     CBC Auto Differential[[id number]]        Abnormal             Final result               Scan Slide[[id number]]                                       Final result                 Please view results for these tests on the individual orders.     Medications   sodium chloride 0.9 % flush 10 mL (has no administration in time range)   acetaminophen (TYLENOL) tablet 1,000 mg (has no administration in time range)   sodium chloride 0.9 % bolus 1,000 mL (1,000 mL Intravenous New Bag 10/10/22 2203)   ipratropium-albuterol (DUO-NEB) nebulizer solution 3 mL (3 mL Nebulization Given 10/10/22 2225)     XR Chest 2 View    Result Date: 10/10/2022   1. No acute disease in the chest  Electronically Signed By-Albert Santos MD On:10/10/2022 10:09 PM This report was finalized on [id number] by  Albert Santos MD.    Prior to Admission medications    Medication Sig Start Date End Date Taking? Authorizing Provider   acetaminophen (TYLENOL) 500 MG tablet Take 500 mg by mouth Every 6 (Six) Hours As Needed.    ProviderLiberty MD   albuterol sulfate  (90 Base) MCG/ACT inhaler Inhale 2 puffs Every 4 (Four) Hours As Needed for Wheezing or Shortness of Air. 10/10/22   Norma Pierce APRN   benzonatate (TESSALON) 200 MG capsule 1 capsule p.o. every 8 hours as needed cough 9/30/22   Hernan Greene MD   calcium carbonate (OS-STEVEN) 600 MG tablet Take 600 mg by mouth Daily.    Liberty Richards MD   cholecalciferol (VITAMIN D3) 1000 units tablet Take 1,000 Units by mouth Daily.    Liberty Richards MD   colestipol (COLESTID) 1 g tablet TAKE ONE TABLET BY MOUTH TWICE A DAY AS NEEDED 10/8/22   Darlene Matute MD   CRANBERRY CONCENTRATE PO Take  by mouth Daily.    Liberty Richards MD   dicyclomine (BENTYL) 10 MG capsule TAKE ONE CAPSULE BY MOUTH THREE TIMES A DAY AS NEEDED FOR IBS 7/11/22   Darlene Matute MD   guaiFENesin (MUCINEX) 600 MG 12 hr tablet Take 1 tablet by mouth Every 12 (Twelve) Hours for 7 days. 10/3/22 10/10/22  Nazario Dean,  "MD   hydrOXYzine (ATARAX) 25 MG tablet Take 1 tablet by mouth At Night As Needed for Itching or Allergies. 10/10/22   Norma Pierce APRN   ipratropium-albuterol (DUO-NEB) 0.5-2.5 mg/3 ml nebulizer Take 3 mL by nebulization Every 4 (Four) Hours As Needed for Wheezing. 10/10/22   Norma Pierce APRN   Mirabegron ER (MYRBETRIQ) 50 MG tablet sustained-release 24 hour 24 hr tablet Take 50 mg by mouth Daily. 4/10/19   Liberty Richards MD   pantoprazole (PROTONIX) 40 MG EC tablet TAKE ONE TABLET BY MOUTH DAILY 8/23/22   Darlene Matute MD   predniSONE (DELTASONE) 20 MG tablet Take 1 tablet by mouth Daily With Breakfast for 7 days. 10/4/22 10/11/22  Nazario Dean MD   Riboflavin (B2 PO) Take 1 tablet by mouth Daily.    ProviderLiberty MD   vitamin B-12 (CYANOCOBALAMIN) 1000 MCG tablet Take 1,000 mcg by mouth Daily.    Provider, MD Liberty                                     [facility]  Number of Diagnoses or Management Options  Human metapneumovirus (hMPV) pneumonia  Other fatigue  Tachycardia  Diagnosis management comments: Chart review: Hospital admission 10/1/2022 through 10/3/2022 multifocal pneumonia with human metapneumovirus, prescription for doxycycline steroids and Mucinex    /64   Pulse 105   Temp 97.9 °F (36.6 °C)   Resp 20   Ht 167.6 cm (66\")   Wt 78.5 kg (173 lb)   SpO2 93%   BMI 27.92 kg/m²         Comorbidities:  has a past medical history of GERD (gastroesophageal reflux disease), History of recurrent UTIs, IBS (irritable bowel syndrome), Osteoarthritis, Osteoporosis, Primary osteoarthritis of both knees (1/24/2022), Pulmonary embolism (HCC) (2011), and Vitamin D deficiency.  Differentials: Worsening pneumonia electrolyte abnormalities dehydration infection prodromal symptoms not all inclusive of differentials considered  Radiology interpretation:  X-rays reviewed by me and interpreted by radiologist,   XR Chest 2 View    Result Date: 10/10/2022   1. No acute disease in " the chest  Electronically Signed By-Albert Santos MD On:10/10/2022 10:09 PM This report was finalized on [id number] by  Albert Santos MD.    Lab interpretation:  Labs viewed by me significant for, white blood cell count 12.7 but recently completed steroids today    Appropriate PPE worn during exam.  Patient's walking oximetry per respiratory noted patient's mean heart rate to be 85 with a saturation bottom limit of 92%.  On my examination heart rate is in the 80s with a resting pulse ox of 94% on room air.  She was offered admission which she refused, she was offered Holter monitor which was also refused.  She was requesting something to help her sleep and was given a prescription for short course of as needed Atarax.  She was also discharged home with ProAir and duo nebs, she states she has incentive spirometer at home.    i discussed findings with patient who voices understanding of discharge instructions, signs and symptoms requiring return to ED; discharged improved and in stable condition with follow up for re-evaluation.    This document is intended for medical expert use only. Reading of this document by patients and/or patient's family without participating medical staff guidance may result in misinterpretation and unintended morbidity.  Any interpretation of such data is the responsibility of the patient and/or family member responsible for the patient in concert with their primary or specialist providers, not to be left for sources of online searches such as [employer], [employer] or similar queries. Relying on these approaches to knowledge may result in misinterpretation, misguided goals of care and even death should patients or family members try recommendations outside of the realm of professional medical care in a supervised inpatient environment.         Final diagnoses:   Other fatigue   Tachycardia   Human metapneumovirus (hMPV) pneumonia       ED Disposition  ED Disposition     ED  Disposition   Discharge    Condition   Stable    Comment   --             No follow-up provider specified.       Medication List      New Prescriptions    albuterol sulfate  (90 Base) MCG/ACT inhaler  Commonly known as: PROVENTIL HFA;VENTOLIN HFA;PROAIR HFA  Inhale 2 puffs Every 4 (Four) Hours As Needed for Wheezing or Shortness of Air.     hydrOXYzine 25 MG tablet  Commonly known as: ATARAX  Take 1 tablet by mouth At Night As Needed for Itching or Allergies.     ipratropium-albuterol 0.5-2.5 mg/3 ml nebulizer  Commonly known as: DUO-NEB  Take 3 mL by nebulization Every 4 (Four) Hours As Needed for Wheezing.           Where to Get Your Medications      These medications were sent to [id number] - [place], [place] - [address] - [phone]  - [phone] FX  [address] [place] [address]    Phone: [phone]   · albuterol sulfate  (90 Base) MCG/ACT inhaler  · hydrOXYzine 25 MG tablet  · ipratropium-albuterol 0.5-2.5 mg/3 ml nebulizer          Norma Pierce, APRN  10/10/22 0729

## 2022-10-11 NOTE — PROGRESS NOTES
Exercise Oximetry    Patient Name:Abida Becker   MRN: [id number]   Date: 10/10/22             ROOM AIR BASELINE   SpO2% 93   Heart Rate 85   Blood Pressure      EXERCISE ON ROOM AIR SpO2% EXERCISE ON O2 @  LPM SpO2%   1 MINUTE 93 1 MINUTE    2 MINUTES 92 2 MINUTES    3 MINUTES 93 3 MINUTES    4 MINUTES 93 4 MINUTES    5 MINUTES 92 5 MINUTES    6 MINUTES 93 6 MINUTES               Distance Walked   Distance Walked   Dyspnea (Maria Isabel Scale)   Dyspnea (Maria Isabel Scale)   Fatigue (Maria Isabel Scale)   Fatigue (Maria Isabel Scale)   SpO2% Post Exercise  91 SpO2% Post Exercise   HR Post Exercise  96 HR Post Exercise   Time to Recovery   Time to Recovery     Comments: test done on room air

## 2022-10-12 ENCOUNTER — HOME CARE VISIT (OUTPATIENT)
Dept: HOME HEALTH SERVICES | Facility: HOME HEALTHCARE | Age: 77
End: 2022-10-12

## 2022-10-12 PROCEDURE — G0299 HHS/HOSPICE OF RN EA 15 MIN: HCPCS

## 2022-10-12 NOTE — HH RPM NOTES
Remote patient monitoring set up complete. Device connectivity successful. Instructions left in the home with the following information:  the monitoring process,  how to contact the [facility] ([facility]), and when to call the [facility] for any technical issues with the device. The patient was also informed that they may receive follow up calls from [facility] when an alarm triggers and that the caller ID will identify the call as an unknown number.  The patient was informed that [facility] is  not a replacement for emergency services and to notify [facility] for any patient emergency.

## 2022-10-13 ENCOUNTER — LAB (OUTPATIENT)
Dept: LAB | Facility: HOSPITAL | Age: 77
End: 2022-10-13

## 2022-10-13 ENCOUNTER — TELEPHONE (OUTPATIENT)
Dept: FAMILY MEDICINE CLINIC | Facility: CLINIC | Age: 77
End: 2022-10-13

## 2022-10-13 ENCOUNTER — HOSPITAL ENCOUNTER (INPATIENT)
Facility: HOSPITAL | Age: 77
LOS: 7 days | Discharge: HOME HEALTH | End: 2022-10-20
Attending: EMERGENCY MEDICINE | Admitting: STUDENT IN AN ORGANIZED HEALTH CARE EDUCATION/TRAINING PROGRAM

## 2022-10-13 ENCOUNTER — APPOINTMENT (OUTPATIENT)
Dept: GENERAL RADIOLOGY | Facility: HOSPITAL | Age: 77
End: 2022-10-13

## 2022-10-13 ENCOUNTER — APPOINTMENT (OUTPATIENT)
Dept: CT IMAGING | Facility: HOSPITAL | Age: 77
End: 2022-10-13

## 2022-10-13 ENCOUNTER — OFFICE VISIT (OUTPATIENT)
Dept: FAMILY MEDICINE CLINIC | Facility: CLINIC | Age: 77
End: 2022-10-13

## 2022-10-13 VITALS
RESPIRATION RATE: 18 BRPM | OXYGEN SATURATION: 96 % | TEMPERATURE: 97.8 F | SYSTOLIC BLOOD PRESSURE: 122 MMHG | HEART RATE: 107 BPM | DIASTOLIC BLOOD PRESSURE: 78 MMHG

## 2022-10-13 VITALS
HEIGHT: 66 IN | SYSTOLIC BLOOD PRESSURE: 93 MMHG | TEMPERATURE: 98.2 F | DIASTOLIC BLOOD PRESSURE: 63 MMHG | OXYGEN SATURATION: 93 % | HEART RATE: 104 BPM | WEIGHT: 175.6 LBS | BODY MASS INDEX: 28.22 KG/M2

## 2022-10-13 DIAGNOSIS — I95.0 IDIOPATHIC HYPOTENSION: ICD-10-CM

## 2022-10-13 DIAGNOSIS — R79.89 POSITIVE D DIMER: ICD-10-CM

## 2022-10-13 DIAGNOSIS — R00.0 TACHYCARDIA: ICD-10-CM

## 2022-10-13 DIAGNOSIS — Z20.822 COVID-19 RULED OUT BY LABORATORY TESTING: ICD-10-CM

## 2022-10-13 DIAGNOSIS — I26.94 MULTIPLE SUBSEGMENTAL PULMONARY EMBOLI WITHOUT ACUTE COR PULMONALE: ICD-10-CM

## 2022-10-13 DIAGNOSIS — R06.00 DYSPNEA, UNSPECIFIED TYPE: Primary | ICD-10-CM

## 2022-10-13 DIAGNOSIS — J12.3 HUMAN METAPNEUMOVIRUS (HMPV) PNEUMONIA: ICD-10-CM

## 2022-10-13 DIAGNOSIS — R06.02 SHORT OF BREATH ON EXERTION: Primary | ICD-10-CM

## 2022-10-13 DIAGNOSIS — I26.99 ACUTE PULMONARY EMBOLISM WITHOUT ACUTE COR PULMONALE, UNSPECIFIED PULMONARY EMBOLISM TYPE: ICD-10-CM

## 2022-10-13 DIAGNOSIS — E87.6 HYPOKALEMIA: ICD-10-CM

## 2022-10-13 DIAGNOSIS — R53.83 OTHER FATIGUE: ICD-10-CM

## 2022-10-13 LAB
ALBUMIN SERPL-MCNC: 3.6 G/DL (ref 3.5–5.2)
ALBUMIN SERPL-MCNC: 4 G/DL (ref 3.5–5.2)
ALBUMIN/GLOB SERPL: 1.4 G/DL
ALBUMIN/GLOB SERPL: 1.9 G/DL
ALP SERPL-CCNC: 61 U/L (ref 39–117)
ALP SERPL-CCNC: 65 U/L (ref 39–117)
ALT SERPL W P-5'-P-CCNC: 11 U/L (ref 1–33)
ALT SERPL W P-5'-P-CCNC: 8 U/L (ref 1–33)
ANION GAP SERPL CALCULATED.3IONS-SCNC: 11.4 MMOL/L (ref 5–15)
ANION GAP SERPL CALCULATED.3IONS-SCNC: 13 MMOL/L (ref 5–15)
APTT PPP: >139 SECONDS (ref 61–76.5)
AST SERPL-CCNC: 10 U/L (ref 1–32)
AST SERPL-CCNC: 12 U/L (ref 1–32)
B PARAPERT DNA SPEC QL NAA+PROBE: NOT DETECTED
B PERT DNA SPEC QL NAA+PROBE: NOT DETECTED
BASOPHILS # BLD AUTO: 0.05 10*3/MM3 (ref 0–0.2)
BASOPHILS # BLD AUTO: 0.1 10*3/MM3 (ref 0–0.2)
BASOPHILS NFR BLD AUTO: 0.5 % (ref 0–1.5)
BASOPHILS NFR BLD AUTO: 0.7 % (ref 0–1.5)
BILIRUB SERPL-MCNC: 0.8 MG/DL (ref 0–1.2)
BILIRUB SERPL-MCNC: 0.9 MG/DL (ref 0–1.2)
BUN SERPL-MCNC: 13 MG/DL (ref 8–23)
BUN SERPL-MCNC: 13 MG/DL (ref 8–23)
BUN/CREAT SERPL: 13.5 (ref 7–25)
BUN/CREAT SERPL: 14.9 (ref 7–25)
C PNEUM DNA NPH QL NAA+NON-PROBE: NOT DETECTED
CALCIUM SPEC-SCNC: 9.1 MG/DL (ref 8.6–10.5)
CALCIUM SPEC-SCNC: 9.3 MG/DL (ref 8.6–10.5)
CHLORIDE SERPL-SCNC: 100 MMOL/L (ref 98–107)
CHLORIDE SERPL-SCNC: 99 MMOL/L (ref 98–107)
CO2 SERPL-SCNC: 24 MMOL/L (ref 22–29)
CO2 SERPL-SCNC: 28.6 MMOL/L (ref 22–29)
CREAT SERPL-MCNC: 0.87 MG/DL (ref 0.57–1)
CREAT SERPL-MCNC: 0.96 MG/DL (ref 0.57–1)
D DIMER PPP FEU-MCNC: 3.49 MG/L (FEU) (ref 0–0.59)
D DIMER PPP FEU-MCNC: 3.74 MG/L (FEU) (ref 0–0.59)
DEPRECATED RDW RBC AUTO: 41.3 FL (ref 37–54)
DEPRECATED RDW RBC AUTO: 44.2 FL (ref 37–54)
EGFRCR SERPLBLD CKD-EPI 2021: 61.8 ML/MIN/1.73
EGFRCR SERPLBLD CKD-EPI 2021: 69.6 ML/MIN/1.73
EOSINOPHIL # BLD AUTO: 0.07 10*3/MM3 (ref 0–0.4)
EOSINOPHIL # BLD AUTO: 0.1 10*3/MM3 (ref 0–0.4)
EOSINOPHIL NFR BLD AUTO: 0.6 % (ref 0.3–6.2)
EOSINOPHIL NFR BLD AUTO: 0.7 % (ref 0.3–6.2)
ERYTHROCYTE [DISTWIDTH] IN BLOOD BY AUTOMATED COUNT: 12.5 % (ref 12.3–15.4)
ERYTHROCYTE [DISTWIDTH] IN BLOOD BY AUTOMATED COUNT: 13.9 % (ref 12.3–15.4)
FLUAV SUBTYP SPEC NAA+PROBE: NOT DETECTED
FLUBV RNA ISLT QL NAA+PROBE: NOT DETECTED
GLOBULIN UR ELPH-MCNC: 2.1 GM/DL
GLOBULIN UR ELPH-MCNC: 2.5 GM/DL
GLUCOSE SERPL-MCNC: 108 MG/DL (ref 65–99)
GLUCOSE SERPL-MCNC: 98 MG/DL (ref 65–99)
HADV DNA SPEC NAA+PROBE: NOT DETECTED
HCOV 229E RNA SPEC QL NAA+PROBE: NOT DETECTED
HCOV HKU1 RNA SPEC QL NAA+PROBE: NOT DETECTED
HCOV NL63 RNA SPEC QL NAA+PROBE: NOT DETECTED
HCOV OC43 RNA SPEC QL NAA+PROBE: NOT DETECTED
HCT VFR BLD AUTO: 38.8 % (ref 34–46.6)
HCT VFR BLD AUTO: 41.3 % (ref 34–46.6)
HGB BLD-MCNC: 12.9 G/DL (ref 12–15.9)
HGB BLD-MCNC: 13.9 G/DL (ref 12–15.9)
HMPV RNA NPH QL NAA+NON-PROBE: DETECTED
HPIV1 RNA ISLT QL NAA+PROBE: NOT DETECTED
HPIV2 RNA SPEC QL NAA+PROBE: NOT DETECTED
HPIV3 RNA NPH QL NAA+PROBE: NOT DETECTED
HPIV4 P GENE NPH QL NAA+PROBE: NOT DETECTED
IMM GRANULOCYTES # BLD AUTO: 0.19 10*3/MM3 (ref 0–0.05)
IMM GRANULOCYTES NFR BLD AUTO: 1.9 % (ref 0–0.5)
INR PPP: 1.09 (ref 0.93–1.1)
LYMPHOCYTES # BLD AUTO: 1.6 10*3/MM3 (ref 0.7–3.1)
LYMPHOCYTES # BLD AUTO: 1.86 10*3/MM3 (ref 0.7–3.1)
LYMPHOCYTES NFR BLD AUTO: 17.1 % (ref 19.6–45.3)
LYMPHOCYTES NFR BLD AUTO: 18.2 % (ref 19.6–45.3)
M PNEUMO IGG SER IA-ACNC: NOT DETECTED
MCH RBC QN AUTO: 30.4 PG (ref 26.6–33)
MCH RBC QN AUTO: 30.5 PG (ref 26.6–33)
MCHC RBC AUTO-ENTMCNC: 33.2 G/DL (ref 31.5–35.7)
MCHC RBC AUTO-ENTMCNC: 33.7 G/DL (ref 31.5–35.7)
MCV RBC AUTO: 90.4 FL (ref 79–97)
MCV RBC AUTO: 91.7 FL (ref 79–97)
MONOCYTES # BLD AUTO: 0.7 10*3/MM3 (ref 0.1–0.9)
MONOCYTES # BLD AUTO: 1.01 10*3/MM3 (ref 0.1–0.9)
MONOCYTES NFR BLD AUTO: 7.7 % (ref 5–12)
MONOCYTES NFR BLD AUTO: 9.9 % (ref 5–12)
NEUTROPHILS NFR BLD AUTO: 68.8 % (ref 42.7–76)
NEUTROPHILS NFR BLD AUTO: 7.02 10*3/MM3 (ref 1.7–7)
NEUTROPHILS NFR BLD AUTO: 7.1 10*3/MM3 (ref 1.7–7)
NEUTROPHILS NFR BLD AUTO: 73.9 % (ref 42.7–76)
NRBC BLD AUTO-RTO: 0 /100 WBC (ref 0–0.2)
NRBC BLD AUTO-RTO: 0.1 /100 WBC (ref 0–0.2)
NT-PROBNP SERPL-MCNC: 368.1 PG/ML (ref 0–1800)
NT-PROBNP SERPL-MCNC: 408 PG/ML (ref 0–1800)
PLATELET # BLD AUTO: 375 10*3/MM3 (ref 140–450)
PLATELET # BLD AUTO: 405 10*3/MM3 (ref 140–450)
PMV BLD AUTO: 7.4 FL (ref 6–12)
PMV BLD AUTO: 9.5 FL (ref 6–12)
POTASSIUM SERPL-SCNC: 3.3 MMOL/L (ref 3.5–5.2)
POTASSIUM SERPL-SCNC: 3.9 MMOL/L (ref 3.5–5.2)
PROT SERPL-MCNC: 6.1 G/DL (ref 6–8.5)
PROT SERPL-MCNC: 6.1 G/DL (ref 6–8.5)
PROTHROMBIN TIME: 11.2 SECONDS (ref 9.6–11.7)
RBC # BLD AUTO: 4.23 10*6/MM3 (ref 3.77–5.28)
RBC # BLD AUTO: 4.57 10*6/MM3 (ref 3.77–5.28)
RHINOVIRUS RNA SPEC NAA+PROBE: NOT DETECTED
RSV RNA NPH QL NAA+NON-PROBE: NOT DETECTED
SARS-COV-2 RNA NPH QL NAA+NON-PROBE: NOT DETECTED
SODIUM SERPL-SCNC: 137 MMOL/L (ref 136–145)
SODIUM SERPL-SCNC: 139 MMOL/L (ref 136–145)
TROPONIN T SERPL-MCNC: <0.01 NG/ML (ref 0–0.03)
TSH SERPL DL<=0.05 MIU/L-ACNC: 0.62 UIU/ML (ref 0.27–4.2)
WBC NRBC COR # BLD: 10.2 10*3/MM3 (ref 3.4–10.8)
WBC NRBC COR # BLD: 9.6 10*3/MM3 (ref 3.4–10.8)

## 2022-10-13 PROCEDURE — 83880 ASSAY OF NATRIURETIC PEPTIDE: CPT | Performed by: NURSE PRACTITIONER

## 2022-10-13 PROCEDURE — 80053 COMPREHEN METABOLIC PANEL: CPT | Performed by: FAMILY MEDICINE

## 2022-10-13 PROCEDURE — 99285 EMERGENCY DEPT VISIT HI MDM: CPT

## 2022-10-13 PROCEDURE — 83880 ASSAY OF NATRIURETIC PEPTIDE: CPT | Performed by: FAMILY MEDICINE

## 2022-10-13 PROCEDURE — 1111F DSCHRG MED/CURRENT MED MERGE: CPT | Performed by: FAMILY MEDICINE

## 2022-10-13 PROCEDURE — 85610 PROTHROMBIN TIME: CPT | Performed by: NURSE PRACTITIONER

## 2022-10-13 PROCEDURE — 85730 THROMBOPLASTIN TIME PARTIAL: CPT | Performed by: STUDENT IN AN ORGANIZED HEALTH CARE EDUCATION/TRAINING PROGRAM

## 2022-10-13 PROCEDURE — 84443 ASSAY THYROID STIM HORMONE: CPT | Performed by: NURSE PRACTITIONER

## 2022-10-13 PROCEDURE — 87040 BLOOD CULTURE FOR BACTERIA: CPT | Performed by: NURSE PRACTITIONER

## 2022-10-13 PROCEDURE — 71045 X-RAY EXAM CHEST 1 VIEW: CPT

## 2022-10-13 PROCEDURE — 85025 COMPLETE CBC W/AUTO DIFF WBC: CPT | Performed by: NURSE PRACTITIONER

## 2022-10-13 PROCEDURE — 25010000002 HEPARIN (PORCINE) 25000-0.45 UT/250ML-% SOLUTION: Performed by: NURSE PRACTITIONER

## 2022-10-13 PROCEDURE — 85379 FIBRIN DEGRADATION QUANT: CPT | Performed by: NURSE PRACTITIONER

## 2022-10-13 PROCEDURE — 85379 FIBRIN DEGRADATION QUANT: CPT | Performed by: FAMILY MEDICINE

## 2022-10-13 PROCEDURE — 71275 CT ANGIOGRAPHY CHEST: CPT

## 2022-10-13 PROCEDURE — 93005 ELECTROCARDIOGRAM TRACING: CPT | Performed by: NURSE PRACTITIONER

## 2022-10-13 PROCEDURE — 99495 TRANSJ CARE MGMT MOD F2F 14D: CPT | Performed by: FAMILY MEDICINE

## 2022-10-13 PROCEDURE — 85025 COMPLETE CBC W/AUTO DIFF WBC: CPT | Performed by: FAMILY MEDICINE

## 2022-10-13 PROCEDURE — 0 IOPAMIDOL PER 1 ML: Performed by: EMERGENCY MEDICINE

## 2022-10-13 PROCEDURE — 36415 COLL VENOUS BLD VENIPUNCTURE: CPT | Performed by: FAMILY MEDICINE

## 2022-10-13 PROCEDURE — 84484 ASSAY OF TROPONIN QUANT: CPT | Performed by: STUDENT IN AN ORGANIZED HEALTH CARE EDUCATION/TRAINING PROGRAM

## 2022-10-13 PROCEDURE — 84484 ASSAY OF TROPONIN QUANT: CPT | Performed by: NURSE PRACTITIONER

## 2022-10-13 PROCEDURE — 25010000002 CEFTRIAXONE PER 250 MG: Performed by: NURSE PRACTITIONER

## 2022-10-13 PROCEDURE — 0202U NFCT DS 22 TRGT SARS-COV-2: CPT | Performed by: NURSE PRACTITIONER

## 2022-10-13 PROCEDURE — 80053 COMPREHEN METABOLIC PANEL: CPT | Performed by: NURSE PRACTITIONER

## 2022-10-13 PROCEDURE — 85730 THROMBOPLASTIN TIME PARTIAL: CPT | Performed by: NURSE PRACTITIONER

## 2022-10-13 RX ORDER — BUDESONIDE 0.5 MG/2ML
0.5 INHALANT ORAL
Status: DISCONTINUED | OUTPATIENT
Start: 2022-10-13 | End: 2022-10-13

## 2022-10-13 RX ORDER — ALBUTEROL SULFATE 90 UG/1
2 AEROSOL, METERED RESPIRATORY (INHALATION) EVERY 4 HOURS PRN
Status: DISCONTINUED | OUTPATIENT
Start: 2022-10-13 | End: 2022-10-20 | Stop reason: HOSPADM

## 2022-10-13 RX ORDER — SODIUM CHLORIDE, SODIUM LACTATE, POTASSIUM CHLORIDE, CALCIUM CHLORIDE 600; 310; 30; 20 MG/100ML; MG/100ML; MG/100ML; MG/100ML
50 INJECTION, SOLUTION INTRAVENOUS CONTINUOUS
Status: DISCONTINUED | OUTPATIENT
Start: 2022-10-13 | End: 2022-10-20 | Stop reason: HOSPADM

## 2022-10-13 RX ORDER — HEPARIN SODIUM 10000 [USP'U]/100ML
18 INJECTION, SOLUTION INTRAVENOUS
Status: DISPENSED | OUTPATIENT
Start: 2022-10-13 | End: 2022-10-15

## 2022-10-13 RX ORDER — ONDANSETRON 2 MG/ML
4 INJECTION INTRAMUSCULAR; INTRAVENOUS EVERY 6 HOURS PRN
Status: DISCONTINUED | OUTPATIENT
Start: 2022-10-13 | End: 2022-10-20 | Stop reason: HOSPADM

## 2022-10-13 RX ORDER — PANTOPRAZOLE SODIUM 40 MG/1
40 TABLET, DELAYED RELEASE ORAL DAILY
Status: DISCONTINUED | OUTPATIENT
Start: 2022-10-14 | End: 2022-10-20 | Stop reason: HOSPADM

## 2022-10-13 RX ORDER — DICYCLOMINE HYDROCHLORIDE 10 MG/1
10 CAPSULE ORAL 3 TIMES DAILY PRN
Status: DISCONTINUED | OUTPATIENT
Start: 2022-10-13 | End: 2022-10-20 | Stop reason: HOSPADM

## 2022-10-13 RX ORDER — HYDROXYZINE HYDROCHLORIDE 25 MG/1
25 TABLET, FILM COATED ORAL NIGHTLY PRN
Status: DISCONTINUED | OUTPATIENT
Start: 2022-10-13 | End: 2022-10-20 | Stop reason: HOSPADM

## 2022-10-13 RX ORDER — SODIUM CHLORIDE 0.9 % (FLUSH) 0.9 %
10 SYRINGE (ML) INJECTION AS NEEDED
Status: DISCONTINUED | OUTPATIENT
Start: 2022-10-13 | End: 2022-10-20 | Stop reason: HOSPADM

## 2022-10-13 RX ORDER — SODIUM CHLORIDE 0.9 % (FLUSH) 0.9 %
10 SYRINGE (ML) INJECTION EVERY 12 HOURS SCHEDULED
Status: DISCONTINUED | OUTPATIENT
Start: 2022-10-13 | End: 2022-10-20 | Stop reason: HOSPADM

## 2022-10-13 RX ORDER — POTASSIUM CHLORIDE 7.45 MG/ML
10 INJECTION INTRAVENOUS
Status: DISCONTINUED | OUTPATIENT
Start: 2022-10-13 | End: 2022-10-20 | Stop reason: HOSPADM

## 2022-10-13 RX ORDER — CHOLECALCIFEROL (VITAMIN D3) 125 MCG
5 CAPSULE ORAL NIGHTLY PRN
Status: DISCONTINUED | OUTPATIENT
Start: 2022-10-13 | End: 2022-10-20 | Stop reason: HOSPADM

## 2022-10-13 RX ORDER — ONDANSETRON 4 MG/1
4 TABLET, FILM COATED ORAL EVERY 6 HOURS PRN
Status: DISCONTINUED | OUTPATIENT
Start: 2022-10-13 | End: 2022-10-20 | Stop reason: HOSPADM

## 2022-10-13 RX ORDER — POTASSIUM CHLORIDE 20 MEQ/1
40 TABLET, EXTENDED RELEASE ORAL AS NEEDED
Status: DISCONTINUED | OUTPATIENT
Start: 2022-10-13 | End: 2022-10-20 | Stop reason: HOSPADM

## 2022-10-13 RX ORDER — BUDESONIDE 0.5 MG/2ML
0.5 INHALANT ORAL
Status: DISCONTINUED | OUTPATIENT
Start: 2022-10-14 | End: 2022-10-18

## 2022-10-13 RX ORDER — IPRATROPIUM BROMIDE AND ALBUTEROL SULFATE 2.5; .5 MG/3ML; MG/3ML
3 SOLUTION RESPIRATORY (INHALATION) EVERY 4 HOURS PRN
Status: DISCONTINUED | OUTPATIENT
Start: 2022-10-13 | End: 2022-10-20 | Stop reason: HOSPADM

## 2022-10-13 RX ORDER — POTASSIUM CHLORIDE 20 MEQ/1
40 TABLET, EXTENDED RELEASE ORAL DAILY
Status: DISCONTINUED | OUTPATIENT
Start: 2022-10-13 | End: 2022-10-13

## 2022-10-13 RX ORDER — POTASSIUM CHLORIDE 1.5 G/1.77G
40 POWDER, FOR SOLUTION ORAL AS NEEDED
Status: DISCONTINUED | OUTPATIENT
Start: 2022-10-13 | End: 2022-10-20 | Stop reason: HOSPADM

## 2022-10-13 RX ORDER — BENZONATATE 100 MG/1
100 CAPSULE ORAL 3 TIMES DAILY PRN
Status: DISCONTINUED | OUTPATIENT
Start: 2022-10-13 | End: 2022-10-20 | Stop reason: HOSPADM

## 2022-10-13 RX ORDER — NITROGLYCERIN 0.4 MG/1
0.4 TABLET SUBLINGUAL
Status: DISCONTINUED | OUTPATIENT
Start: 2022-10-13 | End: 2022-10-20 | Stop reason: HOSPADM

## 2022-10-13 RX ADMIN — DOXYCYCLINE 100 MG: 100 INJECTION, POWDER, LYOPHILIZED, FOR SOLUTION INTRAVENOUS at 21:15

## 2022-10-13 RX ADMIN — CEFTRIAXONE 2 G: 2 INJECTION, POWDER, FOR SOLUTION INTRAMUSCULAR; INTRAVENOUS at 20:21

## 2022-10-13 RX ADMIN — SODIUM CHLORIDE, POTASSIUM CHLORIDE, SODIUM LACTATE AND CALCIUM CHLORIDE 50 ML/HR: 600; 310; 30; 20 INJECTION, SOLUTION INTRAVENOUS at 23:49

## 2022-10-13 RX ADMIN — IOPAMIDOL 100 ML: 755 INJECTION, SOLUTION INTRAVENOUS at 18:31

## 2022-10-13 RX ADMIN — SODIUM CHLORIDE 500 ML: 9 INJECTION, SOLUTION INTRAVENOUS at 21:15

## 2022-10-13 RX ADMIN — HEPARIN SODIUM 18 UNITS/KG/HR: 10000 INJECTION, SOLUTION INTRAVENOUS at 20:22

## 2022-10-13 RX ADMIN — Medication 10 ML: at 23:52

## 2022-10-13 RX ADMIN — POTASSIUM CHLORIDE 40 MEQ: 1500 TABLET, EXTENDED RELEASE ORAL at 17:02

## 2022-10-13 NOTE — HOME HEALTH
Patient reports that she is still feeling bad. Patient states that she is very tired and has no energy. Patient's HR spiked a few days ago while OT was working with her. OT called MD and MD office sent her to the urgent care. Patient states that her EKG was WNL and that they said her HR is elevated because of the steriods she has been on. [facility] set up telehealth today to monitor patient's vitals, as patient lives alone. HR slightly elevated at this time. Patient states that she has MD appoitntment w/ PCP tomorrow on 10/13.    Plan for next visit:  CP assess  falls/safety assess  assess if sleeping better  assess HR

## 2022-10-13 NOTE — PROGRESS NOTES
Transitional Care Follow Up Visit  Subjective    Chief Complaint   Patient presents with   • Fatigue   • Hospital Follow Up Visit   • Pneumonia   • Shortness of Breath     Abida Becker is a 77 y.o. female who presents for a transitional care management visit.    Within 48 business hours after discharge our office contacted her via telephone to coordinate her care and needs.      I reviewed and discussed the details of that call along with the discharge summary, hospital problems, inpatient lab results, inpatient diagnostic studies, and consultation reports with Abida.     Current outpatient and discharge medications have been reconciled for the patient.  Reviewed by: Darlene Matute MD      Date of TCM Phone Call 10/3/2022   [facility]   Date of Admission 10/1/2022   Date of Discharge 10/3/2022   Discharge Disposition [facility]     Risk for Readmission (LACE) Score: 2 (10/3/2022  6:00 AM)      History of Present Illness   Course During Hospital Stay: She was admitted to [facility] with multifocal pneumonia.  Patient was treated with antibiotics.  She started improving and was discharged home, she tells me that about 4 days ago she suddenly started feeling worse, she feels much more tired, her heart rate was noted to go up with exertion.  She also tells me that she has been experiencing some shortness of breath with exertion.  On further questioning she also tells me that about 10 years ago she had pulmonary embolism after orthopedic surgery.  I talked to patient on the phone on 10/10/2022 due to home health reporting the symptoms to us and I advised the patient to go to the ER for evaluation of her symptoms.  At that time she had some blood work done, EKG, and chest x-ray.  EKG showed sinus tachycardia.  Blood work was done including CBC, CMP, magnesium, TSH.  She also had urinalysis checked and respiratory panel.  She does not feel any better.  She feels pretty good  "sitting, but when she gets up and starts moving around her symptoms get worse.     The following portions of the patient's history were reviewed and updated as appropriate: allergies, current medications, past family history, past medical history, past social history, past surgical history and problem list.    Review of Systems   Constitutional: Positive for fatigue. Negative for chills and fever.   HENT: Negative for congestion, ear pain, mouth sores, postnasal drip, rhinorrhea, sinus pain, sore throat, trouble swallowing and voice change.    Respiratory: Positive for shortness of breath. Negative for cough, chest tightness and wheezing.    Cardiovascular: Negative for chest pain.   Gastrointestinal: Negative for diarrhea, nausea and vomiting.   Skin: Negative for color change and rash.   Neurological: Negative for headaches.     Vitals:    10/13/22 1244   BP: 93/63   BP Location: Right arm   Patient Position: Sitting   Cuff Size: Adult   Pulse: 104   Temp: 98.2 °F (36.8 °C)   TempSrc: Temporal   SpO2: 93%   Weight: 79.7 kg (175 lb 9.6 oz)   Height: 167.7 cm (66.02\")       Objective   Physical Exam  Vitals and nursing note reviewed.   Constitutional:       General: She is not in acute distress.     Appearance: She is well-developed.   HENT:      Head: Normocephalic and atraumatic.      Right Ear: External ear normal.      Left Ear: External ear normal.      Mouth/Throat:      Pharynx: No oropharyngeal exudate.   Eyes:      Conjunctiva/sclera: Conjunctivae normal.      Pupils: Pupils are equal, round, and reactive to light.   Cardiovascular:      Rate and Rhythm: Normal rate and regular rhythm.      Heart sounds: Normal heart sounds.   Pulmonary:      Effort: Pulmonary effort is normal. No respiratory distress.      Breath sounds: Normal breath sounds. No wheezing or rales.   Musculoskeletal:         General: No swelling.      Cervical back: Normal range of motion and neck supple.      Right lower leg: No edema.     "  Left lower leg: No edema.   Skin:     General: Skin is warm and dry.      Findings: No rash.       CMP    CMP 10/1/22 10/2/22 10/10/22   Glucose 108 (A) 101 (A) 95   BUN 12 14 16   Creatinine 0.62 0.54 (A) 0.66   Sodium 136 140 143   Potassium 4.0 3.5 4.1   Chloride 97 (A) 102 102   Calcium 9.1 9.2 9.8   Albumin 3.90  4.70   Total Bilirubin 0.7  0.8   Alkaline Phosphatase 94  80   AST (SGOT) 21  18   ALT (SGPT) 21  18   (A) Abnormal value       Comments are available for some flowsheets but are not being displayed.           CBC    CBC 10/1/22 10/2/22 10/10/22   WBC 4.70 5.20 12.70 (A)   RBC 4.44 4.31 5.10   Hemoglobin 13.3 13.1 15.2   Hematocrit 40.4 39.2 46.8 (A)   MCV 90.8 91.1 91.9   MCH 30.0 30.4 29.9   MCHC 33.0 33.3 32.5   RDW 13.4 13.5 13.4   Platelets 256 263 533 (A)   (A) Abnormal value              Assessment & Plan   Diagnoses and all orders for this visit:    1. Short of breath on exertion (Primary)  -     D-dimer, Quantitative  -     CBC Auto Differential  -     BNP    2. Other fatigue  -     CBC Auto Differential  -     Comprehensive Metabolic Panel  -     BNP    3. Tachycardia  -     D-dimer, Quantitative  -     CBC Auto Differential  -     Comprehensive Metabolic Panel  -     BNP    4. Idiopathic hypotension  -     Comprehensive Metabolic Panel  -     BNP       I reviewed her symptoms and concerns as well as available hospital records and ER records including her blood work results and imaging.  CT done on 10/1/2022 showed multifocal pneumonia.  Patient was then evaluated in the ER on 10/10/2022 due to new onset of exertional shortness of breath and tachycardia.  At that time blood work was done including CBC, CMP, and magnesium.  She had EKG which showed sinus tachycardia and chest x-ray was done which was negative.  Patient has ongoing exertional shortness of breath and fatigue, mild tachycardia is noted today, she is slightly hypoxemic and she is hypotensive.  Stat a D-dimer was done today and  it was positive.  I called the patient who was still in the lab and advised her to go to the ER for further evaluation as she needs to be ruled out for pulmonary embolism.  I also called the ER and discussed this with Dr. Serrano.        Return Go to the ER now.       Requested Prescriptions      No prescriptions requested or ordered in this encounter

## 2022-10-13 NOTE — TELEPHONE ENCOUNTER
Caller: Liza Becker    Relationship: Self    Best call back number:    [phone]           What test was performed: LAB     When was the test performed: 10/13/2022    Where was the test performed: [facility]    Additional notes:     LIZA IS CURRENTLY AT [facility]

## 2022-10-14 ENCOUNTER — HOME CARE VISIT (OUTPATIENT)
Dept: HOME HEALTH SERVICES | Facility: HOME HEALTHCARE | Age: 77
End: 2022-10-14

## 2022-10-14 ENCOUNTER — APPOINTMENT (OUTPATIENT)
Dept: CARDIOLOGY | Facility: HOSPITAL | Age: 77
End: 2022-10-14

## 2022-10-14 ENCOUNTER — APPOINTMENT (OUTPATIENT)
Dept: GENERAL RADIOLOGY | Facility: HOSPITAL | Age: 77
End: 2022-10-14

## 2022-10-14 LAB
ANION GAP SERPL CALCULATED.3IONS-SCNC: 9 MMOL/L (ref 5–15)
APTT PPP: 102.1 SECONDS (ref 61–76.5)
APTT PPP: 65.5 SECONDS (ref 61–76.5)
APTT PPP: 69.1 SECONDS (ref 61–76.5)
APTT PPP: 93.6 SECONDS (ref 61–76.5)
BH CV ECHO MEAS - ACS: 1.84 CM
BH CV ECHO MEAS - AO MAX PG: 6 MMHG
BH CV ECHO MEAS - AO MEAN PG: 3.3 MMHG
BH CV ECHO MEAS - AO ROOT DIAM: 3.3 CM
BH CV ECHO MEAS - AO V2 MAX: 122.4 CM/SEC
BH CV ECHO MEAS - AO V2 VTI: 22.1 CM
BH CV ECHO MEAS - AVA(I,D): 2.46 CM2
BH CV ECHO MEAS - EDV(CUBED): 77.1 ML
BH CV ECHO MEAS - EDV(MOD-SP2): 45.7 ML
BH CV ECHO MEAS - EDV(MOD-SP4): 29.7 ML
BH CV ECHO MEAS - EF(MOD-BP): 64 %
BH CV ECHO MEAS - EF(MOD-SP2): 62.7 %
BH CV ECHO MEAS - EF(MOD-SP4): 62.2 %
BH CV ECHO MEAS - ESV(CUBED): 24.2 ML
BH CV ECHO MEAS - ESV(MOD-SP2): 17.1 ML
BH CV ECHO MEAS - ESV(MOD-SP4): 11.2 ML
BH CV ECHO MEAS - FS: 32 %
BH CV ECHO MEAS - IVS/LVPW: 1.08 CM
BH CV ECHO MEAS - IVSD: 1.1 CM
BH CV ECHO MEAS - LA A2CS (ATRIAL LENGTH): 3.7 CM
BH CV ECHO MEAS - LV DIASTOLIC VOL/BSA (35-75): 11.8 CM2
BH CV ECHO MEAS - LV MASS(C)D: 152.4 GRAMS
BH CV ECHO MEAS - LV MAX PG: 4.2 MMHG
BH CV ECHO MEAS - LV MEAN PG: 2.03 MMHG
BH CV ECHO MEAS - LV SYSTOLIC VOL/BSA (12-30): 4.5 CM2
BH CV ECHO MEAS - LV V1 MAX: 102.7 CM/SEC
BH CV ECHO MEAS - LV V1 VTI: 19.7 CM
BH CV ECHO MEAS - LVIDD: 4.3 CM
BH CV ECHO MEAS - LVIDS: 2.9 CM
BH CV ECHO MEAS - LVOT AREA: 2.7 CM2
BH CV ECHO MEAS - LVOT DIAM: 1.87 CM
BH CV ECHO MEAS - LVPWD: 1.02 CM
BH CV ECHO MEAS - MV A MAX VEL: 98.8 CM/SEC
BH CV ECHO MEAS - MV DEC SLOPE: 464.2 CM/SEC2
BH CV ECHO MEAS - MV DEC TIME: 0.17 MSEC
BH CV ECHO MEAS - MV E MAX VEL: 80.5 CM/SEC
BH CV ECHO MEAS - MV E/A: 0.81
BH CV ECHO MEAS - MV MAX PG: 3.8 MMHG
BH CV ECHO MEAS - MV MEAN PG: 2.09 MMHG
BH CV ECHO MEAS - MV V2 VTI: 23.4 CM
BH CV ECHO MEAS - MVA(VTI): 2.32 CM2
BH CV ECHO MEAS - PA V2 MAX: 71.4 CM/SEC
BH CV ECHO MEAS - PULM A REVS DUR: 0.13 SEC
BH CV ECHO MEAS - PULM A REVS VEL: 30.4 CM/SEC
BH CV ECHO MEAS - PULM DIAS VEL: 36.7 CM/SEC
BH CV ECHO MEAS - PULM S/D: 1.66
BH CV ECHO MEAS - PULM SYS VEL: 61.1 CM/SEC
BH CV ECHO MEAS - RV MAX PG: 1.51 MMHG
BH CV ECHO MEAS - RV V1 MAX: 61.4 CM/SEC
BH CV ECHO MEAS - RV V1 VTI: 11.5 CM
BH CV ECHO MEAS - RVDD: 2.5 CM
BH CV ECHO MEAS - SI(MOD-SP2): 11.4 ML/M2
BH CV ECHO MEAS - SI(MOD-SP4): 7.3 ML/M2
BH CV ECHO MEAS - SV(LVOT): 54.2 ML
BH CV ECHO MEAS - SV(MOD-SP2): 28.7 ML
BH CV ECHO MEAS - SV(MOD-SP4): 18.5 ML
BH CV ECHO MEAS - TR MAX PG: 38.4 MMHG
BH CV ECHO MEAS - TR MAX VEL: 309.8 CM/SEC
BH CV LOW VAS LEFT GREATER SAPH BK VESSEL: 1
BH CV LOW VAS RIGHT PERONEAL VESSEL: 1
BH CV LOWER VASCULAR LEFT COMMON FEMORAL AUGMENT: NORMAL
BH CV LOWER VASCULAR LEFT COMMON FEMORAL COMPETENT: NORMAL
BH CV LOWER VASCULAR LEFT COMMON FEMORAL COMPRESS: NORMAL
BH CV LOWER VASCULAR LEFT COMMON FEMORAL PHASIC: NORMAL
BH CV LOWER VASCULAR LEFT COMMON FEMORAL SPONT: NORMAL
BH CV LOWER VASCULAR LEFT DISTAL FEMORAL COMPRESS: NORMAL
BH CV LOWER VASCULAR LEFT GASTRONEMIUS COMPRESS: NORMAL
BH CV LOWER VASCULAR LEFT GREATER SAPH AK COMPRESS: NORMAL
BH CV LOWER VASCULAR LEFT GREATER SAPH BK COMPRESS: NORMAL
BH CV LOWER VASCULAR LEFT GREATER SAPH BK THROMBUS: NORMAL
BH CV LOWER VASCULAR LEFT LESSER SAPH COMPRESS: NORMAL
BH CV LOWER VASCULAR LEFT MID FEMORAL AUGMENT: NORMAL
BH CV LOWER VASCULAR LEFT MID FEMORAL COMPETENT: NORMAL
BH CV LOWER VASCULAR LEFT MID FEMORAL COMPRESS: NORMAL
BH CV LOWER VASCULAR LEFT MID FEMORAL PHASIC: NORMAL
BH CV LOWER VASCULAR LEFT MID FEMORAL SPONT: NORMAL
BH CV LOWER VASCULAR LEFT PERONEAL COMPRESS: NORMAL
BH CV LOWER VASCULAR LEFT POPLITEAL AUGMENT: NORMAL
BH CV LOWER VASCULAR LEFT POPLITEAL COMPETENT: NORMAL
BH CV LOWER VASCULAR LEFT POPLITEAL COMPRESS: NORMAL
BH CV LOWER VASCULAR LEFT POPLITEAL PHASIC: NORMAL
BH CV LOWER VASCULAR LEFT POPLITEAL SPONT: NORMAL
BH CV LOWER VASCULAR LEFT POSTERIOR TIBIAL COMPRESS: NORMAL
BH CV LOWER VASCULAR LEFT PROXIMAL FEMORAL COMPRESS: NORMAL
BH CV LOWER VASCULAR LEFT SAPHENOFEMORAL JUNCTION COMPRESS: NORMAL
BH CV LOWER VASCULAR RIGHT COMMON FEMORAL AUGMENT: NORMAL
BH CV LOWER VASCULAR RIGHT COMMON FEMORAL COMPETENT: NORMAL
BH CV LOWER VASCULAR RIGHT COMMON FEMORAL COMPRESS: NORMAL
BH CV LOWER VASCULAR RIGHT COMMON FEMORAL PHASIC: NORMAL
BH CV LOWER VASCULAR RIGHT COMMON FEMORAL SPONT: NORMAL
BH CV LOWER VASCULAR RIGHT DISTAL FEMORAL COMPRESS: NORMAL
BH CV LOWER VASCULAR RIGHT GASTRONEMIUS COMPRESS: NORMAL
BH CV LOWER VASCULAR RIGHT GREATER SAPH AK COMPRESS: NORMAL
BH CV LOWER VASCULAR RIGHT GREATER SAPH BK COMPRESS: NORMAL
BH CV LOWER VASCULAR RIGHT LESSER SAPH COMPRESS: NORMAL
BH CV LOWER VASCULAR RIGHT MID FEMORAL AUGMENT: NORMAL
BH CV LOWER VASCULAR RIGHT MID FEMORAL COMPETENT: NORMAL
BH CV LOWER VASCULAR RIGHT MID FEMORAL COMPRESS: NORMAL
BH CV LOWER VASCULAR RIGHT MID FEMORAL PHASIC: NORMAL
BH CV LOWER VASCULAR RIGHT MID FEMORAL SPONT: NORMAL
BH CV LOWER VASCULAR RIGHT PERONEAL COMPRESS: NORMAL
BH CV LOWER VASCULAR RIGHT PERONEAL SPONT: NORMAL
BH CV LOWER VASCULAR RIGHT PERONEAL THROMBUS: NORMAL
BH CV LOWER VASCULAR RIGHT POPLITEAL AUGMENT: NORMAL
BH CV LOWER VASCULAR RIGHT POPLITEAL COMPETENT: NORMAL
BH CV LOWER VASCULAR RIGHT POPLITEAL COMPRESS: NORMAL
BH CV LOWER VASCULAR RIGHT POPLITEAL PHASIC: NORMAL
BH CV LOWER VASCULAR RIGHT POPLITEAL SPONT: NORMAL
BH CV LOWER VASCULAR RIGHT POSTERIOR TIBIAL COMPRESS: NORMAL
BH CV LOWER VASCULAR RIGHT PROXIMAL FEMORAL COMPRESS: NORMAL
BH CV LOWER VASCULAR RIGHT SAPHENOFEMORAL JUNCTION COMPRESS: NORMAL
BH CV VAS PRELIMINARY FINDINGS SCRIPTING: 1
BUN SERPL-MCNC: 10 MG/DL (ref 8–23)
BUN/CREAT SERPL: 15.6 (ref 7–25)
CALCIUM SPEC-SCNC: 8.6 MG/DL (ref 8.6–10.5)
CHLORIDE SERPL-SCNC: 102 MMOL/L (ref 98–107)
CO2 SERPL-SCNC: 27 MMOL/L (ref 22–29)
CREAT SERPL-MCNC: 0.64 MG/DL (ref 0.57–1)
DEPRECATED RDW RBC AUTO: 43.3 FL (ref 37–54)
EGFRCR SERPLBLD CKD-EPI 2021: 92.3 ML/MIN/1.73
ERYTHROCYTE [DISTWIDTH] IN BLOOD BY AUTOMATED COUNT: 13.6 % (ref 12.3–15.4)
GLUCOSE BLDC GLUCOMTR-MCNC: 152 MG/DL (ref 70–105)
GLUCOSE SERPL-MCNC: 106 MG/DL (ref 65–99)
HCT VFR BLD AUTO: 35.4 % (ref 34–46.6)
HGB BLD-MCNC: 11.7 G/DL (ref 12–15.9)
LV EF 2D ECHO EST: 60 %
MAXIMAL PREDICTED HEART RATE: 145 BPM
MAXIMAL PREDICTED HEART RATE: 145 BPM
MCH RBC QN AUTO: 29.9 PG (ref 26.6–33)
MCHC RBC AUTO-ENTMCNC: 33 G/DL (ref 31.5–35.7)
MCV RBC AUTO: 90.5 FL (ref 79–97)
PLATELET # BLD AUTO: 327 10*3/MM3 (ref 140–450)
PMV BLD AUTO: 7.2 FL (ref 6–12)
POTASSIUM SERPL-SCNC: 4.5 MMOL/L (ref 3.5–5.2)
QT INTERVAL: 326 MS
RBC # BLD AUTO: 3.91 10*6/MM3 (ref 3.77–5.28)
SODIUM SERPL-SCNC: 138 MMOL/L (ref 136–145)
STRESS TARGET HR: 123 BPM
STRESS TARGET HR: 123 BPM
TROPONIN T SERPL-MCNC: 0.02 NG/ML (ref 0–0.03)
TROPONIN T SERPL-MCNC: <0.01 NG/ML (ref 0–0.03)
WBC NRBC COR # BLD: 10 10*3/MM3 (ref 3.4–10.8)

## 2022-10-14 PROCEDURE — 73590 X-RAY EXAM OF LOWER LEG: CPT

## 2022-10-14 PROCEDURE — 99222 1ST HOSP IP/OBS MODERATE 55: CPT | Performed by: INTERNAL MEDICINE

## 2022-10-14 PROCEDURE — 25010000002 HEPARIN (PORCINE) 25000-0.45 UT/250ML-% SOLUTION: Performed by: STUDENT IN AN ORGANIZED HEALTH CARE EDUCATION/TRAINING PROGRAM

## 2022-10-14 PROCEDURE — 85027 COMPLETE CBC AUTOMATED: CPT | Performed by: STUDENT IN AN ORGANIZED HEALTH CARE EDUCATION/TRAINING PROGRAM

## 2022-10-14 PROCEDURE — 85730 THROMBOPLASTIN TIME PARTIAL: CPT | Performed by: HOSPITALIST

## 2022-10-14 PROCEDURE — 80048 BASIC METABOLIC PNL TOTAL CA: CPT | Performed by: STUDENT IN AN ORGANIZED HEALTH CARE EDUCATION/TRAINING PROGRAM

## 2022-10-14 PROCEDURE — 93306 TTE W/DOPPLER COMPLETE: CPT | Performed by: INTERNAL MEDICINE

## 2022-10-14 PROCEDURE — 85730 THROMBOPLASTIN TIME PARTIAL: CPT | Performed by: STUDENT IN AN ORGANIZED HEALTH CARE EDUCATION/TRAINING PROGRAM

## 2022-10-14 PROCEDURE — 94799 UNLISTED PULMONARY SVC/PX: CPT

## 2022-10-14 PROCEDURE — 84484 ASSAY OF TROPONIN QUANT: CPT | Performed by: STUDENT IN AN ORGANIZED HEALTH CARE EDUCATION/TRAINING PROGRAM

## 2022-10-14 PROCEDURE — 93306 TTE W/DOPPLER COMPLETE: CPT

## 2022-10-14 PROCEDURE — 93970 EXTREMITY STUDY: CPT

## 2022-10-14 PROCEDURE — 82962 GLUCOSE BLOOD TEST: CPT

## 2022-10-14 RX ORDER — ACETAMINOPHEN 325 MG/1
650 TABLET ORAL EVERY 6 HOURS PRN
Status: DISCONTINUED | OUTPATIENT
Start: 2022-10-14 | End: 2022-10-20 | Stop reason: HOSPADM

## 2022-10-14 RX ADMIN — Medication 10 ML: at 10:46

## 2022-10-14 RX ADMIN — IPRATROPIUM BROMIDE AND ALBUTEROL SULFATE 3 ML: .5; 3 SOLUTION RESPIRATORY (INHALATION) at 13:21

## 2022-10-14 RX ADMIN — HEPARIN SODIUM 13 UNITS/KG/HR: 10000 INJECTION, SOLUTION INTRAVENOUS at 13:05

## 2022-10-14 RX ADMIN — Medication 10 ML: at 19:38

## 2022-10-14 RX ADMIN — ACETAMINOPHEN 650 MG: 325 TABLET, FILM COATED ORAL at 16:31

## 2022-10-14 RX ADMIN — PANTOPRAZOLE SODIUM 40 MG: 40 TABLET, DELAYED RELEASE ORAL at 10:46

## 2022-10-14 RX ADMIN — BUDESONIDE 0.5 MG: 0.5 INHALANT RESPIRATORY (INHALATION) at 19:16

## 2022-10-14 RX ADMIN — MIRABEGRON 50 MG: 25 TABLET, FILM COATED, EXTENDED RELEASE ORAL at 19:38

## 2022-10-15 LAB
ALBUMIN SERPL-MCNC: 3.4 G/DL (ref 3.5–5.2)
ALBUMIN/GLOB SERPL: 1.4 G/DL
ALP SERPL-CCNC: 57 U/L (ref 39–117)
ALT SERPL W P-5'-P-CCNC: 8 U/L (ref 1–33)
ANION GAP SERPL CALCULATED.3IONS-SCNC: 11 MMOL/L (ref 5–15)
APTT PPP: 76.2 SECONDS (ref 61–76.5)
AST SERPL-CCNC: 12 U/L (ref 1–32)
BILIRUB SERPL-MCNC: 0.8 MG/DL (ref 0–1.2)
BUN SERPL-MCNC: 8 MG/DL (ref 8–23)
BUN/CREAT SERPL: 12.1 (ref 7–25)
CALCIUM SPEC-SCNC: 9.2 MG/DL (ref 8.6–10.5)
CHLORIDE SERPL-SCNC: 102 MMOL/L (ref 98–107)
CO2 SERPL-SCNC: 28 MMOL/L (ref 22–29)
CREAT SERPL-MCNC: 0.66 MG/DL (ref 0.57–1)
DEPRECATED RDW RBC AUTO: 44.6 FL (ref 37–54)
EGFRCR SERPLBLD CKD-EPI 2021: 91.6 ML/MIN/1.73
ERYTHROCYTE [DISTWIDTH] IN BLOOD BY AUTOMATED COUNT: 13.9 % (ref 12.3–15.4)
GLOBULIN UR ELPH-MCNC: 2.4 GM/DL
GLUCOSE SERPL-MCNC: 102 MG/DL (ref 65–99)
HCT VFR BLD AUTO: 36.8 % (ref 34–46.6)
HGB BLD-MCNC: 12.1 G/DL (ref 12–15.9)
INR PPP: 1.06 (ref 2–3)
MCH RBC QN AUTO: 29.9 PG (ref 26.6–33)
MCHC RBC AUTO-ENTMCNC: 32.8 G/DL (ref 31.5–35.7)
MCV RBC AUTO: 91.2 FL (ref 79–97)
PLATELET # BLD AUTO: 353 10*3/MM3 (ref 140–450)
PMV BLD AUTO: 7.4 FL (ref 6–12)
POTASSIUM SERPL-SCNC: 5.1 MMOL/L (ref 3.5–5.2)
PROT SERPL-MCNC: 5.8 G/DL (ref 6–8.5)
PROTHROMBIN TIME: 10.9 SECONDS (ref 19.4–28.5)
RBC # BLD AUTO: 4.03 10*6/MM3 (ref 3.77–5.28)
SODIUM SERPL-SCNC: 141 MMOL/L (ref 136–145)
WBC NRBC COR # BLD: 9 10*3/MM3 (ref 3.4–10.8)

## 2022-10-15 PROCEDURE — 85730 THROMBOPLASTIN TIME PARTIAL: CPT | Performed by: STUDENT IN AN ORGANIZED HEALTH CARE EDUCATION/TRAINING PROGRAM

## 2022-10-15 PROCEDURE — 85610 PROTHROMBIN TIME: CPT | Performed by: INTERNAL MEDICINE

## 2022-10-15 PROCEDURE — 36415 COLL VENOUS BLD VENIPUNCTURE: CPT | Performed by: STUDENT IN AN ORGANIZED HEALTH CARE EDUCATION/TRAINING PROGRAM

## 2022-10-15 PROCEDURE — 94664 DEMO&/EVAL PT USE INHALER: CPT

## 2022-10-15 PROCEDURE — 94799 UNLISTED PULMONARY SVC/PX: CPT

## 2022-10-15 PROCEDURE — 85027 COMPLETE CBC AUTOMATED: CPT | Performed by: STUDENT IN AN ORGANIZED HEALTH CARE EDUCATION/TRAINING PROGRAM

## 2022-10-15 PROCEDURE — 80053 COMPREHEN METABOLIC PANEL: CPT | Performed by: HOSPITALIST

## 2022-10-15 PROCEDURE — 25010000002 ENOXAPARIN PER 10 MG: Performed by: INTERNAL MEDICINE

## 2022-10-15 PROCEDURE — 99232 SBSQ HOSP IP/OBS MODERATE 35: CPT | Performed by: INTERNAL MEDICINE

## 2022-10-15 PROCEDURE — 94761 N-INVAS EAR/PLS OXIMETRY MLT: CPT

## 2022-10-15 RX ORDER — WARFARIN SODIUM 7.5 MG/1
7.5 TABLET ORAL
Status: COMPLETED | OUTPATIENT
Start: 2022-10-15 | End: 2022-10-15

## 2022-10-15 RX ORDER — ENOXAPARIN SODIUM 100 MG/ML
80 INJECTION SUBCUTANEOUS EVERY 12 HOURS
Status: DISCONTINUED | OUTPATIENT
Start: 2022-10-15 | End: 2022-10-20 | Stop reason: HOSPADM

## 2022-10-15 RX ORDER — ZOLPIDEM TARTRATE 5 MG/1
5 TABLET ORAL NIGHTLY PRN
Status: DISCONTINUED | OUTPATIENT
Start: 2022-10-15 | End: 2022-10-20 | Stop reason: HOSPADM

## 2022-10-15 RX ADMIN — Medication 10 ML: at 20:04

## 2022-10-15 RX ADMIN — BUDESONIDE 0.5 MG: 0.5 INHALANT RESPIRATORY (INHALATION) at 07:33

## 2022-10-15 RX ADMIN — ZOLPIDEM TARTRATE 5 MG: 5 TABLET ORAL at 20:03

## 2022-10-15 RX ADMIN — BUDESONIDE 0.5 MG: 0.5 INHALANT RESPIRATORY (INHALATION) at 18:52

## 2022-10-15 RX ADMIN — ACETAMINOPHEN 650 MG: 325 TABLET, FILM COATED ORAL at 07:39

## 2022-10-15 RX ADMIN — SODIUM CHLORIDE, POTASSIUM CHLORIDE, SODIUM LACTATE AND CALCIUM CHLORIDE 50 ML/HR: 600; 310; 30; 20 INJECTION, SOLUTION INTRAVENOUS at 11:47

## 2022-10-15 RX ADMIN — Medication 5 MG: at 00:22

## 2022-10-15 RX ADMIN — PANTOPRAZOLE SODIUM 40 MG: 40 TABLET, DELAYED RELEASE ORAL at 09:24

## 2022-10-15 RX ADMIN — Medication 10 ML: at 09:24

## 2022-10-15 RX ADMIN — MIRABEGRON 50 MG: 25 TABLET, FILM COATED, EXTENDED RELEASE ORAL at 20:03

## 2022-10-15 RX ADMIN — ENOXAPARIN SODIUM 80 MG: 100 INJECTION SUBCUTANEOUS at 16:22

## 2022-10-15 RX ADMIN — ACETAMINOPHEN 650 MG: 325 TABLET, FILM COATED ORAL at 19:35

## 2022-10-15 RX ADMIN — WARFARIN 7.5 MG: 7.5 TABLET ORAL at 17:55

## 2022-10-16 LAB
ALBUMIN SERPL-MCNC: 3.3 G/DL (ref 3.5–5.2)
ALBUMIN/GLOB SERPL: 1.4 G/DL
ALP SERPL-CCNC: 55 U/L (ref 39–117)
ALT SERPL W P-5'-P-CCNC: 6 U/L (ref 1–33)
ANION GAP SERPL CALCULATED.3IONS-SCNC: 9 MMOL/L (ref 5–15)
AST SERPL-CCNC: 12 U/L (ref 1–32)
BILIRUB SERPL-MCNC: 0.8 MG/DL (ref 0–1.2)
BUN SERPL-MCNC: 9 MG/DL (ref 8–23)
BUN/CREAT SERPL: 15.3 (ref 7–25)
CALCIUM SPEC-SCNC: 8.9 MG/DL (ref 8.6–10.5)
CHLORIDE SERPL-SCNC: 102 MMOL/L (ref 98–107)
CO2 SERPL-SCNC: 27 MMOL/L (ref 22–29)
CREAT SERPL-MCNC: 0.59 MG/DL (ref 0.57–1)
DEPRECATED RDW RBC AUTO: 45.5 FL (ref 37–54)
EGFRCR SERPLBLD CKD-EPI 2021: 94.1 ML/MIN/1.73
ERYTHROCYTE [DISTWIDTH] IN BLOOD BY AUTOMATED COUNT: 14.2 % (ref 12.3–15.4)
GLOBULIN UR ELPH-MCNC: 2.4 GM/DL
GLUCOSE SERPL-MCNC: 127 MG/DL (ref 65–99)
HCT VFR BLD AUTO: 35.8 % (ref 34–46.6)
HGB BLD-MCNC: 11.4 G/DL (ref 12–15.9)
INR PPP: 1.06 (ref 2–3)
MCH RBC QN AUTO: 29.8 PG (ref 26.6–33)
MCHC RBC AUTO-ENTMCNC: 32 G/DL (ref 31.5–35.7)
MCV RBC AUTO: 93.1 FL (ref 79–97)
PLATELET # BLD AUTO: 371 10*3/MM3 (ref 140–450)
PMV BLD AUTO: 7.5 FL (ref 6–12)
POTASSIUM SERPL-SCNC: 4.7 MMOL/L (ref 3.5–5.2)
PROT SERPL-MCNC: 5.7 G/DL (ref 6–8.5)
PROTHROMBIN TIME: 10.9 SECONDS (ref 19.4–28.5)
RBC # BLD AUTO: 3.85 10*6/MM3 (ref 3.77–5.28)
SODIUM SERPL-SCNC: 138 MMOL/L (ref 136–145)
WBC NRBC COR # BLD: 6.9 10*3/MM3 (ref 3.4–10.8)

## 2022-10-16 PROCEDURE — 94761 N-INVAS EAR/PLS OXIMETRY MLT: CPT

## 2022-10-16 PROCEDURE — 94664 DEMO&/EVAL PT USE INHALER: CPT

## 2022-10-16 PROCEDURE — 99232 SBSQ HOSP IP/OBS MODERATE 35: CPT | Performed by: INTERNAL MEDICINE

## 2022-10-16 PROCEDURE — 25010000002 ENOXAPARIN PER 10 MG: Performed by: INTERNAL MEDICINE

## 2022-10-16 PROCEDURE — 85027 COMPLETE CBC AUTOMATED: CPT | Performed by: STUDENT IN AN ORGANIZED HEALTH CARE EDUCATION/TRAINING PROGRAM

## 2022-10-16 PROCEDURE — 80053 COMPREHEN METABOLIC PANEL: CPT | Performed by: HOSPITALIST

## 2022-10-16 PROCEDURE — 94799 UNLISTED PULMONARY SVC/PX: CPT

## 2022-10-16 PROCEDURE — 36415 COLL VENOUS BLD VENIPUNCTURE: CPT | Performed by: STUDENT IN AN ORGANIZED HEALTH CARE EDUCATION/TRAINING PROGRAM

## 2022-10-16 PROCEDURE — 85610 PROTHROMBIN TIME: CPT | Performed by: INTERNAL MEDICINE

## 2022-10-16 RX ORDER — WARFARIN SODIUM 5 MG/1
5 TABLET ORAL
Status: DISCONTINUED | OUTPATIENT
Start: 2022-10-16 | End: 2022-10-20 | Stop reason: HOSPADM

## 2022-10-16 RX ADMIN — ENOXAPARIN SODIUM 80 MG: 100 INJECTION SUBCUTANEOUS at 17:07

## 2022-10-16 RX ADMIN — WARFARIN 5 MG: 5 TABLET ORAL at 17:07

## 2022-10-16 RX ADMIN — PANTOPRAZOLE SODIUM 40 MG: 40 TABLET, DELAYED RELEASE ORAL at 08:11

## 2022-10-16 RX ADMIN — ENOXAPARIN SODIUM 80 MG: 100 INJECTION SUBCUTANEOUS at 03:18

## 2022-10-16 RX ADMIN — Medication 10 ML: at 20:48

## 2022-10-16 RX ADMIN — ACETAMINOPHEN 650 MG: 325 TABLET, FILM COATED ORAL at 21:03

## 2022-10-16 RX ADMIN — BUDESONIDE 0.5 MG: 0.5 INHALANT RESPIRATORY (INHALATION) at 16:39

## 2022-10-16 RX ADMIN — ZOLPIDEM TARTRATE 5 MG: 5 TABLET ORAL at 21:03

## 2022-10-16 RX ADMIN — Medication 10 ML: at 08:11

## 2022-10-16 RX ADMIN — ACETAMINOPHEN 650 MG: 325 TABLET, FILM COATED ORAL at 08:12

## 2022-10-16 RX ADMIN — SODIUM CHLORIDE, POTASSIUM CHLORIDE, SODIUM LACTATE AND CALCIUM CHLORIDE 50 ML/HR: 600; 310; 30; 20 INJECTION, SOLUTION INTRAVENOUS at 21:01

## 2022-10-16 RX ADMIN — BUDESONIDE 0.5 MG: 0.5 INHALANT RESPIRATORY (INHALATION) at 07:31

## 2022-10-16 RX ADMIN — SODIUM CHLORIDE, POTASSIUM CHLORIDE, SODIUM LACTATE AND CALCIUM CHLORIDE 50 ML/HR: 600; 310; 30; 20 INJECTION, SOLUTION INTRAVENOUS at 07:20

## 2022-10-17 LAB
ALBUMIN SERPL-MCNC: 2.9 G/DL (ref 3.5–5.2)
ALBUMIN/GLOB SERPL: 1.1 G/DL
ALP SERPL-CCNC: 63 U/L (ref 39–117)
ALT SERPL W P-5'-P-CCNC: 14 U/L (ref 1–33)
ANION GAP SERPL CALCULATED.3IONS-SCNC: 9 MMOL/L (ref 5–15)
AST SERPL-CCNC: 20 U/L (ref 1–32)
BILIRUB SERPL-MCNC: 0.5 MG/DL (ref 0–1.2)
BUN SERPL-MCNC: 10 MG/DL (ref 8–23)
BUN/CREAT SERPL: 21.3 (ref 7–25)
CALCIUM SPEC-SCNC: 8.8 MG/DL (ref 8.6–10.5)
CHLORIDE SERPL-SCNC: 105 MMOL/L (ref 98–107)
CO2 SERPL-SCNC: 26 MMOL/L (ref 22–29)
CREAT SERPL-MCNC: 0.47 MG/DL (ref 0.57–1)
DEPRECATED RDW RBC AUTO: 45.9 FL (ref 37–54)
EGFRCR SERPLBLD CKD-EPI 2021: 99.4 ML/MIN/1.73
ERYTHROCYTE [DISTWIDTH] IN BLOOD BY AUTOMATED COUNT: 14.1 % (ref 12.3–15.4)
GLOBULIN UR ELPH-MCNC: 2.7 GM/DL
GLUCOSE SERPL-MCNC: 89 MG/DL (ref 65–99)
HCT VFR BLD AUTO: 36.1 % (ref 34–46.6)
HGB BLD-MCNC: 11.8 G/DL (ref 12–15.9)
INR PPP: 1.25 (ref 2–3)
MCH RBC QN AUTO: 30.8 PG (ref 26.6–33)
MCHC RBC AUTO-ENTMCNC: 32.5 G/DL (ref 31.5–35.7)
MCV RBC AUTO: 94.6 FL (ref 79–97)
PLATELET # BLD AUTO: 359 10*3/MM3 (ref 140–450)
PMV BLD AUTO: 7.7 FL (ref 6–12)
POTASSIUM SERPL-SCNC: 4.2 MMOL/L (ref 3.5–5.2)
PROT SERPL-MCNC: 5.6 G/DL (ref 6–8.5)
PROTHROMBIN TIME: 12.7 SECONDS (ref 19.4–28.5)
RBC # BLD AUTO: 3.82 10*6/MM3 (ref 3.77–5.28)
SODIUM SERPL-SCNC: 140 MMOL/L (ref 136–145)
WBC NRBC COR # BLD: 6.8 10*3/MM3 (ref 3.4–10.8)

## 2022-10-17 PROCEDURE — 94664 DEMO&/EVAL PT USE INHALER: CPT

## 2022-10-17 PROCEDURE — 85027 COMPLETE CBC AUTOMATED: CPT | Performed by: STUDENT IN AN ORGANIZED HEALTH CARE EDUCATION/TRAINING PROGRAM

## 2022-10-17 PROCEDURE — 94799 UNLISTED PULMONARY SVC/PX: CPT

## 2022-10-17 PROCEDURE — 85610 PROTHROMBIN TIME: CPT | Performed by: INTERNAL MEDICINE

## 2022-10-17 PROCEDURE — 25010000002 ENOXAPARIN PER 10 MG: Performed by: INTERNAL MEDICINE

## 2022-10-17 PROCEDURE — 99232 SBSQ HOSP IP/OBS MODERATE 35: CPT | Performed by: INTERNAL MEDICINE

## 2022-10-17 PROCEDURE — 80053 COMPREHEN METABOLIC PANEL: CPT | Performed by: HOSPITALIST

## 2022-10-17 RX ADMIN — WARFARIN 5 MG: 5 TABLET ORAL at 17:16

## 2022-10-17 RX ADMIN — Medication 10 ML: at 08:10

## 2022-10-17 RX ADMIN — BUDESONIDE 0.5 MG: 0.5 INHALANT RESPIRATORY (INHALATION) at 17:10

## 2022-10-17 RX ADMIN — ACETAMINOPHEN 650 MG: 325 TABLET, FILM COATED ORAL at 19:28

## 2022-10-17 RX ADMIN — SODIUM CHLORIDE, POTASSIUM CHLORIDE, SODIUM LACTATE AND CALCIUM CHLORIDE 50 ML/HR: 600; 310; 30; 20 INJECTION, SOLUTION INTRAVENOUS at 15:28

## 2022-10-17 RX ADMIN — ZOLPIDEM TARTRATE 5 MG: 5 TABLET ORAL at 20:55

## 2022-10-17 RX ADMIN — ACETAMINOPHEN 650 MG: 325 TABLET, FILM COATED ORAL at 13:23

## 2022-10-17 RX ADMIN — Medication 10 ML: at 20:56

## 2022-10-17 RX ADMIN — MIRABEGRON 50 MG: 25 TABLET, FILM COATED, EXTENDED RELEASE ORAL at 20:55

## 2022-10-17 RX ADMIN — ENOXAPARIN SODIUM 80 MG: 100 INJECTION SUBCUTANEOUS at 04:44

## 2022-10-17 RX ADMIN — PANTOPRAZOLE SODIUM 40 MG: 40 TABLET, DELAYED RELEASE ORAL at 08:10

## 2022-10-17 RX ADMIN — ENOXAPARIN SODIUM 80 MG: 100 INJECTION SUBCUTANEOUS at 17:16

## 2022-10-17 RX ADMIN — BUDESONIDE 0.5 MG: 0.5 INHALANT RESPIRATORY (INHALATION) at 07:02

## 2022-10-17 NOTE — HOME HEALTH
Patient is current with [facility]. Patient was admitted to [facility] on 10.13.2022. We will continue to follow and resume care once discharged home.

## 2022-10-18 LAB
BACTERIA SPEC AEROBE CULT: NORMAL
BACTERIA SPEC AEROBE CULT: NORMAL
DEPRECATED RDW RBC AUTO: 44.6 FL (ref 37–54)
ERYTHROCYTE [DISTWIDTH] IN BLOOD BY AUTOMATED COUNT: 13.9 % (ref 12.3–15.4)
HCT VFR BLD AUTO: 35.3 % (ref 34–46.6)
HGB BLD-MCNC: 11.3 G/DL (ref 12–15.9)
HOLD SPECIMEN: NORMAL
INR PPP: 1.41 (ref 2–3)
MCH RBC QN AUTO: 29.7 PG (ref 26.6–33)
MCHC RBC AUTO-ENTMCNC: 32.1 G/DL (ref 31.5–35.7)
MCV RBC AUTO: 92.6 FL (ref 79–97)
PLATELET # BLD AUTO: 367 10*3/MM3 (ref 140–450)
PMV BLD AUTO: 7.3 FL (ref 6–12)
PROTHROMBIN TIME: 14.2 SECONDS (ref 19.4–28.5)
QT INTERVAL: 351 MS
RBC # BLD AUTO: 3.81 10*6/MM3 (ref 3.77–5.28)
WBC NRBC COR # BLD: 6.3 10*3/MM3 (ref 3.4–10.8)

## 2022-10-18 PROCEDURE — 25010000002 ENOXAPARIN PER 10 MG: Performed by: INTERNAL MEDICINE

## 2022-10-18 PROCEDURE — 85027 COMPLETE CBC AUTOMATED: CPT | Performed by: STUDENT IN AN ORGANIZED HEALTH CARE EDUCATION/TRAINING PROGRAM

## 2022-10-18 PROCEDURE — 94799 UNLISTED PULMONARY SVC/PX: CPT

## 2022-10-18 PROCEDURE — 36415 COLL VENOUS BLD VENIPUNCTURE: CPT | Performed by: STUDENT IN AN ORGANIZED HEALTH CARE EDUCATION/TRAINING PROGRAM

## 2022-10-18 PROCEDURE — 97162 PT EVAL MOD COMPLEX 30 MIN: CPT

## 2022-10-18 PROCEDURE — 99232 SBSQ HOSP IP/OBS MODERATE 35: CPT

## 2022-10-18 PROCEDURE — 94664 DEMO&/EVAL PT USE INHALER: CPT

## 2022-10-18 PROCEDURE — 85610 PROTHROMBIN TIME: CPT | Performed by: INTERNAL MEDICINE

## 2022-10-18 RX ADMIN — ACETAMINOPHEN 650 MG: 325 TABLET, FILM COATED ORAL at 08:07

## 2022-10-18 RX ADMIN — ENOXAPARIN SODIUM 80 MG: 100 INJECTION SUBCUTANEOUS at 16:36

## 2022-10-18 RX ADMIN — ENOXAPARIN SODIUM 80 MG: 100 INJECTION SUBCUTANEOUS at 04:22

## 2022-10-18 RX ADMIN — BUDESONIDE 0.5 MG: 0.5 INHALANT RESPIRATORY (INHALATION) at 07:25

## 2022-10-18 RX ADMIN — ZOLPIDEM TARTRATE 5 MG: 5 TABLET ORAL at 20:21

## 2022-10-18 RX ADMIN — MIRABEGRON 50 MG: 25 TABLET, FILM COATED, EXTENDED RELEASE ORAL at 20:21

## 2022-10-18 RX ADMIN — Medication 10 ML: at 20:21

## 2022-10-18 RX ADMIN — PANTOPRAZOLE SODIUM 40 MG: 40 TABLET, DELAYED RELEASE ORAL at 08:07

## 2022-10-18 RX ADMIN — WARFARIN 5 MG: 5 TABLET ORAL at 16:36

## 2022-10-18 RX ADMIN — Medication 10 ML: at 08:10

## 2022-10-18 RX ADMIN — SODIUM CHLORIDE, POTASSIUM CHLORIDE, SODIUM LACTATE AND CALCIUM CHLORIDE 50 ML/HR: 600; 310; 30; 20 INJECTION, SOLUTION INTRAVENOUS at 16:34

## 2022-10-19 LAB
ALBUMIN SERPL-MCNC: 3.2 G/DL (ref 3.5–5.2)
ALBUMIN/GLOB SERPL: 1.3 G/DL
ALP SERPL-CCNC: 81 U/L (ref 39–117)
ALT SERPL W P-5'-P-CCNC: 42 U/L (ref 1–33)
ANION GAP SERPL CALCULATED.3IONS-SCNC: 8 MMOL/L (ref 5–15)
AST SERPL-CCNC: 41 U/L (ref 1–32)
BILIRUB SERPL-MCNC: 0.5 MG/DL (ref 0–1.2)
BUN SERPL-MCNC: 11 MG/DL (ref 8–23)
BUN/CREAT SERPL: 20.4 (ref 7–25)
CALCIUM SPEC-SCNC: 8.7 MG/DL (ref 8.6–10.5)
CHLORIDE SERPL-SCNC: 103 MMOL/L (ref 98–107)
CO2 SERPL-SCNC: 28 MMOL/L (ref 22–29)
CREAT SERPL-MCNC: 0.54 MG/DL (ref 0.57–1)
DEPRECATED RDW RBC AUTO: 45.5 FL (ref 37–54)
EGFRCR SERPLBLD CKD-EPI 2021: 96.1 ML/MIN/1.73
ERYTHROCYTE [DISTWIDTH] IN BLOOD BY AUTOMATED COUNT: 14 % (ref 12.3–15.4)
GLOBULIN UR ELPH-MCNC: 2.4 GM/DL
GLUCOSE SERPL-MCNC: 98 MG/DL (ref 65–99)
HCT VFR BLD AUTO: 34.2 % (ref 34–46.6)
HGB BLD-MCNC: 11.5 G/DL (ref 12–15.9)
INR PPP: 1.56 (ref 2–3)
MCH RBC QN AUTO: 31.5 PG (ref 26.6–33)
MCHC RBC AUTO-ENTMCNC: 33.7 G/DL (ref 31.5–35.7)
MCV RBC AUTO: 93.6 FL (ref 79–97)
PLATELET # BLD AUTO: 377 10*3/MM3 (ref 140–450)
PMV BLD AUTO: 7.6 FL (ref 6–12)
POTASSIUM SERPL-SCNC: 4.6 MMOL/L (ref 3.5–5.2)
PROT SERPL-MCNC: 5.6 G/DL (ref 6–8.5)
PROTHROMBIN TIME: 15.7 SECONDS (ref 19.4–28.5)
RBC # BLD AUTO: 3.65 10*6/MM3 (ref 3.77–5.28)
SODIUM SERPL-SCNC: 139 MMOL/L (ref 136–145)
WBC NRBC COR # BLD: 6.9 10*3/MM3 (ref 3.4–10.8)

## 2022-10-19 PROCEDURE — 36415 COLL VENOUS BLD VENIPUNCTURE: CPT | Performed by: STUDENT IN AN ORGANIZED HEALTH CARE EDUCATION/TRAINING PROGRAM

## 2022-10-19 PROCEDURE — 80053 COMPREHEN METABOLIC PANEL: CPT | Performed by: HOSPITALIST

## 2022-10-19 PROCEDURE — 85027 COMPLETE CBC AUTOMATED: CPT | Performed by: STUDENT IN AN ORGANIZED HEALTH CARE EDUCATION/TRAINING PROGRAM

## 2022-10-19 PROCEDURE — 85610 PROTHROMBIN TIME: CPT | Performed by: INTERNAL MEDICINE

## 2022-10-19 PROCEDURE — 25010000002 ENOXAPARIN PER 10 MG: Performed by: INTERNAL MEDICINE

## 2022-10-19 PROCEDURE — 99232 SBSQ HOSP IP/OBS MODERATE 35: CPT | Performed by: INTERNAL MEDICINE

## 2022-10-19 RX ADMIN — MIRABEGRON 50 MG: 25 TABLET, FILM COATED, EXTENDED RELEASE ORAL at 20:53

## 2022-10-19 RX ADMIN — PANTOPRAZOLE SODIUM 40 MG: 40 TABLET, DELAYED RELEASE ORAL at 07:49

## 2022-10-19 RX ADMIN — ACETAMINOPHEN 650 MG: 325 TABLET, FILM COATED ORAL at 20:52

## 2022-10-19 RX ADMIN — Medication 10 ML: at 07:49

## 2022-10-19 RX ADMIN — ENOXAPARIN SODIUM 80 MG: 100 INJECTION SUBCUTANEOUS at 04:39

## 2022-10-19 RX ADMIN — ACETAMINOPHEN 650 MG: 325 TABLET, FILM COATED ORAL at 02:01

## 2022-10-19 RX ADMIN — WARFARIN 5 MG: 5 TABLET ORAL at 17:56

## 2022-10-19 RX ADMIN — Medication 5 MG: at 20:53

## 2022-10-19 RX ADMIN — ENOXAPARIN SODIUM 80 MG: 100 INJECTION SUBCUTANEOUS at 17:55

## 2022-10-19 RX ADMIN — Medication 10 ML: at 20:57

## 2022-10-20 ENCOUNTER — READMISSION MANAGEMENT (OUTPATIENT)
Dept: CALL CENTER | Facility: HOSPITAL | Age: 77
End: 2022-10-20

## 2022-10-20 ENCOUNTER — TELEPHONE (OUTPATIENT)
Dept: FAMILY MEDICINE CLINIC | Facility: CLINIC | Age: 77
End: 2022-10-20

## 2022-10-20 VITALS
SYSTOLIC BLOOD PRESSURE: 117 MMHG | DIASTOLIC BLOOD PRESSURE: 60 MMHG | BODY MASS INDEX: 27.88 KG/M2 | HEART RATE: 92 BPM | TEMPERATURE: 98.1 F | OXYGEN SATURATION: 96 % | WEIGHT: 173.5 LBS | HEIGHT: 66 IN | RESPIRATION RATE: 18 BRPM

## 2022-10-20 LAB
ALBUMIN SERPL-MCNC: 3.2 G/DL (ref 3.5–5.2)
ALBUMIN/GLOB SERPL: 1.3 G/DL
ALP SERPL-CCNC: 82 U/L (ref 39–117)
ALT SERPL W P-5'-P-CCNC: 48 U/L (ref 1–33)
ANION GAP SERPL CALCULATED.3IONS-SCNC: 11 MMOL/L (ref 5–15)
AST SERPL-CCNC: 37 U/L (ref 1–32)
BILIRUB SERPL-MCNC: 0.6 MG/DL (ref 0–1.2)
BUN SERPL-MCNC: 11 MG/DL (ref 8–23)
BUN/CREAT SERPL: 22 (ref 7–25)
CALCIUM SPEC-SCNC: 8.7 MG/DL (ref 8.6–10.5)
CHLORIDE SERPL-SCNC: 101 MMOL/L (ref 98–107)
CO2 SERPL-SCNC: 26 MMOL/L (ref 22–29)
CREAT SERPL-MCNC: 0.5 MG/DL (ref 0.57–1)
DEPRECATED RDW RBC AUTO: 45.1 FL (ref 37–54)
EGFRCR SERPLBLD CKD-EPI 2021: 97.9 ML/MIN/1.73
ERYTHROCYTE [DISTWIDTH] IN BLOOD BY AUTOMATED COUNT: 14.1 % (ref 12.3–15.4)
GLOBULIN UR ELPH-MCNC: 2.4 GM/DL
GLUCOSE SERPL-MCNC: 93 MG/DL (ref 65–99)
HCT VFR BLD AUTO: 32.6 % (ref 34–46.6)
HGB BLD-MCNC: 11.6 G/DL (ref 12–15.9)
INR PPP: 1.92 (ref 2–3)
MCH RBC QN AUTO: 33.2 PG (ref 26.6–33)
MCHC RBC AUTO-ENTMCNC: 35.7 G/DL (ref 31.5–35.7)
MCV RBC AUTO: 93 FL (ref 79–97)
PLATELET # BLD AUTO: 339 10*3/MM3 (ref 140–450)
PMV BLD AUTO: 7.8 FL (ref 6–12)
POTASSIUM SERPL-SCNC: 4.1 MMOL/L (ref 3.5–5.2)
PROT SERPL-MCNC: 5.6 G/DL (ref 6–8.5)
PROTHROMBIN TIME: 19.1 SECONDS (ref 19.4–28.5)
RBC # BLD AUTO: 3.5 10*6/MM3 (ref 3.77–5.28)
SODIUM SERPL-SCNC: 138 MMOL/L (ref 136–145)
WBC NRBC COR # BLD: 5.9 10*3/MM3 (ref 3.4–10.8)

## 2022-10-20 PROCEDURE — 99232 SBSQ HOSP IP/OBS MODERATE 35: CPT | Performed by: INTERNAL MEDICINE

## 2022-10-20 PROCEDURE — 97116 GAIT TRAINING THERAPY: CPT

## 2022-10-20 PROCEDURE — 25010000002 ENOXAPARIN PER 10 MG: Performed by: INTERNAL MEDICINE

## 2022-10-20 PROCEDURE — 80053 COMPREHEN METABOLIC PANEL: CPT | Performed by: HOSPITALIST

## 2022-10-20 PROCEDURE — 97110 THERAPEUTIC EXERCISES: CPT

## 2022-10-20 PROCEDURE — 85610 PROTHROMBIN TIME: CPT | Performed by: INTERNAL MEDICINE

## 2022-10-20 PROCEDURE — 85027 COMPLETE CBC AUTOMATED: CPT | Performed by: STUDENT IN AN ORGANIZED HEALTH CARE EDUCATION/TRAINING PROGRAM

## 2022-10-20 RX ORDER — WARFARIN SODIUM 5 MG/1
5 TABLET ORAL NIGHTLY
Qty: 30 TABLET | Refills: 0 | Status: SHIPPED | OUTPATIENT
Start: 2022-10-20 | End: 2022-10-24

## 2022-10-20 RX ORDER — ENOXAPARIN SODIUM 100 MG/ML
80 INJECTION SUBCUTANEOUS EVERY 12 HOURS
Qty: 6.4 ML | Refills: 0 | Status: SHIPPED | OUTPATIENT
Start: 2022-10-20 | End: 2022-10-24

## 2022-10-20 RX ADMIN — Medication 10 ML: at 08:05

## 2022-10-20 RX ADMIN — ENOXAPARIN SODIUM 80 MG: 100 INJECTION SUBCUTANEOUS at 17:11

## 2022-10-20 RX ADMIN — WARFARIN 5 MG: 5 TABLET ORAL at 17:12

## 2022-10-20 RX ADMIN — PANTOPRAZOLE SODIUM 40 MG: 40 TABLET, DELAYED RELEASE ORAL at 08:05

## 2022-10-20 RX ADMIN — ENOXAPARIN SODIUM 80 MG: 100 INJECTION SUBCUTANEOUS at 04:59

## 2022-10-20 NOTE — TELEPHONE ENCOUNTER
Caller: [facility]     Relationship: [unfilled]     Best call back number: [phone]    What is your medical concern? [facility] IS CALLING TO FIND OUT ID IN THE LONGER TERM DR SCHULZ WOULD LIKE TO MANAGE PATIENT'S warfarin (COUMADIN) tablet 5 mg OR IF SHE WOULD LIKE  MEDICATION MANAGEMENT CLINIC TO DO THIS.     How long has this issue been going on? TODAY

## 2022-10-20 NOTE — TELEPHONE ENCOUNTER
I will manage her Coumadin.  Make sure the patient follow-ups with me upon hospital discharge.  Thank you

## 2022-10-20 NOTE — TELEPHONE ENCOUNTER
I spoke with Makayla [facility] and let her know Dr. Matute will take over managing her Coumadin. I did ask that they have the patient make a follow up appointment after hospital discharge.She stated she relayed the information to the .

## 2022-10-21 ENCOUNTER — HOME CARE VISIT (OUTPATIENT)
Dept: HOME HEALTH SERVICES | Facility: HOME HEALTHCARE | Age: 77
End: 2022-10-21

## 2022-10-21 ENCOUNTER — TRANSITIONAL CARE MANAGEMENT TELEPHONE ENCOUNTER (OUTPATIENT)
Dept: CALL CENTER | Facility: HOSPITAL | Age: 77
End: 2022-10-21

## 2022-10-21 ENCOUNTER — TELEPHONE (OUTPATIENT)
Dept: FAMILY MEDICINE CLINIC | Facility: CLINIC | Age: 77
End: 2022-10-21

## 2022-10-21 VITALS
RESPIRATION RATE: 17 BRPM | SYSTOLIC BLOOD PRESSURE: 108 MMHG | TEMPERATURE: 98 F | OXYGEN SATURATION: 96 % | DIASTOLIC BLOOD PRESSURE: 64 MMHG | HEART RATE: 92 BPM

## 2022-10-21 LAB
HH POC INTERNATIONAL NORMALIZATION RATIO: 2.8
HH POC PROTIME: 32 SECONDS

## 2022-10-21 PROCEDURE — G0299 HHS/HOSPICE OF RN EA 15 MIN: HCPCS

## 2022-10-21 NOTE — TELEPHONE ENCOUNTER
I called Fabi RN back at  home care and gave her Dr. Matute's instructions for the patient's care on blood thinners. She is going to call and let the patient and family know/ also she will have patient call and schedule a hospital follow up for in the office.

## 2022-10-21 NOTE — OUTREACH NOTE
Prep Survey    Flowsheet Row Responses   [facility] facility patient discharged from? [facility]   Is LACE score < 7 ? No   Emergency Room discharge w/ pulse ox? No   Eligibility [facility]   Date of Admission 10/13/22   Date of Discharge 10/20/22   Discharge Disposition Home or Self Care   Discharge diagnosis Pulmonary embolism    Does the patient have one of the following disease processes/diagnoses(primary or secondary)? Other   Does the patient have Home health ordered? Yes   What is the Home health agency?  [facility]   Is there a DME ordered? Yes   What DME was ordered? [facility]    Prep survey completed? Yes          VINCE CAMPBELL - Registered Nurse

## 2022-10-21 NOTE — TELEPHONE ENCOUNTER
Caller: [facility]    Relationship to patient: Home Health    Best call back number: [phone]    Patient is needing: PATIENT'S INR WAS 2.8, PLEASE ADVISE WHEN TO RECHECK.

## 2022-10-21 NOTE — HOME HEALTH
Pt was recently rehospitalized due to blood clot in her right leg. Pt was started on lovenox and coumadin and DC back home. INR drawn at this time and therapeutic. Pt to DC Lovenox after tonights dose and continue current does of coumadin. SN to recheck INR Monday with results to Dr. Matute. Pt reports feeling much better since being home and happy o be here. Pt denies a productive cough or sputum production but reports just feeling weak and tired. Pt agreeable with PT eval but does not think she needs any more OT at this time. Pt will continue to use [employer] monitor.       Primary focus of care: PNA    CP assess  INR as ordered  PNA assess with education  DVT monitoring with education  medication review

## 2022-10-21 NOTE — TELEPHONE ENCOUNTER
Patient was discharged from the hospital yesterday, her INR yesterday was 1.9, today her INR is therapeutic.  I believe she is on 5 mg of Coumadin and she may continue that.  Recheck INR on Monday and call us with the result for further recommendation.  She was discharged home with Lovenox.  I recommend to discontinue Lovenox as of tomorrow.  No more Lovenox as of tomorrow.  She also should be scheduling her hospital follow-up appointment with me down the road.  Thank you

## 2022-10-21 NOTE — OUTREACH NOTE
Call Center TCM Note    Flowsheet Row Responses   [facility] patient discharged from? [facility]   Does the patient have one of the following disease processes/diagnoses(primary or secondary)? Other   TCM attempt successful? Yes   Call start time 1542   Call end time 1543   Discharge diagnosis Pulmonary embolism    Does the patient have all medications ordered at discharge? Yes   Is the patient taking all medications as directed (includes completed medication regime)? Yes   Comments hospital f/u with PCP on 10/27   Does the patient have an appointment with their PCP within 7 days of discharge? Yes   What is the Home health agency?  [facility]   Has home health visited the patient within 72 hours of discharge? Yes   Psychosocial issues? No   Did the patient receive a copy of their discharge instructions? Yes   What is the patient's perception of their health status since discharge? Improving   Is the patient/caregiver able to teach back the hierarchy of who to call/visit for symptoms/problems? PCP, Specialist, Home health nurse, Urgent Care, ED, 911 Yes   TCM call completed? Yes   Wrap up additional comments Doing well, no questions, confirmed appt with PCP for 10/27.   Call end time 1543   Would this patient benefit from a Referral to Amb Social Work? No   Is the patient interested in additional calls from an ambulatory ?  NOTE:  applies to high risk patients requiring additional follow-up. No          Lidia Pena RN    10/21/2022, 15:44 EDT

## 2022-10-22 DIAGNOSIS — R15.9 INCONTINENCE OF FECES WITH FECAL URGENCY: ICD-10-CM

## 2022-10-22 DIAGNOSIS — R15.2 INCONTINENCE OF FECES WITH FECAL URGENCY: ICD-10-CM

## 2022-10-22 RX ORDER — MONTELUKAST SODIUM 4 MG/1
TABLET, CHEWABLE ORAL
Qty: 30 TABLET | Refills: 0 | Status: SHIPPED | OUTPATIENT
Start: 2022-10-22 | End: 2022-11-05

## 2022-10-23 RX ORDER — HYDROCHLOROTHIAZIDE 25 MG/1
TABLET ORAL
Qty: 30 TABLET | Refills: 0 | Status: SHIPPED | OUTPATIENT
Start: 2022-10-23 | End: 2022-10-27 | Stop reason: SDUPTHER

## 2022-10-24 ENCOUNTER — HOME CARE VISIT (OUTPATIENT)
Dept: HOME HEALTH SERVICES | Facility: HOME HEALTHCARE | Age: 77
End: 2022-10-24

## 2022-10-24 ENCOUNTER — TELEPHONE (OUTPATIENT)
Dept: FAMILY MEDICINE CLINIC | Facility: CLINIC | Age: 77
End: 2022-10-24

## 2022-10-24 VITALS
SYSTOLIC BLOOD PRESSURE: 115 MMHG | DIASTOLIC BLOOD PRESSURE: 65 MMHG | TEMPERATURE: 97.8 F | RESPIRATION RATE: 18 BRPM | OXYGEN SATURATION: 98 % | HEART RATE: 88 BPM

## 2022-10-24 LAB
HH POC INTERNATIONAL NORMALIZATION RATIO: 3.8
HH POC PROTIME: 46 SECONDS

## 2022-10-24 PROCEDURE — G0151 HHCP-SERV OF PT,EA 15 MIN: HCPCS

## 2022-10-24 PROCEDURE — G0299 HHS/HOSPICE OF RN EA 15 MIN: HCPCS

## 2022-10-24 RX ORDER — WARFARIN SODIUM 4 MG/1
4 TABLET ORAL NIGHTLY
Qty: 30 TABLET | Refills: 0 | Status: SHIPPED | OUTPATIENT
Start: 2022-10-24 | End: 2022-11-20

## 2022-10-24 NOTE — HOME HEALTH
Pt is a 78 y/o female pt of Dr. Matute.  Pt recently hospitalized for PNA and returned to hospital with RLE DVT.  Pt states her mobility is improving daily but she was feeling weak and fatigued due to DVT.  PT evaluated pt's mobility/balance in home and noted pt to ambulate independently without AD.  Pt with no SOA and SpO2/HR WNL with activity.  Pt demo ability to negotiate single steps in home with rail.  Pt scored 26/28 on Tinetti and was able to complete 6 min walk test without rest break.  Pt will require no further skilled home care PT at this time but was provided standing BLE HEP for daily completion.  Pt reports she will comply daily for continued strengthening.  Pt has returned to PLOF with no further skilled home care PT need.  Pt verbalized agreement with no continued PT

## 2022-10-24 NOTE — TELEPHONE ENCOUNTER
Her INR now is 3.8, this is elevated as ideally it should be between 2 and 3.  She currently takes 5 mg of Coumadin every day.  I will be decreasing the dose and I would like her to start taking 4 mg of Coumadin every day.  However tonight I want her to take only 2.5 mg.  Start taking 4 mg as of tomorrow.  She will probably need a new prescription for 4 mg.  Which pharmacy?  I believe she is scheduled to follow-up with me this week and we can recheck her INR at that time.  Please confirm with the patient.  Thank

## 2022-10-24 NOTE — TELEPHONE ENCOUNTER
Caller: HOME HEALTH    Best call back number:[phone]    Who are you requesting to speak with (clinical staff, provider,  specific staff member): CLINICAL    What was the call regarding: INR-3.8   PATIENT ALSO ATE SPINACH Saturday Sunday    Do you require a callback: IF NEEDED

## 2022-10-24 NOTE — TELEPHONE ENCOUNTER
Patient verbally understood. She uses [facility] on [address] next to the hospital

## 2022-10-25 VITALS
SYSTOLIC BLOOD PRESSURE: 126 MMHG | DIASTOLIC BLOOD PRESSURE: 77 MMHG | HEART RATE: 88 BPM | TEMPERATURE: 98.9 F | RESPIRATION RATE: 17 BRPM | OXYGEN SATURATION: 96 %

## 2022-10-27 ENCOUNTER — LAB (OUTPATIENT)
Dept: FAMILY MEDICINE CLINIC | Facility: CLINIC | Age: 77
End: 2022-10-27

## 2022-10-27 ENCOUNTER — HOME CARE VISIT (OUTPATIENT)
Dept: HOME HEALTH SERVICES | Facility: HOME HEALTHCARE | Age: 77
End: 2022-10-27

## 2022-10-27 ENCOUNTER — OFFICE VISIT (OUTPATIENT)
Dept: FAMILY MEDICINE CLINIC | Facility: CLINIC | Age: 77
End: 2022-10-27

## 2022-10-27 VITALS
WEIGHT: 176 LBS | HEIGHT: 66 IN | OXYGEN SATURATION: 95 % | BODY MASS INDEX: 28.28 KG/M2 | DIASTOLIC BLOOD PRESSURE: 74 MMHG | TEMPERATURE: 98.6 F | RESPIRATION RATE: 16 BRPM | SYSTOLIC BLOOD PRESSURE: 110 MMHG | HEART RATE: 90 BPM

## 2022-10-27 DIAGNOSIS — I10 ESSENTIAL HYPERTENSION: ICD-10-CM

## 2022-10-27 DIAGNOSIS — Z23 NEED FOR VACCINATION: ICD-10-CM

## 2022-10-27 DIAGNOSIS — I26.94 MULTIPLE SUBSEGMENTAL PULMONARY EMBOLI WITHOUT ACUTE COR PULMONALE: Primary | ICD-10-CM

## 2022-10-27 DIAGNOSIS — I82.4Y1 ACUTE DEEP VEIN THROMBOSIS (DVT) OF PROXIMAL VEIN OF RIGHT LOWER EXTREMITY: ICD-10-CM

## 2022-10-27 DIAGNOSIS — Z79.01 ENCOUNTER FOR MONITORING COUMADIN THERAPY: ICD-10-CM

## 2022-10-27 DIAGNOSIS — K21.9 GASTROESOPHAGEAL REFLUX DISEASE WITHOUT ESOPHAGITIS: ICD-10-CM

## 2022-10-27 DIAGNOSIS — Z51.81 ENCOUNTER FOR MONITORING COUMADIN THERAPY: ICD-10-CM

## 2022-10-27 DIAGNOSIS — I26.94 MULTIPLE SUBSEGMENTAL PULMONARY EMBOLI WITHOUT ACUTE COR PULMONALE: ICD-10-CM

## 2022-10-27 PROBLEM — R00.0 TACHYCARDIA: Status: RESOLVED | Noted: 2022-10-13 | Resolved: 2022-10-27

## 2022-10-27 PROBLEM — R06.02 SHORT OF BREATH ON EXERTION: Status: RESOLVED | Noted: 2022-10-13 | Resolved: 2022-10-27

## 2022-10-27 PROBLEM — I95.0 IDIOPATHIC HYPOTENSION: Status: RESOLVED | Noted: 2022-10-13 | Resolved: 2022-10-27

## 2022-10-27 PROBLEM — J18.9 MULTIFOCAL PNEUMONIA: Status: RESOLVED | Noted: 2022-10-01 | Resolved: 2022-10-27

## 2022-10-27 PROBLEM — J12.9 VIRAL PNEUMONIA: Status: RESOLVED | Noted: 2022-10-01 | Resolved: 2022-10-27

## 2022-10-27 LAB
ALBUMIN SERPL-MCNC: 3.9 G/DL (ref 3.5–5.2)
ALBUMIN/GLOB SERPL: 1.5 G/DL
ALP SERPL-CCNC: 88 U/L (ref 39–117)
ALT SERPL W P-5'-P-CCNC: 28 U/L (ref 1–33)
ANION GAP SERPL CALCULATED.3IONS-SCNC: 12 MMOL/L (ref 5–15)
AST SERPL-CCNC: 15 U/L (ref 1–32)
BASOPHILS # BLD AUTO: 0.1 10*3/MM3 (ref 0–0.2)
BASOPHILS NFR BLD AUTO: 0.9 % (ref 0–1.5)
BILIRUB SERPL-MCNC: 0.5 MG/DL (ref 0–1.2)
BUN SERPL-MCNC: 20 MG/DL (ref 8–23)
BUN/CREAT SERPL: 29.4 (ref 7–25)
CALCIUM SPEC-SCNC: 9.2 MG/DL (ref 8.6–10.5)
CHLORIDE SERPL-SCNC: 98 MMOL/L (ref 98–107)
CO2 SERPL-SCNC: 29 MMOL/L (ref 22–29)
CREAT SERPL-MCNC: 0.68 MG/DL (ref 0.57–1)
DEPRECATED RDW RBC AUTO: 45.9 FL (ref 37–54)
EGFRCR SERPLBLD CKD-EPI 2021: 91 ML/MIN/1.73
EOSINOPHIL # BLD AUTO: 0.1 10*3/MM3 (ref 0–0.4)
EOSINOPHIL NFR BLD AUTO: 1.6 % (ref 0.3–6.2)
ERYTHROCYTE [DISTWIDTH] IN BLOOD BY AUTOMATED COUNT: 14.4 % (ref 12.3–15.4)
GLOBULIN UR ELPH-MCNC: 2.6 GM/DL
GLUCOSE SERPL-MCNC: 76 MG/DL (ref 65–99)
HCT VFR BLD AUTO: 38.4 % (ref 34–46.6)
HGB BLD-MCNC: 12.8 G/DL (ref 12–15.9)
INR PPP: 3.33 (ref 2–3)
LYMPHOCYTES # BLD AUTO: 1.4 10*3/MM3 (ref 0.7–3.1)
LYMPHOCYTES NFR BLD AUTO: 24.1 % (ref 19.6–45.3)
MCH RBC QN AUTO: 31 PG (ref 26.6–33)
MCHC RBC AUTO-ENTMCNC: 33.2 G/DL (ref 31.5–35.7)
MCV RBC AUTO: 93.2 FL (ref 79–97)
MONOCYTES # BLD AUTO: 0.7 10*3/MM3 (ref 0.1–0.9)
MONOCYTES NFR BLD AUTO: 10.9 % (ref 5–12)
NEUTROPHILS NFR BLD AUTO: 3.8 10*3/MM3 (ref 1.7–7)
NEUTROPHILS NFR BLD AUTO: 62.5 % (ref 42.7–76)
NRBC BLD AUTO-RTO: 0.1 /100 WBC (ref 0–0.2)
PLATELET # BLD AUTO: 415 10*3/MM3 (ref 140–450)
PMV BLD AUTO: 8.2 FL (ref 6–12)
POTASSIUM SERPL-SCNC: 3.3 MMOL/L (ref 3.5–5.2)
PROT SERPL-MCNC: 6.5 G/DL (ref 6–8.5)
PROTHROMBIN TIME: 32 SECONDS (ref 19.4–28.5)
RBC # BLD AUTO: 4.12 10*6/MM3 (ref 3.77–5.28)
SODIUM SERPL-SCNC: 139 MMOL/L (ref 136–145)
WBC NRBC COR # BLD: 6 10*3/MM3 (ref 3.4–10.8)

## 2022-10-27 PROCEDURE — G0008 ADMIN INFLUENZA VIRUS VAC: HCPCS | Performed by: FAMILY MEDICINE

## 2022-10-27 PROCEDURE — 80053 COMPREHEN METABOLIC PANEL: CPT | Performed by: FAMILY MEDICINE

## 2022-10-27 PROCEDURE — 36415 COLL VENOUS BLD VENIPUNCTURE: CPT

## 2022-10-27 PROCEDURE — 90662 IIV NO PRSV INCREASED AG IM: CPT | Performed by: FAMILY MEDICINE

## 2022-10-27 PROCEDURE — 99214 OFFICE O/P EST MOD 30 MIN: CPT | Performed by: FAMILY MEDICINE

## 2022-10-27 PROCEDURE — 85610 PROTHROMBIN TIME: CPT | Performed by: FAMILY MEDICINE

## 2022-10-27 PROCEDURE — 85025 COMPLETE CBC W/AUTO DIFF WBC: CPT | Performed by: FAMILY MEDICINE

## 2022-10-27 RX ORDER — PANTOPRAZOLE SODIUM 40 MG/1
40 TABLET, DELAYED RELEASE ORAL DAILY
Qty: 90 TABLET | Refills: 1 | Status: SHIPPED | OUTPATIENT
Start: 2022-10-27 | End: 2023-02-27

## 2022-10-27 RX ORDER — POTASSIUM CHLORIDE 750 MG/1
10 TABLET, FILM COATED, EXTENDED RELEASE ORAL 2 TIMES DAILY
Qty: 60 TABLET | Refills: 0 | Status: SHIPPED | OUTPATIENT
Start: 2022-10-27 | End: 2022-11-22

## 2022-10-27 RX ORDER — HYDROCHLOROTHIAZIDE 25 MG/1
25 TABLET ORAL DAILY
Qty: 90 TABLET | Refills: 1 | Status: SHIPPED | OUTPATIENT
Start: 2022-10-27

## 2022-10-27 NOTE — PROGRESS NOTES
Subjective   Chief Complaint   Patient presents with   • Pulmonary Embolism     [facility] follow up   • Hypertension   • GI Problem   • Med Refill     Abida Becker is a 77 y.o. female.     Patient Care Team:  Darlene Matute MD as PCP - General  QuentinRolando MD as Consulting Physician (Urology)  Banet, Duane Edward, MD as Consulting Physician (Dermatology)    History of Present Illness  She is coming in today to follow-up on her recent hospital admission and her medical problems.  She was admitted to [facility] about 10 days ago due to exertional shortness of breath, hypotension, and hypoxemia.  Patient was found to have bilateral pulmonary embolism.  Cardiology and pulmonology was consulted.  First EKOS was considered, but considering patient's overall performance they decided not to proceed with the procedure.  She was on oxygen while in the hospital, but she was able to be weaned off oxygen upon discharge.  She was treated with IV heparin, then she was transferred to Coumadin.  She reports doing much better from that standpoint.  She still however feels somehow rundown, but definitely feels better.  No chest pains or difficulty breathing are being reported.  She currently is being treated for hypertension and some GI issues and needs some refills.  She is to have her INR checked today.  Patient tells me that about 10 years ago after her back surgery she had blood clot with pulmonary embolism and she stayed on Coumadin at that time.       The following portions of the patient's history were reviewed and updated as appropriate: allergies, current medications, past family history, past medical history, past social history, past surgical history and problem list.  Past Medical History:   Diagnosis Date   • GERD (gastroesophageal reflux disease)    • History of recurrent UTIs    • IBS (irritable bowel syndrome)    • Osteoarthritis    • Osteoporosis    • Primary osteoarthritis of both knees 01/24/2022   •  "Pulmonary embolism (HCC) 2011    after scoliosis surgery   • Pulmonary embolism (HCC) 10/2022   • Vitamin D deficiency      Past Surgical History:   Procedure Laterality Date   • BREAST BIOPSY     • CHOLECYSTECTOMY     • COLONOSCOPY  01/18/2018   • HIATAL HERNIA REPAIR  2013   • REIMPLANT URETER IN BLADDER     • SPINAL FUSION       The patient has a family history of  Family History   Problem Relation Age of Onset   • Heart failure Mother    • Cancer Father      Social History     Socioeconomic History   • Marital status: Single   Tobacco Use   • Smoking status: Never   • Smokeless tobacco: Never   Vaping Use   • Vaping Use: Never used   Substance and Sexual Activity   • Alcohol use: No   • Drug use: No   • Sexual activity: Never       Review of Systems   Constitutional: Negative for activity change, fatigue and fever.   Respiratory: Negative for shortness of breath and wheezing.    Cardiovascular: Negative for chest pain, palpitations and leg swelling.   Musculoskeletal: Negative for arthralgias and back pain.   Skin: Negative for rash.   Neurological: Negative for tremors and headache.     Visit Vitals  /74 (BP Location: Right arm, Patient Position: Sitting, Cuff Size: Adult)   Pulse 90   Temp 98.6 °F (37 °C) (Temporal)   Resp 16   Ht 167.6 cm (65.98\")   Wt 79.8 kg (176 lb)   SpO2 95%   BMI 28.42 kg/m²       Current Outpatient Medications:   •  acetaminophen (TYLENOL) 500 MG tablet, Take 1 tablet by mouth Every 6 (Six) Hours As Needed for Fever or Mild Pain., Disp: , Rfl:   •  calcium carbonate (OS-STEVEN) 600 MG tablet, Take 600 mg by mouth Daily., Disp: , Rfl:   •  cholecalciferol (VITAMIN D3) 1000 units tablet, Take 1,000 Units by mouth Daily., Disp: , Rfl:   •  colestipol (COLESTID) 1 g tablet, TAKE ONE TABLET BY MOUTH TWICE A DAY AS NEEDED, Disp: 30 tablet, Rfl: 0  •  dicyclomine (BENTYL) 10 MG capsule, TAKE ONE CAPSULE BY MOUTH THREE TIMES A DAY AS NEEDED FOR IBS, Disp: 90 capsule, Rfl: 0  •  " hydroCHLOROthiazide (HYDRODIURIL) 25 MG tablet, Take 1 tablet by mouth Daily., Disp: 90 tablet, Rfl: 1  •  melatonin 3 MG tablet, Take 3 mg by mouth Every Night., Disp: , Rfl:   •  Mirabegron ER (MYRBETRIQ) 50 MG tablet sustained-release 24 hour 24 hr tablet, Take 50 mg by mouth Daily., Disp: , Rfl:   •  pantoprazole (PROTONIX) 40 MG EC tablet, Take 1 tablet by mouth Daily., Disp: 90 tablet, Rfl: 1  •  Riboflavin (B2 PO), Take 1 tablet by mouth Daily., Disp: , Rfl:   •  vitamin B-12 (CYANOCOBALAMIN) 1000 MCG tablet, Take 1,000 mcg by mouth Daily., Disp: , Rfl:   •  warfarin (Coumadin) 4 MG tablet, Take 1 tablet by mouth Every Night., Disp: 30 tablet, Rfl: 0    Objective   Physical Exam  Vitals and nursing note reviewed.   Constitutional:       General: She is not in acute distress.     Appearance: Normal appearance. She is well-developed. She is not ill-appearing.   HENT:      Head: Normocephalic and atraumatic.   Cardiovascular:      Rate and Rhythm: Normal rate and regular rhythm.      Heart sounds: Normal heart sounds. No murmur heard.    No gallop.   Pulmonary:      Effort: Pulmonary effort is normal. No respiratory distress.      Breath sounds: Normal breath sounds. No wheezing, rhonchi or rales.   Chest:      Chest wall: No tenderness.   Musculoskeletal:      Cervical back: Normal range of motion and neck supple.   Neurological:      General: No focal deficit present.      Mental Status: She is alert and oriented to person, place, and time. Mental status is at baseline.   Psychiatric:         Mood and Affect: Mood normal.         XR Chest 2 View    Result Date: 10/10/2022  Impression:  1. No acute disease in the chest  Electronically Signed By-Albert Santos MD On:10/10/2022 10:09 PM This report was finalized on [id number] by  Albert Santos MD.    XR Chest 2 View    Result Date: 9/30/2022  Impression: 1. Questionable noncalcified bilateral lung nodules. CT chest without contrast is  recommended. 2. Faint reticular densities in the right mid to lower lung zone may represent atypical interstitial pneumonia.  Electronically Signed By-Keturah Wylie MD On:9/30/2022 4:42 PM This report was finalized on [id number] by  Keturah Wylie MD.    XR Tibia Fibula 2 View Right    Result Date: 10/14/2022  Impression: Negative tibial fibula radiograph.  Electronically Signed By-Nick Toro MD On:10/14/2022 1:57 PM This report was finalized on [id number] by  Nick Toro MD.    CT Chest Without Contrast Diagnostic    Result Date: 10/1/2022  Impression: Multifocal pneumonia. Electronically signed by:  Thiago Mercado D.O.  10/1/2022 3:01 AM    XR Chest 1 View    Result Date: 10/13/2022  Impression:  1. Small infiltrates in the lung bases bilaterally slightly more prominent than on previous study  Electronically Signed By-Albert Santos MD On:10/13/2022 3:59 PM This report was finalized on [id number] by  Albert Santos MD.    CT Angiogram Chest Pulmonary Embolism    Addendum Date: 10/13/2022    Reevaluation of CT the chest with contrast PE protocol performed on October 13, 2022 reveals that the right ventricle slightly larger than the left ventricle. The right ventricle/left ventricle ratio is approximately 4/3. I see no evidence of shifting of the interventricular septum. The thrombi in the right and left pulmonary artery branches do not appear to cause complete occlusion or filling of the right and left pulmonary artery branches. There does appear to be blood flow going around the right left pulmonary artery branches  thrombi. End of addendum  KRGMD  Electronically Signed By-Albert Santos MD On:10/13/2022 8:22 PM This report was finalized on [id number] by  Albert Santos MD.    Result Date: 10/13/2022  Impression:  1. Moderate-large abnormal pulmonary emboli in the distal end of the right main pulmonary artery extending into the right pulmonary artery branches leading  to the right upper lobe along and right middle lobe along and right lower lobe along. Moderate abnormal pulmonary emboli in the left pulmonary artery branches leading to the left lower lobe along 2. Small atelectatic consolidating pulmonary infiltrate in the posterior lateral inferior right middle lobe along and small areas of mild infiltrate in the right lower lobe and left lower lobe along.  Electronically Signed By-Albert Santos MD On:10/13/2022 7:11 PM This report was finalized on [id number] by  Albert Santos MD.    FOLLOWING LABS WERE REVIEWED TODAY:  CMP    CMP 10/17/22 10/19/22 10/20/22   Glucose 89 98 93   BUN 10 11 11   Creatinine 0.47 (A) 0.54 (A) 0.50 (A)   Sodium 140 139 138   Potassium 4.2 4.6 4.1   Chloride 105 103 101   Calcium 8.8 8.7 8.7   Albumin 2.90 (A) 3.20 (A) 3.20 (A)   Total Bilirubin 0.5 0.5 0.6   Alkaline Phosphatase 63 81 82   AST (SGOT) 20 41 (A) 37 (A)   ALT (SGPT) 14 42 (A) 48 (A)   (A) Abnormal value       Comments are available for some flowsheets but are not being displayed.           CBC    CBC 10/18/22 10/19/22 10/20/22   WBC 6.30 6.90 5.90   RBC 3.81 3.65 (A) 3.50 (A)   Hemoglobin 11.3 (A) 11.5 (A) 11.6 (A)   Hematocrit 35.3 34.2 32.6 (A)   MCV 92.6 93.6 93.0   MCH 29.7 31.5 33.2 (A)   MCHC 32.1 33.7 35.7   RDW 13.9 14.0 14.1   Platelets 367 377 339   (A) Abnormal value            TSH    TSH 10/10/22 10/13/22   TSH 0.841 0.624                 Assessment & Plan   Diagnoses and all orders for this visit:    1. Multiple subsegmental pulmonary emboli without acute cor pulmonale (HCC) (Primary)  -     Protime-INR; Standing  -     Ambulatory Referral to Hematology / Oncology    2. Acute deep vein thrombosis (DVT) of proximal vein of right lower extremity (HCC)  -     Ambulatory Referral to Hematology / Oncology    3. Essential hypertension  -     CBC Auto Differential  -     Comprehensive Metabolic Panel  -     hydroCHLOROthiazide (HYDRODIURIL) 25 MG tablet; Take 1  tablet by mouth Daily.  Dispense: 90 tablet; Refill: 1    4. Gastroesophageal reflux disease without esophagitis  -     pantoprazole (PROTONIX) 40 MG EC tablet; Take 1 tablet by mouth Daily.  Dispense: 90 tablet; Refill: 1    5. Encounter for monitoring Coumadin therapy  -     Protime-INR; Standing    6. Need for vaccination  -     Fluzone High-Dose 65+yrs ([id number])      I reviewed her available hospital records.  CT of the chest showed moderate-large bilateral pulmonary embolism.  Venous Doppler ultrasound showed acute right lower extremity DVT in the peroneal vein, it showed acute left lower extremity superficial thrombophlebitis in the great saphenous vein.  Patient was on heparin drip and then switched to oral Coumadin.  I will be checking her INR and also do follow-up blood work today.  Patient previously had pulmonary embolism about 10 years ago after orthopedic surgery.  I will be referring her to see a hematologist for evaluation, however I discussed with the patient that considering to occurrences of pulmonary embolism she will need to stay on the blood thinner for lifetime.  Patient is concerned about a family members and wonders if her clotting possibly has some genetic component.  Hopefully this will be able to be answered by the hematologist.  Medication refill was given.        Return in about 6 months (around 4/27/2023) for Next scheduled follow up.    Requested Prescriptions     Signed Prescriptions Disp Refills   • hydroCHLOROthiazide (HYDRODIURIL) 25 MG tablet 90 tablet 1     Sig: Take 1 tablet by mouth Daily.   • pantoprazole (PROTONIX) 40 MG EC tablet 90 tablet 1     Sig: Take 1 tablet by mouth Daily.

## 2022-10-28 ENCOUNTER — HOME CARE VISIT (OUTPATIENT)
Dept: HOME HEALTH SERVICES | Facility: HOME HEALTHCARE | Age: 77
End: 2022-10-28

## 2022-10-28 PROCEDURE — G0299 HHS/HOSPICE OF RN EA 15 MIN: HCPCS

## 2022-10-30 NOTE — HOME HEALTH
Patient reports that she is feeing much better. States that she is able to go up and down the stairs without issue. Patient is also able to drive to her appointments. Reports no SOB, but does have low endurance.     Plan for next visit:  CP assess  falls/safety assess  medication education  assess energy/level  assess for INR order

## 2022-10-31 ENCOUNTER — HOME CARE VISIT (OUTPATIENT)
Dept: HOME HEALTH SERVICES | Facility: HOME HEALTHCARE | Age: 77
End: 2022-10-31

## 2022-11-01 ENCOUNTER — LAB (OUTPATIENT)
Dept: LAB | Facility: HOSPITAL | Age: 77
End: 2022-11-01

## 2022-11-01 ENCOUNTER — CONSULT (OUTPATIENT)
Dept: ONCOLOGY | Facility: CLINIC | Age: 77
End: 2022-11-01

## 2022-11-01 VITALS
HEIGHT: 66 IN | RESPIRATION RATE: 18 BRPM | TEMPERATURE: 97.4 F | BODY MASS INDEX: 28.28 KG/M2 | WEIGHT: 176 LBS | HEART RATE: 95 BPM | SYSTOLIC BLOOD PRESSURE: 110 MMHG | OXYGEN SATURATION: 95 % | DIASTOLIC BLOOD PRESSURE: 71 MMHG

## 2022-11-01 DIAGNOSIS — E55.9 VITAMIN D DEFICIENCY: ICD-10-CM

## 2022-11-01 DIAGNOSIS — I26.94 MULTIPLE SUBSEGMENTAL PULMONARY EMBOLI WITHOUT ACUTE COR PULMONALE: Primary | ICD-10-CM

## 2022-11-01 DIAGNOSIS — Z86.718 PERSONAL HISTORY OF OTHER VENOUS THROMBOSIS AND EMBOLISM: ICD-10-CM

## 2022-11-01 LAB
BASOPHILS # BLD AUTO: 0.03 10*3/MM3 (ref 0–0.2)
BASOPHILS NFR BLD AUTO: 0.4 % (ref 0–1.5)
DEPRECATED RDW RBC AUTO: 47.5 FL (ref 37–54)
EOSINOPHIL # BLD AUTO: 0.15 10*3/MM3 (ref 0–0.4)
EOSINOPHIL NFR BLD AUTO: 1.8 % (ref 0.3–6.2)
ERYTHROCYTE [DISTWIDTH] IN BLOOD BY AUTOMATED COUNT: 14.3 % (ref 12.3–15.4)
HCT VFR BLD AUTO: 40.2 % (ref 34–46.6)
HGB BLD-MCNC: 13 G/DL (ref 12–15.9)
LYMPHOCYTES # BLD AUTO: 1.87 10*3/MM3 (ref 0.7–3.1)
LYMPHOCYTES NFR BLD AUTO: 22.9 % (ref 19.6–45.3)
MCH RBC QN AUTO: 30.2 PG (ref 26.6–33)
MCHC RBC AUTO-ENTMCNC: 32.3 G/DL (ref 31.5–35.7)
MCV RBC AUTO: 93.5 FL (ref 79–97)
MONOCYTES # BLD AUTO: 0.76 10*3/MM3 (ref 0.1–0.9)
MONOCYTES NFR BLD AUTO: 9.3 % (ref 5–12)
NEUTROPHILS NFR BLD AUTO: 5.35 10*3/MM3 (ref 1.7–7)
NEUTROPHILS NFR BLD AUTO: 65.6 % (ref 42.7–76)
PLATELET # BLD AUTO: 276 10*3/MM3 (ref 140–450)
PMV BLD AUTO: 10.3 FL (ref 6–12)
RBC # BLD AUTO: 4.3 10*6/MM3 (ref 3.77–5.28)
WBC NRBC COR # BLD: 8.16 10*3/MM3 (ref 3.4–10.8)

## 2022-11-01 PROCEDURE — 85025 COMPLETE CBC W/AUTO DIFF WBC: CPT | Performed by: INTERNAL MEDICINE

## 2022-11-01 PROCEDURE — 99204 OFFICE O/P NEW MOD 45 MIN: CPT | Performed by: INTERNAL MEDICINE

## 2022-11-01 RX ORDER — SULFAMETHOXAZOLE AND TRIMETHOPRIM 800; 160 MG/1; MG/1
TABLET ORAL
COMMUNITY
Start: 2022-10-28 | End: 2022-11-20

## 2022-11-02 ENCOUNTER — NURSE TRIAGE (OUTPATIENT)
Dept: CALL CENTER | Facility: HOSPITAL | Age: 77
End: 2022-11-02

## 2022-11-02 NOTE — TELEPHONE ENCOUNTER
"    Reason for Disposition  • [1] Follow-up call to recent contact AND [2] information only call, no triage required    Additional Information  • Negative: [1] Caller is not with the adult (patient) AND [2] reporting urgent symptoms  • Negative: Lab result questions  • Negative: Medication questions  • Negative: Caller can't be reached by phone  • Negative: Caller has already spoken to PCP or another triager  • Negative: RN needs further essential information from caller in order to complete triage  • Negative: Requesting regular office appointment  • Negative: [1] Caller requesting NON-URGENT health information AND [2] PCP's office is the best resource  • Negative: Health Information question, no triage required and triager able to answer question  • Negative: General information question, no triage required and triager able to answer question  • Negative: Question about upcoming scheduled test, no triage required and triager able to answer question  • Negative: [1] Caller is not with the adult (patient) AND [2] probable NON-URGENT symptoms    Answer Assessment - Initial Assessment Questions  1. REASON FOR CALL or QUESTION: \"What is your reason for calling today?\" or \"How can I best help you?\" or \"What question do you have that I can help answer?\"      HH number    Protocols used: INFORMATION ONLY CALL - NO TRIAGE-ADULT-      "

## 2022-11-03 ENCOUNTER — PATIENT ROUNDING (BHMG ONLY) (OUTPATIENT)
Dept: ONCOLOGY | Facility: CLINIC | Age: 77
End: 2022-11-03

## 2022-11-03 ENCOUNTER — HOME CARE VISIT (OUTPATIENT)
Dept: HOME HEALTH SERVICES | Facility: HOME HEALTHCARE | Age: 77
End: 2022-11-03

## 2022-11-03 ENCOUNTER — TELEPHONE (OUTPATIENT)
Dept: FAMILY MEDICINE CLINIC | Facility: CLINIC | Age: 77
End: 2022-11-03

## 2022-11-03 VITALS
TEMPERATURE: 97.5 F | RESPIRATION RATE: 17 BRPM | DIASTOLIC BLOOD PRESSURE: 69 MMHG | OXYGEN SATURATION: 97 % | SYSTOLIC BLOOD PRESSURE: 118 MMHG | HEART RATE: 82 BPM

## 2022-11-03 LAB
HH POC INTERNATIONAL NORMALIZATION RATIO: 2
HH POC PROTIME: 24.2 SECONDS

## 2022-11-03 PROCEDURE — G0299 HHS/HOSPICE OF RN EA 15 MIN: HCPCS

## 2022-11-03 NOTE — TELEPHONE ENCOUNTER
FARIDEH FROM  HOME HEALTH CALLED. SHE IS REPORTING THE PATIENTS INR.  INR RESULTS: 2.0    SHE IS TAKING 4MG DAILY OF CUMIDIN, EATS DARK LEAFY GREENS 3X A WEEK, CONSISTENTLY

## 2022-11-03 NOTE — TELEPHONE ENCOUNTER
Her INR is on the lower side of desired range.  For now I recommend to continue the same and closely monitor and recheck INR again in 1-2 weeks.  Thank you.

## 2022-11-03 NOTE — PROGRESS NOTES
November 3, 2022    Hello, may I speak with Abida Becker?    My name is Carmelina Dobbins      I am  with [facility] [address] [place] [address] [phone].    Before we get started may I verify your date of birth? 1945    I am calling to officially welcome you to our practice and ask about your recent visit. Is this a good time to talk? no    Tell me about your visit with us. What things went well?  A My Chart message was sent to the patient.       We're always looking for ways to make our patients' experiences even better. Do you have recommendations on ways we may improve?  no    Overall were you satisfied with your first visit to our practice? yes       I appreciate you taking the time to speak with me today. Is there anything else I can do for you? no      Thank you, and have a great day.

## 2022-11-04 NOTE — HOME HEALTH
plan for next visit:  CP assess  falls/safety assess  medication teaching- coumadin  INR as ordered  assess for any new SOB

## 2022-11-05 DIAGNOSIS — R15.9 INCONTINENCE OF FECES WITH FECAL URGENCY: ICD-10-CM

## 2022-11-05 DIAGNOSIS — R15.2 INCONTINENCE OF FECES WITH FECAL URGENCY: ICD-10-CM

## 2022-11-05 RX ORDER — MONTELUKAST SODIUM 4 MG/1
TABLET, CHEWABLE ORAL
Qty: 60 TABLET | Refills: 0 | Status: SHIPPED | OUTPATIENT
Start: 2022-11-05 | End: 2022-12-15

## 2022-11-10 ENCOUNTER — HOME CARE VISIT (OUTPATIENT)
Dept: HOME HEALTH SERVICES | Facility: HOME HEALTHCARE | Age: 77
End: 2022-11-10

## 2022-11-10 ENCOUNTER — TELEPHONE (OUTPATIENT)
Dept: FAMILY MEDICINE CLINIC | Facility: CLINIC | Age: 77
End: 2022-11-10

## 2022-11-10 VITALS
TEMPERATURE: 97.9 F | SYSTOLIC BLOOD PRESSURE: 128 MMHG | RESPIRATION RATE: 16 BRPM | HEART RATE: 78 BPM | DIASTOLIC BLOOD PRESSURE: 74 MMHG | OXYGEN SATURATION: 97 %

## 2022-11-10 LAB
HH POC INTERNATIONAL NORMALIZATION RATIO: 2
HH POC PROTIME: 23.9 SECONDS

## 2022-11-10 PROCEDURE — G0299 HHS/HOSPICE OF RN EA 15 MIN: HCPCS

## 2022-11-10 NOTE — TELEPHONE ENCOUNTER
Patient's INR is still on the lower side of normal, she currently takes Coumadin 4 mg once a day.  I recommend to continue Coumadin 4 mg daily but Monday and Thursday when I recommend to take 5 mg.  Please make sure that patient understands directions correctly as I am adjusting the dose of Coumadin.  Recheck INR in 2 weeks.  Thank you.

## 2022-11-10 NOTE — HOME HEALTH
Vital signs stable. INR 2.0; results called into MD office. Education provided about INR result; patient verbilized understanding. Appetite well. No issues with elimination. Patient denies falls and pain. Medications reviewed.     Cp assess  INR   vital signs

## 2022-11-10 NOTE — TELEPHONE ENCOUNTER
I was able to recall patient and give her the coumadin dosage changes. She verbally understood and is aware home health nurse will re check in 2 weeks

## 2022-11-17 ENCOUNTER — HOME CARE VISIT (OUTPATIENT)
Dept: HOME HEALTH SERVICES | Facility: HOME HEALTHCARE | Age: 77
End: 2022-11-17

## 2022-11-20 RX ORDER — WARFARIN SODIUM 4 MG/1
TABLET ORAL
Qty: 30 TABLET | Refills: 0 | Status: SHIPPED | OUTPATIENT
Start: 2022-11-20 | End: 2022-12-19

## 2022-11-21 ENCOUNTER — TELEPHONE (OUTPATIENT)
Dept: FAMILY MEDICINE CLINIC | Facility: CLINIC | Age: 77
End: 2022-11-21

## 2022-11-21 ENCOUNTER — HOME CARE VISIT (OUTPATIENT)
Dept: HOME HEALTH SERVICES | Facility: HOME HEALTHCARE | Age: 77
End: 2022-11-21

## 2022-11-21 ENCOUNTER — TELEPHONE (OUTPATIENT)
Dept: ORTHOPEDIC SURGERY | Facility: CLINIC | Age: 77
End: 2022-11-21

## 2022-11-21 VITALS
SYSTOLIC BLOOD PRESSURE: 116 MMHG | DIASTOLIC BLOOD PRESSURE: 77 MMHG | HEART RATE: 65 BPM | TEMPERATURE: 97.9 F | RESPIRATION RATE: 18 BRPM | OXYGEN SATURATION: 98 %

## 2022-11-21 LAB
HH POC INTERNATIONAL NORMALIZATION RATIO: 3.4
HH POC PROTIME: 38.3 SECONDS

## 2022-11-21 PROCEDURE — G0299 HHS/HOSPICE OF RN EA 15 MIN: HCPCS

## 2022-11-21 RX ORDER — GABAPENTIN 300 MG/1
CAPSULE ORAL
Qty: 180 CAPSULE | Refills: 0 | Status: SHIPPED | OUTPATIENT
Start: 2022-11-21 | End: 2023-02-26

## 2022-11-21 NOTE — TELEPHONE ENCOUNTER
Caller: FARIDEH    Relationship to patient: NURSE    Best call back number: [phone]    Patient is needing: INR UPDATE WAS 3.4  SHES BEEN TAKING 5MG TWICE A WEEK AND THEN 4MG THE OTHER DAYS.

## 2022-11-21 NOTE — TELEPHONE ENCOUNTER
Patient's INR is now elevated.  I recently slightly increase her Coumadin and she now alternates 4 mg and 5 mg.  I recommend to decrease the Coumadin and start taking 4 mg every day from now on.  Recheck INR in 2 weeks.  Thank you.

## 2022-11-21 NOTE — TELEPHONE ENCOUNTER
Caller: LIZA GRAVES    Relationship to patient: SELF    Best call back number: [phone]    Chief complaint: BILATERAL KNEE PAIN    Type of visit: INJECTION    Requested date: ASAP       Additional notes: CURRENTLY HAS FOLLOW UP APPT SCHEDULED FOR 11/30/2022

## 2022-11-22 RX ORDER — POTASSIUM CHLORIDE 750 MG/1
TABLET, FILM COATED, EXTENDED RELEASE ORAL
Qty: 60 TABLET | Refills: 0 | Status: SHIPPED | OUTPATIENT
Start: 2022-11-22 | End: 2022-12-26

## 2022-11-22 NOTE — HOME HEALTH
plan for next visit:  CP assess  falls/safety assess  INR due on 12/5  assess for swallowing issues  assess for any new cough or SOB

## 2022-11-23 NOTE — TELEPHONE ENCOUNTER
PATIENT CALLED IN ABOUT HER INR. I ADVISED HER OF THE MESSAGE FROM DR SCHULZ, PATIENT EXPRESSED UNDERSTANDING. SHE IS SCHEDULED FOR INR RECHECK ON 12/8/22 @ 9:45AM.

## 2022-11-30 ENCOUNTER — HOME CARE VISIT (OUTPATIENT)
Dept: HOME HEALTH SERVICES | Facility: HOME HEALTHCARE | Age: 77
End: 2022-11-30

## 2022-12-01 ENCOUNTER — HOME CARE VISIT (OUTPATIENT)
Dept: HOME HEALTH SERVICES | Facility: HOME HEALTHCARE | Age: 77
End: 2022-12-01

## 2022-12-02 ENCOUNTER — TELEPHONE (OUTPATIENT)
Dept: FAMILY MEDICINE CLINIC | Facility: CLINIC | Age: 77
End: 2022-12-02

## 2022-12-05 ENCOUNTER — CLINICAL SUPPORT (OUTPATIENT)
Dept: ORTHOPEDIC SURGERY | Facility: CLINIC | Age: 77
End: 2022-12-05

## 2022-12-05 VITALS
DIASTOLIC BLOOD PRESSURE: 72 MMHG | OXYGEN SATURATION: 96 % | SYSTOLIC BLOOD PRESSURE: 120 MMHG | HEIGHT: 66 IN | BODY MASS INDEX: 28.28 KG/M2 | WEIGHT: 176 LBS

## 2022-12-05 DIAGNOSIS — M81.0 AGE-RELATED OSTEOPOROSIS WITHOUT CURRENT PATHOLOGICAL FRACTURE: Primary | ICD-10-CM

## 2022-12-05 PROCEDURE — 96372 THER/PROPH/DIAG INJ SC/IM: CPT | Performed by: PHYSICIAN ASSISTANT

## 2022-12-07 NOTE — PROGRESS NOTES
I spoke to Kristy Moe at the cancer center and the process of ordering prolia is changing as of 1/1/2023.  She will be calling back with our new process to schedule.

## 2022-12-08 ENCOUNTER — TELEPHONE (OUTPATIENT)
Dept: FAMILY MEDICINE CLINIC | Facility: CLINIC | Age: 77
End: 2022-12-08

## 2022-12-08 ENCOUNTER — LAB (OUTPATIENT)
Dept: FAMILY MEDICINE CLINIC | Facility: CLINIC | Age: 77
End: 2022-12-08

## 2022-12-08 DIAGNOSIS — I26.94 MULTIPLE SUBSEGMENTAL PULMONARY EMBOLI WITHOUT ACUTE COR PULMONALE: ICD-10-CM

## 2022-12-08 DIAGNOSIS — Z51.81 ENCOUNTER FOR MONITORING COUMADIN THERAPY: ICD-10-CM

## 2022-12-08 DIAGNOSIS — Z79.01 ENCOUNTER FOR MONITORING COUMADIN THERAPY: ICD-10-CM

## 2022-12-08 LAB
INR PPP: 2.7 (ref 2–3)
PROTHROMBIN TIME: 26.3 SECONDS (ref 19.4–28.5)

## 2022-12-08 PROCEDURE — 36415 COLL VENOUS BLD VENIPUNCTURE: CPT

## 2022-12-08 PROCEDURE — 85610 PROTHROMBIN TIME: CPT | Performed by: FAMILY MEDICINE

## 2022-12-08 NOTE — TELEPHONE ENCOUNTER
I recommend for the patient to contact osteoporosis clinic at [facility] to see if they have any recommendation.  Maybe they will have someone else in the office to do the same.  Thank you.

## 2022-12-08 NOTE — TELEPHONE ENCOUNTER
PATIENT HAD HER FIRST PROLIA INJECTION WITH SHIMA EZEKIEL 12/5/22. SHE WILL NOT BE DUE FOR HER NEXT UNTIL 6/6/23 AT THE EARLIEST. SHE IS ASKING WHO SHE WILL SEE AFTER THIS YEAR, SINCE SHIMA IS LEAVING THE PRACTICE.  PLEASE ADVISE.

## 2022-12-09 ENCOUNTER — OFFICE VISIT (OUTPATIENT)
Dept: ORTHOPEDIC SURGERY | Facility: CLINIC | Age: 77
End: 2022-12-09

## 2022-12-09 VITALS — WEIGHT: 176 LBS | BODY MASS INDEX: 28.28 KG/M2 | OXYGEN SATURATION: 96 % | HEIGHT: 66 IN | HEART RATE: 76 BPM

## 2022-12-09 DIAGNOSIS — M17.0 PRIMARY OSTEOARTHRITIS OF BOTH KNEES: Primary | ICD-10-CM

## 2022-12-09 DIAGNOSIS — E66.3 OVERWEIGHT (BMI 25.0-29.9): ICD-10-CM

## 2022-12-09 PROCEDURE — 99213 OFFICE O/P EST LOW 20 MIN: CPT | Performed by: PHYSICIAN ASSISTANT

## 2022-12-09 PROCEDURE — 20610 DRAIN/INJ JOINT/BURSA W/O US: CPT | Performed by: PHYSICIAN ASSISTANT

## 2022-12-09 RX ORDER — TRIAMCINOLONE ACETONIDE 40 MG/ML
80 INJECTION, SUSPENSION INTRA-ARTICULAR; INTRAMUSCULAR
Status: COMPLETED | OUTPATIENT
Start: 2022-12-09 | End: 2022-12-09

## 2022-12-09 RX ORDER — LIDOCAINE HYDROCHLORIDE 10 MG/ML
2 INJECTION, SOLUTION EPIDURAL; INFILTRATION; INTRACAUDAL; PERINEURAL
Status: COMPLETED | OUTPATIENT
Start: 2022-12-09 | End: 2022-12-09

## 2022-12-09 RX ADMIN — LIDOCAINE HYDROCHLORIDE 2 ML: 10 INJECTION, SOLUTION EPIDURAL; INFILTRATION; INTRACAUDAL; PERINEURAL at 09:48

## 2022-12-09 RX ADMIN — TRIAMCINOLONE ACETONIDE 80 MG: 40 INJECTION, SUSPENSION INTRA-ARTICULAR; INTRAMUSCULAR at 09:48

## 2022-12-09 NOTE — PROGRESS NOTES
ORTHO FOLLOW UP       Subjective:    HPI:   Abida Becker is a 77 y.o. female who presents in follow-up for bilateral knee pain with a known history of bilateral knee DJD.  Her last intra-articular steroid injections were on 8/29/2022, which did help to reduce her pain, but did not seem to work as well as previously.  She did last have Depo-Medrol when she has previously had Kenalog.  She does report a recent increase in her knee pain and is now using a cane.      Past Medical History:   Diagnosis Date   • GERD (gastroesophageal reflux disease)    • History of recurrent UTIs    • IBS (irritable bowel syndrome)    • Osteoarthritis    • Osteoporosis     on prolia(12/5/22)   • Primary osteoarthritis of both knees 01/24/2022   • Pulmonary embolism (HCC) 2011    after scoliosis surgery   • Pulmonary embolism (HCC) 10/2022   • Vitamin D deficiency        Past Surgical History:   Procedure Laterality Date   • BREAST BIOPSY     • CHOLECYSTECTOMY     • COLONOSCOPY  01/18/2018   • HIATAL HERNIA REPAIR  2013   • REIMPLANT URETER IN BLADDER     • SPINAL FUSION         Social History     Occupational History   • Not on file   Tobacco Use   • Smoking status: Never   • Smokeless tobacco: Never   Vaping Use   • Vaping Use: Never used   Substance and Sexual Activity   • Alcohol use: No   • Drug use: No   • Sexual activity: Never      The following portions of the patient's history were reviewed and updated as appropriate: allergies, current medications, past family history, past medical history, past social history, past surgical history and problem list.    Medications:    Current Outpatient Medications:   •  acetaminophen (TYLENOL) 500 MG tablet, Take 1 tablet by mouth Every 6 (Six) Hours As Needed for Fever or Mild Pain., Disp: , Rfl:   •  calcium carbonate (OS-STEVEN) 600 MG tablet, Take 600 mg by mouth Daily., Disp: , Rfl:   •  cholecalciferol (VITAMIN D3) 1000 units tablet, Take 1,000 Units by mouth Daily., Disp: , Rfl:   •   "colestipol (COLESTID) 1 g tablet, TAKE ONE TABLET BY MOUTH TWICE A DAY AS NEEDED, Disp: 60 tablet, Rfl: 0  •  dicyclomine (BENTYL) 10 MG capsule, TAKE ONE CAPSULE BY MOUTH THREE TIMES A DAY AS NEEDED FOR IBS, Disp: 90 capsule, Rfl: 0  •  gabapentin (NEURONTIN) 300 MG capsule, TAKE ONE CAPSULE BY MOUTH TWICE A DAY, Disp: 180 capsule, Rfl: 0  •  GABAPENTIN PO, Take  by mouth., Disp: , Rfl:   •  hydroCHLOROthiazide (HYDRODIURIL) 25 MG tablet, Take 1 tablet by mouth Daily., Disp: 90 tablet, Rfl: 1  •  melatonin 3 MG tablet, Take 3 mg by mouth Every Night., Disp: , Rfl:   •  Mirabegron ER (MYRBETRIQ) 50 MG tablet sustained-release 24 hour 24 hr tablet, Take 50 mg by mouth Daily., Disp: , Rfl:   •  pantoprazole (PROTONIX) 40 MG EC tablet, Take 1 tablet by mouth Daily., Disp: 90 tablet, Rfl: 1  •  potassium chloride 10 MEQ CR tablet, TAKE ONE TABLET BY MOUTH TWICE A DAY, Disp: 60 tablet, Rfl: 0  •  Riboflavin (B2 PO), Take 1 tablet by mouth Daily., Disp: , Rfl:   •  vitamin B-12 (CYANOCOBALAMIN) 1000 MCG tablet, Take 1,000 mcg by mouth Daily., Disp: , Rfl:   •  warfarin (COUMADIN) 4 MG tablet, TAKE ONE TABLET BY MOUTH ONCE NIGHTLY, Disp: 30 tablet, Rfl: 0    Allergies:  Allergies   Allergen Reactions   • Amoxicillin Other (See Comments)      unsure of type of reaction - states it was so long ago she can't remember    • Penicillins Nausea And Vomiting          Objective   Objective:    Pulse 76   Ht 167.6 cm (66\")   Wt 79.8 kg (176 lb)   SpO2 96%   BMI 28.41 kg/m²     Physical Examination:  Alert, oriented, overweight individual in no acute distress, ambulating with the assistance of a cane  Right lower extremity shows no erythema, rashes, or open skin lesions. There is a minimal amount of swelling.  There is a slight valgus malalignment, and the patient is neurovascularly intact distally. The knee is stable to varus and valgus stress, there is no crepitus noted, and plantar and dorsiflexion is 5/5.  Patella seems to " track lateral..  There is no tenderness to palpation or with range of motion, which is about 0-130.  Left lower extremity shows no erythema, rashes, or open skin lesions. There is a minimal amount of swelling.  There is a slight valgus malalignment, and the patient is neurovascularly intact distally. The knee is stable to varus and valgus stress, there is no crepitus noted, and plantar and dorsiflexion is 5/5.  Patella seems to track lateral.  There is no tenderness to palpation or with range of motion, which is about 0-130.         Imaging:  xrays obtained today  bilateral Knee X-Ray    Date of exam: 12/9/2022    Indication: Bilateral knee pain    AP, Lateral, [place] views    Findings:Right:Shows moderate to severe tricompartmental DJD, worse in the patellofemoral compartment, There is subchondral sclerosis, subchondral cysts, and osteophytosis present., No fractures or dislocations are appreciated and osteopenia present.  Left:Shows moderate to severe tricompartmental DJD, worse in the lateral compartment, There is subchondral sclerosis, subchondral cysts, and osteophytosis present., No fractures or dislocations are appreciated and osteopenia present    decreased joint spaces    Hardware appropriately positioned not applicable    yes prior studies available for comparison.    This patient's x-ray report was graded according to the Kellgren and Swapnil classification.  This took into account the joint space narrowing, osteophyte formation, sclerosis of the distal femur/proximal tibia along with deformity of those bones.  The findings were indicative of K L grade 4.    X-RAY was ordered and reviewed by MARLO Pagan          Assessment:  1. Primary osteoarthritis of both knees    2. Overweight (BMI 25.0-29.9)                 Plan:  We will continue conservative treatment options at this time.  She fails conservative treatment management, she may be a candidate for total knee replacement.  Continue weight  "loss efforts.  Continue bracing.  Intra-articular steroid injections today, risks and benefits were discussed and postinjection instructions were given.  We did discuss future use with Visco supplementation.  Follow-up with Dr. Kimble in 3 months.  She should call with any questions or concerns.  Natural history and expected course discussed. Questions answered.  Educational materials distributed.  Rest, ice, compression, and elevation (RICE) therapy.  OTC analgesics as needed.  Arthrocentesis. See procedure note.  cortisone injections  viscosupplementation  bracing  weight loss  activtiy modification  assistive devices  - Large Joint Arthrocentesis: bilateral knee on 12/9/2022 9:48 AM  Indications: pain  Details: 25 G needle, anterolateral approach  Medications (Right): 2 mL lidocaine PF 1% 1 %; 80 mg triamcinolone acetonide 40 MG/ML  Medications (Left): 2 mL lidocaine PF 1% 1 %; 80 mg triamcinolone acetonide 40 MG/ML  Outcome: tolerated well, no immediate complications  Procedure, treatment alternatives, risks and benefits explained, specific risks discussed. Consent was given by the patient. Immediately prior to procedure a time out was called to verify the correct patient, procedure, equipment, support staff and site/side marked as required. Patient was prepped and draped in the usual sterile fashion.                    MARLO Pagan  12/09/22  09:46 EST    \"Please note that portions of this note were completed with a voice recognition program\".   "

## 2022-12-12 NOTE — PROGRESS NOTES
I verbally spoke to the patient and she understood. She will call in 3 weeks to set up lab appt for 4 weeks

## 2022-12-15 DIAGNOSIS — R15.9 INCONTINENCE OF FECES WITH FECAL URGENCY: ICD-10-CM

## 2022-12-15 DIAGNOSIS — R15.2 INCONTINENCE OF FECES WITH FECAL URGENCY: ICD-10-CM

## 2022-12-15 RX ORDER — MONTELUKAST SODIUM 4 MG/1
TABLET, CHEWABLE ORAL
Qty: 60 TABLET | Refills: 0 | Status: SHIPPED | OUTPATIENT
Start: 2022-12-15

## 2022-12-19 RX ORDER — WARFARIN SODIUM 4 MG/1
TABLET ORAL
Qty: 30 TABLET | Refills: 0 | Status: SHIPPED | OUTPATIENT
Start: 2022-12-19 | End: 2023-01-19

## 2022-12-26 RX ORDER — POTASSIUM CHLORIDE 750 MG/1
TABLET, FILM COATED, EXTENDED RELEASE ORAL
Qty: 60 TABLET | Refills: 0 | Status: SHIPPED | OUTPATIENT
Start: 2022-12-26 | End: 2023-01-19

## 2022-12-28 ENCOUNTER — OFFICE VISIT (OUTPATIENT)
Dept: FAMILY MEDICINE CLINIC | Facility: CLINIC | Age: 77
End: 2022-12-28

## 2022-12-28 VITALS
TEMPERATURE: 97.3 F | DIASTOLIC BLOOD PRESSURE: 72 MMHG | BODY MASS INDEX: 28.45 KG/M2 | HEIGHT: 66 IN | HEART RATE: 78 BPM | SYSTOLIC BLOOD PRESSURE: 107 MMHG | WEIGHT: 177 LBS | RESPIRATION RATE: 16 BRPM

## 2022-12-28 DIAGNOSIS — M50.30 DDD (DEGENERATIVE DISC DISEASE), CERVICAL: ICD-10-CM

## 2022-12-28 DIAGNOSIS — R41.3 MEMORY PROBLEM: Primary | ICD-10-CM

## 2022-12-28 DIAGNOSIS — M54.2 CHRONIC NECK PAIN: ICD-10-CM

## 2022-12-28 DIAGNOSIS — G89.29 CHRONIC NECK PAIN: ICD-10-CM

## 2022-12-28 PROBLEM — R50.9 FEVER: Status: RESOLVED | Noted: 2022-09-29 | Resolved: 2022-12-28

## 2022-12-28 PROBLEM — R53.83 OTHER FATIGUE: Status: RESOLVED | Noted: 2021-06-22 | Resolved: 2022-12-28

## 2022-12-28 PROBLEM — F41.9 ANXIETY: Status: ACTIVE | Noted: 2022-12-28

## 2022-12-28 PROCEDURE — 99213 OFFICE O/P EST LOW 20 MIN: CPT | Performed by: FAMILY MEDICINE

## 2022-12-28 RX ORDER — SULFAMETHOXAZOLE AND TRIMETHOPRIM 800; 160 MG/1; MG/1
TABLET ORAL
COMMUNITY
Start: 2022-12-27 | End: 2023-02-27

## 2022-12-28 NOTE — PROGRESS NOTES
Subjective   Chief Complaint   Patient presents with   • Anxiety   • Memory Loss   • Neck Pain   • Osteoarthritis     Abida Becker is a 77 y.o. female.     Patient Care Team:  Darlene Matute MD as PCP - General  ChenegaRolando MD as Consulting Physician (Urology)  Banet, Duane Edward, MD as Consulting Physician (Dermatology)    History of Present Illness  She is coming in today to discuss her memory problems.  She reports that for the last few months she has noted that at times she has hard time to come up with words.  She recently was shopping at [employer] and on the way out from the store she realized that she could not find her car and [employer] workers had to help her with that.  This is creating some anxiety and concerns.  There is not any family history of early dementia.  She also wants to talk about her orthopedic issues and chronic pain in multiple joints, lately she has been noticing that her chronic neck pain is getting worse.  She did physical therapy a while back and that helped a lot and she would like to get a new referral.       The following portions of the patient's history were reviewed and updated as appropriate: allergies, current medications, past family history, past medical history, past social history, past surgical history and problem list.  Past Medical History:   Diagnosis Date   • GERD (gastroesophageal reflux disease)    • History of recurrent UTIs    • IBS (irritable bowel syndrome)    • Osteoarthritis    • Osteoporosis     on prolia(12/5/22)   • Primary osteoarthritis of both knees 01/24/2022   • Pulmonary embolism (HCC) 2011    after scoliosis surgery   • Pulmonary embolism (HCC) 10/2022   • Vitamin D deficiency      Past Surgical History:   Procedure Laterality Date   • BREAST BIOPSY     • CHOLECYSTECTOMY     • COLONOSCOPY  01/18/2018   • HIATAL HERNIA REPAIR  2013   • REIMPLANT URETER IN BLADDER     • SPINAL FUSION       The patient has a family history of  Family History  "  Problem Relation Age of Onset   • Heart failure Mother    • Cancer Father      Social History     Socioeconomic History   • Marital status: Single   Tobacco Use   • Smoking status: Never     Passive exposure: Never   • Smokeless tobacco: Never   Vaping Use   • Vaping Use: Never used   Substance and Sexual Activity   • Alcohol use: No   • Drug use: No   • Sexual activity: Never       Review of Systems   Constitutional: Negative for activity change, fatigue and fever.   Respiratory: Negative for shortness of breath and wheezing.    Cardiovascular: Negative for chest pain, palpitations and leg swelling.   Musculoskeletal: Positive for arthralgias and neck pain. Negative for back pain.   Skin: Negative for rash.   Neurological: Positive for memory problem. Negative for tremors and headache.   Psychiatric/Behavioral: Positive for sleep disturbance.     Visit Vitals  /72 (BP Location: Left arm, Patient Position: Sitting, Cuff Size: Adult)   Pulse 78   Temp 97.3 °F (36.3 °C) (Temporal)   Resp 16   Ht 167.6 cm (65.98\")   Wt 80.3 kg (177 lb)   BMI 28.58 kg/m²       Current Outpatient Medications:   •  acetaminophen (TYLENOL) 500 MG tablet, Take 1 tablet by mouth Every 6 (Six) Hours As Needed for Fever or Mild Pain., Disp: , Rfl:   •  calcium carbonate (OS-STEVEN) 600 MG tablet, Take 600 mg by mouth Daily., Disp: , Rfl:   •  cholecalciferol (VITAMIN D3) 1000 units tablet, Take 1,000 Units by mouth Daily., Disp: , Rfl:   •  colestipol (COLESTID) 1 g tablet, TAKE ONE TABLET BY MOUTH TWICE A DAY AS NEEDED, Disp: 60 tablet, Rfl: 0  •  dicyclomine (BENTYL) 10 MG capsule, TAKE ONE CAPSULE BY MOUTH THREE TIMES A DAY AS NEEDED FOR IBS, Disp: 90 capsule, Rfl: 0  •  gabapentin (NEURONTIN) 300 MG capsule, TAKE ONE CAPSULE BY MOUTH TWICE A DAY, Disp: 180 capsule, Rfl: 0  •  hydroCHLOROthiazide (HYDRODIURIL) 25 MG tablet, Take 1 tablet by mouth Daily., Disp: 90 tablet, Rfl: 1  •  melatonin 3 MG tablet, Take 3 mg by mouth Every " Night., Disp: , Rfl:   •  Mirabegron ER (MYRBETRIQ) 50 MG tablet sustained-release 24 hour 24 hr tablet, Take 50 mg by mouth Daily., Disp: , Rfl:   •  pantoprazole (PROTONIX) 40 MG EC tablet, Take 1 tablet by mouth Daily., Disp: 90 tablet, Rfl: 1  •  potassium chloride 10 MEQ CR tablet, TAKE ONE TABLET BY MOUTH TWICE A DAY, Disp: 60 tablet, Rfl: 0  •  Riboflavin (B2 PO), Take 1 tablet by mouth Daily., Disp: , Rfl:   •  sulfamethoxazole-trimethoprim (BACTRIM DS,SEPTRA DS) 800-160 MG per tablet, , Disp: , Rfl:   •  vitamin B-12 (CYANOCOBALAMIN) 1000 MCG tablet, Take 1,000 mcg by mouth Daily., Disp: , Rfl:   •  warfarin (COUMADIN) 4 MG tablet, TAKE ONE TABLET BY MOUTH ONCE NIGHTLY, Disp: 30 tablet, Rfl: 0  •  cefdinir (OMNICEF) 300 MG capsule, Take 1 capsule by mouth 2 (Two) Times a Day., Disp: 14 capsule, Rfl: 0  •  GABAPENTIN PO, Take  by mouth., Disp: , Rfl:     Objective   Physical Exam  Constitutional:       General: She is not in acute distress.     Appearance: Normal appearance. She is well-developed. She is not ill-appearing or diaphoretic.      Comments: Patient is in no distress, patient has normal voice and speech.  Normal respiratory effort.   HENT:      Head: Normocephalic and atraumatic.   Pulmonary:      Effort: Pulmonary effort is normal.   Musculoskeletal:      Cervical back: Normal range of motion and neck supple.   Neurological:      General: No focal deficit present.      Mental Status: She is alert and oriented to person, place, and time. Mental status is at baseline.   Psychiatric:         Mood and Affect: Mood normal.       CBC    CBC 10/20/22 10/27/22 11/1/22   WBC 5.90 6.00 8.16   RBC 3.50 (A) 4.12 4.30   Hemoglobin 11.6 (A) 12.8 13.0   Hematocrit 32.6 (A) 38.4 40.2   MCV 93.0 93.2 93.5   MCH 33.2 (A) 31.0 30.2   MCHC 35.7 33.2 32.3   RDW 14.1 14.4 14.3   Platelets 339 415 276   (A) Abnormal value            CMP    CMP 10/19/22 10/20/22 10/27/22   Glucose 98 93 76   BUN 11 11 20   Creatinine  0.54 (A) 0.50 (A) 0.68   eGFR 96.1 97.9 91.0   Sodium 139 138 139   Potassium 4.6 4.1 3.3 (A)   Chloride 103 101 98   Calcium 8.7 8.7 9.2   Total Protein 5.6 (A) 5.6 (A) 6.5   Albumin 3.20 (A) 3.20 (A) 3.90   Globulin 2.4 2.4 2.6   Total Bilirubin 0.5 0.6 0.5   Alkaline Phosphatase 81 82 88   AST (SGOT) 41 (A) 37 (A) 15   ALT (SGPT) 42 (A) 48 (A) 28   Albumin/Globulin Ratio 1.3 1.3 1.5   BUN/Creatinine Ratio 20.4 22.0 29.4 (A)   Anion Gap 8.0 11.0 12.0   (A) Abnormal value       Comments are available for some flowsheets but are not being displayed.           TSH    TSH 10/10/22 10/13/22   TSH 0.841 0.624             Assessment & Plan   Diagnoses and all orders for this visit:    1. Memory problem (Primary)  -     CT Head Without Contrast; Future  -     Ambulatory Referral to Neuropsychology    2. Chronic neck pain  -     Ambulatory Referral to Physical Therapy Evaluate and treat    3. DDD (degenerative disc disease), cervical  -     Ambulatory Referral to Physical Therapy Evaluate and treat      I reviewed her symptoms and concerns.  I also reviewed her recent blood work results.  I will be getting CT of the head and I will be referring her for a neuropsychological evaluation.  Patient has dealt with chronic pain in multiple joints due to advanced osteoarthritis and degenerative disc disease for quite some time.  She previously did physical therapy which helped and I will be giving her a new referral.  She may take over-the-counter Tylenol as needed for pain.  She was advised to stay away from anti-inflammatories due to her being on Eliquis.        Return if symptoms worsen or fail to improve, for Recheck.    Requested Prescriptions      No prescriptions requested or ordered in this encounter

## 2023-01-04 ENCOUNTER — TELEPHONE (OUTPATIENT)
Dept: FAMILY MEDICINE CLINIC | Facility: CLINIC | Age: 78
End: 2023-01-04
Payer: MEDICARE

## 2023-01-04 NOTE — TELEPHONE ENCOUNTER
PT referral sent to [facility], Ct sent to [facility], they [facility] on the 28th, No answer, No VM 01/04/23

## 2023-01-04 NOTE — TELEPHONE ENCOUNTER
PATIENT CALLED TO CHECK THE STATUS OF A CT SCAN, AS WELL AS A PHYSICAL THERAPY REFERRAL THAT WAS TO BE SENT TO [facility].    PLEASE ADVISE  [phone]

## 2023-01-04 NOTE — TELEPHONE ENCOUNTER
The CT order as well as the referral for the physical therapy are in the patient's chart.  Please check on the status and notify the patient.  Thank you.

## 2023-01-06 ENCOUNTER — TELEPHONE (OUTPATIENT)
Dept: ORTHOPEDIC SURGERY | Facility: CLINIC | Age: 78
End: 2023-01-06

## 2023-01-06 NOTE — TELEPHONE ENCOUNTER
Provider:  SHIMA FALCON PA-C  Caller:  LIZA GRAVES  Relationship to Patient: SELF  Phone Number:  [phone]  Reason for Call:  PATIENT RECEIVED LETTER ABOUT SHIMA FALCON LEAVING. PLEASE ADVISE.    ALSO, ON 12/9/22 VISIT WITH SHIMA FALCON, PATIENT WAS REFERRED TO SEE DR. BOYD AS A NEW PATIENT FOR HER KNEES. PATIENT WANTING TO BE SEEN SOONER OR SEE SOMEONE ELSE? PLEASE ADVISE.  When was the patient last seen:  12/9/22

## 2023-01-06 NOTE — TELEPHONE ENCOUNTER
PATIENT CALLED IN AND ASKED ABOUT HER CT. I GAVE HER THE NUMBER FOR SCHEDULING AT THE HOSPITAL. SHE RECEIVED A CALLED FROM PT TO SCHEDULE YESTERDAY.

## 2023-01-09 ENCOUNTER — TELEPHONE (OUTPATIENT)
Dept: FAMILY MEDICINE CLINIC | Facility: CLINIC | Age: 78
End: 2023-01-09

## 2023-01-09 NOTE — TELEPHONE ENCOUNTER
Caller: Abida Becker    Relationship: Self    Best call back number: [phone]    Who are you requesting to speak with (clinical staff, provider,  specific staff member): CLINICAL STAFF     What was the call regarding: THE DOCTOR SHE WAS SEEING FOR HER PROLIA INJECTIONS IS NO LONGER IN PRACTICE AND SHE NEEDS A RECOMMENDATION ON WHO SHE CAN GO SEE TO GET THE INJECTION PLEASE CALL AND ADVISE       Do you require a callback: YES

## 2023-01-09 NOTE — TELEPHONE ENCOUNTER
I called the patient and explained she should have gotten a letter from Brandi Wade office with [facility] phone numbers for [place] and [facility] who is taking over her patients. Patient stated she got the letter and will call the [facility]

## 2023-01-10 ENCOUNTER — LAB (OUTPATIENT)
Dept: LAB | Facility: HOSPITAL | Age: 78
End: 2023-01-10
Payer: MEDICARE

## 2023-01-10 ENCOUNTER — TELEPHONE (OUTPATIENT)
Dept: ENDOCRINOLOGY | Facility: CLINIC | Age: 78
End: 2023-01-10
Payer: MEDICARE

## 2023-01-10 DIAGNOSIS — Z86.718 PERSONAL HISTORY OF OTHER VENOUS THROMBOSIS AND EMBOLISM: ICD-10-CM

## 2023-01-10 DIAGNOSIS — Z51.81 ENCOUNTER FOR MONITORING COUMADIN THERAPY: ICD-10-CM

## 2023-01-10 DIAGNOSIS — Z79.01 ENCOUNTER FOR MONITORING COUMADIN THERAPY: ICD-10-CM

## 2023-01-10 DIAGNOSIS — I26.94 MULTIPLE SUBSEGMENTAL PULMONARY EMBOLI WITHOUT ACUTE COR PULMONALE: ICD-10-CM

## 2023-01-10 LAB
BASOPHILS # BLD AUTO: 0 10*3/MM3 (ref 0–0.2)
BASOPHILS NFR BLD AUTO: 0.7 % (ref 0–1.5)
D DIMER PPP FEU-MCNC: 0.28 MG/L (FEU) (ref 0–0.75)
DEPRECATED RDW RBC AUTO: 49.9 FL (ref 37–54)
EOSINOPHIL # BLD AUTO: 0.1 10*3/MM3 (ref 0–0.4)
EOSINOPHIL NFR BLD AUTO: 1.7 % (ref 0.3–6.2)
ERYTHROCYTE [DISTWIDTH] IN BLOOD BY AUTOMATED COUNT: 15.2 % (ref 12.3–15.4)
HCT VFR BLD AUTO: 36.4 % (ref 34–46.6)
HCYS SERPL-MCNC: 11.9 UMOL/L (ref 0–15)
HGB BLD-MCNC: 12.1 G/DL (ref 12–15.9)
INR PPP: 2.29 (ref 2–3)
LYMPHOCYTES # BLD AUTO: 1.4 10*3/MM3 (ref 0.7–3.1)
LYMPHOCYTES NFR BLD AUTO: 28.8 % (ref 19.6–45.3)
MCH RBC QN AUTO: 29.3 PG (ref 26.6–33)
MCHC RBC AUTO-ENTMCNC: 33.1 G/DL (ref 31.5–35.7)
MCV RBC AUTO: 88.5 FL (ref 79–97)
MONOCYTES # BLD AUTO: 0.5 10*3/MM3 (ref 0.1–0.9)
MONOCYTES NFR BLD AUTO: 11.2 % (ref 5–12)
NEUTROPHILS NFR BLD AUTO: 2.7 10*3/MM3 (ref 1.7–7)
NEUTROPHILS NFR BLD AUTO: 57.6 % (ref 42.7–76)
NRBC BLD AUTO-RTO: 0.1 /100 WBC (ref 0–0.2)
PLATELET # BLD AUTO: 352 10*3/MM3 (ref 140–450)
PMV BLD AUTO: 8.1 FL (ref 6–12)
PROTHROMBIN TIME: 22.5 SECONDS (ref 19.4–28.5)
RBC # BLD AUTO: 4.12 10*6/MM3 (ref 3.77–5.28)
WBC NRBC COR # BLD: 4.7 10*3/MM3 (ref 3.4–10.8)

## 2023-01-10 PROCEDURE — 85613 RUSSELL VIPER VENOM DILUTED: CPT

## 2023-01-10 PROCEDURE — 85705 THROMBOPLASTIN INHIBITION: CPT

## 2023-01-10 PROCEDURE — 86147 CARDIOLIPIN ANTIBODY EA IG: CPT

## 2023-01-10 PROCEDURE — 85025 COMPLETE CBC W/AUTO DIFF WBC: CPT

## 2023-01-10 PROCEDURE — 85670 THROMBIN TIME PLASMA: CPT

## 2023-01-10 PROCEDURE — 36415 COLL VENOUS BLD VENIPUNCTURE: CPT

## 2023-01-10 PROCEDURE — 81240 F2 GENE: CPT

## 2023-01-10 PROCEDURE — 85379 FIBRIN DEGRADATION QUANT: CPT

## 2023-01-10 PROCEDURE — 85303 CLOT INHIBIT PROT C ACTIVITY: CPT

## 2023-01-10 PROCEDURE — 81241 F5 GENE: CPT

## 2023-01-10 PROCEDURE — 85610 PROTHROMBIN TIME: CPT

## 2023-01-10 PROCEDURE — 85240 CLOT FACTOR VIII AHG 1 STAGE: CPT

## 2023-01-10 PROCEDURE — 85732 THROMBOPLASTIN TIME PARTIAL: CPT

## 2023-01-10 PROCEDURE — 86146 BETA-2 GLYCOPROTEIN ANTIBODY: CPT

## 2023-01-10 PROCEDURE — 83090 ASSAY OF HOMOCYSTEINE: CPT

## 2023-01-10 PROCEDURE — 85300 ANTITHROMBIN III ACTIVITY: CPT

## 2023-01-10 PROCEDURE — 85306 CLOT INHIBIT PROT S FREE: CPT

## 2023-01-11 LAB
APTT SCREEN TO CONFIRM RATIO: 0.91 RATIO (ref 0–1.34)
AT III PPP CHRO-ACNC: >128 % (ref 75–120)
CARDIOLIPIN IGG SER IA-ACNC: <9 GPL U/ML (ref 0–14)
CARDIOLIPIN IGM SER IA-ACNC: <9 MPL U/ML (ref 0–12)
CONFIRM APTT/NORMAL: 71.6 SEC (ref 0–47.6)
F5 GENE MUT ANL BLD/T: NORMAL
FACT VIII ACT/NOR PPP: >182 % ACTIVITY (ref 70–160)
FACTOR II, DNA ANALYSIS: NORMAL
LA 2 SCREEN W REFLEX-IMP: ABNORMAL
MIXING DRVVT: 40.4 SEC (ref 0–40.4)
PROT C ACT/NOR PPP: 76 % (ref 73–180)
PROT S ACT/NOR PPP: 42 % (ref 63–140)
SCREEN APTT: 38.2 SEC (ref 0–51.9)
SCREEN DRVVT: 52.6 SEC (ref 0–47)
THROMBIN TIME: 17.1 SEC (ref 0–23)

## 2023-01-13 LAB
B2 GLYCOPROT1 IGA SER-ACNC: <9 GPI IGA UNITS (ref 0–25)
B2 GLYCOPROT1 IGG SER-ACNC: <9 GPI IGG UNITS (ref 0–20)
B2 GLYCOPROT1 IGM SER-ACNC: <9 GPI IGM UNITS (ref 0–32)

## 2023-01-17 ENCOUNTER — OFFICE VISIT (OUTPATIENT)
Dept: FAMILY MEDICINE CLINIC | Facility: CLINIC | Age: 78
End: 2023-01-17
Payer: MEDICARE

## 2023-01-17 VITALS
TEMPERATURE: 97.7 F | RESPIRATION RATE: 16 BRPM | OXYGEN SATURATION: 97 % | HEIGHT: 66 IN | SYSTOLIC BLOOD PRESSURE: 92 MMHG | BODY MASS INDEX: 28.87 KG/M2 | WEIGHT: 179.6 LBS | DIASTOLIC BLOOD PRESSURE: 60 MMHG | HEART RATE: 79 BPM

## 2023-01-17 DIAGNOSIS — M54.9 UPPER BACK PAIN: Primary | ICD-10-CM

## 2023-01-17 PROCEDURE — 99213 OFFICE O/P EST LOW 20 MIN: CPT | Performed by: FAMILY MEDICINE

## 2023-01-17 NOTE — PROGRESS NOTES
Subjective   Chief Complaint   Patient presents with   • Back Pain     Abida Becker is a 77 y.o. female.     Patient Care Team:  Dalrene Matute MD as PCP - General  [place], Rolando HUNTER MD as Consulting Physician (Urology)  Banet, Duane Edward, MD as Consulting Physician (Dermatology)  Milla Urias MD as Consulting Physician (Hematology and Oncology)    History of Present Illness  She is coming in today to address the back issues.  Patient reports that in 2011 she had a back surgery to fix the scoliosis.  At that time the earnestine was used and also she had some fusions done.  She also tells me that since then she pretty much noted a earnestine somehow protruding through the skin in the upper back, it causes some minimal discomfort, but it has not changed over the years.  She reports that she discussed this with the surgeon back then and was reassured.  She has been doing physical therapy lately and was advised by the physical therapist to get address that as this here.       The following portions of the patient's history were reviewed and updated as appropriate: allergies, current medications, past family history, past medical history, past social history, past surgical history and problem list.  Past Medical History:   Diagnosis Date   • GERD (gastroesophageal reflux disease)    • History of recurrent UTIs    • IBS (irritable bowel syndrome)    • Osteoarthritis    • Osteoporosis     on prolia(12/5/22)   • Primary osteoarthritis of both knees 01/24/2022   • Pulmonary embolism (HCC) 2011    after scoliosis surgery   • Pulmonary embolism (HCC) 10/2022   • Vitamin D deficiency      Past Surgical History:   Procedure Laterality Date   • BACK SURGERY  11/2011    Dr. Olsen, due to scoliosis, earnestine and some fusions   • BREAST BIOPSY     • CHOLECYSTECTOMY     • COLONOSCOPY  01/18/2018   • HIATAL HERNIA REPAIR  2013   • REIMPLANT URETER IN BLADDER       The patient has a family history of  Family History   Problem Relation Age of  "Onset   • Heart failure Mother    • Cancer Father      Social History     Socioeconomic History   • Marital status: Single   Tobacco Use   • Smoking status: Never     Passive exposure: Never   • Smokeless tobacco: Never   Vaping Use   • Vaping Use: Never used   Substance and Sexual Activity   • Alcohol use: No   • Drug use: No   • Sexual activity: Never       Review of Systems   Constitutional: Negative for activity change, fatigue and fever.   Respiratory: Negative for shortness of breath and wheezing.    Cardiovascular: Negative for chest pain, palpitations and leg swelling.   Musculoskeletal: Positive for arthralgias and back pain.   Skin: Negative for rash.   Neurological: Negative for tremors and headache.     Visit Vitals  BP 92/60 (BP Location: Right arm, Patient Position: Sitting, Cuff Size: Adult)   Pulse 79   Temp 97.7 °F (36.5 °C) (Temporal)   Resp 16   Ht 167.6 cm (65.98\")   Wt 81.5 kg (179 lb 9.6 oz)   SpO2 97%   BMI 29.00 kg/m²       Current Outpatient Medications:   •  acetaminophen (TYLENOL) 500 MG tablet, Take 1 tablet by mouth Every 6 (Six) Hours As Needed for Fever or Mild Pain., Disp: , Rfl:   •  calcium carbonate (OS-STEVEN) 600 MG tablet, Take 600 mg by mouth Daily., Disp: , Rfl:   •  cholecalciferol (VITAMIN D3) 1000 units tablet, Take 1,000 Units by mouth Daily., Disp: , Rfl:   •  colestipol (COLESTID) 1 g tablet, TAKE ONE TABLET BY MOUTH TWICE A DAY AS NEEDED, Disp: 60 tablet, Rfl: 0  •  dicyclomine (BENTYL) 10 MG capsule, TAKE ONE CAPSULE BY MOUTH THREE TIMES A DAY AS NEEDED FOR IBS, Disp: 90 capsule, Rfl: 0  •  gabapentin (NEURONTIN) 300 MG capsule, TAKE ONE CAPSULE BY MOUTH TWICE A DAY, Disp: 180 capsule, Rfl: 0  •  hydroCHLOROthiazide (HYDRODIURIL) 25 MG tablet, Take 1 tablet by mouth Daily., Disp: 90 tablet, Rfl: 1  •  melatonin 3 MG tablet, Take 3 mg by mouth Every Night., Disp: , Rfl:   •  Mirabegron ER (MYRBETRIQ) 50 MG tablet sustained-release 24 hour 24 hr tablet, Take 50 mg by mouth " Daily., Disp: , Rfl:   •  pantoprazole (PROTONIX) 40 MG EC tablet, Take 1 tablet by mouth Daily., Disp: 90 tablet, Rfl: 1  •  potassium chloride 10 MEQ CR tablet, TAKE ONE TABLET BY MOUTH TWICE A DAY, Disp: 60 tablet, Rfl: 0  •  vitamin B-12 (CYANOCOBALAMIN) 1000 MCG tablet, Take 1,000 mcg by mouth Daily., Disp: , Rfl:   •  warfarin (COUMADIN) 4 MG tablet, TAKE ONE TABLET BY MOUTH ONCE NIGHTLY, Disp: 30 tablet, Rfl: 0  •  GABAPENTIN PO, Take  by mouth., Disp: , Rfl:   •  Riboflavin (B2 PO), Take 1 tablet by mouth Daily., Disp: , Rfl:   •  sulfamethoxazole-trimethoprim (BACTRIM DS,SEPTRA DS) 800-160 MG per tablet, , Disp: , Rfl:     Objective   Physical Exam  Constitutional:       General: She is not in acute distress.     Appearance: Normal appearance. She is well-developed. She is not ill-appearing or diaphoretic.      Comments: Patient is in no distress, patient has normal voice and speech.  Normal respiratory effort.   HENT:      Head: Normocephalic and atraumatic.   Pulmonary:      Effort: Pulmonary effort is normal.   Musculoskeletal:      Cervical back: Normal range of motion and neck supple.      Comments: There is slight skin redness noted in the right upper back, when patient bends over there is some protrusion noted which possibly might be rot.  Skin is intact.  No signs of infection.   Neurological:      General: No focal deficit present.      Mental Status: She is alert and oriented to person, place, and time. Mental status is at baseline.   Psychiatric:         Mood and Affect: Mood normal.         Assessment & Plan   Diagnoses and all orders for this visit:    1. Upper back pain (Primary)  -     XR Spine Thoracic 2 View; Future      I will be getting x-ray of the thoracic spine.  Patient meantime can take Tylenol over-the-counter as needed for pain.  She is to avoid anti-inflammatories due to her being on Coumadin.  I will advise further once the result of the x-ray is available.  Patient potentially  needs to see the orthopedist.        Return if symptoms worsen or fail to improve, for Recheck.    Requested Prescriptions      No prescriptions requested or ordered in this encounter

## 2023-01-18 DIAGNOSIS — M54.9 UPPER BACK PAIN: Primary | ICD-10-CM

## 2023-01-19 RX ORDER — POTASSIUM CHLORIDE 750 MG/1
TABLET, FILM COATED, EXTENDED RELEASE ORAL
Qty: 60 TABLET | Refills: 0 | Status: SHIPPED | OUTPATIENT
Start: 2023-01-19 | End: 2023-02-15

## 2023-01-19 RX ORDER — WARFARIN SODIUM 4 MG/1
TABLET ORAL
Qty: 30 TABLET | Refills: 0 | Status: SHIPPED | OUTPATIENT
Start: 2023-01-19 | End: 2023-02-15

## 2023-01-20 ENCOUNTER — HOSPITAL ENCOUNTER (OUTPATIENT)
Dept: CT IMAGING | Facility: HOSPITAL | Age: 78
Discharge: HOME | End: 2023-01-20
Admitting: FAMILY MEDICINE
Payer: MEDICARE

## 2023-01-20 DIAGNOSIS — R41.3 MEMORY PROBLEM: ICD-10-CM

## 2023-01-20 PROCEDURE — 70450 CT HEAD/BRAIN W/O DYE: CPT

## 2023-01-23 ENCOUNTER — TELEPHONE (OUTPATIENT)
Dept: FAMILY MEDICINE CLINIC | Facility: CLINIC | Age: 78
End: 2023-01-23
Payer: MEDICARE

## 2023-01-24 ENCOUNTER — LAB (OUTPATIENT)
Dept: LAB | Facility: HOSPITAL | Age: 78
End: 2023-01-24
Payer: MEDICARE

## 2023-01-24 DIAGNOSIS — I26.94 MULTIPLE SUBSEGMENTAL PULMONARY EMBOLI WITHOUT ACUTE COR PULMONALE: Primary | ICD-10-CM

## 2023-01-24 DIAGNOSIS — Z86.718 PERSONAL HISTORY OF OTHER VENOUS THROMBOSIS AND EMBOLISM: ICD-10-CM

## 2023-01-24 LAB
D DIMER PPP FEU-MCNC: 0.31 MG/L (FEU) (ref 0–0.75)
HCYS SERPL-MCNC: 11.8 UMOL/L (ref 0–15)

## 2023-01-24 PROCEDURE — 83090 ASSAY OF HOMOCYSTEINE: CPT

## 2023-01-24 PROCEDURE — 81240 F2 GENE: CPT

## 2023-01-24 PROCEDURE — 85240 CLOT FACTOR VIII AHG 1 STAGE: CPT

## 2023-01-24 PROCEDURE — 81241 F5 GENE: CPT

## 2023-01-24 PROCEDURE — 85379 FIBRIN DEGRADATION QUANT: CPT

## 2023-01-24 PROCEDURE — 85300 ANTITHROMBIN III ACTIVITY: CPT

## 2023-01-24 PROCEDURE — 36415 COLL VENOUS BLD VENIPUNCTURE: CPT

## 2023-01-25 LAB
AT III PPP CHRO-ACNC: 126 % (ref 75–120)
CARDIOLIPIN IGG SER IA-ACNC: <9 GPL U/ML (ref 0–14)
CARDIOLIPIN IGM SER IA-ACNC: 10 MPL U/ML (ref 0–12)
F5 GENE MUT ANL BLD/T: NORMAL
FACT VIII ACT/NOR PPP: 195 % ACTIVITY (ref 70–160)
FACTOR II, DNA ANALYSIS: NORMAL

## 2023-01-26 LAB
APTT SCREEN TO CONFIRM RATIO: 0.95 RATIO (ref 0–1.34)
B2 GLYCOPROT1 IGA SER-ACNC: <9 GPI IGA UNITS (ref 0–25)
B2 GLYCOPROT1 IGG SER-ACNC: <9 GPI IGG UNITS (ref 0–20)
B2 GLYCOPROT1 IGM SER-ACNC: <9 GPI IGM UNITS (ref 0–32)
CONFIRM APTT/NORMAL: 72.2 SEC (ref 0–47.6)
DRVVT SCREEN TO CONFIRM RATIO: 1.1 RATIO (ref 0.8–1.2)
LA 2 SCREEN W REFLEX-IMP: ABNORMAL
MIXING DRVVT: 40.7 SEC (ref 0–40.4)
PROT C ACT/NOR PPP: 75 % (ref 73–180)
PROT S ACT/NOR PPP: 39 % (ref 63–140)
SCREEN APTT: 43.8 SEC (ref 0–51.9)
SCREEN DRVVT: 59.4 SEC (ref 0–47)
THROMBIN TIME: 17.3 SEC (ref 0–23)

## 2023-01-31 ENCOUNTER — APPOINTMENT (OUTPATIENT)
Dept: LAB | Facility: HOSPITAL | Age: 78
End: 2023-01-31
Payer: MEDICARE

## 2023-01-31 ENCOUNTER — TELEPHONE (OUTPATIENT)
Dept: ONCOLOGY | Facility: CLINIC | Age: 78
End: 2023-01-31

## 2023-01-31 NOTE — TELEPHONE ENCOUNTER
Caller: Abida Becker    Relationship: Self    Best call back number: [phone]    What is the best time to reach you: ANYTIME BEFORE 11:30AM     Who are you requesting to speak with (clinical staff, provider,  specific staff member):          What was the call regarding: NEEDING TO RESCHEDULE  FOLLOW UP WITH DR MARTIN TODAY 01/31 DUE TO THE WEATHER    Do you require a callback: YES

## 2023-02-03 ENCOUNTER — OFFICE VISIT (OUTPATIENT)
Dept: NEUROSURGERY | Facility: CLINIC | Age: 78
End: 2023-02-03
Payer: MEDICARE

## 2023-02-03 VITALS
HEIGHT: 66 IN | HEART RATE: 91 BPM | OXYGEN SATURATION: 94 % | DIASTOLIC BLOOD PRESSURE: 64 MMHG | BODY MASS INDEX: 29.6 KG/M2 | SYSTOLIC BLOOD PRESSURE: 116 MMHG | WEIGHT: 184.2 LBS

## 2023-02-03 DIAGNOSIS — G89.29 CHRONIC BILATERAL THORACIC BACK PAIN: ICD-10-CM

## 2023-02-03 DIAGNOSIS — M54.6 CHRONIC BILATERAL THORACIC BACK PAIN: ICD-10-CM

## 2023-02-03 DIAGNOSIS — Q76.49 SPINAL DEFORMITY: Primary | ICD-10-CM

## 2023-02-03 PROCEDURE — 99214 OFFICE O/P EST MOD 30 MIN: CPT | Performed by: NURSE PRACTITIONER

## 2023-02-06 NOTE — PROGRESS NOTES
HEMATOLOGY ONCOLOGY OUTPATIENT FOLLOW UP       Patient name: Abida Becker  : 1945  MRN: [id number]  Primary Care Physician: Darlene Matute MD  Referring Physician: Darlene Matute MD  Reason For Consult: Venous thromboembolism      History of Present Illness:    Patient is a 77 y.o. female who has a history of prior provoked venous thromboembolism .  At that time she was anticoagulated for 3 to 4 months.  She was subsequently taken off anticoagulation and did well.  Now patient was admitted for pneumonia recently to the hospital.    After discharge she noted to have worsening of symptoms and went back to the hospital.    10/13/2022 -CT PE with moderate large abnormal pulmonary emboli in the distal end of the right main pulmonary artery, moderate pulmonary emboli in the left pulmonary artery branches, pneumonia changes.  Ultrasound Doppler with acute right lower extremity DVT in the peroneal, acute left lower extremity superficial thrombophlebitis  Patient was discharged on Coumadin.  Patient takes coumadin 4 mg daily. INR therapeutic, hypercoagulable workup with elevated factor 8 activity, low protein C        Subjective:  Patient is seen for follow up . Has some bruising.       Past Medical History:   Diagnosis Date   • GERD (gastroesophageal reflux disease)    • History of recurrent UTIs    • IBS (irritable bowel syndrome)    • Osteoarthritis    • Osteoporosis     on prolia(22)   • Primary osteoarthritis of both knees 2022   • Pulmonary embolism (HCC)     after scoliosis surgery   • Pulmonary embolism (HCC) 10/2022   • Vitamin D deficiency        Past Surgical History:   Procedure Laterality Date   • BACK SURGERY  2011    Dr. Olsen, due to scoliosis, earnestine and some fusions   • BREAST BIOPSY     • CHOLECYSTECTOMY     • COLONOSCOPY  2018   • HIATAL HERNIA REPAIR     • REIMPLANT URETER IN BLADDER           Current Outpatient Medications:   •   acetaminophen (TYLENOL) 500 MG tablet, Take 1 tablet by mouth Every 6 (Six) Hours As Needed for Fever or Mild Pain., Disp: , Rfl:   •  calcium carbonate (OS-STEVEN) 600 MG tablet, Take 600 mg by mouth Daily., Disp: , Rfl:   •  cholecalciferol (VITAMIN D3) 1000 units tablet, Take 1,000 Units by mouth Daily., Disp: , Rfl:   •  colestipol (COLESTID) 1 g tablet, TAKE ONE TABLET BY MOUTH TWICE A DAY AS NEEDED, Disp: 60 tablet, Rfl: 0  •  dicyclomine (BENTYL) 10 MG capsule, TAKE ONE CAPSULE BY MOUTH THREE TIMES A DAY AS NEEDED FOR IBS, Disp: 90 capsule, Rfl: 0  •  gabapentin (NEURONTIN) 300 MG capsule, TAKE ONE CAPSULE BY MOUTH TWICE A DAY, Disp: 180 capsule, Rfl: 0  •  GABAPENTIN PO, Take  by mouth., Disp: , Rfl:   •  hydroCHLOROthiazide (HYDRODIURIL) 25 MG tablet, Take 1 tablet by mouth Daily., Disp: 90 tablet, Rfl: 1  •  melatonin 3 MG tablet, Take 3 mg by mouth Every Night., Disp: , Rfl:   •  Mirabegron ER (MYRBETRIQ) 50 MG tablet sustained-release 24 hour 24 hr tablet, Take 50 mg by mouth Daily., Disp: , Rfl:   •  pantoprazole (PROTONIX) 40 MG EC tablet, Take 1 tablet by mouth Daily., Disp: 90 tablet, Rfl: 1  •  potassium chloride 10 MEQ CR tablet, TAKE ONE TABLET BY MOUTH TWICE A DAY, Disp: 60 tablet, Rfl: 0  •  Riboflavin (B2 PO), Take 1 tablet by mouth Daily., Disp: , Rfl:   •  sulfamethoxazole-trimethoprim (BACTRIM DS,SEPTRA DS) 800-160 MG per tablet, , Disp: , Rfl:   •  vitamin B-12 (CYANOCOBALAMIN) 1000 MCG tablet, Take 1,000 mcg by mouth Daily., Disp: , Rfl:   •  warfarin (COUMADIN) 4 MG tablet, TAKE ONE TABLET BY MOUTH ONCE NIGHTLY, Disp: 30 tablet, Rfl: 0    Allergies   Allergen Reactions   • Amoxicillin Other (See Comments)      unsure of type of reaction - states it was so long ago she can't remember    • Penicillins Nausea And Vomiting       Family History   Problem Relation Age of Onset   • Heart failure Mother    • Cancer Father        Cancer-related family history includes Cancer in her  "father.      Social History     Tobacco Use   • Smoking status: Never     Passive exposure: Never   • Smokeless tobacco: Never   Vaping Use   • Vaping Use: Never used   Substance Use Topics   • Alcohol use: No   • Drug use: No     Social History     Social History Narrative   • Not on file         Objective:    Vital Signs:  Vitals:    02/08/23 1514   BP: 108/66   Pulse: 86   Temp: 97.9 °F (36.6 °C)   TempSrc: Oral   SpO2: 97%   Weight: 82 kg (180 lb 12.8 oz)   Height: 167.6 cm (65.98\")   PainSc: 0-No pain     Body mass index is 29.2 kg/m².    ECOG  (1) Restricted in physically strenuous activity, ambulatory and able to do work of light nature    Physical Exam:   Physical Exam  Constitutional:       Appearance: Normal appearance.   HENT:      Head: Normocephalic and atraumatic.   Eyes:      Extraocular Movements: Extraocular movements intact.      Pupils: Pupils are equal, round, and reactive to light.   Cardiovascular:      Rate and Rhythm: Normal rate and regular rhythm.      Pulses: Normal pulses.      Heart sounds: No murmur heard.  Pulmonary:      Effort: Pulmonary effort is normal.      Breath sounds: Normal breath sounds.   Abdominal:      General: There is no distension.      Palpations: Abdomen is soft. There is no mass.      Tenderness: There is no abdominal tenderness.   Musculoskeletal:         General: Normal range of motion.      Cervical back: Normal range of motion and neck supple.   Skin:     General: Skin is warm.   Neurological:      General: No focal deficit present.      Mental Status: She is alert.   Psychiatric:         Mood and Affect: Mood normal.       Lab Results - Last 18 Months   Lab Units 01/10/23  0951 11/01/22  1428 10/27/22  1402   WBC 10*3/mm3 4.70 8.16 6.00   HEMOGLOBIN g/dL 12.1 13.0 12.8   HEMATOCRIT % 36.4 40.2 38.4   PLATELETS 10*3/mm3 352 276 415   MCV fL 88.5 93.5 93.2     Lab Results - Last 18 Months   Lab Units 10/27/22  1402 10/20/22  0428 10/19/22  0445   SODIUM mmol/L " 139 138 139   POTASSIUM mmol/L 3.3* 4.1 4.6   CHLORIDE mmol/L 98 101 103   CO2 mmol/L 29.0 26.0 28.0   BUN mg/dL 20 11 11   CREATININE mg/dL 0.68 0.50* 0.54*   CALCIUM mg/dL 9.2 8.7 8.7   BILIRUBIN mg/dL 0.5 0.6 0.5   ALK PHOS U/L 88 82 81   ALT (SGPT) U/L 28 48* 42*   AST (SGOT) U/L 15 37* 41*   GLUCOSE mg/dL 76 93 98       Lab Results   Component Value Date    GLUCOSE 76 10/27/2022    BUN 20 10/27/2022    CREATININE 0.68 10/27/2022    EGFRIFNONA 83 02/07/2022    BCR 29.4 (H) 10/27/2022    K 3.3 (L) 10/27/2022    CO2 29.0 10/27/2022    CALCIUM 9.2 10/27/2022    ALBUMIN 3.90 10/27/2022    LABIL2 1.4 04/11/2019    AST 15 10/27/2022    ALT 28 10/27/2022       Lab Results - Last 18 Months   Lab Units 01/10/23  0951 12/08/22  0948 10/27/22  1402 10/15/22  1314 10/15/22  0502 10/14/22  2137 10/14/22  1441   INR  2.29 2.70 3.33*   < >  --   --   --    APTT seconds  --   --   --   --  76.2 69.1 65.5    < > = values in this interval not displayed.       No results found for: IRON, TIBC, FERRITIN    No results found for: FOLATE    No results found for: OCCULTBLD    No results found for: RETICCTPCT  Lab Results   Component Value Date    DTTCKACO07 632 06/22/2021     No results found for: SPEP, UPEP  No results found for: LDH, URICACID  Lab Results   Component Value Date    DELLA Negative 09/11/2019    SEDRATE 17 09/11/2019     No results found for: FIBRINOGEN, HAPTOGLOBIN  Lab Results   Component Value Date    PTT 76.2 10/15/2022    INR 2.29 01/10/2023     No results found for:   No results found for: CEA  No components found for: CA-19-9  No results found for: PSA  Lab Results   Component Value Date    SEDRATE 17 09/11/2019      Assessment & Plan     Patient is a 77-year-old female with provoked venous thromboembolism.    Venous thromboembolism   Even though this is a recurrent event, this is still provoked  With recent hospitalization   check for hypercoagulable work-up since this is her second episode this is concerning  for factor VIII high, protein C deficiency   I would recommend indefinite anticoagulation with coumadin INR monitored by PCP    F/u in 6 months     Thank you very much for providing the opportunity to participate in this patient’s care. Please do not hesitate to call if there are any other questions.

## 2023-02-08 ENCOUNTER — OFFICE VISIT (OUTPATIENT)
Dept: ONCOLOGY | Facility: CLINIC | Age: 78
End: 2023-02-08
Payer: MEDICARE

## 2023-02-08 VITALS
BODY MASS INDEX: 29.06 KG/M2 | HEART RATE: 86 BPM | DIASTOLIC BLOOD PRESSURE: 66 MMHG | OXYGEN SATURATION: 97 % | HEIGHT: 66 IN | TEMPERATURE: 97.9 F | SYSTOLIC BLOOD PRESSURE: 108 MMHG | WEIGHT: 180.8 LBS

## 2023-02-08 DIAGNOSIS — I26.94 MULTIPLE SUBSEGMENTAL PULMONARY EMBOLI WITHOUT ACUTE COR PULMONALE: Primary | ICD-10-CM

## 2023-02-08 PROCEDURE — 99213 OFFICE O/P EST LOW 20 MIN: CPT | Performed by: INTERNAL MEDICINE

## 2023-02-10 ENCOUNTER — HOSPITAL ENCOUNTER (OUTPATIENT)
Dept: GENERAL RADIOLOGY | Facility: HOSPITAL | Age: 78
Discharge: HOME | End: 2023-02-10
Payer: MEDICARE

## 2023-02-10 ENCOUNTER — LAB (OUTPATIENT)
Dept: LAB | Facility: HOSPITAL | Age: 78
End: 2023-02-10
Payer: MEDICARE

## 2023-02-10 ENCOUNTER — TELEPHONE (OUTPATIENT)
Dept: FAMILY MEDICINE CLINIC | Facility: CLINIC | Age: 78
End: 2023-02-10
Payer: MEDICARE

## 2023-02-10 DIAGNOSIS — Z79.01 ENCOUNTER FOR MONITORING COUMADIN THERAPY: ICD-10-CM

## 2023-02-10 DIAGNOSIS — G89.29 CHRONIC BILATERAL THORACIC BACK PAIN: ICD-10-CM

## 2023-02-10 DIAGNOSIS — Z51.81 ENCOUNTER FOR MONITORING COUMADIN THERAPY: ICD-10-CM

## 2023-02-10 DIAGNOSIS — Q76.49 SPINAL DEFORMITY: ICD-10-CM

## 2023-02-10 DIAGNOSIS — I26.94 MULTIPLE SUBSEGMENTAL PULMONARY EMBOLI WITHOUT ACUTE COR PULMONALE: ICD-10-CM

## 2023-02-10 DIAGNOSIS — M54.6 CHRONIC BILATERAL THORACIC BACK PAIN: ICD-10-CM

## 2023-02-10 LAB
INR PPP: 2.01 (ref 2–3)
PROTHROMBIN TIME: 19.9 SECONDS (ref 19.4–28.5)

## 2023-02-10 PROCEDURE — 72082 X-RAY EXAM ENTIRE SPI 2/3 VW: CPT

## 2023-02-10 PROCEDURE — 85610 PROTHROMBIN TIME: CPT

## 2023-02-10 PROCEDURE — 36415 COLL VENOUS BLD VENIPUNCTURE: CPT

## 2023-02-15 RX ORDER — POTASSIUM CHLORIDE 750 MG/1
TABLET, FILM COATED, EXTENDED RELEASE ORAL
Qty: 60 TABLET | Refills: 0 | Status: SHIPPED | OUTPATIENT
Start: 2023-02-15 | End: 2023-03-21

## 2023-02-15 RX ORDER — WARFARIN SODIUM 4 MG/1
TABLET ORAL
Qty: 30 TABLET | Refills: 0 | Status: SHIPPED | OUTPATIENT
Start: 2023-02-15 | End: 2023-03-21

## 2023-02-23 DIAGNOSIS — M15.9 OSTEOARTHRITIS, GENERALIZED: ICD-10-CM

## 2023-02-23 RX ORDER — GABAPENTIN 100 MG/1
CAPSULE ORAL
Qty: 16 CAPSULE | OUTPATIENT
Start: 2023-02-23

## 2023-02-25 ENCOUNTER — HOSPITAL ENCOUNTER (OUTPATIENT)
Dept: CT IMAGING | Facility: HOSPITAL | Age: 78
Discharge: HOME | End: 2023-02-25
Admitting: NURSE PRACTITIONER
Payer: MEDICARE

## 2023-02-25 DIAGNOSIS — Q76.49 SPINAL DEFORMITY: ICD-10-CM

## 2023-02-25 DIAGNOSIS — G89.29 CHRONIC BILATERAL THORACIC BACK PAIN: ICD-10-CM

## 2023-02-25 DIAGNOSIS — M54.6 CHRONIC BILATERAL THORACIC BACK PAIN: ICD-10-CM

## 2023-02-25 PROCEDURE — 72128 CT CHEST SPINE W/O DYE: CPT

## 2023-02-26 RX ORDER — GABAPENTIN 300 MG/1
CAPSULE ORAL
Qty: 180 CAPSULE | Refills: 0 | Status: SHIPPED | OUTPATIENT
Start: 2023-02-26

## 2023-02-27 ENCOUNTER — OFFICE VISIT (OUTPATIENT)
Dept: FAMILY MEDICINE CLINIC | Facility: CLINIC | Age: 78
End: 2023-02-27
Payer: MEDICARE

## 2023-02-27 VITALS
DIASTOLIC BLOOD PRESSURE: 76 MMHG | WEIGHT: 176.8 LBS | HEIGHT: 66 IN | SYSTOLIC BLOOD PRESSURE: 113 MMHG | BODY MASS INDEX: 28.42 KG/M2 | HEART RATE: 79 BPM | RESPIRATION RATE: 16 BRPM | OXYGEN SATURATION: 96 % | TEMPERATURE: 98 F

## 2023-02-27 DIAGNOSIS — R00.2 PALPITATIONS: Primary | ICD-10-CM

## 2023-02-27 PROCEDURE — 99213 OFFICE O/P EST LOW 20 MIN: CPT | Performed by: FAMILY MEDICINE

## 2023-02-27 PROCEDURE — 93000 ELECTROCARDIOGRAM COMPLETE: CPT | Performed by: FAMILY MEDICINE

## 2023-02-27 NOTE — PROGRESS NOTES
Subjective   Chief Complaint   Patient presents with   • Fatigue     No Energy Last Thursday - Hear Rate Was 118 And Then Next Day Her Pulse Was Irregular   • Palpitations     Abida Becker is a 77 y.o. female.     Patient Care Team:  Darlene Matute MD as PCP - [facility], Rolando HUNTER MD as Consulting Physician (Urology)  Banet, Duane Edward, MD as Consulting Physician (Dermatology)  Milla Urias MD as Consulting Physician (Hematology and Oncology)    History of Present Illness  She is coming in today due to some heart rate issues.  She reports that on 2/23/2023 she noted to have a lot of fatigue throughout the day.  She then checked her oxygen level with a pulse oximeter which was normal, however it was showing her heart rate to be around 118.  It also showed that her heart rate was irregular.  Her symptoms lasted throughout the day, but since then she has been feeling fine from the energy standpoint.  No chest pains, difficulty breathing, dizziness, or syncope.  She periodically still monitors her heart rate and it is not high, but at times the pulse oximeter tells her that it is irregular.  Patient is currently on Coumadin due to pulmonary embolism several months ago.  She denies any exertional shortness of breath.       The following portions of the patient's history were reviewed and updated as appropriate: allergies, current medications, past family history, past medical history, past social history, past surgical history and problem list.  Past Medical History:   Diagnosis Date   • GERD (gastroesophageal reflux disease)    • History of recurrent UTIs    • IBS (irritable bowel syndrome)    • Osteoarthritis    • Osteoporosis     on prolia(12/5/22)   • Primary osteoarthritis of both knees 01/24/2022   • Pulmonary embolism (HCC) 2011    after scoliosis surgery   • Pulmonary embolism (HCC) 10/2022   • Vitamin D deficiency      Past Surgical History:   Procedure Laterality Date   • BACK SURGERY  11/2011     "Dr. Olsen, due to scoliosis, earnestine and some fusions   • BREAST BIOPSY     • CHOLECYSTECTOMY     • COLONOSCOPY  01/18/2018   • HIATAL HERNIA REPAIR  2013   • REIMPLANT URETER IN BLADDER       The patient has a family history of  Family History   Problem Relation Age of Onset   • Heart failure Mother    • Cancer Father      Social History     Socioeconomic History   • Marital status: Single   Tobacco Use   • Smoking status: Never     Passive exposure: Never   • Smokeless tobacco: Never   Vaping Use   • Vaping Use: Never used   Substance and Sexual Activity   • Alcohol use: No   • Drug use: No   • Sexual activity: Never       Review of Systems   Constitutional: Positive for fatigue. Negative for activity change and fever.   Respiratory: Negative for shortness of breath and wheezing.    Cardiovascular: Positive for palpitations. Negative for chest pain and leg swelling.   Musculoskeletal: Negative for arthralgias and back pain.   Skin: Negative for rash.   Neurological: Negative for tremors and headache.     Visit Vitals  /76   Pulse 79   Temp 98 °F (36.7 °C)   Resp 16   Ht 167.6 cm (65.98\")   Wt 80.2 kg (176 lb 12.8 oz)   SpO2 96%   BMI 28.55 kg/m²       BMI is >= 25 and <30. (Overweight) The following options were offered after discussion;: exercise counseling/recommendations      Current Outpatient Medications:   •  acetaminophen (TYLENOL) 500 MG tablet, Take 1 tablet by mouth Every 6 (Six) Hours As Needed for Fever or Mild Pain., Disp: , Rfl:   •  calcium carbonate (OS-STEVEN) 600 MG tablet, Take 600 mg by mouth Daily., Disp: , Rfl:   •  cholecalciferol (VITAMIN D3) 1000 units tablet, Take 1,000 Units by mouth Daily., Disp: , Rfl:   •  colestipol (COLESTID) 1 g tablet, TAKE ONE TABLET BY MOUTH TWICE A DAY AS NEEDED, Disp: 60 tablet, Rfl: 0  •  dicyclomine (BENTYL) 10 MG capsule, TAKE ONE CAPSULE BY MOUTH THREE TIMES A DAY AS NEEDED FOR IBS, Disp: 90 capsule, Rfl: 0  •  gabapentin (NEURONTIN) 300 MG capsule, TAKE " ONE CAPSULE BY MOUTH TWICE A DAY, Disp: 180 capsule, Rfl: 0  •  hydroCHLOROthiazide (HYDRODIURIL) 25 MG tablet, Take 1 tablet by mouth Daily., Disp: 90 tablet, Rfl: 1  •  melatonin 3 MG tablet, Take 3 mg by mouth Every Night., Disp: , Rfl:   •  Mirabegron ER (MYRBETRIQ) 50 MG tablet sustained-release 24 hour 24 hr tablet, Take 50 mg by mouth Daily., Disp: , Rfl:   •  potassium chloride 10 MEQ CR tablet, TAKE ONE TABLET BY MOUTH TWICE A DAY, Disp: 60 tablet, Rfl: 0  •  Riboflavin (B2 PO), Take 1 tablet by mouth Daily., Disp: , Rfl:   •  vitamin B-12 (CYANOCOBALAMIN) 1000 MCG tablet, Take 1,000 mcg by mouth Daily., Disp: , Rfl:   •  warfarin (COUMADIN) 4 MG tablet, TAKE ONE TABLET BY MOUTH ONCE NIGHTLY, Disp: 30 tablet, Rfl: 0    Objective   Physical Exam  Vitals and nursing note reviewed.   Constitutional:       General: She is not in acute distress.     Appearance: Normal appearance. She is well-developed. She is not ill-appearing.   HENT:      Head: Normocephalic and atraumatic.   Cardiovascular:      Rate and Rhythm: Normal rate and regular rhythm.      Heart sounds: Normal heart sounds. No murmur heard.    No gallop.   Pulmonary:      Effort: Pulmonary effort is normal. No respiratory distress.      Breath sounds: Normal breath sounds. No wheezing, rhonchi or rales.   Chest:      Chest wall: No tenderness.   Musculoskeletal:      Cervical back: Normal range of motion and neck supple.   Neurological:      General: No focal deficit present.      Mental Status: She is alert and oriented to person, place, and time. Mental status is at baseline.   Psychiatric:         Mood and Affect: Mood normal.         FOLLOWING LABS WERE REVIEWED TODAY:  CMP    CMP 10/19/22 10/20/22 10/27/22   Glucose 98 93 76   BUN 11 11 20   Creatinine 0.54 (A) 0.50 (A) 0.68   eGFR 96.1 97.9 91.0   Sodium 139 138 139   Potassium 4.6 4.1 3.3 (A)   Chloride 103 101 98   Calcium 8.7 8.7 9.2   Total Protein 5.6 (A) 5.6 (A) 6.5   Albumin 3.20 (A)  3.20 (A) 3.90   Globulin 2.4 2.4 2.6   Total Bilirubin 0.5 0.6 0.5   Alkaline Phosphatase 81 82 88   AST (SGOT) 41 (A) 37 (A) 15   ALT (SGPT) 42 (A) 48 (A) 28   Albumin/Globulin Ratio 1.3 1.3 1.5   BUN/Creatinine Ratio 20.4 22.0 29.4 (A)   Anion Gap 8.0 11.0 12.0   (A) Abnormal value       Comments are available for some flowsheets but are not being displayed.              ECG 12 Lead    Date/Time: 2/27/2023 11:58 AM  Performed by: Darlene Matute MD  Authorized by: Darlene Matute MD   Comparison: compared with previous ECG from 10/13/2022  Similar to previous ECG  Rhythm: sinus rhythm  Rate: normal  Conduction: conduction normal  ST Segments: ST segments normal  T Waves: T waves normal  QRS axis: normal  Other: no other findings    Clinical impression: normal ECG             Assessment & Plan   Diagnoses and all orders for this visit:    1. Palpitations (Primary)  -     ECG 12 Lead  -     Holter Monitor - 24 Hour; Future      I reviewed her symptoms and concerns.  EKG was done today and showed normal sinus rhythm.  Patient reports however some irregular heart rate on occasions.  Patient had echocardiogram done in 10/2022.  I will be getting Holter monitor.  I reviewed her recent blood work results.  I will advise further once Holter monitor result is available.      Return in about 3 months (around 5/27/2023) for Next scheduled follow up.    Requested Prescriptions      No prescriptions requested or ordered in this encounter

## 2023-03-01 ENCOUNTER — OFFICE VISIT (OUTPATIENT)
Dept: NEUROSURGERY | Facility: CLINIC | Age: 78
End: 2023-03-01
Payer: MEDICARE

## 2023-03-01 VITALS
OXYGEN SATURATION: 98 % | BODY MASS INDEX: 28.57 KG/M2 | DIASTOLIC BLOOD PRESSURE: 65 MMHG | HEIGHT: 66 IN | WEIGHT: 177.8 LBS | SYSTOLIC BLOOD PRESSURE: 133 MMHG | HEART RATE: 72 BPM

## 2023-03-01 DIAGNOSIS — M40.292 OTHER KYPHOSIS OF CERVICAL REGION: Primary | ICD-10-CM

## 2023-03-01 DIAGNOSIS — Z98.1 STATUS POST THORACIC SPINAL FUSION: ICD-10-CM

## 2023-03-01 DIAGNOSIS — M43.12 ACQUIRED SPONDYLOLISTHESIS OF CERVICAL VERTEBRA: ICD-10-CM

## 2023-03-01 DIAGNOSIS — Q76.49 SPINAL DEFORMITY: ICD-10-CM

## 2023-03-01 PROCEDURE — 99214 OFFICE O/P EST MOD 30 MIN: CPT | Performed by: NEUROLOGICAL SURGERY

## 2023-03-10 ENCOUNTER — LAB (OUTPATIENT)
Dept: LAB | Facility: HOSPITAL | Age: 78
End: 2023-03-10
Payer: MEDICARE

## 2023-03-10 DIAGNOSIS — I26.94 MULTIPLE SUBSEGMENTAL PULMONARY EMBOLI WITHOUT ACUTE COR PULMONALE: ICD-10-CM

## 2023-03-10 PROCEDURE — 85025 COMPLETE CBC W/AUTO DIFF WBC: CPT | Performed by: INTERNAL MEDICINE

## 2023-03-14 ENCOUNTER — TELEPHONE (OUTPATIENT)
Dept: FAMILY MEDICINE CLINIC | Facility: CLINIC | Age: 78
End: 2023-03-14
Payer: MEDICARE

## 2023-03-14 NOTE — TELEPHONE ENCOUNTER
Patient called and left a voice mail stating she has not heard from [facility] regarding her Holter Monitor. I called the patient after researching the referral  [facility] scheduling left her a message on 03/02/20213 to call and schedule. Since patient never returned their call they put her on the schedule for 03/26/2023 @ 9 am. I tried also to call the patient and had to leave a detailed message with this information. I left her [facility] scheduling phone number to call and speak with them about her upcoming appt.

## 2023-03-21 RX ORDER — WARFARIN SODIUM 4 MG/1
TABLET ORAL
Qty: 30 TABLET | Refills: 0 | Status: SHIPPED | OUTPATIENT
Start: 2023-03-21

## 2023-03-21 RX ORDER — POTASSIUM CHLORIDE 750 MG/1
TABLET, FILM COATED, EXTENDED RELEASE ORAL
Qty: 60 TABLET | Refills: 0 | Status: SHIPPED | OUTPATIENT
Start: 2023-03-21

## 2023-03-26 ENCOUNTER — HOSPITAL ENCOUNTER (OUTPATIENT)
Dept: MRI IMAGING | Facility: HOSPITAL | Age: 78
Discharge: HOME | End: 2023-03-26
Payer: MEDICARE

## 2023-03-26 ENCOUNTER — HOSPITAL ENCOUNTER (OUTPATIENT)
Dept: RESPIRATORY THERAPY | Facility: HOSPITAL | Age: 78
Discharge: HOME | End: 2023-03-26
Payer: MEDICARE

## 2023-03-26 DIAGNOSIS — R00.2 PALPITATIONS: ICD-10-CM

## 2023-03-26 PROCEDURE — 93225 XTRNL ECG REC<48 HRS REC: CPT

## 2023-03-28 ENCOUNTER — HOSPITAL ENCOUNTER (OUTPATIENT)
Dept: MRI IMAGING | Facility: HOSPITAL | Age: 78
Discharge: HOME | End: 2023-03-28
Payer: MEDICARE

## 2023-03-28 DIAGNOSIS — M40.292 OTHER KYPHOSIS OF CERVICAL REGION: ICD-10-CM

## 2023-04-01 ENCOUNTER — HOSPITAL ENCOUNTER (OUTPATIENT)
Dept: MRI IMAGING | Facility: HOSPITAL | Age: 78
Discharge: HOME | End: 2023-04-01
Admitting: NEUROLOGICAL SURGERY
Payer: MEDICARE

## 2023-04-01 DIAGNOSIS — I48.0 PAROXYSMAL ATRIAL FIBRILLATION: Primary | ICD-10-CM

## 2023-04-01 PROCEDURE — 72141 MRI NECK SPINE W/O DYE: CPT

## 2023-04-03 ENCOUNTER — TELEPHONE (OUTPATIENT)
Dept: FAMILY MEDICINE CLINIC | Facility: CLINIC | Age: 78
End: 2023-04-03

## 2023-04-03 NOTE — TELEPHONE ENCOUNTER
Patient recently had Holter monitor done and I signed off on it over the weekend.  It showed few runs of atrial fibrillation.  Please see the Holter monitor signout for details and notify the patient.  Thank you.

## 2023-04-03 NOTE — TELEPHONE ENCOUNTER
Caller: Abida Becker    Relationship: Self    Best call back number: [phone]    What is the best time to reach you: ANYTIME   Who are you requesting to speak with (clinical staff, provider,  specific staff member): CLINICAL STAFF    Do you know the name of the person who called: SELF     What was the call regarding: PATIENT CALLED AND STATED AND WANTED TO KNOW HER TEST RESULTS AND WANTED TO KNOW WHY SHE HAS TO SEE CARDIOLOGY. PLEASE CALL ASAP! Do you require a callback: YES

## 2023-04-04 NOTE — TELEPHONE ENCOUNTER
Note   I called the patient after hours myself.  Patient was admitted to [facility] in the recent past and discharged last week due to multifocal pneumonia.  She tells me that over the last couple of days her heart rate has been staying elevated and when she starts moving around she was noted to have even more elevated heart rate.  She does not necessarily feel palpitations, she denies any chest pain, but when she gets up and starts moving around she feels very tired and feels like she might pass out.  Advised the patient to get checked now and go to the ER for further evaluation.  She will be calling her daughter who will take her to the ER now.

## 2023-04-06 ENCOUNTER — TELEPHONE (OUTPATIENT)
Dept: NEUROSURGERY | Facility: CLINIC | Age: 78
End: 2023-04-06

## 2023-04-06 NOTE — TELEPHONE ENCOUNTER
Caller: Abida Becker    Relationship: Self    Best call back number: [phone]    What test was performed: MRI CERVICAL SPINE WO 4/1/2023 @    Additional notes: PATIENT CALLED IN REQUESTING A C/B TO REVIEW MRI IMAGING RESULTS SHE'S RECEIVED VIA [facility] [employer] (MULTIPLE 'MOD-SEVERE' FINDINGS SHE IS REQUESTING C/B ASAP).     SHE STATES PHYSICAL THERAPY ALSO NEEDS FURTHER DIRECTION FOR HER PLAN OF CARE. ADVISED HER I WOULD HAVE CLINICAL REVIEW HER RECENT MRI IMAGING AND DETERMINE APPROPRIATE NEXT STEPS/FOLLOW UP SCHEDULING TIMEFRAME.     REQUESTING C/B FOR IMAGING RESULTS BEFORE END OF DAY OR JUST FOLLOW UP TO SCHEDULE IF F2F FOLLOW UP W/ THIS NEW MRI IS REQ'D.

## 2023-04-14 ENCOUNTER — TELEPHONE (OUTPATIENT)
Dept: FAMILY MEDICINE CLINIC | Facility: CLINIC | Age: 78
End: 2023-04-14

## 2023-04-14 ENCOUNTER — LAB (OUTPATIENT)
Dept: LAB | Facility: HOSPITAL | Age: 78
End: 2023-04-14
Payer: MEDICARE

## 2023-04-14 DIAGNOSIS — Z79.01 ENCOUNTER FOR MONITORING COUMADIN THERAPY: ICD-10-CM

## 2023-04-14 DIAGNOSIS — Z79.01 ENCOUNTER FOR MONITORING COUMADIN THERAPY: Primary | ICD-10-CM

## 2023-04-14 DIAGNOSIS — I26.94 MULTIPLE SUBSEGMENTAL PULMONARY EMBOLI WITHOUT ACUTE COR PULMONALE: ICD-10-CM

## 2023-04-14 DIAGNOSIS — Z51.81 ENCOUNTER FOR MONITORING COUMADIN THERAPY: ICD-10-CM

## 2023-04-14 DIAGNOSIS — Z51.81 ENCOUNTER FOR MONITORING COUMADIN THERAPY: Primary | ICD-10-CM

## 2023-04-14 LAB
INR PPP: 1.5 (ref 2–3)
PROTHROMBIN TIME: 15.1 SECONDS (ref 19.4–28.5)

## 2023-04-14 PROCEDURE — 85610 PROTHROMBIN TIME: CPT

## 2023-04-14 PROCEDURE — 36415 COLL VENOUS BLD VENIPUNCTURE: CPT

## 2023-04-14 NOTE — TELEPHONE ENCOUNTER
Caller: Abida Becker    Relationship: Self    Best call back number: [phone]    What orders are you requesting (i.e. lab or imaging): STANDING ORDER FOR PROTIME INR    In what timeframe would the patient need to come in: NEXT MONTH    Where will you receive your lab/imaging services: [facility]    Additional notes: PATIENT GOT INR DONE TODAY AND IT WAS THE LAST MONTH FOR HER 6 MONTH STANDING ORDERS AND NEEDS A NEW ONE PLACED FOR NEXT MONTH.    PLEASE ADVISE

## 2023-04-14 NOTE — PROGRESS NOTES
"Subjective   History of Present Illness: Abida Becker is a 77 y.o. female is here today for follow-up for back and neck pain. Today patient reports continued neck pain into her left arm.  No changes since she was last seen.    Chief Complaint   Patient presents with   • Neck Pain     Follow up           Previous Treatment: Gabapentin,Tylenol,    The following portions of the patient's history were reviewed and updated as appropriate: allergies, current medications, past family history, past medical history, past social history, past surgical history and problem list.    Review of Systems   Constitutional: Positive for activity change.   HENT: Negative.    Eyes: Negative.    Respiratory: Negative.    Cardiovascular: Negative.    Gastrointestinal: Negative.    Endocrine: Negative.    Genitourinary: Negative.    Musculoskeletal: Positive for arthralgias, myalgias, neck pain and neck stiffness.   Skin: Negative.    Allergic/Immunologic: Negative.    Neurological: Negative.    Hematological: Negative.    Psychiatric/Behavioral: Negative.        Objective     /71   Pulse 71   Ht 167.6 cm (65.98\")   Wt 83.2 kg (183 lb 6.4 oz)   SpO2 98%   BMI 29.62 kg/m²    Body mass index is 29.62 kg/m².  Vitals:    04/17/23 0940   PainSc:   4           Neurologic Exam    Assessment & Plan   Independent Review of Radiographic Studies:      I personally reviewed and interpreted the images from the following studies.    MRI cervical spine: There is moderate stenosis at C2-3 and C4-5 without overt spinal cord compression and no cord signal change.  There is multilevel spondylolisthesis and kyphotic deformity as previously visualized on x-ray.    Medical Decision Making:      Abida Becker is a 77 y.o. female with a history of thoracolumbar fusion for deformity and history of osteoporosis.  Previous work-up demonstrated no significant adjacent segment disease and overall okay spinal balance.  She does have moderate kyphotic " deformity of the cervical spine, but MRI does not reveal high-grade central stenosis and no spinal cord compression.  No surgical intervention is indicated at this time.  Patient should continue working with physical therapy.  I will see her back as needed.      Diagnoses and all orders for this visit:    1. Other kyphosis of cervical region (Primary)    2. Cervical stenosis of spine    3. Acquired spondylolisthesis of cervical vertebra    4. DDD (degenerative disc disease), cervical      No follow-ups on file.    This patient was examined wearing appropriate personal protective equipment.                      Dr. Edilson Rosas IV    04/17/23  09:59 EDT

## 2023-04-17 ENCOUNTER — OFFICE VISIT (OUTPATIENT)
Dept: NEUROSURGERY | Facility: CLINIC | Age: 78
End: 2023-04-17
Payer: MEDICARE

## 2023-04-17 VITALS
HEIGHT: 66 IN | DIASTOLIC BLOOD PRESSURE: 71 MMHG | OXYGEN SATURATION: 98 % | WEIGHT: 183.4 LBS | BODY MASS INDEX: 29.47 KG/M2 | HEART RATE: 71 BPM | SYSTOLIC BLOOD PRESSURE: 107 MMHG

## 2023-04-17 DIAGNOSIS — M48.02 CERVICAL STENOSIS OF SPINE: ICD-10-CM

## 2023-04-17 DIAGNOSIS — M43.12 ACQUIRED SPONDYLOLISTHESIS OF CERVICAL VERTEBRA: ICD-10-CM

## 2023-04-17 DIAGNOSIS — M50.30 DDD (DEGENERATIVE DISC DISEASE), CERVICAL: ICD-10-CM

## 2023-04-17 DIAGNOSIS — M40.292 OTHER KYPHOSIS OF CERVICAL REGION: Primary | ICD-10-CM

## 2023-04-17 PROCEDURE — 1160F RVW MEDS BY RX/DR IN RCRD: CPT | Performed by: NEUROLOGICAL SURGERY

## 2023-04-17 PROCEDURE — 99213 OFFICE O/P EST LOW 20 MIN: CPT | Performed by: NEUROLOGICAL SURGERY

## 2023-04-17 PROCEDURE — 3078F DIAST BP <80 MM HG: CPT | Performed by: NEUROLOGICAL SURGERY

## 2023-04-17 PROCEDURE — 3074F SYST BP LT 130 MM HG: CPT | Performed by: NEUROLOGICAL SURGERY

## 2023-04-17 PROCEDURE — 1159F MED LIST DOCD IN RCRD: CPT | Performed by: NEUROLOGICAL SURGERY

## 2023-04-18 ENCOUNTER — TELEPHONE (OUTPATIENT)
Dept: FAMILY MEDICINE CLINIC | Facility: CLINIC | Age: 78
End: 2023-04-18

## 2023-04-18 NOTE — TELEPHONE ENCOUNTER
Caller: Abida Becker    Relationship: Self    Best call back number: [phone]  What is the best time to reach you:ANYTIME   Who are you requesting to speak with (clinical staff, provider,  specific staff member): CLINICAL STAFF  Do you know the name of the person who called: SELF     What was the call regarding: PATIENT CALLED AND STATED SHE IS VERY FATIGUED AND COULD TAKE A NAP ALL DAY. PATIENT STATES SHE DOES NOT KNOW IF SHE FEELS THIS WAY DO TO HER A-FIB OR HER MEDICATION WARFARIN. PLEASE CALL     Do you require a callback: YES

## 2023-04-18 NOTE — TELEPHONE ENCOUNTER
How long has she been so fatigued? She is scheduled to see a cardiologist on 5/11/2023. If she feels so fatigued she needs to be seen sooner here. Any CP or SOA? If SOA or CP go to the ER now. Otherwise may put her on my schedule for tomorrow.

## 2023-04-20 ENCOUNTER — HOSPITAL ENCOUNTER (EMERGENCY)
Facility: HOSPITAL | Age: 78
Discharge: HOME | End: 2023-04-20
Attending: EMERGENCY MEDICINE
Payer: MEDICARE

## 2023-04-20 ENCOUNTER — LAB (OUTPATIENT)
Dept: LAB | Facility: HOSPITAL | Age: 78
End: 2023-04-20
Payer: MEDICARE

## 2023-04-20 ENCOUNTER — OFFICE VISIT (OUTPATIENT)
Dept: FAMILY MEDICINE CLINIC | Facility: CLINIC | Age: 78
End: 2023-04-20
Payer: MEDICARE

## 2023-04-20 VITALS
HEIGHT: 67 IN | BODY MASS INDEX: 27.47 KG/M2 | SYSTOLIC BLOOD PRESSURE: 120 MMHG | TEMPERATURE: 97.7 F | DIASTOLIC BLOOD PRESSURE: 68 MMHG | WEIGHT: 175 LBS | HEART RATE: 90 BPM | OXYGEN SATURATION: 95 % | RESPIRATION RATE: 20 BRPM

## 2023-04-20 VITALS
HEART RATE: 74 BPM | WEIGHT: 180 LBS | SYSTOLIC BLOOD PRESSURE: 103 MMHG | OXYGEN SATURATION: 97 % | DIASTOLIC BLOOD PRESSURE: 68 MMHG | TEMPERATURE: 97.8 F | HEIGHT: 66 IN | RESPIRATION RATE: 16 BRPM | BODY MASS INDEX: 28.93 KG/M2

## 2023-04-20 DIAGNOSIS — R53.83 OTHER FATIGUE: ICD-10-CM

## 2023-04-20 DIAGNOSIS — R53.83 FATIGUE, UNSPECIFIED TYPE: Primary | ICD-10-CM

## 2023-04-20 DIAGNOSIS — I48.0 PAROXYSMAL ATRIAL FIBRILLATION: Primary | ICD-10-CM

## 2023-04-20 DIAGNOSIS — I10 ESSENTIAL HYPERTENSION: ICD-10-CM

## 2023-04-20 DIAGNOSIS — Z86.711 HISTORY OF PULMONARY EMBOLISM: ICD-10-CM

## 2023-04-20 DIAGNOSIS — Z79.01 ENCOUNTER FOR MONITORING COUMADIN THERAPY: ICD-10-CM

## 2023-04-20 DIAGNOSIS — M81.0 AGE-RELATED OSTEOPOROSIS WITHOUT CURRENT PATHOLOGICAL FRACTURE: ICD-10-CM

## 2023-04-20 DIAGNOSIS — Z51.81 ENCOUNTER FOR MONITORING COUMADIN THERAPY: ICD-10-CM

## 2023-04-20 LAB
25(OH)D3 SERPL-MCNC: 34.3 NG/ML (ref 30–100)
ALBUMIN SERPL-MCNC: 4.6 G/DL (ref 3.5–5.2)
ALBUMIN/GLOB SERPL: 2 G/DL
ALP SERPL-CCNC: 91 U/L (ref 39–117)
ALT SERPL W P-5'-P-CCNC: 14 U/L (ref 1–33)
ANION GAP SERPL CALCULATED.3IONS-SCNC: 12 MMOL/L (ref 5–15)
AST SERPL-CCNC: 16 U/L (ref 1–32)
BASOPHILS # BLD AUTO: 0 10*3/MM3 (ref 0–0.2)
BASOPHILS NFR BLD AUTO: 0.8 % (ref 0–1.5)
BILIRUB BLD-MCNC: NEGATIVE MG/DL
BILIRUB SERPL-MCNC: 0.9 MG/DL (ref 0–1.2)
BUN SERPL-MCNC: 17 MG/DL (ref 8–23)
BUN/CREAT SERPL: 24.6 (ref 7–25)
CALCIUM SPEC-SCNC: 9.7 MG/DL (ref 8.6–10.5)
CHLORIDE SERPL-SCNC: 100 MMOL/L (ref 98–107)
CLARITY, POC: CLEAR
CO2 SERPL-SCNC: 28 MMOL/L (ref 22–29)
COLOR UR: NORMAL
CREAT SERPL-MCNC: 0.69 MG/DL (ref 0.57–1)
D DIMER PPP FEU-MCNC: 0.42 MG/L (FEU) (ref 0–0.75)
DEPRECATED RDW RBC AUTO: 47.7 FL (ref 37–54)
EGFRCR SERPLBLD CKD-EPI 2021: 90.6 ML/MIN/1.73
EOSINOPHIL # BLD AUTO: 0 10*3/MM3 (ref 0–0.4)
EOSINOPHIL NFR BLD AUTO: 0.9 % (ref 0.3–6.2)
ERYTHROCYTE [DISTWIDTH] IN BLOOD BY AUTOMATED COUNT: 15.4 % (ref 12.3–15.4)
EXPIRATION DATE: NORMAL
GLOBULIN UR ELPH-MCNC: 2.3 GM/DL
GLUCOSE SERPL-MCNC: 92 MG/DL (ref 65–99)
GLUCOSE UR STRIP-MCNC: NEGATIVE MG/DL
HCT VFR BLD AUTO: 38 % (ref 34–46.6)
HGB BLD-MCNC: 12.4 G/DL (ref 12–15.9)
INR PPP: 1.45 (ref 2–3)
KETONES UR QL: NEGATIVE
LEUKOCYTE EST, POC: NEGATIVE
LYMPHOCYTES # BLD AUTO: 1.6 10*3/MM3 (ref 0.7–3.1)
LYMPHOCYTES NFR BLD AUTO: 30.1 % (ref 19.6–45.3)
Lab: NORMAL
MAGNESIUM SERPL-MCNC: 2.2 MG/DL (ref 1.6–2.4)
MCH RBC QN AUTO: 27.5 PG (ref 26.6–33)
MCHC RBC AUTO-ENTMCNC: 32.5 G/DL (ref 31.5–35.7)
MCV RBC AUTO: 84.4 FL (ref 79–97)
MONOCYTES # BLD AUTO: 0.5 10*3/MM3 (ref 0.1–0.9)
MONOCYTES NFR BLD AUTO: 9.9 % (ref 5–12)
NEUTROPHILS NFR BLD AUTO: 3 10*3/MM3 (ref 1.7–7)
NEUTROPHILS NFR BLD AUTO: 58.3 % (ref 42.7–76)
NITRITE UR-MCNC: NEGATIVE MG/ML
NRBC BLD AUTO-RTO: 0.1 /100 WBC (ref 0–0.2)
PH UR: 5.5 [PH] (ref 5–8)
PLATELET # BLD AUTO: 399 10*3/MM3 (ref 140–450)
PMV BLD AUTO: 7.8 FL (ref 6–12)
POTASSIUM SERPL-SCNC: 4.3 MMOL/L (ref 3.5–5.2)
PROT SERPL-MCNC: 6.9 G/DL (ref 6–8.5)
PROT UR STRIP-MCNC: NEGATIVE MG/DL
PROTHROMBIN TIME: 15.2 SECONDS (ref 19.4–28.5)
RBC # BLD AUTO: 4.5 10*6/MM3 (ref 3.77–5.28)
RBC # UR STRIP: NEGATIVE /UL
SODIUM SERPL-SCNC: 140 MMOL/L (ref 136–145)
SP GR UR: 1.02 (ref 1–1.03)
TROPONIN T SERPL HS-MCNC: 12 NG/L
TROPONIN T SERPL HS-MCNC: 13 NG/L
TSH SERPL DL<=0.05 MIU/L-ACNC: 2.34 UIU/ML (ref 0.27–4.2)
UROBILINOGEN UR QL: NORMAL
WBC NRBC COR # BLD: 5.2 10*3/MM3 (ref 3.4–10.8)

## 2023-04-20 PROCEDURE — 36415 COLL VENOUS BLD VENIPUNCTURE: CPT | Performed by: FAMILY MEDICINE

## 2023-04-20 PROCEDURE — 99214 OFFICE O/P EST MOD 30 MIN: CPT | Performed by: FAMILY MEDICINE

## 2023-04-20 PROCEDURE — 93000 ELECTROCARDIOGRAM COMPLETE: CPT | Performed by: FAMILY MEDICINE

## 2023-04-20 PROCEDURE — 80053 COMPREHEN METABOLIC PANEL: CPT | Performed by: FAMILY MEDICINE

## 2023-04-20 PROCEDURE — 84484 ASSAY OF TROPONIN QUANT: CPT | Performed by: FAMILY MEDICINE

## 2023-04-20 PROCEDURE — 81003 URINALYSIS AUTO W/O SCOPE: CPT | Performed by: FAMILY MEDICINE

## 2023-04-20 PROCEDURE — 85610 PROTHROMBIN TIME: CPT

## 2023-04-20 PROCEDURE — 36415 COLL VENOUS BLD VENIPUNCTURE: CPT

## 2023-04-20 PROCEDURE — 3078F DIAST BP <80 MM HG: CPT | Performed by: FAMILY MEDICINE

## 2023-04-20 PROCEDURE — 85025 COMPLETE CBC W/AUTO DIFF WBC: CPT | Performed by: FAMILY MEDICINE

## 2023-04-20 PROCEDURE — 82306 VITAMIN D 25 HYDROXY: CPT

## 2023-04-20 PROCEDURE — 99282 EMERGENCY DEPT VISIT SF MDM: CPT

## 2023-04-20 PROCEDURE — 84443 ASSAY THYROID STIM HORMONE: CPT | Performed by: FAMILY MEDICINE

## 2023-04-20 PROCEDURE — 85379 FIBRIN DEGRADATION QUANT: CPT | Performed by: FAMILY MEDICINE

## 2023-04-20 PROCEDURE — 84484 ASSAY OF TROPONIN QUANT: CPT | Performed by: EMERGENCY MEDICINE

## 2023-04-20 PROCEDURE — 93005 ELECTROCARDIOGRAM TRACING: CPT | Performed by: EMERGENCY MEDICINE

## 2023-04-20 PROCEDURE — 83735 ASSAY OF MAGNESIUM: CPT | Performed by: FAMILY MEDICINE

## 2023-04-20 PROCEDURE — 1159F MED LIST DOCD IN RCRD: CPT | Performed by: FAMILY MEDICINE

## 2023-04-20 PROCEDURE — 3074F SYST BP LT 130 MM HG: CPT | Performed by: FAMILY MEDICINE

## 2023-04-20 PROCEDURE — 1160F RVW MEDS BY RX/DR IN RCRD: CPT | Performed by: FAMILY MEDICINE

## 2023-04-20 RX ORDER — POTASSIUM CHLORIDE 750 MG/1
10 TABLET, FILM COATED, EXTENDED RELEASE ORAL 2 TIMES DAILY
Qty: 180 TABLET | Refills: 0 | Status: SHIPPED | OUTPATIENT
Start: 2023-04-20

## 2023-04-20 RX ORDER — WARFARIN SODIUM 4 MG/1
4 TABLET ORAL NIGHTLY
Qty: 90 TABLET | Refills: 0 | Status: SHIPPED | OUTPATIENT
Start: 2023-04-20

## 2023-04-20 RX ORDER — SODIUM CHLORIDE 0.9 % (FLUSH) 0.9 %
10 SYRINGE (ML) INJECTION AS NEEDED
Status: DISCONTINUED | OUTPATIENT
Start: 2023-04-20 | End: 2023-04-20 | Stop reason: HOSPADM

## 2023-04-20 NOTE — DISCHARGE INSTRUCTIONS
Follow-up with your primary doctor and cardiology.  Return to the emergency room for any new or worsening symptoms or if you have any other questions or concerns.

## 2023-04-20 NOTE — ED PROVIDER NOTES
Subjective   History of Present Illness  Chief complaint: Fatigue    77-year-old female presents with severe fatigue.  Patient states symptoms have been present over the past 2 weeks.  She denies any other specific complaints.  She states she just feels like she has no energy and she is always tired.  She denies any chest pain or shortness of breath.  She denies any fever, vomiting, diarrhea, cough.  She has no complaints of pain.  She saw her primary doctor today and had some blood work done.  She states her primary doctor told her to come to the emergency room because something was off on her blood work.  She does not know what was abnormal however on review of records it appears her troponin was borderline elevated at 13.  CBC, CMP, magnesium, D-dimer, urinalysis were all unremarkable.    History provided by:  Patient      Review of Systems   Constitutional: Positive for fatigue. Negative for fever.   HENT: Negative for congestion.    Respiratory: Negative for cough and shortness of breath.    Cardiovascular: Negative for chest pain.   Gastrointestinal: Negative for abdominal pain, diarrhea and vomiting.   Genitourinary: Negative for dysuria.   Neurological: Negative for headaches.   Psychiatric/Behavioral: Negative for confusion.       Past Medical History:   Diagnosis Date   • GERD (gastroesophageal reflux disease)    • History of recurrent UTIs    • IBS (irritable bowel syndrome)    • Osteoarthritis    • Osteoporosis     on prolia(12/5/22)   • Primary osteoarthritis of both knees 01/24/2022   • Pulmonary embolism 2011    after scoliosis surgery   • Pulmonary embolism 10/2022   • Vitamin D deficiency        Allergies   Allergen Reactions   • Amoxicillin Other (See Comments)      unsure of type of reaction - states it was so long ago she can't remember    • Penicillins Nausea And Vomiting       Past Surgical History:   Procedure Laterality Date   • BACK SURGERY  11/2011    Dr. Olsen, due to scoliosis, earnestine and  "some fusions   • BREAST BIOPSY     • CHOLECYSTECTOMY     • COLONOSCOPY  01/18/2018   • HIATAL HERNIA REPAIR  2013   • REIMPLANT URETER IN BLADDER         Family History   Problem Relation Age of Onset   • Heart failure Mother    • Cancer Father        Social History     Socioeconomic History   • Marital status: Single   Tobacco Use   • Smoking status: Never     Passive exposure: Never   • Smokeless tobacco: Never   Vaping Use   • Vaping Use: Never used   Substance and Sexual Activity   • Alcohol use: No   • Drug use: No   • Sexual activity: Never       /68   Pulse 90   Temp 97.7 °F (36.5 °C) (Oral)   Resp 20   Ht 170.2 cm (67\")   Wt 79.4 kg (175 lb)   SpO2 95%   BMI 27.41 kg/m²       Objective   Physical Exam  Vitals and nursing note reviewed.   Constitutional:       Appearance: Normal appearance.   HENT:      Head: Normocephalic and atraumatic.      Mouth/Throat:      Mouth: Mucous membranes are moist.   Cardiovascular:      Rate and Rhythm: Normal rate and regular rhythm.      Heart sounds: Normal heart sounds.   Pulmonary:      Effort: Pulmonary effort is normal. No respiratory distress.      Breath sounds: Normal breath sounds.   Abdominal:      General: Bowel sounds are normal.      Palpations: Abdomen is soft.      Tenderness: There is no abdominal tenderness.   Skin:     General: Skin is warm and dry.   Neurological:      Mental Status: She is alert and oriented to person, place, and time.         Procedures           ED Course      Results for orders placed or performed during the hospital encounter of 04/20/23   Single High Sensitivity Troponin T    Specimen: Blood   Result Value Ref Range    HS Troponin T 12 (H) <10 ng/L   ECG 12 Lead Other; fatigue   Result Value Ref Range    QT Interval 374 ms               My interpretation of EKG shows sinus rhythm, rate of 74, no ST elevation                           MDM   Patient had the above evaluation.  Results were discussed with the patient.  EKG " shows no acute ischemia.  Troponin this morning was 12.  Repeat troponin this afternoon is also 12.  Patient is having no chest pain or shortness of breath.  I see no indication for admission at this time.  Heart score is low risk.  Patient will be discharged to follow-up with her primary doctor and cardiologist.  Patient is agreeable with this plan.      Final diagnoses:   Fatigue, unspecified type       ED Disposition  ED Disposition     ED Disposition   Discharge    Condition   Stable    Comment   --             Darlene Matute MD  [address] [address]  [phone]    Call in 2 days           Medication List      No changes were made to your prescriptions during this visit.          Earl Ochoa MD  04/20/23 4504

## 2023-04-20 NOTE — ED NOTES
Pt. Presents from home with c/o fatigue x 2 weeks. Lab work ordered and performed by MD. Was told to go to there ER by Dr. Pepper. Pt. Lives alone.

## 2023-04-20 NOTE — PROGRESS NOTES
Subjective   Chief Complaint   Patient presents with   • Fatigue   • Hypertension   • Atrial Fibrillation     Abida Becker is a 77 y.o. female.     Patient Care Team:  Darlene Matute MD as PCP - General  [place] MD as Consulting Physician (Urology)  Banet, Duane Edward, MD as Consulting Physician (Dermatology)  Milla Urias MD as Consulting Physician (Hematology and Oncology)    History of Present Illness  She is coming in today due to experiencing pretty intense fatigue over the last 1-2 weeks.  She feels started to the extent that she has been taking naps throughout the day.  She wakes up in the morning feeling tired.  The nap helps and she feels more refreshed after that.  She has been dealing with some dizziness on and off, but this is not new and she has been experiencing that for a long time.  She denies any chest pains or shortness of breath.  She recently had Holter monitor done which showed sinus rhythm however she also had short runs of atrial fibrillation with rapid ventricular response.  She already is on Coumadin due to pulmonary embolism which she experienced last year.  She is scheduled to see a cardiologist for initial evaluation, but this appointment is not until 5/11/2023.       The following portions of the patient's history were reviewed and updated as appropriate: allergies, current medications, past family history, past medical history, past social history, past surgical history and problem list.  Past Medical History:   Diagnosis Date   • GERD (gastroesophageal reflux disease)    • History of recurrent UTIs    • IBS (irritable bowel syndrome)    • Osteoarthritis    • Osteoporosis     on prolia(12/5/22)   • Primary osteoarthritis of both knees 01/24/2022   • Pulmonary embolism 2011    after scoliosis surgery   • Pulmonary embolism 10/2022   • Vitamin D deficiency      Past Surgical History:   Procedure Laterality Date   • BACK SURGERY  11/2011    Dr. Olsen, due to scoliosis,  "earnestine and some fusions   • BREAST BIOPSY     • CHOLECYSTECTOMY     • COLONOSCOPY  01/18/2018   • HIATAL HERNIA REPAIR  2013   • REIMPLANT URETER IN BLADDER       The patient has a family history of  Family History   Problem Relation Age of Onset   • Heart failure Mother    • Cancer Father      Social History     Socioeconomic History   • Marital status: Single   Tobacco Use   • Smoking status: Never     Passive exposure: Never   • Smokeless tobacco: Never   Vaping Use   • Vaping Use: Never used   Substance and Sexual Activity   • Alcohol use: No   • Drug use: No   • Sexual activity: Never       Review of Systems   Constitutional: Positive for fatigue. Negative for activity change and fever.   Respiratory: Negative for cough, chest tightness, shortness of breath and wheezing.    Cardiovascular: Negative for chest pain, palpitations and leg swelling.   Musculoskeletal: Negative for arthralgias and back pain.   Skin: Negative for rash.   Neurological: Positive for dizziness. Negative for tremors and headache.     Visit Vitals  /68 (BP Location: Left arm, Patient Position: Sitting, Cuff Size: Adult)   Pulse 74   Temp 97.8 °F (36.6 °C) (Infrared)   Resp 16   Ht 167.6 cm (65.98\")   Wt 81.6 kg (180 lb)   SpO2 97%   BMI 29.07 kg/m²       BMI is >= 25 and <30. (Overweight) The following options were offered after discussion;: exercise counseling/recommendations      Current Outpatient Medications:   •  acetaminophen (TYLENOL) 500 MG tablet, Take 1 tablet by mouth Every 6 (Six) Hours As Needed for Fever or Mild Pain., Disp: , Rfl:   •  calcium carbonate (OS-STEVEN) 600 MG tablet, Take 1 tablet by mouth Daily., Disp: , Rfl:   •  cholecalciferol (VITAMIN D3) 1000 units tablet, Take 1 tablet by mouth Daily., Disp: , Rfl:   •  colestipol (COLESTID) 1 g tablet, TAKE ONE TABLET BY MOUTH TWICE A DAY AS NEEDED, Disp: 60 tablet, Rfl: 0  •  dicyclomine (BENTYL) 10 MG capsule, TAKE ONE CAPSULE BY MOUTH THREE TIMES A DAY AS NEEDED FOR " IBS, Disp: 90 capsule, Rfl: 0  •  gabapentin (NEURONTIN) 300 MG capsule, TAKE ONE CAPSULE BY MOUTH TWICE A DAY, Disp: 180 capsule, Rfl: 0  •  melatonin 3 MG tablet, Take 1 tablet by mouth Every Night., Disp: , Rfl:   •  Mirabegron ER (MYRBETRIQ) 50 MG tablet sustained-release 24 hour 24 hr tablet, Take 50 mg by mouth Daily., Disp: , Rfl:   •  potassium chloride 10 MEQ CR tablet, Take 1 tablet by mouth 2 (Two) Times a Day., Disp: 180 tablet, Rfl: 0  •  Riboflavin (B2 PO), Take 1 tablet by mouth Daily., Disp: , Rfl:   •  vitamin B-12 (CYANOCOBALAMIN) 1000 MCG tablet, Take 1 tablet by mouth Daily., Disp: , Rfl:   •  warfarin (COUMADIN) 4 MG tablet, Take 1 tablet by mouth Every Night., Disp: 90 tablet, Rfl: 0  No current facility-administered medications for this visit.    Objective   Physical Exam  Vitals and nursing note reviewed.   Constitutional:       General: She is not in acute distress.     Appearance: Normal appearance. She is well-developed. She is not ill-appearing.   HENT:      Head: Normocephalic and atraumatic.   Cardiovascular:      Rate and Rhythm: Normal rate and regular rhythm.      Heart sounds: Normal heart sounds. No murmur heard.    No gallop.   Pulmonary:      Effort: Pulmonary effort is normal. No respiratory distress.      Breath sounds: Normal breath sounds. No wheezing, rhonchi or rales.   Chest:      Chest wall: No tenderness.   Musculoskeletal:      Cervical back: Normal range of motion and neck supple.   Neurological:      General: No focal deficit present.      Mental Status: She is alert and oriented to person, place, and time. Mental status is at baseline.   Psychiatric:         Mood and Affect: Mood normal.         MRI Cervical Spine Without Contrast    Result Date: 4/2/2023  Impression: Impression: There is abnormal alignment of the cervical spine with reversal of the usual lower cervical lordosis and straightening of the cervicothoracic junction as above. Multilevel moderate cervical  spondylosis is present with areas of discussed by complex formation and facet arthropathy, including multiple areas demonstrating moderate to severe spinal canal and bilateral neuroforaminal narrowing as above. Electronically Signed: Ta Baxter  4/2/2023 6:09 AM EDT  Workstation ID: [id number]      FOLLOWING LABS WERE REVIEWED TODAY:  CMP        10/20/2022    04:28 10/27/2022    14:02 4/20/2023    11:09   CMP   Glucose 93   76   92     BUN 11   20   17     Creatinine 0.50   0.68   0.69     EGFR 97.9   91.0   90.6     Sodium 138   139   140     Potassium 4.1   3.3   4.3     Chloride 101   98   100     Calcium 8.7   9.2   9.7     Total Protein 5.6   6.5   6.9     Albumin 3.20   3.90   4.6     Globulin 2.4   2.6   2.3     Total Bilirubin 0.6   0.5   0.9     Alkaline Phosphatase 82   88   91     AST (SGOT) 37   15   16     ALT (SGPT) 48   28   14     Albumin/Globulin Ratio 1.3   1.5   2.0     BUN/Creatinine Ratio 22.0   29.4   24.6     Anion Gap 11.0   12.0   12.0       CBC        1/10/2023    09:51 3/10/2023    09:24 4/20/2023    11:09   CBC   WBC 4.70   5.40   5.20     RBC 4.12   4.06   4.50     Hemoglobin 12.1   11.4   12.4     Hematocrit 36.4   34.8   38.0     MCV 88.5   85.7   84.4     MCH 29.3   28.1   27.5     MCHC 33.1   32.8   32.5     RDW 15.2   14.3   15.4     Platelets 352   367   399       TSH        10/10/2022    21:23 10/13/2022    16:05 4/20/2023    11:09   TSH   TSH 0.841   0.624   2.340          ECG 12 Lead    Date/Time: 4/20/2023 10:13 AM  Performed by: Darlene Matute MD  Authorized by: Darlene Matute MD   Comparison: compared with previous ECG from 2/27/2023  Similar to previous ECG  Rhythm: sinus rhythm  Rate: normal  ST Segments: ST segments normal  T Waves: T waves normal  QRS axis: normal  Other findings: non-specific ST-T wave changes    Clinical impression: non-specific ECG               Assessment & Plan   Diagnoses and all orders for this visit:    1. Paroxysmal atrial fibrillation  (Primary)  -     warfarin (COUMADIN) 4 MG tablet; Take 1 tablet by mouth Every Night.  Dispense: 90 tablet; Refill: 0  -     ECG 12 Lead  -     CBC Auto Differential  -     Comprehensive Metabolic Panel  -     Magnesium  -     TSH  -     High Sensitivity Troponin T  -     D-dimer, Quantitative    2. Other fatigue  -     CBC Auto Differential  -     Comprehensive Metabolic Panel  -     Magnesium  -     TSH  -     POC Urinalysis Dipstick, Automated  -     High Sensitivity Troponin T  -     D-dimer, Quantitative    3. Essential hypertension  -     potassium chloride 10 MEQ CR tablet; Take 1 tablet by mouth 2 (Two) Times a Day.  Dispense: 180 tablet; Refill: 0  -     CBC Auto Differential  -     Comprehensive Metabolic Panel  -     Magnesium  -     TSH    4. History of pulmonary embolism  -     warfarin (COUMADIN) 4 MG tablet; Take 1 tablet by mouth Every Night.  Dispense: 90 tablet; Refill: 0      Patient presents today with a severe fatigue for the last 1-2 weeks with daytime somnolence, this is very unusual for her.  Her exam today is intact.  EKG was done today and did not show any acute changes.  Patient denies any chest pain or shortness of breath.  She recently had Holter monitor done which showed some short runs of atrial fibrillation with rapid ventricular response and she was referred to see a cardiologist, and this appointment is scheduled for 5/11/2023.  Her blood pressure is noted to be on the lower side, I advised the patient to discontinue HCTZ.  Outpatient blood work was done today and showed slightly elevated troponin.  I do believe this needs to be at least rechecked later today and I advised the patient to go to the ER for reevaluation and repeat labs and EKG.      Return in about 4 months (around 8/20/2023) for Medicare Wellness.    Requested Prescriptions     Signed Prescriptions Disp Refills   • warfarin (COUMADIN) 4 MG tablet 90 tablet 0     Sig: Take 1 tablet by mouth Every Night.   • potassium  chloride 10 MEQ CR tablet 180 tablet 0     Sig: Take 1 tablet by mouth 2 (Two) Times a Day.

## 2023-04-21 LAB — QT INTERVAL: 374 MS

## 2023-04-25 NOTE — TELEPHONE ENCOUNTER
Patient called me back and stated she figured out why she was so fatigued. She started taking a new otc allergy medication that was different from her [employer] brand/ she read the bottle and it says can cause drowsiness/ She switched back to the [employer] non drowsy brand and she is fine.

## 2023-04-26 ENCOUNTER — PATIENT OUTREACH (OUTPATIENT)
Dept: CASE MANAGEMENT | Facility: OTHER | Age: 78
End: 2023-04-26
Payer: MEDICARE

## 2023-04-26 NOTE — OUTREACH NOTE
AMBULATORY CASE MANAGEMENT NOTE    Name and Relationship of Patient/Support Person: Abida Becker A - Self    Patient Outreach    Patient discharge from [facility] ED on 4/20 seen for fatigue. RN-ACM outreach call made to patient. Explained role of RN-ACM and reason for call. Patient states she is doing great. Reviewed ED AVS with patient. Has cardiology appointment scheduled. Reviewed SDOH. Patient denies any needs at this time. Encouraged to patient to reach out with any questions. Follow up outreach as needed.    Send Education  Questions/Answers    Flowsheet Row Most Recent Value   Annual Wellness Visit:  Patient Has Completed   Other Patient Education/Resources  24/7 [facility] Nurse Call Line   24/7 Nurse Call Line Education Method Verbal  [sent via My Chart]   Advanced Directives: Patient Has      SDOH updated and reviewed with the patient during this program:  Financial Resource Strain: Low Risk    • Difficulty of Paying Living Expenses: Not very hard      Food Insecurity: No Food Insecurity   • Worried About Running Out of Food in the Last Year: Never true   • Ran Out of Food in the Last Year: Never true      Transportation Needs: No Transportation Needs   • Lack of Transportation (Medical): No   • Lack of Transportation (Non-Medical): No       Haleigh COLEMAN  Ambulatory Case Management    4/26/2023, 14:14 EDT

## 2023-04-28 DIAGNOSIS — Z86.711 HISTORY OF PULMONARY EMBOLISM: ICD-10-CM

## 2023-04-28 DIAGNOSIS — I10 ESSENTIAL HYPERTENSION: ICD-10-CM

## 2023-04-28 DIAGNOSIS — I48.0 PAROXYSMAL ATRIAL FIBRILLATION: ICD-10-CM

## 2023-04-28 RX ORDER — WARFARIN SODIUM 4 MG/1
4 TABLET ORAL NIGHTLY
Qty: 90 TABLET | Refills: 0 | Status: SHIPPED | OUTPATIENT
Start: 2023-04-28

## 2023-04-28 RX ORDER — POTASSIUM CHLORIDE 750 MG/1
10 TABLET, FILM COATED, EXTENDED RELEASE ORAL 2 TIMES DAILY
Qty: 180 TABLET | Refills: 0 | Status: SHIPPED | OUTPATIENT
Start: 2023-04-28

## 2023-04-30 DIAGNOSIS — K58.0 IRRITABLE BOWEL SYNDROME WITH DIARRHEA: ICD-10-CM

## 2023-04-30 RX ORDER — DICYCLOMINE HYDROCHLORIDE 10 MG/1
CAPSULE ORAL
Qty: 90 CAPSULE | Refills: 0 | Status: SHIPPED | OUTPATIENT
Start: 2023-04-30

## 2023-05-11 ENCOUNTER — OFFICE VISIT (OUTPATIENT)
Dept: CARDIOLOGY | Facility: CLINIC | Age: 78
End: 2023-05-11
Payer: MEDICARE

## 2023-05-11 VITALS
SYSTOLIC BLOOD PRESSURE: 96 MMHG | HEIGHT: 66 IN | DIASTOLIC BLOOD PRESSURE: 60 MMHG | BODY MASS INDEX: 29.41 KG/M2 | RESPIRATION RATE: 18 BRPM | WEIGHT: 183 LBS | HEART RATE: 90 BPM

## 2023-05-11 DIAGNOSIS — I48.0 PAROXYSMAL ATRIAL FIBRILLATION: Primary | ICD-10-CM

## 2023-05-11 RX ORDER — METOPROLOL SUCCINATE 25 MG/1
25 TABLET, EXTENDED RELEASE ORAL DAILY
Qty: 30 TABLET | Refills: 11 | Status: SHIPPED | OUTPATIENT
Start: 2023-05-11

## 2023-05-12 NOTE — PROGRESS NOTES
"Cardiology Clinic Note  Brett Zuniga MD, PhD    Subjective:     Encounter Date:05/11/2023      Patient ID: Abida Becker is a 77 y.o. female.    Chief Complaint:  Chief Complaint   Patient presents with   • Atrial Fibrillation       HPI:  I the pleasure to see this patient as an new encounter for paroxysmal atrial fibrillation, blood pressures 96/60 with heart rates in the 90s.  She had some very self-limited short runs of paroxysmal atrial fibrillation in March 2023 on Holter monitor which was otherwise asymptomatic at that time.  She has a history of PE in the past on chronic anticoagulation with Coumadin which protects against A-fib related stroke, should her blood pressure is well controlled with history of essential hypertension, she has no structural heart disease with normal echo in October 2022 with essentially normal atrial sizes.  She has not had sleep study to date.  She is not on rate or rhythm control strategy.  No history of CAD heart failure unexplained syncope or other complaints    Review of systems otherwise negative x14 point review of systems except as mentioned above    EKG demonstrates sinus rhythm with normal conduction otherwise      Historical data copied forward from previous encounters in EMR including the history, exam, and assessment/plan has been reviewed and is unchanged unless noted otherwise.    Cardiac medicines reviewed with risk, benefits, and necessity of each discussed.    Risk and benefit of cardiac testing reviewed including death heart attack stroke pain bleeding infection need for vascular /cardiovascular surgery were discussed and the patient     Objective:         BP 96/60 (BP Location: Right arm, Patient Position: Sitting)   Pulse 90   Resp 18   Ht 167.6 cm (66\")   Wt 83 kg (183 lb)   BMI 29.54 kg/m²     Physical Exam  Regular rate and rhythm with no new rubs murmurs gallops  No heave or lift  Clubbing cyanosis or edema are absent  Intact grossly  Clear to " auscultation  Soft nontender nondistended  Skin warm and dry  Normal pulses  Assessment:         Diagnoses and all orders for this visit:    1. Paroxysmal atrial fibrillation (Primary)  -     Adult Transthoracic Echo Complete W/ Cont if Necessary Per Protocol; Future  -     Ambulatory Referral to Sleep Medicine    Other orders  -     metoprolol succinate XL (TOPROL-XL) 25 MG 24 hr tablet; Take 1 tablet by mouth Daily.  Dispense: 30 tablet; Refill: 11    Paroxysmal atrial fibrillation self-limited and self terminating  Start metoprolol XL 25 daily  Continue Coumadin  Blood pressures are at goal  Avoid hypotension  Diet and exercise per AHA guidelines  Primary prevention goals discussed for CAD comorbidities  No indication for loop diuretics at this point    See her back in 6 months to year          The pleasure to be involved in this patient's cardiovascular care.  Please call with any questions or concerns  Brett Zuniga MD, PhD    Most recent EKG as reviewed and interpreted by me:  Procedures     Most recent echo as reviewed and interpreted by me:  Results for orders placed during the hospital encounter of 10/13/22    Adult Transthoracic Echo Complete W/ Cont if Necessary Per Protocol    Interpretation Summary  •  Estimated left ventricular EF = 60% Left ventricular systolic function is normal.    Indication  Dyspnea    Technically satisfactory study.  Mitral valve is structurally normal.  Tricuspid valve is structurally normal.  Aortic valve is structurally normal.  Pulmonic valve could not be well visualized.  No evidence for mitral tricuspid or aortic regurgitation is seen by Doppler study.  Left atrium is normal in size.  Right atrium is normal in size.  Left ventricle is normal in size and contractility with ejection fraction of 60%.  Right ventricle is normal in size.  Atrial septum is intact.  Aorta is normal.  No pericardial effusion or intracardiac thrombus is seen.    Impression  Structurally and  functionally normal cardiac valves.  Left ventricular size and contractility is normal with ejection fraction of 60%.      Most recent stress test as reviewed and interpreted by me:      Most recent cardiac catheterization as reviewed interpreted by me:  No results found for this or any previous visit.    The following portions of the patient's history were reviewed and updated as appropriate: allergies, current medications, past family history, past medical history, past social history, past surgical history and problem list.      ROS:  14 point review of systems negative except as mentioned above    Current Outpatient Medications:   •  acetaminophen (TYLENOL) 500 MG tablet, Take 1 tablet by mouth Every 6 (Six) Hours As Needed for Fever or Mild Pain., Disp: , Rfl:   •  calcium carbonate (OS-STEVEN) 600 MG tablet, Take 1 tablet by mouth Daily., Disp: , Rfl:   •  cholecalciferol (VITAMIN D3) 1000 units tablet, Take 1 tablet by mouth Daily., Disp: , Rfl:   •  colestipol (COLESTID) 1 g tablet, TAKE ONE TABLET BY MOUTH TWICE A DAY AS NEEDED, Disp: 60 tablet, Rfl: 0  •  dicyclomine (BENTYL) 10 MG capsule, TAKE ONE CAPSULE BY MOUTH THREE TIMES A DAY AS NEEDED FOR IBS, Disp: 90 capsule, Rfl: 0  •  gabapentin (NEURONTIN) 300 MG capsule, TAKE ONE CAPSULE BY MOUTH TWICE A DAY, Disp: 180 capsule, Rfl: 0  •  melatonin 3 MG tablet, Take 1 tablet by mouth Every Night., Disp: , Rfl:   •  Mirabegron ER (MYRBETRIQ) 50 MG tablet sustained-release 24 hour 24 hr tablet, Take 50 mg by mouth Daily., Disp: , Rfl:   •  potassium chloride 10 MEQ CR tablet, Take 1 tablet by mouth 2 (Two) Times a Day., Disp: 180 tablet, Rfl: 0  •  Riboflavin (B2 PO), Take 1 tablet by mouth Daily., Disp: , Rfl:   •  vitamin B-12 (CYANOCOBALAMIN) 1000 MCG tablet, Take 1 tablet by mouth Daily., Disp: , Rfl:   •  warfarin (COUMADIN) 4 MG tablet, Take 1 tablet by mouth Every Night., Disp: 90 tablet, Rfl: 0  •  metoprolol succinate XL (TOPROL-XL) 25 MG 24 hr tablet,  Take 1 tablet by mouth Daily., Disp: 30 tablet, Rfl: 11    Problem List:  Patient Active Problem List   Diagnosis   • Allergic rhinitis   • Gait abnormality   • Gastroesophageal reflux disease   • Hip pain, right   • Low back pain   • Recurrent urinary tract infection   • Scoliosis   • Vaginitis, atrophic   • Medicare annual wellness visit, subsequent   • Balance problem   • Vitamin D deficiency   • Acute pain of left knee   • Primary osteoarthritis of left knee   • Tear of MCL (medial collateral ligament) of knee, left, subsequent encounter   • Overweight (BMI 25.0-29.9)   • Visit for screening mammogram   • Postmenopausal   • Chronic pain of left knee   • Right lumbar radiculopathy   • Tremor, essential   • Other fatigue   • Osteoarthritis, generalized   • Irritable bowel syndrome with diarrhea   • Primary osteoarthritis of both knees   • Obesity (BMI 30.0-34.9)   • Hiatal hernia   • Incontinence of feces with fecal urgency   • Age-related osteoporosis without current pathological fracture   • Bruising   • Family hx osteoporosis   • Personal history of spine surgery   • Nonintractable headache   • Essential hypertension   • Multiple subsegmental pulmonary emboli without acute cor pulmonale   • Encounter for monitoring Coumadin therapy   • Acute deep vein thrombosis (DVT) of proximal vein of right lower extremity   • Need for vaccination   • Anxiety   • Memory problem   • Chronic neck pain   • DDD (degenerative disc disease), cervical   • Upper back pain   • Spinal deformity   • Chronic bilateral thoracic back pain   • Palpitations   • Paroxysmal atrial fibrillation   • History of pulmonary embolism     Past Medical History:  Past Medical History:   Diagnosis Date   • GERD (gastroesophageal reflux disease)    • History of recurrent UTIs    • IBS (irritable bowel syndrome)    • Osteoarthritis    • Osteoporosis     on prolia(12/5/22)   • Primary osteoarthritis of both knees 01/24/2022   • Pulmonary embolism 2011     after scoliosis surgery   • Pulmonary embolism 10/2022   • Vitamin D deficiency      Past Surgical History:  Past Surgical History:   Procedure Laterality Date   • BACK SURGERY  11/2011    Dr. Olsen, due to scoliosis, earnestine and some fusions   • BREAST BIOPSY     • CHOLECYSTECTOMY     • COLONOSCOPY  01/18/2018   • HIATAL HERNIA REPAIR  2013   • REIMPLANT URETER IN BLADDER       Social History:  Social History     Socioeconomic History   • Marital status: Single   Tobacco Use   • Smoking status: Never     Passive exposure: Never   • Smokeless tobacco: Never   Vaping Use   • Vaping Use: Never used   Substance and Sexual Activity   • Alcohol use: No   • Drug use: No   • Sexual activity: Never     Allergies:  Allergies   Allergen Reactions   • Amoxicillin Other (See Comments)      unsure of type of reaction - states it was so long ago she can't remember    • Penicillins Nausea And Vomiting     Immunizations:  Immunization History   Administered Date(s) Administered   • COVID-19 (PFIZER) Purple Cap Monovalent 01/24/2021, 02/27/2021, 11/05/2021   • Covid-19 (Pfizer) Gray Cap Monovalent 05/12/2022   • FLUAD TRI 65YR+ 11/01/2013   • Fluad Quad 65+ 11/16/2021   • Fluzone High Dose =>65 Years ([employer] ONLY) 10/07/2015, 10/31/2016   • Fluzone High-Dose 65+yrs 10/27/2022   • H1N1 Inj 12/29/2009   • Pneumococcal Conjugate 13-Valent (PCV13) 01/17/2018   • Pneumococcal Polysaccharide (PPSV23) 04/10/2019   • Tdap 12/12/2018   • Zostavax 09/01/2013            In-Office Procedure(s):  No orders to display        ASCVD RIsk Score::  The ASCVD Risk score (Humboldt DK, et al., 2019) failed to calculate for the following reasons:    The valid HDL cholesterol range is 20 to 100 mg/dL    Imaging:    Results for orders placed during the hospital encounter of 02/10/23    XR Spine Scoliosis 2 or 3 Views    Narrative  XR SPINE SCOLIOSIS 2 -3 VIEWS    Date of Exam: 2/10/2023 9:30 AM EST    Indication: spinal deformity with possible hardware  failure.    Comparison: Thoracic spine series 1/17/2023. Lumbar spine series 1/9/2018.    Findings:  Bilateral thoracic and lumbar vertebral fusion rods extend from T3-4 level through the L4 level. The rods appear intact without fracture. The right superior fusion earnestine hook is not definitely contiguous to the thoracic spine, based upon the lateral image;  however, this may be artifactual and related to position.    S-shaped thoracolumbar scoliotic curvature is seen, with mid thoracic dextroscoliosis of approximately 34 degrees, and mid lumbar scoliosis of approximately 29 degrees. There is approximately 7 mm anterolisthesis of C4 upon C5. Advanced diminished disc  height is present at C5-6 and C6-7. Advanced diminished disc height is present at L2-3 through L5-S1.    There is positive coronal balance, with the C7 plumbline passes about 3.8 cm to the right of the mid sacrum. There is positive sagittal balance, with the C7 plum line passing 6.2 cm anterior to the S1 posterior superior corner.    Impression  1. S-shaped thoracic-lumbar scoliosis.  2. Bilateral thoracolumbar vertical fusion earnestine fixation changes are present. No evidence of hardware fracture.  3. The right fusion earnestine most superior is not definitely contiguous to the thoracic spine based on the lateral image; however, this may simply be artifactual and related to patient positioning.  4. Positive coronal balance..  5. Positive sagittal balance.    Electronically Signed: Keturah Wylie  2/10/2023 2:24 PM EST  Workstation ID: [id number]       Results for orders placed during the hospital encounter of 02/25/23    CT Thoracic Spine Without Contrast    Narrative  CT THORACIC SPINE WO CONTRAST    Date of Exam: 2/25/2023 11:55 AM EST    Indication: evaluate hardware.    Comparison: CT 10/13/2022    Technique: Axial CT images were obtained of the thoracic spine without contrast administration.  Sagittal and coronal reconstructions were performed.  Automated exposure  control and iterative reconstruction methods were used.    Findings:  There is redemonstrated Wong earnestine fixation of the thoracolumbar spine. The right most superior laminar hook at the level of T3 is no longer attached to the adjacent lamina, mildly displaced radially. A small nonspecific fluid collection is present  overlying the adjacent superficial soft tissues. Appearance is similar to multiple prior exams, going back to at least October 2022. There is no evidence of additional hardware displacement. Vertebral body heights are maintained without evidence of  fracture. There is unchanged thoracic dextrocurvature. The paraspinal soft tissues demonstrate no additional acute findings. There is no suspicious lytic or sclerotic osseous lesion. Spondylosis changes are present, without definite areas of high-grade  spinal canal narrowing.    Impression  Impression:  There is mild posterior displacement of the most superior laminar hook on the right at T3. A small 1.5 cm adjacent superficial fluid collection is present without adjacent inflammatory change, favoring seroma but with component of abscess not entirely  excluded. There is no evidence of additional hardware displacement, fracture or new malalignment.    Electronically Signed: Ta Baxter  2/25/2023 3:05 PM EST  Workstation ID: [id number]      Results for orders placed during the hospital encounter of 02/25/23    CT Thoracic Spine Without Contrast    Narrative  CT THORACIC SPINE WO CONTRAST    Date of Exam: 2/25/2023 11:55 AM EST    Indication: evaluate hardware.    Comparison: CT 10/13/2022    Technique: Axial CT images were obtained of the thoracic spine without contrast administration.  Sagittal and coronal reconstructions were performed.  Automated exposure control and iterative reconstruction methods were used.    Findings:  There is redemonstrated Wong earnestine fixation of the thoracolumbar spine. The right most superior laminar hook at the level of  T3 is no longer attached to the adjacent lamina, mildly displaced radially. A small nonspecific fluid collection is present  overlying the adjacent superficial soft tissues. Appearance is similar to multiple prior exams, going back to at least October 2022. There is no evidence of additional hardware displacement. Vertebral body heights are maintained without evidence of  fracture. There is unchanged thoracic dextrocurvature. The paraspinal soft tissues demonstrate no additional acute findings. There is no suspicious lytic or sclerotic osseous lesion. Spondylosis changes are present, without definite areas of high-grade  spinal canal narrowing.    Impression  Impression:  There is mild posterior displacement of the most superior laminar hook on the right at T3. A small 1.5 cm adjacent superficial fluid collection is present without adjacent inflammatory change, favoring seroma but with component of abscess not entirely  excluded. There is no evidence of additional hardware displacement, fracture or new malalignment.    Electronically Signed: Ta Baxter  2/25/2023 3:05 PM EST  Workstation ID: [id number]      Lab Review:   Admission on 04/20/2023, Discharged on 04/20/2023   Component Date Value   • QT Interval 04/20/2023 374    • HS Troponin T 04/20/2023 12 (H)    Lab on 04/20/2023   Component Date Value   • Protime 04/20/2023 15.2 (L)    • INR 04/20/2023 1.45 (L)    • 25 Hydroxy, Vitamin D 04/20/2023 34.3    Office Visit on 04/20/2023   Component Date Value   • WBC 04/20/2023 5.20    • RBC 04/20/2023 4.50    • Hemoglobin 04/20/2023 12.4    • Hematocrit 04/20/2023 38.0    • MCV 04/20/2023 84.4    • MCH 04/20/2023 27.5    • MCHC 04/20/2023 32.5    • RDW 04/20/2023 15.4    • RDW-SD 04/20/2023 47.7    • MPV 04/20/2023 7.8    • Platelets 04/20/2023 399    • Neutrophil % 04/20/2023 58.3    • Lymphocyte % 04/20/2023 30.1    • Monocyte % 04/20/2023 9.9    • Eosinophil % 04/20/2023 0.9    • Basophil % 04/20/2023 0.8    •  Neutrophils, Absolute 04/20/2023 3.00    • Lymphocytes, Absolute 04/20/2023 1.60    • Monocytes, Absolute 04/20/2023 0.50    • Eosinophils, Absolute 04/20/2023 0.00    • Basophils, Absolute 04/20/2023 0.00    • nRBC 04/20/2023 0.1    • Glucose 04/20/2023 92    • BUN 04/20/2023 17    • Creatinine 04/20/2023 0.69    • Sodium 04/20/2023 140    • Potassium 04/20/2023 4.3    • Chloride 04/20/2023 100    • CO2 04/20/2023 28.0    • Calcium 04/20/2023 9.7    • Total Protein 04/20/2023 6.9    • Albumin 04/20/2023 4.6    • ALT (SGPT) 04/20/2023 14    • AST (SGOT) 04/20/2023 16    • Alkaline Phosphatase 04/20/2023 91    • Total Bilirubin 04/20/2023 0.9    • Globulin 04/20/2023 2.3    • A/G Ratio 04/20/2023 2.0    • BUN/Creatinine Ratio 04/20/2023 24.6    • Anion Gap 04/20/2023 12.0    • eGFR 04/20/2023 90.6    • Magnesium 04/20/2023 2.2    • TSH 04/20/2023 2.340    • Color 04/20/2023 Dark Yellow    • Clarity, UA 04/20/2023 Clear    • Specific Gravity  04/20/2023 1.025    • pH, Urine 04/20/2023 5.5    • Leukocytes 04/20/2023 Negative    • Nitrite, UA 04/20/2023 Negative    • Protein, POC 04/20/2023 Negative    • Glucose, UA 04/20/2023 Negative    • Ketones, UA 04/20/2023 Negative    • Urobilinogen, UA 04/20/2023 0.2 E.U./dL    • Bilirubin 04/20/2023 Negative    • Blood, UA 04/20/2023 Negative    • Lot Number 04/20/2023 [id number]    • Expiration Date 04/20/2023 1/2,024    • HS Troponin T 04/20/2023 13 (H)    • D-Dimer, Quantitative 04/20/2023 0.42    Lab on 04/14/2023   Component Date Value   • Protime 04/14/2023 15.1 (L)    • INR 04/14/2023 1.50 (L)    Lab on 02/10/2023   Component Date Value   • Protime 02/10/2023 19.9    • INR 02/10/2023 2.01    Appointment on 01/31/2023   Component Date Value   • WBC 03/10/2023 5.40    • RBC 03/10/2023 4.06    • Hemoglobin 03/10/2023 11.4 (L)    • Hematocrit 03/10/2023 34.8    • MCV 03/10/2023 85.7    • MCH 03/10/2023 28.1    • MCHC 03/10/2023 32.8    • RDW 03/10/2023 14.3    • RDW-SD  03/10/2023 45.1    • MPV 03/10/2023 8.3    • Platelets 03/10/2023 367    • Neutrophil % 03/10/2023 54.2    • Lymphocyte % 03/10/2023 30.3    • Monocyte % 03/10/2023 13.0 (H)    • Eosinophil % 03/10/2023 1.9    • Basophil % 03/10/2023 0.6    • Neutrophils, Absolute 03/10/2023 2.90    • Lymphocytes, Absolute 03/10/2023 1.60    • Monocytes, Absolute 03/10/2023 0.70    • Eosinophils, Absolute 03/10/2023 0.10    • Basophils, Absolute 03/10/2023 0.00    • nRBC 03/10/2023 0.0    Lab on 01/24/2023   Component Date Value   • Antithrombin Activity 01/24/2023 126 (H)    • D-Dimer, Quantitative 01/24/2023 0.31    • Factor V Leiden 01/24/2023 Normal    • Factor VIII Activity 01/24/2023 195 (H)    • Factor II, DNA Analysis 01/24/2023 Normal    • Homocysteine, Plasma (Qu* 01/24/2023 11.8    • Protein C-Functional 01/24/2023 75    • Protein S-Functional 01/24/2023 39 (L)    • Dilute Prothrombin Time(* 01/24/2023 72.2 (H)    • dPT Confirm Ratio 01/24/2023 0.95    • Thrombin Time 01/24/2023 17.3    • PTT Lupus Anticoagulant 01/24/2023 43.8    • Dilute Viper Venom Time 01/24/2023 59.4 (H)    • Lupus Anticoagulant Refl* 01/24/2023 Comment:    • Anticardiolipin IgG 01/24/2023 <9    • Anticardiolipin IgM 01/24/2023 10    • Beta-2 Glyco 1 IgG 01/24/2023 <9    • Beta-2 Glyco 1 IgA 01/24/2023 <9    • Beta-2 Glyco 1 IgM 01/24/2023 <9    • Dilute Viper Venom 1:1 M* 01/24/2023 40.7 (H)    • Dilute Viper Venom Time 01/24/2023 1.1    Lab on 01/10/2023   Component Date Value   • Protime 01/10/2023 22.5    • INR 01/10/2023 2.29    • Antithrombin Activity 01/10/2023 >128 (H)    • D-Dimer, Quantitative 01/10/2023 0.28    • Factor V Leiden 01/10/2023 Normal    • Factor VIII Activity 01/10/2023 >182 (H)    • Factor II, DNA Analysis 01/10/2023 Normal    • Homocysteine, Plasma (Qu* 01/10/2023 11.9    • Protein C-Functional 01/10/2023 76    • Dilute Prothrombin Time(* 01/10/2023 71.6 (H)    • dPT Confirm Ratio 01/10/2023 0.91    • Thrombin Time  01/10/2023 17.1    • PTT Lupus Anticoagulant 01/10/2023 38.2    • Dilute Viper Venom Time 01/10/2023 52.6 (H)    • Lupus Anticoagulant Refl* 01/10/2023 Comment:    • Anticardiolipin IgG 01/10/2023 <9    • Anticardiolipin IgM 01/10/2023 <9    • Beta-2 Glyco 1 IgG 01/10/2023 <9    • Beta-2 Glyco 1 IgA 01/10/2023 <9    • Beta-2 Glyco 1 IgM 01/10/2023 <9    • WBC 01/10/2023 4.70    • RBC 01/10/2023 4.12    • Hemoglobin 01/10/2023 12.1    • Hematocrit 01/10/2023 36.4    • MCV 01/10/2023 88.5    • MCH 01/10/2023 29.3    • MCHC 01/10/2023 33.1    • RDW 01/10/2023 15.2    • RDW-SD 01/10/2023 49.9    • MPV 01/10/2023 8.1    • Platelets 01/10/2023 352    • Neutrophil % 01/10/2023 57.6    • Lymphocyte % 01/10/2023 28.8    • Monocyte % 01/10/2023 11.2    • Eosinophil % 01/10/2023 1.7    • Basophil % 01/10/2023 0.7    • Neutrophils, Absolute 01/10/2023 2.70    • Lymphocytes, Absolute 01/10/2023 1.40    • Monocytes, Absolute 01/10/2023 0.50    • Eosinophils, Absolute 01/10/2023 0.10    • Basophils, Absolute 01/10/2023 0.00    • nRBC 01/10/2023 0.1    • Protein S-Functional 01/10/2023 42 (L)    • Dilute Viper Venom 1:1 M* 01/10/2023 40.4    Admission on 12/18/2022, Discharged on 12/18/2022   Component Date Value   • POC Influenza A, Molecul* 12/18/2022 Negative    • POC Influenza B, Molecul* 12/18/2022 Negative    • Internal Control 12/18/2022 Passed    • Lot Number 12/18/2022 [id number]    • Expiration Date 12/18/2022 10/03/2023    Lab on 12/08/2022   Component Date Value   • Protime 12/08/2022 26.3    • INR 12/08/2022 2.70    There may be more visits with results that are not included.     Recent labs reviewed and interpreted for clinical significance and application            Level of Care:           Brett Zuniga MD  05/12/23  .

## 2023-05-18 ENCOUNTER — TELEPHONE (OUTPATIENT)
Dept: FAMILY MEDICINE CLINIC | Facility: CLINIC | Age: 78
End: 2023-05-18

## 2023-05-18 NOTE — TELEPHONE ENCOUNTER
Patient went to [facility] for chest congestion cough/ diagnosed with URI and possible pneumonia

## 2023-05-18 NOTE — TELEPHONE ENCOUNTER
Caller: Abida Becker    Relationship: Self    Best call back number: [phone]    Which medication are you concerned about:azithromycin (Zithromax Z-Vasu) 250 MG tablet  methylPREDNISolone (MEDROL) 4 MG dose pack AND albuterol sulfate  (90 Base) MCG/ACT inhaler    What are your concerns: WOULD LIKE TO KNOW IF ANY OF THESE MEDICATIONS WILL EFFECT HER INR

## 2023-05-18 NOTE — TELEPHONE ENCOUNTER
I recommend to start Z-Vasu.  Continue current dose of Coumadin, recheck INR on Monday.  Thank you.

## 2023-05-18 NOTE — TELEPHONE ENCOUNTER
Yes, azithromycin as well as the steroid pack both can affect her INR and possibly cause Coumadin toxicity.  Who put her on these medications and why?

## 2023-05-20 DIAGNOSIS — K21.9 GASTROESOPHAGEAL REFLUX DISEASE WITHOUT ESOPHAGITIS: ICD-10-CM

## 2023-05-20 RX ORDER — PANTOPRAZOLE SODIUM 40 MG/1
TABLET, DELAYED RELEASE ORAL
Qty: 90 TABLET | Refills: 1 | Status: SHIPPED | OUTPATIENT
Start: 2023-05-20

## 2023-05-22 ENCOUNTER — TELEPHONE (OUTPATIENT)
Dept: FAMILY MEDICINE CLINIC | Facility: CLINIC | Age: 78
End: 2023-05-22

## 2023-05-22 NOTE — TELEPHONE ENCOUNTER
Caller: Abida Becker    Relationship: Self    Best call back number: [phone]     CELL: [phone] (FOR BACK UP)    What is the best time to reach you: ANY    Who are you requesting to speak with (clinical staff, provider,  specific staff member): CLINICAL STAFF    What was the call regarding: What was the call regarding: PATIENT STATES SHE WAS ADVISED TO FOLLOW UP TODAY.    PATIENT STATES SHE STILL HAS A COUGH WITH WHEEZING. PATIENT STATES SHE'S NOT BETTER, BUT SHE'S BETTER THAN SHE WAS. PATIENT IS REQUESTING A CALL BACK. PATIENT STATES SHE DOES NOT WANT TO GET WORSE.    ALSO, PLEASE ADVISE ON WHEN TO SCHEDULE AN INR      Do you require a callback: YES

## 2023-05-23 ENCOUNTER — OFFICE VISIT (OUTPATIENT)
Dept: FAMILY MEDICINE CLINIC | Facility: CLINIC | Age: 78
End: 2023-05-23
Payer: MEDICARE

## 2023-05-23 ENCOUNTER — LAB (OUTPATIENT)
Dept: FAMILY MEDICINE CLINIC | Facility: CLINIC | Age: 78
End: 2023-05-23
Payer: MEDICARE

## 2023-05-23 VITALS
WEIGHT: 178.2 LBS | DIASTOLIC BLOOD PRESSURE: 69 MMHG | BODY MASS INDEX: 28.64 KG/M2 | TEMPERATURE: 97.8 F | HEIGHT: 66 IN | HEART RATE: 75 BPM | RESPIRATION RATE: 16 BRPM | SYSTOLIC BLOOD PRESSURE: 102 MMHG

## 2023-05-23 DIAGNOSIS — Z79.01 ENCOUNTER FOR MONITORING COUMADIN THERAPY: ICD-10-CM

## 2023-05-23 DIAGNOSIS — J40 BRONCHITIS: Primary | ICD-10-CM

## 2023-05-23 DIAGNOSIS — Z51.81 ENCOUNTER FOR MONITORING COUMADIN THERAPY: ICD-10-CM

## 2023-05-23 PROBLEM — J06.9 ACUTE URI: Status: ACTIVE | Noted: 2023-05-23

## 2023-05-23 LAB
INR PPP: 3.14 (ref 2–3)
PROTHROMBIN TIME: 31.5 SECONDS (ref 19.4–28.5)

## 2023-05-23 PROCEDURE — 36415 COLL VENOUS BLD VENIPUNCTURE: CPT

## 2023-05-23 PROCEDURE — 99213 OFFICE O/P EST LOW 20 MIN: CPT | Performed by: FAMILY MEDICINE

## 2023-05-23 PROCEDURE — 1160F RVW MEDS BY RX/DR IN RCRD: CPT | Performed by: FAMILY MEDICINE

## 2023-05-23 PROCEDURE — 1159F MED LIST DOCD IN RCRD: CPT | Performed by: FAMILY MEDICINE

## 2023-05-23 PROCEDURE — 85610 PROTHROMBIN TIME: CPT | Performed by: FAMILY MEDICINE

## 2023-05-23 PROCEDURE — 3074F SYST BP LT 130 MM HG: CPT | Performed by: FAMILY MEDICINE

## 2023-05-23 PROCEDURE — 3078F DIAST BP <80 MM HG: CPT | Performed by: FAMILY MEDICINE

## 2023-05-23 RX ORDER — BUDESONIDE, GLYCOPYRROLATE, AND FORMOTEROL FUMARATE 160; 9; 4.8 UG/1; UG/1; UG/1
2 AEROSOL, METERED RESPIRATORY (INHALATION) 2 TIMES DAILY
Qty: 1 EACH | Refills: 0 | COMMUNITY
Start: 2023-05-23

## 2023-05-29 RX ORDER — GABAPENTIN 300 MG/1
CAPSULE ORAL
Qty: 180 CAPSULE | Refills: 0 | Status: SHIPPED | OUTPATIENT
Start: 2023-05-29

## 2023-06-09 ENCOUNTER — OFFICE VISIT (OUTPATIENT)
Dept: FAMILY MEDICINE CLINIC | Facility: CLINIC | Age: 78
End: 2023-06-09
Payer: MEDICARE

## 2023-06-09 ENCOUNTER — HOSPITAL ENCOUNTER (OUTPATIENT)
Dept: CARDIOLOGY | Facility: HOSPITAL | Age: 78
Discharge: HOME | End: 2023-06-09
Payer: MEDICARE

## 2023-06-09 ENCOUNTER — LAB (OUTPATIENT)
Dept: LAB | Facility: HOSPITAL | Age: 78
End: 2023-06-09
Payer: MEDICARE

## 2023-06-09 VITALS
SYSTOLIC BLOOD PRESSURE: 104 MMHG | BODY MASS INDEX: 29.09 KG/M2 | HEART RATE: 70 BPM | WEIGHT: 181 LBS | TEMPERATURE: 98.1 F | DIASTOLIC BLOOD PRESSURE: 67 MMHG | OXYGEN SATURATION: 96 % | RESPIRATION RATE: 16 BRPM | HEIGHT: 66 IN

## 2023-06-09 DIAGNOSIS — Z79.01 ENCOUNTER FOR MONITORING COUMADIN THERAPY: ICD-10-CM

## 2023-06-09 DIAGNOSIS — M79.89 RIGHT LEG SWELLING: ICD-10-CM

## 2023-06-09 DIAGNOSIS — Z51.81 ENCOUNTER FOR MONITORING COUMADIN THERAPY: ICD-10-CM

## 2023-06-09 DIAGNOSIS — S80.11XA HEMATOMA OF RIGHT LOWER LEG: Primary | ICD-10-CM

## 2023-06-09 LAB
BH CV LOWER VASCULAR LEFT COMMON FEMORAL AUGMENT: NORMAL
BH CV LOWER VASCULAR LEFT COMMON FEMORAL COMPETENT: NORMAL
BH CV LOWER VASCULAR LEFT COMMON FEMORAL COMPRESS: NORMAL
BH CV LOWER VASCULAR LEFT COMMON FEMORAL PHASIC: NORMAL
BH CV LOWER VASCULAR LEFT COMMON FEMORAL SPONT: NORMAL
BH CV LOWER VASCULAR RIGHT COMMON FEMORAL AUGMENT: NORMAL
BH CV LOWER VASCULAR RIGHT COMMON FEMORAL COMPETENT: NORMAL
BH CV LOWER VASCULAR RIGHT COMMON FEMORAL COMPRESS: NORMAL
BH CV LOWER VASCULAR RIGHT COMMON FEMORAL PHASIC: NORMAL
BH CV LOWER VASCULAR RIGHT COMMON FEMORAL SPONT: NORMAL
BH CV LOWER VASCULAR RIGHT DISTAL FEMORAL COMPRESS: NORMAL
BH CV LOWER VASCULAR RIGHT GASTRONEMIUS COMPRESS: NORMAL
BH CV LOWER VASCULAR RIGHT GREATER SAPH AK COMPRESS: NORMAL
BH CV LOWER VASCULAR RIGHT GREATER SAPH BK COMPRESS: NORMAL
BH CV LOWER VASCULAR RIGHT LESSER SAPH COMPRESS: NORMAL
BH CV LOWER VASCULAR RIGHT MID FEMORAL AUGMENT: NORMAL
BH CV LOWER VASCULAR RIGHT MID FEMORAL COMPETENT: NORMAL
BH CV LOWER VASCULAR RIGHT MID FEMORAL COMPRESS: NORMAL
BH CV LOWER VASCULAR RIGHT MID FEMORAL PHASIC: NORMAL
BH CV LOWER VASCULAR RIGHT MID FEMORAL SPONT: NORMAL
BH CV LOWER VASCULAR RIGHT PERONEAL COMPRESS: NORMAL
BH CV LOWER VASCULAR RIGHT POPLITEAL AUGMENT: NORMAL
BH CV LOWER VASCULAR RIGHT POPLITEAL COMPETENT: NORMAL
BH CV LOWER VASCULAR RIGHT POPLITEAL COMPRESS: NORMAL
BH CV LOWER VASCULAR RIGHT POPLITEAL PHASIC: NORMAL
BH CV LOWER VASCULAR RIGHT POPLITEAL SPONT: NORMAL
BH CV LOWER VASCULAR RIGHT POSTERIOR TIBIAL COMPRESS: NORMAL
BH CV LOWER VASCULAR RIGHT PROXIMAL FEMORAL COMPRESS: NORMAL
BH CV LOWER VASCULAR RIGHT SAPHENOFEMORAL JUNCTION COMPRESS: NORMAL
BH CV VAS POP FLUID COLLECTED: 1
BH CV VAS PRELIMINARY FINDINGS SCRIPTING: 1
INR PPP: 2.94 (ref 2–3)
PROTHROMBIN TIME: 29.6 SECONDS (ref 19.4–28.5)

## 2023-06-09 PROCEDURE — 36415 COLL VENOUS BLD VENIPUNCTURE: CPT

## 2023-06-09 PROCEDURE — 85610 PROTHROMBIN TIME: CPT

## 2023-06-09 PROCEDURE — 93971 EXTREMITY STUDY: CPT

## 2023-06-09 NOTE — PROGRESS NOTES
Subjective   Chief Complaint   Patient presents with    Injury     Right shin/ 1 week ago     Abida Becker is a 78 y.o. female.     Patient Care Team:  Darlene Matute MD as PCP - General  Glassmanor, Rolando HUNTER MD as Consulting Physician (Urology)  Banet, Duane Edward, MD as Consulting Physician (Dermatology)  Milla Urias MD as Consulting Physician (Hematology and Oncology)    History of Present Illness  She is coming in today due to right shin symptoms.  She reports that 1 week ago while sitting at home she felt a pop and pain in the right upper shin, she instantly saw a lump there and a bruise.  The area has been pretty sore and the first few days it affected her gait.  She denies any trauma to that area.  She is on Coumadin due to DVT and PE in 10/2022.  She has been getting bruises on and off on her arms.     The following portions of the patient's history were reviewed and updated as appropriate: allergies, current medications, past family history, past medical history, past social history, past surgical history, and problem list.  Past Medical History:   Diagnosis Date    GERD (gastroesophageal reflux disease)     History of recurrent UTIs     IBS (irritable bowel syndrome)     Osteoarthritis     Osteoporosis     on prolia(12/5/22)    Primary osteoarthritis of both knees 01/24/2022    Pulmonary embolism 2011    after scoliosis surgery    Pulmonary embolism 10/2022    Vitamin D deficiency      Past Surgical History:   Procedure Laterality Date    BACK SURGERY  11/2011    Dr. Olsen, due to scoliosis, earnestine and some fusions    BREAST BIOPSY      CHOLECYSTECTOMY      COLONOSCOPY  01/18/2018    HIATAL HERNIA REPAIR  2013    REIMPLANT URETER IN BLADDER       The patient has a family history of  Family History   Problem Relation Age of Onset    Heart failure Mother     Cancer Father      Social History     Socioeconomic History    Marital status: Single   Tobacco Use    Smoking status: Never     Passive exposure:  "Never    Smokeless tobacco: Never   Vaping Use    Vaping Use: Never used   Substance and Sexual Activity    Alcohol use: No    Drug use: No    Sexual activity: Never       Review of Systems   Skin:  Positive for wound. Negative for color change, rash, skin lesions and bruise.   Visit Vitals  /67 (BP Location: Left arm, Patient Position: Sitting, Cuff Size: Adult)   Pulse 70   Temp 98.1 °F (36.7 °C) (Infrared)   Resp 16   Ht 167.6 cm (66\")   Wt 82.1 kg (181 lb)   SpO2 96%   BMI 29.21 kg/m²              Current Outpatient Medications:     acetaminophen (TYLENOL) 500 MG tablet, Take 1 tablet by mouth Every 6 (Six) Hours As Needed for Fever or Mild Pain., Disp: , Rfl:     calcium carbonate (OS-STEVEN) 600 MG tablet, Take 1 tablet by mouth Daily., Disp: , Rfl:     cholecalciferol (VITAMIN D3) 1000 units tablet, Take 1 tablet by mouth Daily., Disp: , Rfl:     colestipol (COLESTID) 1 g tablet, TAKE ONE TABLET BY MOUTH TWICE A DAY AS NEEDED, Disp: 60 tablet, Rfl: 0    dicyclomine (BENTYL) 10 MG capsule, TAKE ONE CAPSULE BY MOUTH THREE TIMES A DAY AS NEEDED FOR IBS, Disp: 90 capsule, Rfl: 0    gabapentin (NEURONTIN) 300 MG capsule, TAKE ONE CAPSULE BY MOUTH TWICE A DAY, Disp: 180 capsule, Rfl: 0    melatonin 3 MG tablet, Take 1 tablet by mouth Every Night., Disp: , Rfl:     metoprolol succinate XL (TOPROL-XL) 25 MG 24 hr tablet, Take 1 tablet by mouth Daily., Disp: 30 tablet, Rfl: 11    Mirabegron ER (MYRBETRIQ) 50 MG tablet sustained-release 24 hour 24 hr tablet, Take 50 mg by mouth Daily., Disp: , Rfl:     pantoprazole (PROTONIX) 40 MG EC tablet, TAKE ONE TABLET BY MOUTH DAILY, Disp: 90 tablet, Rfl: 1    potassium chloride 10 MEQ CR tablet, Take 1 tablet by mouth 2 (Two) Times a Day., Disp: 180 tablet, Rfl: 0    Riboflavin (B2 PO), Take 1 tablet by mouth Daily., Disp: , Rfl:     vitamin B-12 (CYANOCOBALAMIN) 1000 MCG tablet, Take 1 tablet by mouth Daily., Disp: , Rfl:     warfarin (COUMADIN) 4 MG tablet, Take 1 tablet " by mouth Every Night. (Patient taking differently: Take 1 tablet by mouth Every Night. M-FRI/ 4 mg  Sat Sun 6mg), Disp: 90 tablet, Rfl: 0    albuterol sulfate  (90 Base) MCG/ACT inhaler, Inhale 2 puffs Every 6 (Six) Hours As Needed for Wheezing. (Patient not taking: Reported on 5/23/2023), Disp: 8 g, Rfl: 0    Budeson-Glycopyrrol-Formoterol (Breztri Aerosphere) 160-9-4.8 MCG/ACT aerosol inhaler, Inhale 2 puffs 2 (Two) Times a Day. (Patient not taking: Reported on 6/9/2023), Disp: 1 each, Rfl: 0    Diclofenac Sodium (VOLTAREN) 1 % gel gel, Apply 4 g topically to the appropriate area as directed 4 (Four) Times a Day As Needed (pain)., Disp: 100 g, Rfl: 0    Objective   Physical Exam  Constitutional:       General: She is not in acute distress.     Appearance: Normal appearance. She is well-developed. She is not ill-appearing or diaphoretic.      Comments: Patient is in no distress, patient has normal voice and speech.  Normal respiratory effort.   HENT:      Head: Normocephalic and atraumatic.   Pulmonary:      Effort: Pulmonary effort is normal.   Musculoskeletal:      Cervical back: Normal range of motion and neck supple.      Comments: No swelling to the right calf noted, no palpation tenderness.  There is a bruise localized in the proximal part of the right shin, the central part is somehow thick and tender to touch, clinically possibly consistent with thrombophlebitis.   Neurological:      General: No focal deficit present.      Mental Status: She is alert and oriented to person, place, and time. Mental status is at baseline.   Psychiatric:         Mood and Affect: Mood normal.     INR          1/10/2023    09:51 2/10/2023    09:05 4/14/2023    09:43 4/20/2023    11:09 5/23/2023    10:32   [facility]   INR 2.29  2.01  1.50  1.45  3.14          6/9/2023    10:04   [facility]   INR 2.94        Assessment & Plan   Diagnoses and all orders for this visit:    1. Hematoma of right lower leg (Primary)    2.  Right leg swelling  -     Duplex Venous Lower Extremity - Right CAR  -     Diclofenac Sodium (VOLTAREN) 1 % gel gel; Apply 4 g topically to the appropriate area as directed 4 (Four) Times a Day As Needed (pain).  Dispense: 100 g; Refill: 0      Stat venous Doppler ultrasound was done today and it was negative for DVT and SVT.  Patient is at high risk of bleeding due to her being on Coumadin and this was discussed with the patient.  I gave her prescription for diclofenac gel to use on the area to help with pain.  She is to monitor the symptoms and contact us back if any concerns.      Return if symptoms worsen or fail to improve, for Recheck.    Requested Prescriptions     Signed Prescriptions Disp Refills    Diclofenac Sodium (VOLTAREN) 1 % gel gel 100 g 0     Sig: Apply 4 g topically to the appropriate area as directed 4 (Four) Times a Day As Needed (pain).

## 2023-06-16 ENCOUNTER — HOSPITAL ENCOUNTER (OUTPATIENT)
Dept: CARDIOLOGY | Facility: HOSPITAL | Age: 78
Discharge: HOME | End: 2023-06-16
Payer: MEDICARE

## 2023-06-16 DIAGNOSIS — I48.0 PAROXYSMAL ATRIAL FIBRILLATION: ICD-10-CM

## 2023-06-16 PROCEDURE — 93306 TTE W/DOPPLER COMPLETE: CPT | Performed by: INTERNAL MEDICINE

## 2023-06-16 PROCEDURE — 93306 TTE W/DOPPLER COMPLETE: CPT

## 2023-06-19 LAB
BH CV ECHO MEAS - ACS: 2.14 CM
BH CV ECHO MEAS - AO MAX PG: 6.5 MMHG
BH CV ECHO MEAS - AO MEAN PG: 3.1 MMHG
BH CV ECHO MEAS - AO ROOT DIAM: 3 CM
BH CV ECHO MEAS - AO V2 MAX: 127.5 CM/SEC
BH CV ECHO MEAS - AO V2 VTI: 21.6 CM
BH CV ECHO MEAS - AVA(I,D): 2.33 CM2
BH CV ECHO MEAS - EDV(CUBED): 77.2 ML
BH CV ECHO MEAS - EDV(MOD-SP4): 51 ML
BH CV ECHO MEAS - EF(MOD-BP): 51 %
BH CV ECHO MEAS - EF(MOD-SP4): 51.2 %
BH CV ECHO MEAS - ESV(CUBED): 28.3 ML
BH CV ECHO MEAS - ESV(MOD-SP4): 24.9 ML
BH CV ECHO MEAS - FS: 28.4 %
BH CV ECHO MEAS - IVS/LVPW: 0.95 CM
BH CV ECHO MEAS - IVSD: 0.96 CM
BH CV ECHO MEAS - LA DIMENSION: 3.7 CM
BH CV ECHO MEAS - LV DIASTOLIC VOL/BSA (35-75): 26.6 CM2
BH CV ECHO MEAS - LV MASS(C)D: 136.7 GRAMS
BH CV ECHO MEAS - LV MAX PG: 3.8 MMHG
BH CV ECHO MEAS - LV MEAN PG: 2.43 MMHG
BH CV ECHO MEAS - LV SYSTOLIC VOL/BSA (12-30): 13 CM2
BH CV ECHO MEAS - LV V1 MAX: 97.3 CM/SEC
BH CV ECHO MEAS - LV V1 VTI: 20.2 CM
BH CV ECHO MEAS - LVIDD: 4.3 CM
BH CV ECHO MEAS - LVIDS: 3 CM
BH CV ECHO MEAS - LVOT AREA: 2.49 CM2
BH CV ECHO MEAS - LVOT DIAM: 1.78 CM
BH CV ECHO MEAS - LVPWD: 1 CM
BH CV ECHO MEAS - MR MAX PG: 55.2 MMHG
BH CV ECHO MEAS - MR MAX VEL: 371.6 CM/SEC
BH CV ECHO MEAS - MV A MAX VEL: 106.3 CM/SEC
BH CV ECHO MEAS - MV DEC SLOPE: 377.5 CM/SEC2
BH CV ECHO MEAS - MV DEC TIME: 0.21 MSEC
BH CV ECHO MEAS - MV E MAX VEL: 78 CM/SEC
BH CV ECHO MEAS - MV E/A: 0.73
BH CV ECHO MEAS - MV MAX PG: 5.4 MMHG
BH CV ECHO MEAS - MV MEAN PG: 2.7 MMHG
BH CV ECHO MEAS - MV V2 VTI: 20.5 CM
BH CV ECHO MEAS - MVA(VTI): 2.45 CM2
BH CV ECHO MEAS - PA ACC TIME: 0.11 SEC
BH CV ECHO MEAS - PA V2 MAX: 87.5 CM/SEC
BH CV ECHO MEAS - PULM A REVS DUR: 0.1 SEC
BH CV ECHO MEAS - PULM A REVS VEL: 32.9 CM/SEC
BH CV ECHO MEAS - PULM DIAS VEL: 45 CM/SEC
BH CV ECHO MEAS - PULM S/D: 1.47
BH CV ECHO MEAS - PULM SYS VEL: 66.3 CM/SEC
BH CV ECHO MEAS - RAP SYSTOLE: 8 MMHG
BH CV ECHO MEAS - RV MAX PG: 1.69 MMHG
BH CV ECHO MEAS - RV V1 MAX: 64.9 CM/SEC
BH CV ECHO MEAS - RV V1 VTI: 13.2 CM
BH CV ECHO MEAS - RVDD: 2.9 CM
BH CV ECHO MEAS - SI(MOD-SP4): 13.6 ML/M2
BH CV ECHO MEAS - SV(LVOT): 50.3 ML
BH CV ECHO MEAS - SV(MOD-SP4): 26.1 ML
MAXIMAL PREDICTED HEART RATE: 144 BPM
STRESS TARGET HR: 122 BPM

## 2023-06-26 ENCOUNTER — TELEPHONE (OUTPATIENT)
Dept: CARDIOLOGY | Facility: CLINIC | Age: 78
End: 2023-06-26

## 2023-06-26 NOTE — TELEPHONE ENCOUNTER
Caller: Abida Becker    Relationship: Self    Best call back number: [phone]    What is the best time to reach you: ANYTIME    Who are you requesting to speak with (clinical staff, provider,  specific staff member): CLINICAL      What was the call regarding: PT HAD AN ECHOCARDIOGRAM DONE ON 06.16.23. DR. ECHEVERRIA CALLED HER BACK AND TOLD HER EVERYTHING WAS NORMAL. THEN ON FRIDAY THE PHARMACY CALLED HER TO TELL HER THAT SHE HAD A NEW MEDICATION TO PICK-UP. IT IS METOPROLOL. SHE DOES NOT UNDERSTAND WHY THERE IS MEDICATION IF HER TEST WAS GOOD, AND DR. ECHEVERRIA NEVER TALKED TO HER ABOUT THE MEDICATION. SHE DOES NOT WANT TO TAKE IT WITHOUT SPEAKING TO SOMEONE. PLEASE REACH OUT TO PT.    Is it okay if the provider responds through [employer]: PHONE PLEASE.

## 2023-07-03 ENCOUNTER — TELEPHONE (OUTPATIENT)
Dept: FAMILY MEDICINE CLINIC | Facility: CLINIC | Age: 78
End: 2023-07-03

## 2023-07-03 NOTE — TELEPHONE ENCOUNTER
I called the patient and left a message that I will mail her a copy of the counselor's list we have in our office/ and she can also call Medicare and see who they have ... as we don't know who is covered.

## 2023-07-03 NOTE — TELEPHONE ENCOUNTER
Caller: Abida Becker    Relationship: Self    Best call back number: [phone]     What is the best time to reach you: ANYTIME AFTER 10:30 AM    Who are you requesting to speak with (clinical staff, provider,  specific staff member): DR SCHULZ    What was the call regarding: PATIENT STATED SHE WAS GIVEN A PAPER WITH COUNSELING NUMBERS FROM DR SCHULZ. HOWEVER SHE HAS MISPLACED THIS.    PATIENT IS REQUESTING TO KNOW THE NUMBER FOR COUNSELING SERVICES FOR MEDICARE.    PLEASE CALL TO DISCUSS, OR LEAVE A DETAILED VOICEMAIL IF UNABLE TO REACH.    Is it okay if the provider responds through MyChart: PATIENT PREFERS CALL

## 2023-07-26 ENCOUNTER — LAB (OUTPATIENT)
Dept: LAB | Facility: HOSPITAL | Age: 78
End: 2023-07-26
Payer: MEDICARE

## 2023-07-26 DIAGNOSIS — Z51.81 ENCOUNTER FOR MONITORING COUMADIN THERAPY: ICD-10-CM

## 2023-07-26 DIAGNOSIS — Z79.01 ENCOUNTER FOR MONITORING COUMADIN THERAPY: ICD-10-CM

## 2023-07-26 LAB
INR PPP: 2.52 (ref 2–3)
PROTHROMBIN TIME: 25.6 SECONDS (ref 19.4–28.5)

## 2023-07-26 PROCEDURE — 85610 PROTHROMBIN TIME: CPT

## 2023-07-26 PROCEDURE — 36415 COLL VENOUS BLD VENIPUNCTURE: CPT

## 2023-07-28 ENCOUNTER — TELEPHONE (OUTPATIENT)
Dept: FAMILY MEDICINE CLINIC | Facility: CLINIC | Age: 78
End: 2023-07-28

## 2023-07-28 NOTE — TELEPHONE ENCOUNTER
Caller: Abida Becker    Relationship to patient: Self    Best call back number: [phone] - CAN LEAVE A MESSAGE IF NEEDED.    Patient is needing: THE PATIENT STATES THAT TWO OF HER CLOSE FRIENDS HAVE COME DOWN WITH SHINGLES AND SHE HAS BEEN IN CONTACT WITH THEM RECENTLY. THE PATIENT WOULD LIKE TO KNOW IF SHE NEEDS TO HAVE AN UPDATED SHINGLES VACCINATION. PLEASE ADVISE.

## 2023-07-28 NOTE — TELEPHONE ENCOUNTER
Please advise the patient that I definitely recommend for her to get a new shingles shot called Shingrix.  This is a series of 2 shots which are given 2 to 6 months apart.  As of January of this year Medicare covers shingles shot under prescription benefits and she needs to schedule it through her pharmacy.  Thank you.

## 2023-07-28 NOTE — TELEPHONE ENCOUNTER
Per patient approval verbally/ I left message at  number provided with the detailed information provided by Dr. Matute

## 2023-08-02 DIAGNOSIS — K58.0 IRRITABLE BOWEL SYNDROME WITH DIARRHEA: ICD-10-CM

## 2023-08-02 RX ORDER — DICYCLOMINE HYDROCHLORIDE 10 MG/1
CAPSULE ORAL
Qty: 90 CAPSULE | Refills: 0 | Status: SHIPPED | OUTPATIENT
Start: 2023-08-02

## 2023-08-09 ENCOUNTER — TELEPHONE (OUTPATIENT)
Dept: ONCOLOGY | Facility: CLINIC | Age: 78
End: 2023-08-09

## 2023-08-09 DIAGNOSIS — R15.2 INCONTINENCE OF FECES WITH FECAL URGENCY: ICD-10-CM

## 2023-08-09 DIAGNOSIS — R15.9 INCONTINENCE OF FECES WITH FECAL URGENCY: ICD-10-CM

## 2023-08-09 RX ORDER — MONTELUKAST SODIUM 4 MG/1
TABLET, CHEWABLE ORAL
Qty: 60 TABLET | Refills: 0 | Status: SHIPPED | OUTPATIENT
Start: 2023-08-09

## 2023-08-09 NOTE — TELEPHONE ENCOUNTER
Caller: Abida Becker    Relationship to patient: Self    Best call back number: [phone]    Type of visit: LAB & F/U 1     Requested date: CALL TO R/S     If rescheduling, when is the original appointment: 8/9/2023

## 2023-08-19 ENCOUNTER — LAB (OUTPATIENT)
Dept: LAB | Facility: HOSPITAL | Age: 78
End: 2023-08-19
Payer: MEDICARE

## 2023-08-19 DIAGNOSIS — Z79.01 ENCOUNTER FOR MONITORING COUMADIN THERAPY: ICD-10-CM

## 2023-08-19 DIAGNOSIS — Z51.81 ENCOUNTER FOR MONITORING COUMADIN THERAPY: ICD-10-CM

## 2023-08-19 LAB
INR PPP: 1.87 (ref 2–3)
PROTHROMBIN TIME: 19.3 SECONDS (ref 19.4–28.5)

## 2023-08-19 PROCEDURE — 85610 PROTHROMBIN TIME: CPT

## 2023-08-19 PROCEDURE — 36415 COLL VENOUS BLD VENIPUNCTURE: CPT

## 2023-08-23 NOTE — PROGRESS NOTES
HEMATOLOGY ONCOLOGY OUTPATIENT FOLLOW UP       Patient name: Abida Becker  : 1945  MRN: [id number]  Primary Care Physician: Darlene Matute MD  Referring Physician: Darlene Matute MD  Reason For Consult: Venous thromboembolism      History of Present Illness:    Patient is a 78 y.o. female who has a history of prior provoked venous thromboembolism .  At that time she was anticoagulated for 3 to 4 months.  She was subsequently taken off anticoagulation and did well.  Now patient was admitted for pneumonia recently to the hospital.    After discharge she noted to have worsening of symptoms and went back to the hospital.    10/13/2022 -CT PE with moderate large abnormal pulmonary emboli in the distal end of the right main pulmonary artery, moderate pulmonary emboli in the left pulmonary artery branches, pneumonia changes.  Ultrasound Doppler with acute right lower extremity DVT in the peroneal, acute left lower extremity superficial thrombophlebitis  Patient was discharged on Coumadin.  Patient takes coumadin 4 mg daily. INR therapeutic, hypercoagulable workup with elevated factor 8 activity, low protein C    23 - hb 11.4, plt 376,     Subjective:  Has had increasing fatigue, no other new symptoms.      Past Medical History:   Diagnosis Date    GERD (gastroesophageal reflux disease)     History of recurrent UTIs     IBS (irritable bowel syndrome)     Osteoarthritis     Osteoporosis     on prolia(22)    Primary osteoarthritis of both knees 2022    Pulmonary embolism     after scoliosis surgery    Pulmonary embolism 10/2022    Vitamin D deficiency        Past Surgical History:   Procedure Laterality Date    BACK SURGERY  2011    Dr. Olsen, due to scoliosis, earnestine and some fusions    BREAST BIOPSY      CHOLECYSTECTOMY      COLONOSCOPY  2018    HIATAL HERNIA REPAIR  2013    REIMPLANT URETER IN BLADDER           Current Outpatient  Medications:     acetaminophen (TYLENOL) 500 MG tablet, Take 1 tablet by mouth Every 6 (Six) Hours As Needed for Fever or Mild Pain., Disp: , Rfl:     calcium carbonate (OS-STEVEN) 600 MG tablet, Take 1 tablet by mouth Daily., Disp: , Rfl:     cholecalciferol (VITAMIN D3) 1000 units tablet, Take 1 tablet by mouth Daily., Disp: , Rfl:     colestipol (COLESTID) 1 g tablet, TAKE ONE TABLET BY MOUTH TWICE A DAY AS NEEDED, Disp: 60 tablet, Rfl: 0    Diclofenac Sodium (VOLTAREN) 1 % gel gel, Apply 4 g topically to the appropriate area as directed 4 (Four) Times a Day As Needed (pain)., Disp: 100 g, Rfl: 0    dicyclomine (BENTYL) 10 MG capsule, TAKE ONE CAPSULE BY MOUTH THREE TIMES A DAY AS NEEDED FOR IBS, Disp: 90 capsule, Rfl: 0    gabapentin (NEURONTIN) 300 MG capsule, TAKE 1 CAPSULE BY MOUTH TWICE A DAY, Disp: 180 capsule, Rfl: 0    melatonin 3 MG tablet, Take 1 tablet by mouth Every Night., Disp: , Rfl:     metoprolol succinate XL (TOPROL-XL) 25 MG 24 hr tablet, Take 1 tablet by mouth Daily., Disp: 30 tablet, Rfl: 11    Mirabegron ER (MYRBETRIQ) 50 MG tablet sustained-release 24 hour 24 hr tablet, Take 50 mg by mouth Daily., Disp: , Rfl:     pantoprazole (PROTONIX) 40 MG EC tablet, TAKE ONE TABLET BY MOUTH DAILY, Disp: 90 tablet, Rfl: 1    potassium chloride 10 MEQ CR tablet, TAKE 1 TABLET BY MOUTH TWICE A DAY, Disp: 180 tablet, Rfl: 0    Riboflavin (B2 PO), Take 1 tablet by mouth Daily., Disp: , Rfl:     vitamin B-12 (CYANOCOBALAMIN) 1000 MCG tablet, Take 1 tablet by mouth Daily., Disp: , Rfl:     warfarin (COUMADIN) 4 MG tablet, TAKE 1 TABLET BY MOUTH EVERY DAY AT NIGHT, Disp: 90 tablet, Rfl: 0    albuterol sulfate  (90 Base) MCG/ACT inhaler, Inhale 2 puffs Every 6 (Six) Hours As Needed for Wheezing. (Patient not taking: Reported on 5/23/2023), Disp: 8 g, Rfl: 0    Budeson-Glycopyrrol-Formoterol (Breztri Aerosphere) 160-9-4.8 MCG/ACT aerosol inhaler, Inhale 2 puffs 2 (Two) Times a Day. (Patient  "not taking: Reported on 6/9/2023), Disp: 1 each, Rfl: 0    Allergies   Allergen Reactions    Amoxicillin Other (See Comments)      unsure of type of reaction - states it was so long ago she can't remember     Penicillins Nausea And Vomiting       Family History   Problem Relation Age of Onset    Heart failure Mother     Cancer Father        Cancer-related family history includes Cancer in her father.      Social History     Tobacco Use    Smoking status: Never     Passive exposure: Never    Smokeless tobacco: Never   Vaping Use    Vaping Use: Never used   Substance Use Topics    Alcohol use: No    Drug use: No     Social History     Social History Narrative    Not on file         Objective:    Vital Signs:  Vitals:    08/24/23 1412   BP: 104/65   Pulse: 82   Resp: 14   Temp: 97.7 øF (36.5 øC)   SpO2: 94%   Weight: 83.3 kg (183 lb 9.6 oz)   Height: 167.6 cm (66\")   PainSc: 0-No pain     Body mass index is 29.63 kg/mý.    ECOG  (1) Restricted in physically strenuous activity, ambulatory and able to do work of light nature    Physical Exam:   Physical Exam  Constitutional:       Appearance: Normal appearance.   HENT:      Head: Normocephalic and atraumatic.   Eyes:      Extraocular Movements: Extraocular movements intact.      Pupils: Pupils are equal, round, and reactive to light.   Cardiovascular:      Rate and Rhythm: Normal rate and regular rhythm.      Pulses: Normal pulses.      Heart sounds: No murmur heard.  Pulmonary:      Effort: Pulmonary effort is normal.      Breath sounds: Normal breath sounds.   Abdominal:      General: There is no distension.      Palpations: Abdomen is soft. There is no mass.      Tenderness: There is no abdominal tenderness.   Musculoskeletal:         General: Normal range of motion.      Cervical back: Normal range of motion and neck supple.   Skin:     General: Skin is warm.   Neurological:      General: No focal deficit present.      Mental Status: She is alert. "   Psychiatric:         Mood and Affect: Mood normal.     Lab Results - Last 18 Months   Lab Units 08/24/23  1409 04/20/23  1109 03/10/23  0924   WBC 10*3/mm3 6.60 5.20 5.40   HEMOGLOBIN g/dL 11.4* 12.4 11.4*   HEMATOCRIT % 36.8 38.0 34.8   PLATELETS 10*3/mm3 376 399 367   MCV fL 87.4 84.4 85.7     Lab Results - Last 18 Months   Lab Units 04/20/23  1109 10/27/22  1402 10/20/22  0428   SODIUM mmol/L 140 139 138   POTASSIUM mmol/L 4.3 3.3* 4.1   CHLORIDE mmol/L 100 98 101   CO2 mmol/L 28.0 29.0 26.0   BUN mg/dL 17 20 11   CREATININE mg/dL 0.69 0.68 0.50*   CALCIUM mg/dL 9.7 9.2 8.7   BILIRUBIN mg/dL 0.9 0.5 0.6   ALK PHOS U/L 91 88 82   ALT (SGPT) U/L 14 28 48*   AST (SGOT) U/L 16 15 37*   GLUCOSE mg/dL 92 76 93       Lab Results   Component Value Date    GLUCOSE 92 04/20/2023    BUN 17 04/20/2023    CREATININE 0.69 04/20/2023    EGFRIFNONA 83 02/07/2022    BCR 24.6 04/20/2023    K 4.3 04/20/2023    CO2 28.0 04/20/2023    CALCIUM 9.7 04/20/2023    ALBUMIN 4.6 04/20/2023    LABIL2 1.4 04/11/2019    AST 16 04/20/2023    ALT 14 04/20/2023       Lab Results - Last 18 Months   Lab Units 08/19/23  1023 07/26/23  1039 06/09/23  1004 10/15/22  1314 10/15/22  0502 10/14/22  2137 10/14/22  1441   INR  1.87* 2.52 2.94   < >  --   --   --    APTT seconds  --   --   --   --  76.2 69.1 65.5    < > = values in this interval not displayed.       No results found for: IRON, TIBC, FERRITIN    No results found for: FOLATE    No results found for: OCCULTBLD    No results found for: RETICCTPCT  Lab Results   Component Value Date    QLKTORTU70 632 06/22/2021     No results found for: SPEP, UPEP  No results found for: LDH, URICACID  Lab Results   Component Value Date    DELLA Negative 09/11/2019    SEDRATE 17 09/11/2019     No results found for: FIBRINOGEN, HAPTOGLOBIN  Lab Results   Component Value Date    PTT 76.2 10/15/2022    INR 1.87 (L) 08/19/2023     No results found for:   No results found for: CEA  No components found for:  CA-19-9  No results found for: PSA  Lab Results   Component Value Date    SEDRATE 17 09/11/2019      Assessment & Plan     Patient is a 78-year-old female with provoked venous thromboembolism.    Venous thromboembolism   Even though this is a recurrent event, this is still provoked  With recent hospitalization   check for hypercoagulable work-up since this is her second episode this is concerning for factor VIII high, protein C deficiency   I would recommend indefinite anticoagulation with coumadin INR monitored by PCP continue to monitor last INR 1.87, continues to be on the same dose     Anemia  Normocytic  With fatigue  Check iron b12 folic acid     F/u in 3 months    Thank you very much for providing the opportunity to participate in this patient's care. Please do not hesitate to call if there are any other questions.

## 2023-08-24 ENCOUNTER — LAB (OUTPATIENT)
Dept: LAB | Facility: HOSPITAL | Age: 78
End: 2023-08-24
Payer: MEDICARE

## 2023-08-24 ENCOUNTER — OFFICE VISIT (OUTPATIENT)
Dept: ONCOLOGY | Facility: CLINIC | Age: 78
End: 2023-08-24
Payer: MEDICARE

## 2023-08-24 VITALS
TEMPERATURE: 97.7 F | RESPIRATION RATE: 14 BRPM | WEIGHT: 183.6 LBS | DIASTOLIC BLOOD PRESSURE: 65 MMHG | HEIGHT: 66 IN | BODY MASS INDEX: 29.51 KG/M2 | HEART RATE: 82 BPM | OXYGEN SATURATION: 94 % | SYSTOLIC BLOOD PRESSURE: 104 MMHG

## 2023-08-24 DIAGNOSIS — I26.94 MULTIPLE SUBSEGMENTAL PULMONARY EMBOLI WITHOUT ACUTE COR PULMONALE: Primary | ICD-10-CM

## 2023-08-24 DIAGNOSIS — N91.2 AMENIA: ICD-10-CM

## 2023-08-24 LAB
BASOPHILS # BLD AUTO: 0.02 10*3/MM3 (ref 0–0.2)
BASOPHILS NFR BLD AUTO: 0.3 % (ref 0–1.5)
DEPRECATED RDW RBC AUTO: 55.3 FL (ref 37–54)
EOSINOPHIL # BLD AUTO: 0.14 10*3/MM3 (ref 0–0.4)
EOSINOPHIL NFR BLD AUTO: 2.1 % (ref 0.3–6.2)
ERYTHROCYTE [DISTWIDTH] IN BLOOD BY AUTOMATED COUNT: 17.4 % (ref 12.3–15.4)
FERRITIN SERPL-MCNC: 15.81 NG/ML (ref 13–150)
HCT VFR BLD AUTO: 36.8 % (ref 34–46.6)
HGB BLD-MCNC: 11.4 G/DL (ref 12–15.9)
HOLD SPECIMEN: NORMAL
HOLD SPECIMEN: NORMAL
IRON 24H UR-MRATE: 56 MCG/DL (ref 37–145)
IRON SATN MFR SERPL: 12 % (ref 20–50)
LYMPHOCYTES # BLD AUTO: 1.51 10*3/MM3 (ref 0.7–3.1)
LYMPHOCYTES NFR BLD AUTO: 22.9 % (ref 19.6–45.3)
MCH RBC QN AUTO: 27.1 PG (ref 26.6–33)
MCHC RBC AUTO-ENTMCNC: 31 G/DL (ref 31.5–35.7)
MCV RBC AUTO: 87.4 FL (ref 79–97)
MONOCYTES # BLD AUTO: 0.61 10*3/MM3 (ref 0.1–0.9)
MONOCYTES NFR BLD AUTO: 9.2 % (ref 5–12)
NEUTROPHILS NFR BLD AUTO: 4.32 10*3/MM3 (ref 1.7–7)
NEUTROPHILS NFR BLD AUTO: 65.5 % (ref 42.7–76)
PLATELET # BLD AUTO: 376 10*3/MM3 (ref 140–450)
PMV BLD AUTO: 9.7 FL (ref 6–12)
RBC # BLD AUTO: 4.21 10*6/MM3 (ref 3.77–5.28)
TIBC SERPL-MCNC: 463 MCG/DL (ref 298–536)
TRANSFERRIN SERPL-MCNC: 311 MG/DL (ref 200–360)
VIT B12 BLD-MCNC: >2000 PG/ML (ref 211–946)
WBC NRBC COR # BLD: 6.6 10*3/MM3 (ref 3.4–10.8)
WHOLE BLOOD HOLD COAG: NORMAL

## 2023-08-24 PROCEDURE — 99214 OFFICE O/P EST MOD 30 MIN: CPT | Performed by: INTERNAL MEDICINE

## 2023-08-24 PROCEDURE — 1126F AMNT PAIN NOTED NONE PRSNT: CPT | Performed by: INTERNAL MEDICINE

## 2023-08-24 PROCEDURE — 84466 ASSAY OF TRANSFERRIN: CPT | Performed by: INTERNAL MEDICINE

## 2023-08-24 PROCEDURE — 82728 ASSAY OF FERRITIN: CPT | Performed by: INTERNAL MEDICINE

## 2023-08-24 PROCEDURE — 36415 COLL VENOUS BLD VENIPUNCTURE: CPT

## 2023-08-24 PROCEDURE — 85025 COMPLETE CBC W/AUTO DIFF WBC: CPT | Performed by: INTERNAL MEDICINE

## 2023-08-24 PROCEDURE — 3074F SYST BP LT 130 MM HG: CPT | Performed by: INTERNAL MEDICINE

## 2023-08-24 PROCEDURE — 83540 ASSAY OF IRON: CPT | Performed by: INTERNAL MEDICINE

## 2023-08-24 PROCEDURE — 1160F RVW MEDS BY RX/DR IN RCRD: CPT | Performed by: INTERNAL MEDICINE

## 2023-08-24 PROCEDURE — 3078F DIAST BP <80 MM HG: CPT | Performed by: INTERNAL MEDICINE

## 2023-08-24 PROCEDURE — 82607 VITAMIN B-12: CPT | Performed by: INTERNAL MEDICINE

## 2023-08-24 PROCEDURE — 1159F MED LIST DOCD IN RCRD: CPT | Performed by: INTERNAL MEDICINE

## 2023-08-25 ENCOUNTER — TELEPHONE (OUTPATIENT)
Dept: ONCOLOGY | Facility: CLINIC | Age: 78
End: 2023-08-25
Payer: MEDICARE

## 2023-08-25 NOTE — TELEPHONE ENCOUNTER
----- Message from JW Huang sent at 8/24/2023  4:56 PM EDT -----  Anemia work-up thus far showing some iron deficiency.  Please have her come in for a stool occult blood.  If she is not already taking oral iron or does not have history of malabsorption or intolerance she can begin ferrous sulfate 325 mg p.o. daily with vitamin C or orange juice to assist with absorption.

## 2023-08-31 ENCOUNTER — TELEPHONE (OUTPATIENT)
Dept: ONCOLOGY | Facility: CLINIC | Age: 78
End: 2023-08-31
Payer: MEDICARE

## 2023-08-31 NOTE — TELEPHONE ENCOUNTER
Attempted to call the patient for the second time. No answer. V/m was left with call back information.

## 2023-09-01 ENCOUNTER — TELEPHONE (OUTPATIENT)
Dept: ONCOLOGY | Facility: CLINIC | Age: 78
End: 2023-09-01

## 2023-09-01 NOTE — TELEPHONE ENCOUNTER
Caller: Abida Becker    Relationship: Self    Best call back number: [phone]    What is the best time to reach you: ASAP    Who are you requesting to speak with (clinical staff, provider,  specific staff member): CLINICAL    What was the call regarding: REQUESTING A CALL BACK TO GO OVER LAB RESULTS  MAY LEAVE A VM    Is it okay if the provider responds through MyChart: YES

## 2023-09-05 NOTE — TELEPHONE ENCOUNTER
Caller: Abida Becker    Relationship: Self    Best call back number: [phone]  CELL [phone]    What is the best time to reach you: ASAP    Who are you requesting to speak with (clinical staff, provider,  specific staff member): CLINICAL    What was the call regarding: PT RETURNING MISSED CALL FROM [facility] FOR LAB RESULTS  HUB UNABLE TO WT

## 2023-09-09 DIAGNOSIS — M79.89 RIGHT LEG SWELLING: ICD-10-CM

## 2023-09-11 ENCOUNTER — TELEPHONE (OUTPATIENT)
Dept: ONCOLOGY | Facility: CLINIC | Age: 78
End: 2023-09-11
Payer: MEDICARE

## 2023-09-11 DIAGNOSIS — N91.2 AMENIA: Primary | ICD-10-CM

## 2023-09-11 NOTE — TELEPHONE ENCOUNTER
Caller: Abida Becker    Relationship: Self    Best call back number: [phone]     What medications are you currently taking:   Current Outpatient Medications on File Prior to Visit   Medication Sig Dispense Refill    acetaminophen (TYLENOL) 500 MG tablet Take 1 tablet by mouth Every 6 (Six) Hours As Needed for Fever or Mild Pain.      albuterol sulfate  (90 Base) MCG/ACT inhaler Inhale 2 puffs Every 6 (Six) Hours As Needed for Wheezing. (Patient not taking: Reported on 5/23/2023) 8 g 0    Budeson-Glycopyrrol-Formoterol (Breztri Aerosphere) 160-9-4.8 MCG/ACT aerosol inhaler Inhale 2 puffs 2 (Two) Times a Day. (Patient not taking: Reported on 6/9/2023) 1 each 0    calcium carbonate (OS-STEVEN) 600 MG tablet Take 1 tablet by mouth Daily.      cholecalciferol (VITAMIN D3) 1000 units tablet Take 1 tablet by mouth Daily.      colestipol (COLESTID) 1 g tablet TAKE ONE TABLET BY MOUTH TWICE A DAY AS NEEDED 60 tablet 0    Diclofenac Sodium (VOLTAREN) 1 % gel gel APPLY FOUR GRAM TOPICALLY FOUR TIMES A DAY AS DIRECTED AS NEEDED FOR PAIN 100 g 0    dicyclomine (BENTYL) 10 MG capsule TAKE ONE CAPSULE BY MOUTH THREE TIMES A DAY AS NEEDED FOR IBS 90 capsule 0    gabapentin (NEURONTIN) 300 MG capsule TAKE 1 CAPSULE BY MOUTH TWICE A  capsule 0    melatonin 3 MG tablet Take 1 tablet by mouth Every Night.      metoprolol succinate XL (TOPROL-XL) 25 MG 24 hr tablet Take 1 tablet by mouth Daily. 30 tablet 11    Mirabegron ER (MYRBETRIQ) 50 MG tablet sustained-release 24 hour 24 hr tablet Take 50 mg by mouth Daily.      pantoprazole (PROTONIX) 40 MG EC tablet TAKE ONE TABLET BY MOUTH DAILY 90 tablet 1    potassium chloride 10 MEQ CR tablet TAKE 1 TABLET BY MOUTH TWICE A  tablet 0    Riboflavin (B2 PO) Take 1 tablet by mouth Daily.      vitamin B-12 (CYANOCOBALAMIN) 1000 MCG tablet Take 1 tablet by mouth Daily.      warfarin (COUMADIN) 4 MG tablet TAKE 1 TABLET BY MOUTH EVERY DAY AT NIGHT 90 tablet 0     No  current facility-administered medications on file prior to visit.          Which medication are you concerned about:PATIENT UNDERSTOOD THAT SHE WAS TO RECEIVE NEW IRON MEDICATION    PHARMACY HAS NOT RECEIVED RX    PATIENT CALLING TO CLARIFY THAT SHE JUNDERSTOOD CORRECTLY AND/OR RE-ORDER RX    Who prescribed you this medication: MARIO

## 2023-09-12 RX ORDER — FERROUS SULFATE 325(65) MG
325 TABLET ORAL
Qty: 90 TABLET | Refills: 0 | Status: SHIPPED | OUTPATIENT
Start: 2023-09-12

## 2023-09-12 NOTE — TELEPHONE ENCOUNTER
SW patient who confirmed she is not taking any oral iron. Per Dr. Urias ferrous sulfate was sent in for the patient. The patient v/u and had no other questions.

## 2023-09-15 ENCOUNTER — TELEPHONE (OUTPATIENT)
Dept: FAMILY MEDICINE CLINIC | Facility: CLINIC | Age: 78
End: 2023-09-15
Payer: MEDICARE

## 2023-09-15 DIAGNOSIS — R15.9 INCONTINENCE OF FECES WITH FECAL URGENCY: ICD-10-CM

## 2023-09-15 DIAGNOSIS — E55.9 VITAMIN D DEFICIENCY: ICD-10-CM

## 2023-09-15 DIAGNOSIS — I10 ESSENTIAL HYPERTENSION: Primary | ICD-10-CM

## 2023-09-15 DIAGNOSIS — R15.2 INCONTINENCE OF FECES WITH FECAL URGENCY: ICD-10-CM

## 2023-09-15 RX ORDER — MONTELUKAST SODIUM 4 MG/1
TABLET, CHEWABLE ORAL
Qty: 60 TABLET | Refills: 0 | Status: SHIPPED | OUTPATIENT
Start: 2023-09-15

## 2023-09-15 NOTE — TELEPHONE ENCOUNTER
RELAY:    CALLED PATIENT, LINE BUSY. I WILL TRY TO CALL BACK LATER TO ADVISE THE PATIENT THAT THE LAB ORDERS ARE IN HER CHART AND SHE CAN HAVE THEM DONE WHEN SHE IS AT [facility] FOR HER INR.

## 2023-09-15 NOTE — TELEPHONE ENCOUNTER
PATIENT HAS A MEDICARE WELLNESS ON 10/4/23. SHE WILL BE AT [facility] TO HAVE HER INR DONE ON 9/20/23 AND WOULD LIKE THE LABS DONE THEN, IF POSSIBLE. I DO NOT SEE ORDERS IN THE CHART, CAN YOU PLEASE ADD ORDERS. THANK YOU! I CAN CALL HER TO LET HER KNOW WHEN THEY ARE IN.

## 2023-09-15 NOTE — TELEPHONE ENCOUNTER
RELAY    CALLED AND LVM STATING THAT THE LAB ORDERS HAVE BEEN PLACED AND SHE CAN HAVE THEM DONE ON 9/20/23 WHILE AT [facility] FOR HER INR

## 2023-09-20 ENCOUNTER — LAB (OUTPATIENT)
Dept: LAB | Facility: HOSPITAL | Age: 78
End: 2023-09-20
Payer: MEDICARE

## 2023-09-20 DIAGNOSIS — N91.2 AMENIA: ICD-10-CM

## 2023-09-20 LAB
25(OH)D3 SERPL-MCNC: 31.2 NG/ML (ref 30–100)
ALBUMIN SERPL-MCNC: 4.2 G/DL (ref 3.5–5.2)
ALBUMIN/GLOB SERPL: 1.8 G/DL
ALP SERPL-CCNC: 108 U/L (ref 39–117)
ALT SERPL W P-5'-P-CCNC: 17 U/L (ref 1–33)
ANION GAP SERPL CALCULATED.3IONS-SCNC: 11 MMOL/L (ref 5–15)
AST SERPL-CCNC: 17 U/L (ref 1–32)
BILIRUB SERPL-MCNC: 0.6 MG/DL (ref 0–1.2)
BUN SERPL-MCNC: 20 MG/DL (ref 8–23)
BUN/CREAT SERPL: 24.4 (ref 7–25)
CALCIUM SPEC-SCNC: 9.3 MG/DL (ref 8.6–10.5)
CHLORIDE SERPL-SCNC: 103 MMOL/L (ref 98–107)
CHOLEST SERPL-MCNC: 189 MG/DL (ref 0–200)
CO2 SERPL-SCNC: 26 MMOL/L (ref 22–29)
CREAT SERPL-MCNC: 0.82 MG/DL (ref 0.57–1)
EGFRCR SERPLBLD CKD-EPI 2021: 74.2 ML/MIN/1.73
FERRITIN SERPL-MCNC: 31.19 NG/ML (ref 13–150)
GLOBULIN UR ELPH-MCNC: 2.3 GM/DL
GLUCOSE SERPL-MCNC: 84 MG/DL (ref 65–99)
HDLC SERPL-MCNC: 91 MG/DL (ref 40–60)
INR PPP: 1.85 (ref 2–3)
IRON 24H UR-MRATE: 53 MCG/DL (ref 37–145)
IRON SATN MFR SERPL: 11 % (ref 20–50)
LDLC SERPL CALC-MCNC: 85 MG/DL (ref 0–100)
LDLC/HDLC SERPL: 0.93 {RATIO}
POTASSIUM SERPL-SCNC: 4.4 MMOL/L (ref 3.5–5.2)
PROT SERPL-MCNC: 6.5 G/DL (ref 6–8.5)
PROTHROMBIN TIME: 19.1 SECONDS (ref 19.4–28.5)
SODIUM SERPL-SCNC: 140 MMOL/L (ref 136–145)
TIBC SERPL-MCNC: 477 MCG/DL (ref 298–536)
TRANSFERRIN SERPL-MCNC: 320 MG/DL (ref 200–360)
TRIGL SERPL-MCNC: 69 MG/DL (ref 0–150)
VLDLC SERPL-MCNC: 13 MG/DL (ref 5–40)

## 2023-09-20 PROCEDURE — 80061 LIPID PANEL: CPT | Performed by: FAMILY MEDICINE

## 2023-09-20 PROCEDURE — 83540 ASSAY OF IRON: CPT

## 2023-09-20 PROCEDURE — 80053 COMPREHEN METABOLIC PANEL: CPT | Performed by: FAMILY MEDICINE

## 2023-09-20 PROCEDURE — 84466 ASSAY OF TRANSFERRIN: CPT

## 2023-09-20 PROCEDURE — 82306 VITAMIN D 25 HYDROXY: CPT | Performed by: FAMILY MEDICINE

## 2023-09-20 PROCEDURE — 82728 ASSAY OF FERRITIN: CPT

## 2023-09-20 PROCEDURE — 85025 COMPLETE CBC W/AUTO DIFF WBC: CPT | Performed by: INTERNAL MEDICINE

## 2023-09-20 NOTE — PROGRESS NOTES
I called the patient and she takes Coumadin 4 mg M-F  And  Coumadin 6 Mg on Sat and Sun    She was just started on 09/12/2023 by Dr. Urias Ferrous Sulfate 325 mg daily

## 2023-09-26 DIAGNOSIS — Z86.711 HISTORY OF PULMONARY EMBOLISM: ICD-10-CM

## 2023-09-26 DIAGNOSIS — I48.0 PAROXYSMAL ATRIAL FIBRILLATION: ICD-10-CM

## 2023-09-26 DIAGNOSIS — I10 ESSENTIAL HYPERTENSION: ICD-10-CM

## 2023-09-26 RX ORDER — POTASSIUM CHLORIDE 750 MG/1
TABLET, FILM COATED, EXTENDED RELEASE ORAL
Qty: 180 TABLET | Refills: 0 | Status: SHIPPED | OUTPATIENT
Start: 2023-09-26

## 2023-09-26 RX ORDER — WARFARIN SODIUM 4 MG/1
TABLET ORAL
Qty: 90 TABLET | Refills: 0 | Status: SHIPPED | OUTPATIENT
Start: 2023-09-26

## 2023-10-03 ENCOUNTER — TELEPHONE (OUTPATIENT)
Dept: FAMILY MEDICINE CLINIC | Facility: CLINIC | Age: 78
End: 2023-10-03

## 2023-10-03 DIAGNOSIS — I48.0 PAROXYSMAL ATRIAL FIBRILLATION: ICD-10-CM

## 2023-10-03 DIAGNOSIS — Z86.711 HISTORY OF PULMONARY EMBOLISM: ICD-10-CM

## 2023-10-03 RX ORDER — WARFARIN SODIUM 4 MG/1
4 TABLET ORAL
Qty: 90 TABLET | Refills: 0 | Status: CANCELLED | OUTPATIENT
Start: 2023-10-03

## 2023-10-03 NOTE — TELEPHONE ENCOUNTER
Caller: Abida Becker    Relationship to patient: Self    Best call back number: [phone]    Patient is needing: PATIENT IS CALLING TO ASK PROVIDER TO SEND A NEW PRESCRIPTION  TO HER PHARMACY BECAUSE THE DIRECTION AND TIMES HAS CHANGED HER MEDICATION     [facility]    [address] [place], [place] - [address] [phone] [facility] [phone] FX

## 2023-10-03 NOTE — TELEPHONE ENCOUNTER
I called the patient and based on her last INR she takes her warfarin 4 mg Monday thru Thursday, and 1 1/2 tablets on Friday Saturday and Sunday/ so she needs a new prescription to reflect this please

## 2023-10-04 DIAGNOSIS — I48.0 PAROXYSMAL ATRIAL FIBRILLATION: ICD-10-CM

## 2023-10-04 DIAGNOSIS — Z86.711 HISTORY OF PULMONARY EMBOLISM: ICD-10-CM

## 2023-10-04 RX ORDER — WARFARIN SODIUM 4 MG/1
TABLET ORAL
Qty: 100 TABLET | Refills: 0 | Status: SHIPPED | OUTPATIENT
Start: 2023-10-04

## 2023-10-08 PROCEDURE — 87086 URINE CULTURE/COLONY COUNT: CPT | Performed by: FAMILY MEDICINE

## 2023-10-08 PROCEDURE — 87077 CULTURE AEROBIC IDENTIFY: CPT | Performed by: FAMILY MEDICINE

## 2023-10-08 PROCEDURE — 87186 SC STD MICRODIL/AGAR DIL: CPT | Performed by: FAMILY MEDICINE

## 2023-10-11 ENCOUNTER — LAB (OUTPATIENT)
Dept: LAB | Facility: HOSPITAL | Age: 78
End: 2023-10-11
Payer: MEDICARE

## 2023-10-11 DIAGNOSIS — Z79.01 ENCOUNTER FOR MONITORING COUMADIN THERAPY: ICD-10-CM

## 2023-10-11 DIAGNOSIS — Z51.81 ENCOUNTER FOR MONITORING COUMADIN THERAPY: ICD-10-CM

## 2023-10-11 LAB
INR PPP: 2 (ref 2–3)
PROTHROMBIN TIME: 20.7 SECONDS (ref 19.4–28.5)

## 2023-10-11 PROCEDURE — 36415 COLL VENOUS BLD VENIPUNCTURE: CPT

## 2023-10-11 PROCEDURE — 85610 PROTHROMBIN TIME: CPT

## 2023-10-13 DIAGNOSIS — R15.2 INCONTINENCE OF FECES WITH FECAL URGENCY: ICD-10-CM

## 2023-10-13 DIAGNOSIS — R15.9 INCONTINENCE OF FECES WITH FECAL URGENCY: ICD-10-CM

## 2023-10-13 RX ORDER — MONTELUKAST SODIUM 4 MG/1
TABLET, CHEWABLE ORAL
Qty: 60 TABLET | Refills: 0 | Status: SHIPPED | OUTPATIENT
Start: 2023-10-13

## 2023-10-20 DIAGNOSIS — M79.89 RIGHT LEG SWELLING: ICD-10-CM

## 2023-10-25 ENCOUNTER — OFFICE VISIT (OUTPATIENT)
Dept: FAMILY MEDICINE CLINIC | Facility: CLINIC | Age: 78
End: 2023-10-25
Payer: MEDICARE

## 2023-10-25 VITALS
WEIGHT: 184 LBS | HEIGHT: 66 IN | OXYGEN SATURATION: 95 % | HEART RATE: 79 BPM | SYSTOLIC BLOOD PRESSURE: 113 MMHG | TEMPERATURE: 97.5 F | RESPIRATION RATE: 16 BRPM | BODY MASS INDEX: 29.57 KG/M2 | DIASTOLIC BLOOD PRESSURE: 73 MMHG

## 2023-10-25 DIAGNOSIS — Z00.00 MEDICARE ANNUAL WELLNESS VISIT, SUBSEQUENT: Primary | ICD-10-CM

## 2023-10-25 DIAGNOSIS — Z12.31 VISIT FOR SCREENING MAMMOGRAM: ICD-10-CM

## 2023-10-25 DIAGNOSIS — Z23 NEED FOR VACCINATION: ICD-10-CM

## 2023-10-25 DIAGNOSIS — R40.0 DAYTIME SLEEPINESS: ICD-10-CM

## 2023-10-25 DIAGNOSIS — M81.0 AGE-RELATED OSTEOPOROSIS WITHOUT CURRENT PATHOLOGICAL FRACTURE: ICD-10-CM

## 2023-10-25 PROBLEM — J06.9 ACUTE URI: Status: RESOLVED | Noted: 2023-05-23 | Resolved: 2023-10-25

## 2023-10-25 PROBLEM — R00.2 PALPITATIONS: Status: RESOLVED | Noted: 2023-02-27 | Resolved: 2023-10-25

## 2023-10-25 NOTE — PROGRESS NOTES
Subsequent Medicare Wellness Visit   The ABC's of the Annual Wellness Visit    Chief Complaint   Patient presents with    Medicare Wellness-subsequent       HPI:  Abida Becker, -1945, is a 78 y.o. female who presents for a Subsequent Medicare Wellness Visit.  She lives by herself.  She is fully independent with her activities of daily living.  She has been having a lot of chronic pain due to advanced osteoarthritis and degenerative disc disease.  She is a status post scoliosis surgery and she still has rods in her back.  Over time she has noted some worsening of her posture if it comes to her neck situation.  She also reports having some daytime tiredness and sleepiness and she is easy to doze off. Patient does not report any chest pain, shortness of breath, dizziness, nausea, vomiting, or diarrhea, visual issues, headaches, numbness or tingling. No urinary issues reported like urgency, frequency, or discomfort upon urination.  No significant weight changes reported.  No swelling reported.  No rashes or any other skin issues reported. No emotional issues or insomnia.    Recent Hospitalizations:  Recently treated at the following:  [facility] .    Current Medical Providers:  Patient Care Team:  Darlene Matute MD as PCP - General  Rolando Stein MD as Consulting Physician (Urology)  Banet, Duane Edward, MD as Consulting Physician (Dermatology)  Milla Urias MD as Consulting Physician (Hematology and Oncology)  Brett Zuniga MD as Consulting Physician (Cardiology)    Health Habits and Functional and Cognitive Screening and Depression Screening:      10/25/2023     1:29 PM   Functional & Cognitive Status   Do you have difficulty preparing food and eating? No   Do you have difficulty bathing yourself, getting dressed or grooming yourself? No   Do you have difficulty using the toilet? No   Do you have difficulty moving around from place to place? No   Do you have trouble with steps  or getting out of a bed or a chair? No   Current Diet Well Balanced Diet   Dental Exam Up to date   Eye Exam Up to date   Exercise (times per week) 0 times per week   Current Exercises Include No Regular Exercise   Do you need help using the phone?  No   Are you deaf or do you have serious difficulty hearing?  No   Do you need help to go to places out of walking distance? No   Do you need help shopping? No   Do you need help preparing meals?  No   Do you need help with housework?  No   Do you need help with laundry? No   Do you need help taking your medications? No   Do you need help managing money? No   Do you ever drive or ride in a car without wearing a seat belt? No   Have you felt unusual stress, anger or loneliness in the last month? No   Who do you live with? Alone   If you need help, do you have trouble finding someone available to you? No   Do you have difficulty concentrating, remembering or making decisions? No       Compared to one year ago, the patient feels her physical health is the same and her mental health is the same.    Depression Screen:      8/24/2023     2:12 PM   PHQ-2/PHQ-9 Depression Screening   Little Interest or Pleasure in Doing Things 0-->not at all   Feeling Down, Depressed or Hopeless 0-->not at all   PHQ-9: Brief Depression Severity Measure Score 0         Past Medical/Family/Social History:  The following portions of the patient's history were reviewed and updated as appropriate: allergies, current medications, past family history, past medical history, past social history, past surgical history, and problem list.    Allergies   Allergen Reactions    Amoxicillin Other (See Comments)      unsure of type of reaction - states it was so long ago she can't remember     Penicillins Nausea And Vomiting         Current Outpatient Medications:     acetaminophen (TYLENOL) 500 MG tablet, Take 1 tablet by mouth Every 6 (Six) Hours As Needed for Fever or Mild Pain., Disp: , Rfl:     calcium  carbonate (OS-STEVEN) 600 MG tablet, Take 1 tablet by mouth Daily., Disp: , Rfl:     cholecalciferol (VITAMIN D3) 1000 units tablet, Take 1 tablet by mouth Daily., Disp: , Rfl:     colestipol (COLESTID) 1 g tablet, TAKE 1 TABLET BY MOUTH TWICE A DAY AS NEEDED, Disp: 60 tablet, Rfl: 0    Diclofenac Sodium (VOLTAREN) 1 % gel gel, APPLY 4 GRAMS TO AFFECTED AREA 4 TIMES DAILY AS NEEDED FOR PAIN, Disp: 100 g, Rfl: 0    dicyclomine (BENTYL) 10 MG capsule, TAKE ONE CAPSULE BY MOUTH THREE TIMES A DAY AS NEEDED FOR IBS, Disp: 90 capsule, Rfl: 0    ferrous sulfate 325 (65 FE) MG tablet, Take 1 tablet by mouth Daily With Breakfast., Disp: 90 tablet, Rfl: 0    gabapentin (NEURONTIN) 300 MG capsule, TAKE 1 CAPSULE BY MOUTH TWICE A DAY, Disp: 180 capsule, Rfl: 0    melatonin 3 MG tablet, Take 1 tablet by mouth Every Night., Disp: , Rfl:     metoprolol succinate XL (TOPROL-XL) 25 MG 24 hr tablet, Take 1 tablet by mouth Daily., Disp: 30 tablet, Rfl: 11    Mirabegron ER (MYRBETRIQ) 50 MG tablet sustained-release 24 hour 24 hr tablet, Take 50 mg by mouth Daily., Disp: , Rfl:     pantoprazole (PROTONIX) 40 MG EC tablet, TAKE ONE TABLET BY MOUTH DAILY, Disp: 90 tablet, Rfl: 1    potassium chloride 10 MEQ CR tablet, TAKE 1 TABLET BY MOUTH TWICE A DAY, Disp: 180 tablet, Rfl: 0    Riboflavin (B2 PO), Take 1 tablet by mouth Daily., Disp: , Rfl:     vitamin B-12 (CYANOCOBALAMIN) 1000 MCG tablet, Take 1 tablet by mouth Daily., Disp: , Rfl:     warfarin (COUMADIN) 4 MG tablet, Take 1 tablet p.o. 4 days a week, take 1.5 tablets p.o. 3 days a week as directed, Disp: 100 tablet, Rfl: 0    Aspirin use counseling: Does not need ASA (and currently is not on it)    Current medication list contains no high risk medications.  No harmful drug interactions have been identified.     Family History   Problem Relation Age of Onset    Heart failure Mother     Cancer Father        Social History     Tobacco Use    Smoking status: Never     Passive exposure:  Never    Smokeless tobacco: Never   Substance Use Topics    Alcohol use: No       Past Surgical History:   Procedure Laterality Date    BACK SURGERY  11/2011    Dr. Olsen, due to scoliosis, earnestine and some fusions    BREAST BIOPSY      CHOLECYSTECTOMY      COLONOSCOPY  01/18/2018    HIATAL HERNIA REPAIR  2013    REIMPLANT URETER IN BLADDER         Patient Active Problem List   Diagnosis    Allergic rhinitis    Gait abnormality    Gastroesophageal reflux disease    Hip pain, right    Low back pain    Recurrent urinary tract infection    Scoliosis    Vaginitis, atrophic    Medicare annual wellness visit, subsequent    Balance problem    Vitamin D deficiency    Acute pain of left knee    Primary osteoarthritis of left knee    Tear of MCL (medial collateral ligament) of knee, left, subsequent encounter    Overweight (BMI 25.0-29.9)    Visit for screening mammogram    Postmenopausal    Chronic pain of left knee    Right lumbar radiculopathy    Tremor, essential    Other fatigue    Osteoarthritis, generalized    Irritable bowel syndrome with diarrhea    Primary osteoarthritis of both knees    Obesity (BMI 30.0-34.9)    Hiatal hernia    Incontinence of feces with fecal urgency    Age-related osteoporosis without current pathological fracture    Bruising    Family hx osteoporosis    Personal history of spine surgery    Nonintractable headache    Essential hypertension    Multiple subsegmental pulmonary emboli without acute cor pulmonale    Encounter for monitoring Coumadin therapy    Acute deep vein thrombosis (DVT) of proximal vein of right lower extremity    Need for vaccination    Anxiety    Memory problem    Chronic neck pain    DDD (degenerative disc disease), cervical    Upper back pain    Spinal deformity    Chronic bilateral thoracic back pain    Paroxysmal atrial fibrillation    History of pulmonary embolism    Hematoma of right lower leg    Right leg swelling    Daytime sleepiness       Review of Systems  "  Constitutional:  Negative for activity change, fatigue and fever.   Respiratory:  Negative for shortness of breath and wheezing.    Cardiovascular:  Negative for chest pain, palpitations and leg swelling.   Musculoskeletal:  Positive for arthralgias, back pain and gait problem.   Skin:  Negative for rash.   Neurological:  Negative for tremors and headache.       Objective     Vitals:    10/25/23 1336   BP: 113/73   BP Location: Left arm   Patient Position: Sitting   Cuff Size: Adult   Pulse: 79   Resp: 16   Temp: 97.5 °F (36.4 °C)   TempSrc: Infrared   SpO2: 95%   Weight: 83.5 kg (184 lb)   Height: 167.6 cm (65.98\")   PainSc: 0-No pain              No results found.    The patient has no evidence of cognitve impairment.     Physical Exam  Vitals and nursing note reviewed.   Constitutional:       General: She is not in acute distress.     Appearance: Normal appearance. She is well-developed. She is not ill-appearing.   HENT:      Head: Normocephalic and atraumatic.   Cardiovascular:      Rate and Rhythm: Normal rate and regular rhythm.      Heart sounds: Normal heart sounds. No murmur heard.     No gallop.   Pulmonary:      Effort: Pulmonary effort is normal. No respiratory distress.      Breath sounds: Normal breath sounds. No wheezing, rhonchi or rales.   Chest:      Chest wall: No tenderness.   Musculoskeletal:      Cervical back: Normal range of motion and neck supple.   Neurological:      General: No focal deficit present.      Mental Status: She is alert and oriented to person, place, and time. Mental status is at baseline.   Psychiatric:         Mood and Affect: Mood normal.         Recent Lab Results:     Lab Results   Component Value Date    CHOL 189 09/20/2023    TRIG 69 09/20/2023    HDL 91 (H) 09/20/2023    VLDL 13 09/20/2023    LDLHDL 0.93 09/20/2023       Assessment & Plan   Age-appropriate Screening Schedule:  Refer to the list below for future screening recommendations based on patient's age, sex " and/or medical conditions.      Health Maintenance   Topic Date Due    COVID-19 Vaccine (6 - 2023-24 season) 09/01/2023    ZOSTER VACCINE (3 of 3) 09/27/2023    BMI FOLLOWUP  05/23/2024    DXA SCAN  08/18/2024    ANNUAL WELLNESS VISIT  10/25/2024    TDAP/TD VACCINES (2 - Td or Tdap) 12/12/2028    HEPATITIS C SCREENING  Completed    INFLUENZA VACCINE  Completed    Pneumococcal Vaccine 65+  Completed    COLORECTAL CANCER SCREENING  Discontinued       Medicare Risks and Personalized Health Plan:  Advance Directive Discussion  Breast Cancer/Mammogram Screening  Cardiovascular risk  Colon Cancer Screening  Dementia/Memory   Depression/Dysphoria  Diabetic Lab Screening   Fall Risk  Immunizations Discussed/Encouraged (specific immunizations; Influenza, Shingrix, and COVID19 )  Obesity/Overweight   Osteoporosis Risk      CMS-Preventive Services Quick Reference  Medicare Preventive Services Addressed:  Annual Wellness Visit (AWV)  Bone Density Measurements  Cardiovascular Disease Screening Tests (may do this order every 5 years in beneficiaries without signs or symptoms of cardiovascular disease)  Colorectal Cancer Screening, Colonoscopy  Screening Mammography     Advance Care Planning:  ACP discussion was held with the patient during this visit. Patient has an advance directive in EMR which is still valid.     Diagnoses and all orders for this visit:    1. Medicare annual wellness visit, subsequent (Primary)    2. Visit for screening mammogram  -     Mammo Screening Digital Tomosynthesis Bilateral With CAD; Future    3. Age-related osteoporosis without current pathological fracture  -     Ambulatory Referral to Endocrinology    4. Daytime sleepiness  -     Ambulatory Referral to Sleep Medicine    5. Need for vaccination  -     Fluzone High-Dose 65+yrs ([id number])      Medicare wellness exam was done today.  I reviewed her recent blood work results.  I also reviewed her health maintenance.  Her last mammogram was in 4/2021  and the new order was given and she will be scheduling the test.  Her last DEXA scan was in 8/2022, patient was previously started on Prolia shots through a local orthopedist office, I will be referring her to to the endocrinology office for the Prolia injection as she is not able to get it in the Ortho office anymore.  She will continue calcium and vitamin D supplementation.  Her last colonoscopy was in 1/2018 and no recall colonoscopy was recommended.  I also reviewed her immunization.  She is up to speed with her pneumonia shot and she was given her flu shot today.  She was advised to get her COVID-19 vaccine and RSV vaccine through the pharmacy.  She also will be scheduling her second shingles shot through the pharmacy.  I will be also referring her for a sleep study due to daytime sleepiness.  Healthy lifestyle was reinforced.  Fall prevention was stressed.    An After Visit Summary and PPPS with all of these plans were given to the patient.      Follow Up:  Return in about 1 year (around 10/25/2024) for Medicare Wellness.        Requested Prescriptions      No prescriptions requested or ordered in this encounter

## 2023-10-30 ENCOUNTER — TELEPHONE (OUTPATIENT)
Dept: FAMILY MEDICINE CLINIC | Facility: CLINIC | Age: 78
End: 2023-10-30

## 2023-10-30 NOTE — TELEPHONE ENCOUNTER
Patient has been on Coumadin for couple of years due to pulmonary embolism.  Not that there are no alternative to Coumadin, however this all does not seem to be as a good advice for the patient from the urgent care.  So she was seen in urgent care and prescribed Z-Vasu and Medrol Dosepak as I can see from her chart and advised by a nurse practitioner that she needs to contact her medical doctor to change Coumadin so she can take the medication prescribed from urgent care.  Please clarify with the patient whether this really was the advise she was given from the urgent care.  Thank you.

## 2023-10-30 NOTE — TELEPHONE ENCOUNTER
Caller: Abida Becker    Relationship: Self    Best call back number: [phone]     What medication are you requesting: ALTERNATIVE TO WARFARIN      If a prescription is needed, what is your preferred pharmacy and phone number: [id number] - [place], [place] - [address] - [phone]  - [phone]     Additional notes: PATIENT WAS SEEN AT [facility] DUE TO CHEST TIGHTNESS - DIAGNOSED WITH BRONCHITIS AND ADVISED TO CONTACT PCP AND BE PRESCRIBED AN ALTERNATIVE TO WARFARIN DUE TO MEDICATION REACTION

## 2023-11-03 ENCOUNTER — TELEPHONE (OUTPATIENT)
Dept: FAMILY MEDICINE CLINIC | Facility: CLINIC | Age: 78
End: 2023-11-03
Payer: MEDICARE

## 2023-11-03 NOTE — TELEPHONE ENCOUNTER
Patient was seen for bronchitis on 10/28/2023 @ [facility] and prescribed a Z pack and a Medrol Dose Pack. She has taken both of them and finished them .Her concern is she is on coumadin and wants to know if she needs to get her INR checked sooner than 11/09/2023. ( [facility] NP told her that steroid and coumadin can cause her INR to increase. Please advise

## 2023-11-04 DIAGNOSIS — K58.0 IRRITABLE BOWEL SYNDROME WITH DIARRHEA: ICD-10-CM

## 2023-11-04 RX ORDER — DICYCLOMINE HYDROCHLORIDE 10 MG/1
CAPSULE ORAL
Qty: 90 CAPSULE | Refills: 0 | Status: SHIPPED | OUTPATIENT
Start: 2023-11-04

## 2023-11-06 ENCOUNTER — LAB (OUTPATIENT)
Dept: LAB | Facility: HOSPITAL | Age: 78
End: 2023-11-06
Payer: MEDICARE

## 2023-11-06 DIAGNOSIS — Z51.81 ENCOUNTER FOR MONITORING COUMADIN THERAPY: ICD-10-CM

## 2023-11-06 DIAGNOSIS — Z79.01 ENCOUNTER FOR MONITORING COUMADIN THERAPY: ICD-10-CM

## 2023-11-06 LAB
INR PPP: 3.98 (ref 2–3)
PROTHROMBIN TIME: 39.2 SECONDS (ref 19.4–28.5)

## 2023-11-06 PROCEDURE — 85610 PROTHROMBIN TIME: CPT

## 2023-11-06 PROCEDURE — 36415 COLL VENOUS BLD VENIPUNCTURE: CPT

## 2023-11-08 DIAGNOSIS — N91.2 AMENIA: Primary | ICD-10-CM

## 2023-11-09 ENCOUNTER — LAB (OUTPATIENT)
Dept: LAB | Facility: HOSPITAL | Age: 78
End: 2023-11-09
Payer: MEDICARE

## 2023-11-09 DIAGNOSIS — N91.2 AMENIA: ICD-10-CM

## 2023-11-09 LAB
BASOPHILS # BLD AUTO: 0.02 10*3/MM3 (ref 0–0.2)
BASOPHILS NFR BLD AUTO: 0.3 % (ref 0–1.5)
DEPRECATED RDW RBC AUTO: 54.9 FL (ref 37–54)
EOSINOPHIL # BLD AUTO: 0.12 10*3/MM3 (ref 0–0.4)
EOSINOPHIL NFR BLD AUTO: 1.8 % (ref 0.3–6.2)
ERYTHROCYTE [DISTWIDTH] IN BLOOD BY AUTOMATED COUNT: 16.6 % (ref 12.3–15.4)
FERRITIN SERPL-MCNC: 64.19 NG/ML (ref 13–150)
HCT VFR BLD AUTO: 42.3 % (ref 34–46.6)
HGB BLD-MCNC: 13.2 G/DL (ref 12–15.9)
IRON 24H UR-MRATE: 99 MCG/DL (ref 37–145)
IRON SATN MFR SERPL: 25 % (ref 20–50)
LYMPHOCYTES # BLD AUTO: 1.51 10*3/MM3 (ref 0.7–3.1)
LYMPHOCYTES NFR BLD AUTO: 22.9 % (ref 19.6–45.3)
MCH RBC QN AUTO: 29 PG (ref 26.6–33)
MCHC RBC AUTO-ENTMCNC: 31.2 G/DL (ref 31.5–35.7)
MCV RBC AUTO: 93 FL (ref 79–97)
MONOCYTES # BLD AUTO: 0.76 10*3/MM3 (ref 0.1–0.9)
MONOCYTES NFR BLD AUTO: 11.5 % (ref 5–12)
NEUTROPHILS NFR BLD AUTO: 4.18 10*3/MM3 (ref 1.7–7)
NEUTROPHILS NFR BLD AUTO: 63.5 % (ref 42.7–76)
PLATELET # BLD AUTO: 308 10*3/MM3 (ref 140–450)
PMV BLD AUTO: 9.3 FL (ref 6–12)
RBC # BLD AUTO: 4.55 10*6/MM3 (ref 3.77–5.28)
TIBC SERPL-MCNC: 395 MCG/DL (ref 298–536)
TRANSFERRIN SERPL-MCNC: 265 MG/DL (ref 200–360)
WBC NRBC COR # BLD: 6.59 10*3/MM3 (ref 3.4–10.8)

## 2023-11-09 PROCEDURE — 36415 COLL VENOUS BLD VENIPUNCTURE: CPT

## 2023-11-09 PROCEDURE — 83540 ASSAY OF IRON: CPT | Performed by: INTERNAL MEDICINE

## 2023-11-09 PROCEDURE — 85025 COMPLETE CBC W/AUTO DIFF WBC: CPT

## 2023-11-09 PROCEDURE — 82728 ASSAY OF FERRITIN: CPT | Performed by: INTERNAL MEDICINE

## 2023-11-09 PROCEDURE — 84466 ASSAY OF TRANSFERRIN: CPT | Performed by: INTERNAL MEDICINE

## 2023-11-10 ENCOUNTER — LAB (OUTPATIENT)
Dept: FAMILY MEDICINE CLINIC | Facility: CLINIC | Age: 78
End: 2023-11-10
Payer: MEDICARE

## 2023-11-10 ENCOUNTER — TELEPHONE (OUTPATIENT)
Dept: CARDIOLOGY | Facility: CLINIC | Age: 78
End: 2023-11-10

## 2023-11-10 DIAGNOSIS — Z51.81 ENCOUNTER FOR MONITORING COUMADIN THERAPY: ICD-10-CM

## 2023-11-10 DIAGNOSIS — Z79.01 ENCOUNTER FOR MONITORING COUMADIN THERAPY: ICD-10-CM

## 2023-11-10 LAB
INR PPP: 1.83 (ref 2–3)
PROTHROMBIN TIME: 19.1 SECONDS (ref 19.4–28.5)

## 2023-11-10 PROCEDURE — 36415 COLL VENOUS BLD VENIPUNCTURE: CPT

## 2023-11-10 PROCEDURE — 85610 PROTHROMBIN TIME: CPT | Performed by: FAMILY MEDICINE

## 2023-11-10 NOTE — TELEPHONE ENCOUNTER
Caller: Abida Becker    Relationship to patient: Self    Best call back number: [phone] (home)     Chief complaint: CAN'T DO 2:30 ON APPT DAY    Type of visit: FOLLOW UP    Requested date: ASAP     If rescheduling, when is the original appointment: 11.14.23     Additional notes:PATIENT WATCHES HER GRANDSON ON MONDAYS AND TUESDAYS, SO SHE CAN EITHER COME IN BEFORE SHE DROPS HIM OFF, IF ITS OKAY HE COMES WITH HER. OR SHE CAN DO BETWEEN 12-2PM ON THOSE DAYS, IF NOT IT WOULD HAVE TO BE A WEDNESDAY, THURSDAY, OR FRIDAY FOR HER APPOINTMENT. NEXT AVAILABLE ISNT UNTIL MARCH OF 2024 AND THAT IS OUT OF THE 3 WEEK TIMEFRAME TO SCHEDULE AND SHE WOULD ALSO LIKE TO BE SEEN SOONER THAT THAT. PLEASE CALL THE PATIENT BACK REGARDING SCHEDULING, THANK YOU!

## 2023-11-15 NOTE — PROGRESS NOTES
HEMATOLOGY ONCOLOGY OUTPATIENT FOLLOW UP       Patient name: Abida Becker  : 1945  MRN: [id number]  Primary Care Physician: Darlene Matute MD  Referring Physician: Darlene Matute MD  Reason For Consult: Venous thromboembolism      History of Present Illness:    Patient is a 78 y.o. female who has a history of prior provoked venous thromboembolism .  At that time she was anticoagulated for 3 to 4 months.  She was subsequently taken off anticoagulation and did well.  Now patient was admitted for pneumonia recently to the hospital.    After discharge she noted to have worsening of symptoms and went back to the hospital.    10/13/2022 -CT PE with moderate large abnormal pulmonary emboli in the distal end of the right main pulmonary artery, moderate pulmonary emboli in the left pulmonary artery branches, pneumonia changes.  Ultrasound Doppler with acute right lower extremity DVT in the peroneal, acute left lower extremity superficial thrombophlebitis  Patient was discharged on Coumadin.  Patient takes coumadin 4 mg daily. INR therapeutic, hypercoagulable workup with elevated factor 8 activity, low protein C    23 - hb 11.4, plt 376,     Subjective:  Patient continues to be on coumadin has had some fluctuation with INR. Interested in switching to Xarelto      Past Medical History:   Diagnosis Date    GERD (gastroesophageal reflux disease)     History of recurrent UTIs     IBS (irritable bowel syndrome)     Osteoarthritis     Osteoporosis     on prolia(22)    Primary osteoarthritis of both knees 2022    Pulmonary embolism     after scoliosis surgery    Pulmonary embolism 10/2022    Vitamin D deficiency        Past Surgical History:   Procedure Laterality Date    BACK SURGERY  2011    Dr. Olsen, due to scoliosis, earnestine and some fusions    BREAST BIOPSY      CHOLECYSTECTOMY      COLONOSCOPY  2018    HIATAL HERNIA REPAIR  2013    REIMPLANT URETER IN  BLADDER           Current Outpatient Medications:     acetaminophen (TYLENOL) 500 MG tablet, Take 1 tablet by mouth Every 6 (Six) Hours As Needed for Fever or Mild Pain., Disp: , Rfl:     albuterol (ACCUNEB) 1.25 MG/3ML nebulizer solution, Take 3 mL by nebulization Every 6 (Six) Hours As Needed for Wheezing., Disp: 60 each, Rfl: 0    azithromycin (Zithromax Z-Vasu) 250 MG tablet, Take 2 tablets the first day, then 1 tablet daily for 4 days., Disp: 6 tablet, Rfl: 0    benzonatate (TESSALON) 200 MG capsule, Take 1 capsule by mouth 3 (Three) Times a Day As Needed for Cough., Disp: 30 capsule, Rfl: 0    calcium carbonate (OS-STEVEN) 600 MG tablet, Take 1 tablet by mouth Daily., Disp: , Rfl:     cholecalciferol (VITAMIN D3) 1000 units tablet, Take 1 tablet by mouth Daily., Disp: , Rfl:     colestipol (COLESTID) 1 g tablet, TAKE 1 TABLET BY MOUTH TWICE A DAY AS NEEDED, Disp: 60 tablet, Rfl: 0    Diclofenac Sodium (VOLTAREN) 1 % gel gel, APPLY 4 GRAMS TO AFFECTED AREA 4 TIMES DAILY AS NEEDED FOR PAIN, Disp: 100 g, Rfl: 0    dicyclomine (BENTYL) 10 MG capsule, TAKE ONE CAPSULE BY MOUTH THREE TIMES A DAY AS NEEDED FOR IBS, Disp: 90 capsule, Rfl: 0    ferrous sulfate 325 (65 FE) MG tablet, Take 1 tablet by mouth Daily With Breakfast., Disp: 90 tablet, Rfl: 0    gabapentin (NEURONTIN) 300 MG capsule, TAKE 1 CAPSULE BY MOUTH TWICE A DAY, Disp: 180 capsule, Rfl: 0    melatonin 3 MG tablet, Take 1 tablet by mouth Every Night., Disp: , Rfl:     methylPREDNISolone (MEDROL) 4 MG dose pack, Take as directed on package instructions., Disp: 21 tablet, Rfl: 0    metoprolol succinate XL (TOPROL-XL) 25 MG 24 hr tablet, Take 1 tablet by mouth Daily., Disp: 30 tablet, Rfl: 11    Mirabegron ER (MYRBETRIQ) 50 MG tablet sustained-release 24 hour 24 hr tablet, Take 50 mg by mouth Daily., Disp: , Rfl:     pantoprazole (PROTONIX) 40 MG EC tablet, TAKE ONE TABLET BY MOUTH DAILY, Disp: 90 tablet, Rfl: 1    potassium chloride 10 MEQ CR tablet, TAKE 1  TABLET BY MOUTH TWICE A DAY, Disp: 180 tablet, Rfl: 0    Riboflavin (B2 PO), Take 1 tablet by mouth Daily., Disp: , Rfl:     vitamin B-12 (CYANOCOBALAMIN) 1000 MCG tablet, Take 1 tablet by mouth Daily., Disp: , Rfl:     warfarin (COUMADIN) 4 MG tablet, Take 1 tablet p.o. 4 days a week, take 1.5 tablets p.o. 3 days a week as directed, Disp: 100 tablet, Rfl: 0    Allergies   Allergen Reactions    Amoxicillin Other (See Comments)      unsure of type of reaction - states it was so long ago she can't remember     Penicillins Nausea And Vomiting       Family History   Problem Relation Age of Onset    Heart failure Mother     Cancer Father        Cancer-related family history includes Cancer in her father.      Social History     Tobacco Use    Smoking status: Never     Passive exposure: Never    Smokeless tobacco: Never   Vaping Use    Vaping Use: Never used   Substance Use Topics    Alcohol use: No    Drug use: No     Social History     Social History Narrative    Not on file         Objective:    Vital Signs:  There were no vitals filed for this visit.    There is no height or weight on file to calculate BMI.    ECOG  (1) Restricted in physically strenuous activity, ambulatory and able to do work of light nature    Physical Exam:   Physical Exam  Constitutional:       Appearance: Normal appearance.   HENT:      Head: Normocephalic and atraumatic.   Eyes:      Extraocular Movements: Extraocular movements intact.      Pupils: Pupils are equal, round, and reactive to light.   Cardiovascular:      Rate and Rhythm: Normal rate and regular rhythm.      Pulses: Normal pulses.      Heart sounds: No murmur heard.  Pulmonary:      Effort: Pulmonary effort is normal.      Breath sounds: Normal breath sounds.   Abdominal:      General: There is no distension.      Palpations: Abdomen is soft. There is no mass.      Tenderness: There is no abdominal tenderness.   Musculoskeletal:         General: Normal range of motion.       "Cervical back: Normal range of motion and neck supple.   Skin:     General: Skin is warm.   Neurological:      General: No focal deficit present.      Mental Status: She is alert.   Psychiatric:         Mood and Affect: Mood normal.       Lab Results - Last 18 Months   Lab Units 11/09/23  0921 09/20/23  0927 08/24/23  1409   WBC 10*3/mm3 6.59 5.70 6.60   HEMOGLOBIN g/dL 13.2 12.0 11.4*   HEMATOCRIT % 42.3 37.4 36.8   PLATELETS 10*3/mm3 308 358 376   MCV fL 93.0 87.2 87.4     Lab Results - Last 18 Months   Lab Units 09/20/23  0927 04/20/23  1109 10/27/22  1402   SODIUM mmol/L 140 140 139   POTASSIUM mmol/L 4.4 4.3 3.3*   CHLORIDE mmol/L 103 100 98   CO2 mmol/L 26.0 28.0 29.0   BUN mg/dL 20 17 20   CREATININE mg/dL 0.82 0.69 0.68   CALCIUM mg/dL 9.3 9.7 9.2   BILIRUBIN mg/dL 0.6 0.9 0.5   ALK PHOS U/L 108 91 88   ALT (SGPT) U/L 17 14 28   AST (SGOT) U/L 17 16 15   GLUCOSE mg/dL 84 92 76       Lab Results   Component Value Date    GLUCOSE 84 09/20/2023    BUN 20 09/20/2023    CREATININE 0.82 09/20/2023    EGFRIFNONA 83 02/07/2022    BCR 24.4 09/20/2023    K 4.4 09/20/2023    CO2 26.0 09/20/2023    CALCIUM 9.3 09/20/2023    ALBUMIN 4.2 09/20/2023    LABIL2 1.4 04/11/2019    AST 17 09/20/2023    ALT 17 09/20/2023       Lab Results - Last 18 Months   Lab Units 11/10/23  1022 11/06/23  1203 10/11/23  1403 10/15/22  1314 10/15/22  0502 10/14/22  2137 10/14/22  1441   INR  1.83* 3.98* 2.00   < >  --   --   --    APTT seconds  --   --   --   --  76.2 69.1 65.5    < > = values in this interval not displayed.       Lab Results   Component Value Date    IRON 99 11/09/2023    TIBC 395 11/09/2023    FERRITIN 64.19 11/09/2023       No results found for: \"FOLATE\"    No results found for: \"OCCULTBLD\"    No results found for: \"RETICCTPCT\"  Lab Results   Component Value Date    CGQGKIMV95 >2,000 (H) 08/24/2023     No results found for: \"SPEP\", \"UPEP\"  No results found for: \"LDH\", \"URICACID\"  Lab Results   Component Value Date    DELLA " "Negative 09/11/2019    SEDRATE 17 09/11/2019     No results found for: \"FIBRINOGEN\", \"HAPTOGLOBIN\"  Lab Results   Component Value Date    PTT 76.2 10/15/2022    INR 1.83 (L) 11/10/2023     No results found for: \"\"  No results found for: \"CEA\"  No components found for: \"CA-19-9\"  No results found for: \"PSA\"  Lab Results   Component Value Date    SEDRATE 17 09/11/2019      Assessment & Plan     Patient is a 78-year-old female with provoked venous thromboembolism.    Venous thromboembolism   Even though this is a recurrent event, this is still provoked  With recent hospitalization   check for hypercoagulable work-up since this is her second episode this is concerning for factor VIII high, protein C deficiency   I would recommend indefinite anticoagulation with coumadin INR monitored by PCP has had significant fluctuation, interested in switching to NOAC  Will start Xarelto. She is agreeable    Anemia  Normocytic  Now resolved  Iron is normal    F/u in 3 months    Thank you very much for providing the opportunity to participate in this patient’s care. Please do not hesitate to call if there are any other questions.    "

## 2023-11-16 ENCOUNTER — OFFICE VISIT (OUTPATIENT)
Dept: ONCOLOGY | Facility: CLINIC | Age: 78
End: 2023-11-16
Payer: MEDICARE

## 2023-11-16 VITALS
HEIGHT: 66 IN | RESPIRATION RATE: 16 BRPM | SYSTOLIC BLOOD PRESSURE: 122 MMHG | BODY MASS INDEX: 29.6 KG/M2 | HEART RATE: 74 BPM | TEMPERATURE: 98.2 F | DIASTOLIC BLOOD PRESSURE: 75 MMHG | OXYGEN SATURATION: 98 % | WEIGHT: 184.2 LBS

## 2023-11-16 DIAGNOSIS — I26.94 MULTIPLE SUBSEGMENTAL PULMONARY EMBOLI WITHOUT ACUTE COR PULMONALE: Primary | ICD-10-CM

## 2023-11-16 PROCEDURE — 1126F AMNT PAIN NOTED NONE PRSNT: CPT | Performed by: INTERNAL MEDICINE

## 2023-11-16 PROCEDURE — 3078F DIAST BP <80 MM HG: CPT | Performed by: INTERNAL MEDICINE

## 2023-11-16 PROCEDURE — 3074F SYST BP LT 130 MM HG: CPT | Performed by: INTERNAL MEDICINE

## 2023-11-22 DIAGNOSIS — K21.9 GASTROESOPHAGEAL REFLUX DISEASE WITHOUT ESOPHAGITIS: ICD-10-CM

## 2023-11-22 RX ORDER — PANTOPRAZOLE SODIUM 40 MG/1
40 TABLET, DELAYED RELEASE ORAL DAILY
Qty: 90 TABLET | Refills: 1 | Status: SHIPPED | OUTPATIENT
Start: 2023-11-22

## 2023-11-28 ENCOUNTER — TELEPHONE (OUTPATIENT)
Dept: FAMILY MEDICINE CLINIC | Facility: CLINIC | Age: 78
End: 2023-11-28

## 2023-11-28 RX ORDER — GABAPENTIN 300 MG/1
CAPSULE ORAL
Qty: 180 CAPSULE | Refills: 0 | Status: SHIPPED | OUTPATIENT
Start: 2023-11-28

## 2023-11-28 NOTE — TELEPHONE ENCOUNTER
Caller: Abida Becker    Relationship: Self    Best call back number: [phone]     What was the call regarding: PATIENT IS SCHEDULED FOR AN APPOINTMENT TOMORROW, 11/29/2023 AT 2:15PM WITH DR SCHULZ. PATIENT IS ASKING IF THERE WOULD BE A WHEELCHAIR AVAILABLE FOR HER WHEN SHE GETS THERE. PLEASE ADVISE.        .

## 2023-11-28 NOTE — TELEPHONE ENCOUNTER
ABDI CALLED THRU AND ASKED THIS WHEN THE PT WAS ON THE PHONE. SHE WAS ADVISED THAT WE HAVE A WHEELCHAIR AND THEY WOULD HAVE TO COME UP TO THE OFFICE TO GET IT.

## 2023-11-29 ENCOUNTER — OFFICE VISIT (OUTPATIENT)
Dept: FAMILY MEDICINE CLINIC | Facility: CLINIC | Age: 78
End: 2023-11-29
Payer: MEDICARE

## 2023-11-29 VITALS
WEIGHT: 184.3 LBS | OXYGEN SATURATION: 95 % | SYSTOLIC BLOOD PRESSURE: 121 MMHG | TEMPERATURE: 98 F | HEIGHT: 66 IN | RESPIRATION RATE: 16 BRPM | DIASTOLIC BLOOD PRESSURE: 86 MMHG | BODY MASS INDEX: 29.62 KG/M2 | HEART RATE: 75 BPM

## 2023-11-29 DIAGNOSIS — M25.551 RIGHT HIP PAIN: Primary | ICD-10-CM

## 2023-11-29 PROCEDURE — 3079F DIAST BP 80-89 MM HG: CPT | Performed by: FAMILY MEDICINE

## 2023-11-29 PROCEDURE — 3074F SYST BP LT 130 MM HG: CPT | Performed by: FAMILY MEDICINE

## 2023-11-29 PROCEDURE — 99213 OFFICE O/P EST LOW 20 MIN: CPT | Performed by: FAMILY MEDICINE

## 2023-11-29 NOTE — PROGRESS NOTES
Subjective   Chief Complaint   Patient presents with    Hip Pain     Right side 4 days     Abida Becker is a 78 y.o. female.     Patient Care Team:  Darlene Matute MD as PCP - General  Ozark AcresRolando MD as Consulting Physician (Urology)  Banet, Duane Edward, MD as Consulting Physician (Dermatology)  Milla Urias MD as Consulting Physician (Hematology and Oncology)  Brett Zuniga MD as Consulting Physician (Cardiology)    History of Present Illness  She is coming in today due to right hip pain which she started experiencing about 4 days ago.  She denies any trauma or fall.  She reports that pain is only present when she puts weight on it and attempts to walk, she does not have any pain when sitting or lying.  No weakness reported.  No numbness or tingling.       The following portions of the patient's history were reviewed and updated as appropriate: allergies, current medications, past family history, past medical history, past social history, past surgical history, and problem list.  Past Medical History:   Diagnosis Date    GERD (gastroesophageal reflux disease)     History of recurrent UTIs     IBS (irritable bowel syndrome)     Osteoarthritis     Osteoporosis     on prolia(12/5/22)    Primary osteoarthritis of both knees 01/24/2022    Pulmonary embolism 2011    after scoliosis surgery    Pulmonary embolism 10/2022    Vitamin D deficiency      Past Surgical History:   Procedure Laterality Date    BACK SURGERY  11/2011    Dr. Olsen, due to scoliosis, earnsetine and some fusions    BREAST BIOPSY      CHOLECYSTECTOMY      COLONOSCOPY  01/18/2018    HIATAL HERNIA REPAIR  2013    REIMPLANT URETER IN BLADDER       The patient has a family history of  Family History   Problem Relation Age of Onset    Heart failure Mother     Cancer Father      Social History     Socioeconomic History    Marital status: Single   Tobacco Use    Smoking status: Never     Passive exposure: Never    Smokeless tobacco: Never  "  Vaping Use    Vaping Use: Never used   Substance and Sexual Activity    Alcohol use: No    Drug use: No    Sexual activity: Never       Review of Systems   Constitutional:  Negative for chills and fever.   Musculoskeletal:  Positive for arthralgias and gait problem.     Visit Vitals  /86 (BP Location: Left arm, Patient Position: Sitting, Cuff Size: Adult)   Pulse 75   Temp 98 °F (36.7 °C) (Infrared)   Resp 16   Ht 167.6 cm (65.98\")   Wt 83.6 kg (184 lb 4.9 oz)   SpO2 95%   BMI 29.76 kg/m²              Current Outpatient Medications:     acetaminophen (TYLENOL) 500 MG tablet, Take 1 tablet by mouth Every 6 (Six) Hours As Needed for Fever or Mild Pain., Disp: , Rfl:     albuterol (ACCUNEB) 1.25 MG/3ML nebulizer solution, Take 3 mL by nebulization Every 6 (Six) Hours As Needed for Wheezing., Disp: 60 each, Rfl: 0    azithromycin (Zithromax Z-Vasu) 250 MG tablet, Take 2 tablets the first day, then 1 tablet daily for 4 days., Disp: 6 tablet, Rfl: 0    benzonatate (TESSALON) 200 MG capsule, Take 1 capsule by mouth 3 (Three) Times a Day As Needed for Cough., Disp: 30 capsule, Rfl: 0    calcium carbonate (OS-STEVEN) 600 MG tablet, Take 1 tablet by mouth Daily., Disp: , Rfl:     cholecalciferol (VITAMIN D3) 1000 units tablet, Take 1 tablet by mouth Daily., Disp: , Rfl:     colestipol (COLESTID) 1 g tablet, TAKE 1 TABLET BY MOUTH TWICE A DAY AS NEEDED, Disp: 60 tablet, Rfl: 0    Diclofenac Sodium (VOLTAREN) 1 % gel gel, APPLY 4 GRAMS TO AFFECTED AREA 4 TIMES DAILY AS NEEDED FOR PAIN, Disp: 100 g, Rfl: 0    dicyclomine (BENTYL) 10 MG capsule, TAKE ONE CAPSULE BY MOUTH THREE TIMES A DAY AS NEEDED FOR IBS, Disp: 90 capsule, Rfl: 0    ferrous sulfate 325 (65 FE) MG tablet, Take 1 tablet by mouth Daily With Breakfast., Disp: 90 tablet, Rfl: 0    gabapentin (NEURONTIN) 300 MG capsule, TAKE 1 CAPSULE BY MOUTH TWICE A DAY, Disp: 180 capsule, Rfl: 0    melatonin 3 MG tablet, Take 1 tablet by mouth Every Night., Disp: , Rfl:     " methylPREDNISolone (MEDROL) 4 MG dose pack, Take as directed on package instructions., Disp: 21 tablet, Rfl: 0    metoprolol succinate XL (TOPROL-XL) 25 MG 24 hr tablet, Take 1 tablet by mouth Daily., Disp: 30 tablet, Rfl: 11    Mirabegron ER (MYRBETRIQ) 50 MG tablet sustained-release 24 hour 24 hr tablet, Take 50 mg by mouth Daily., Disp: , Rfl:     pantoprazole (PROTONIX) 40 MG EC tablet, TAKE 1 TABLET BY MOUTH DAILY, Disp: 90 tablet, Rfl: 1    potassium chloride 10 MEQ CR tablet, TAKE 1 TABLET BY MOUTH TWICE A DAY, Disp: 180 tablet, Rfl: 0    Riboflavin (B2 PO), Take 1 tablet by mouth Daily., Disp: , Rfl:     rivaroxaban (XARELTO) 20 MG tablet, Take 1 tablet by mouth Daily., Disp: 30 tablet, Rfl: 3    vitamin B-12 (CYANOCOBALAMIN) 1000 MCG tablet, Take 1 tablet by mouth Daily., Disp: , Rfl:     warfarin (COUMADIN) 4 MG tablet, Take 1 tablet p.o. 4 days a week, take 1.5 tablets p.o. 3 days a week as directed, Disp: 100 tablet, Rfl: 0    Objective   Physical Exam  Constitutional:       General: She is not in acute distress.     Appearance: Normal appearance. She is well-developed. She is not ill-appearing or diaphoretic.      Comments: Patient is in no distress, patient has normal voice and speech.  Normal respiratory effort.  Patient is sitting in a wheelchair.   HENT:      Head: Normocephalic and atraumatic.   Pulmonary:      Effort: Pulmonary effort is normal.   Musculoskeletal:      Cervical back: Normal range of motion and neck supple.      Comments: Examination was done while patient sitting in the wheelchair as she cannot really get up due to the pain in the right hip.  There is pretty good range of motion in the right hip and patient does not report pain with internal or external rotation.  No weakness.  No palpation tenderness over trochanteric area.   Neurological:      General: No focal deficit present.      Mental Status: She is alert and oriented to person, place, and time. Mental status is at  baseline.   Psychiatric:         Mood and Affect: Mood normal.           Assessment & Plan   Diagnoses and all orders for this visit:    1. Right hip pain (Primary)  -     XR Hip With or Without Pelvis 2 - 3 View Right; Future      I will be getting x-ray of the right hip.  I discussed with the patient that considering her presentation if the x-ray does not provide satisfactory answer I will be proceeding with more advanced imaging like CT or MRI.  In meantime she will continue gabapentin for pain and she will also take Tylenol over-the-counter.      Return if symptoms worsen or fail to improve, for Recheck.    Requested Prescriptions      No prescriptions requested or ordered in this encounter

## 2023-11-30 DIAGNOSIS — M25.551 RIGHT HIP PAIN: Primary | ICD-10-CM

## 2023-12-01 ENCOUNTER — OFFICE VISIT (OUTPATIENT)
Dept: CARDIOLOGY | Facility: CLINIC | Age: 78
End: 2023-12-01
Payer: MEDICARE

## 2023-12-01 VITALS
OXYGEN SATURATION: 97 % | RESPIRATION RATE: 18 BRPM | WEIGHT: 179 LBS | HEART RATE: 80 BPM | HEIGHT: 66 IN | SYSTOLIC BLOOD PRESSURE: 117 MMHG | BODY MASS INDEX: 28.77 KG/M2 | DIASTOLIC BLOOD PRESSURE: 76 MMHG

## 2023-12-01 DIAGNOSIS — I10 ESSENTIAL HYPERTENSION: ICD-10-CM

## 2023-12-01 DIAGNOSIS — I48.0 PAROXYSMAL ATRIAL FIBRILLATION: ICD-10-CM

## 2023-12-01 DIAGNOSIS — Z09 FOLLOW-UP EXAM: Primary | ICD-10-CM

## 2023-12-04 ENCOUNTER — TELEPHONE (OUTPATIENT)
Dept: FAMILY MEDICINE CLINIC | Facility: CLINIC | Age: 78
End: 2023-12-04

## 2023-12-04 NOTE — TELEPHONE ENCOUNTER
Caller: Abida Becker    Relationship: Self    Best call back number: [phone]       What specialty or service is being requested: RADIOLOGY        Any additional details: PATIENT STATES SHE CALLED [facility] FOR THE MRI AND WAS TOLD THAT IT WILL BE 12/15/23 BEFORE THEY CAN GET HER IN.  SHE IS WANTING TO KNOW IF DR SCHULZ CAN SEND IN AN ORDER TO A FACILITY THAT CAN GET HER IN SOONER.    PLEASE ADVISE.

## 2023-12-04 NOTE — TELEPHONE ENCOUNTER
I called the patient and LM that next Friday is probably the soonest/ But she can call [facility] and see how soon they can get her in as we are unsure who has quick availability

## 2023-12-06 DIAGNOSIS — M79.89 RIGHT LEG SWELLING: ICD-10-CM

## 2023-12-12 NOTE — PROGRESS NOTES
"Chief Complaint  NEW PATIENT (POSS RJ)    Subjective          Abida Becker presents to [facility] NEUROLOGY  History of Present Illness    The patient c/o daytime sleepiness issues:  chronic fatigue.  Fatigue is improved with taking iron, now no longer falling asleep     The patient complains of snoring  NO . Not that she knows of     The patient complains of Leg symptoms:  NO .     Not sleepy during the day any more.   As a child; There is no h/O sleepwalking or bedwetting or nightmares or sleep eating or acting out dreams    Sleep schedule: Bedtime:830-9PM , gets out of bed at 615-630 AM, sleep latency: INSTANT, Gets about 8-9 hours of sleep.gets up once to go to the bathroom each night    EPWORTH SLEEPINESS SCALE  Sitting and reading 2 WatchingTV 2  Sitting, inactive, in a public place 0  As a passenger in a car for 1 hour w/o a break  0  Lying down to rest in the afternoon  3  Sitting and talking to someone  0  Sitting quietly after a lunch  0  In a car, while stopped for traffic or a light  1  Total 8    Review of Systems   Constitutional:  Positive for fatigue.   HENT:  Positive for sinus pressure.    Genitourinary:  Positive for urgency.   Musculoskeletal:  Positive for back pain and neck pain.   Allergic/Immunologic: Positive for environmental allergies.   There is no history of hypnagogic hallucinations, sleep paralysis or cataplexy.    Objective   Vital Signs:   /66   Pulse 91   Resp 16   Ht 167.6 cm (66\")   Wt 81.2 kg (179 lb)   BMI 28.89 kg/m²     Physical Exam  Vitals reviewed.   Constitutional:       Appearance: Normal appearance.   HENT:      Nose: Nose normal.   Cardiovascular:      Rate and Rhythm: Normal rate.   Pulmonary:      Effort: Pulmonary effort is normal. No respiratory distress.   Neurological:      General: No focal deficit present.      Mental Status: She is alert and oriented to person, place, and time.   Psychiatric:         Mood and Affect: Mood normal. "        Result Review :                 Assessment and Plan    Diagnoses and all orders for this visit:    1. Daytime sleepiness (Primary)      Pt reports significant resolution of her sleepiness / fatigue with correction of her iron deficiency anemia.  No history of snoring.  Will defer sleep testing at this time, but if symptoms return or worsen can do HST.      Follow Up   Return if symptoms worsen or fail to improve.  Patient was given instructions and counseling regarding her condition or for health maintenance advice. Please see specific information pulled into the AVS if appropriate.       This document has been electronically signed by Joseph Seipel, MD on December 13, 2023 11:50 EST

## 2023-12-13 ENCOUNTER — OFFICE VISIT (OUTPATIENT)
Dept: NEUROLOGY | Facility: CLINIC | Age: 78
End: 2023-12-13
Payer: MEDICARE

## 2023-12-13 VITALS
SYSTOLIC BLOOD PRESSURE: 100 MMHG | DIASTOLIC BLOOD PRESSURE: 66 MMHG | BODY MASS INDEX: 28.77 KG/M2 | RESPIRATION RATE: 16 BRPM | HEIGHT: 66 IN | HEART RATE: 91 BPM | WEIGHT: 179 LBS

## 2023-12-13 DIAGNOSIS — R40.0 DAYTIME SLEEPINESS: Primary | ICD-10-CM

## 2023-12-13 PROCEDURE — 1160F RVW MEDS BY RX/DR IN RCRD: CPT | Performed by: PSYCHIATRY & NEUROLOGY

## 2023-12-13 PROCEDURE — 3074F SYST BP LT 130 MM HG: CPT | Performed by: PSYCHIATRY & NEUROLOGY

## 2023-12-13 PROCEDURE — 99203 OFFICE O/P NEW LOW 30 MIN: CPT | Performed by: PSYCHIATRY & NEUROLOGY

## 2023-12-13 PROCEDURE — 1159F MED LIST DOCD IN RCRD: CPT | Performed by: PSYCHIATRY & NEUROLOGY

## 2023-12-13 PROCEDURE — 3078F DIAST BP <80 MM HG: CPT | Performed by: PSYCHIATRY & NEUROLOGY

## 2023-12-16 DIAGNOSIS — N91.2 AMENIA: ICD-10-CM

## 2023-12-18 ENCOUNTER — PATIENT ROUNDING (BHMG ONLY) (OUTPATIENT)
Dept: NEUROLOGY | Facility: CLINIC | Age: 78
End: 2023-12-18
Payer: MEDICARE

## 2023-12-18 RX ORDER — FERROUS SULFATE 325(65) MG
1 TABLET ORAL
Qty: 90 TABLET | Refills: 0 | Status: SHIPPED | OUTPATIENT
Start: 2023-12-18

## 2023-12-19 ENCOUNTER — TELEPHONE (OUTPATIENT)
Dept: FAMILY MEDICINE CLINIC | Facility: CLINIC | Age: 78
End: 2023-12-19

## 2023-12-19 DIAGNOSIS — S32.10XA CLOSED FRACTURE OF SACRUM, UNSPECIFIED PORTION OF SACRUM, INITIAL ENCOUNTER: Primary | ICD-10-CM

## 2023-12-19 NOTE — TELEPHONE ENCOUNTER
Hub staff attempted to follow warm transfer process and was unsuccessful     Caller: Abida Becker    Relationship to patient: Self    Best call back number: [phone]     Patient is needing: RETURNING PHONE STEVEN TO [facility] FOR MRI RESULTS

## 2023-12-20 ENCOUNTER — OFFICE VISIT (OUTPATIENT)
Dept: ORTHOPEDIC SURGERY | Facility: CLINIC | Age: 78
End: 2023-12-20
Payer: MEDICARE

## 2023-12-20 VITALS — OXYGEN SATURATION: 98 % | BODY MASS INDEX: 28.77 KG/M2 | WEIGHT: 179 LBS | HEIGHT: 66 IN | RESPIRATION RATE: 20 BRPM

## 2023-12-20 DIAGNOSIS — S32.10XA CLOSED FRACTURE OF SACRUM, UNSPECIFIED PORTION OF SACRUM, INITIAL ENCOUNTER: Primary | ICD-10-CM

## 2023-12-20 RX ORDER — SULFAMETHOXAZOLE AND TRIMETHOPRIM 800; 160 MG/1; MG/1
TABLET ORAL
COMMUNITY
Start: 2023-12-18

## 2023-12-20 NOTE — PROGRESS NOTES
[facility]        Patient Name: Abida Becker  : 1945  Primary Care Physician: Darlene Matute MD        Chief Complaint:    Chief Complaint   Patient presents with    Pelvis - Initial Evaluation     Sacrum fracture    Pain- 3           HPI:   Abida Becker is a 78 y.o. year old who presents today for evaluation of sacral fracture.    She states she was in her normal state of health and cooking/cleaning for Thanksgiving dinner. All of the sudden, she developed severe mid lower back, right buttock and right hip pain that was debilitating. No fall, no injury. No history of trauma. She was seen by her PCP and hip xray was performed and essentially unremarkable. Her pain continued, so MRI was obtained which identified a right sacral, non displaced insufficiency fracture. She has been taking ES Tylenol with good pain relief. She is ambulating with a cane. Denies severe pain. Denies paresthesia.     She has a known h/o osteoporosis. She previously received Q6 month prolia but her last dose was 2022.   She actually resumes treatment tomorrow.           Past Medical/Surgical, Social and Family History:  I have reviewed and/or updated pertinent history as noted in the medical record including:  Past Medical History:   Diagnosis Date    GERD (gastroesophageal reflux disease)     History of recurrent UTIs     IBS (irritable bowel syndrome)     Osteoarthritis     Osteoporosis     on prolia(22)    Primary osteoarthritis of both knees 2022    Pulmonary embolism     after scoliosis surgery    Pulmonary embolism 10/2022    Scoliosis     Vitamin D deficiency      Past Surgical History:   Procedure Laterality Date    BACK SURGERY  2011    Dr. Olsne, due to scoliosis, earnestine and some fusions    BREAST BIOPSY      CHOLECYSTECTOMY      COLONOSCOPY  2018    HIATAL HERNIA REPAIR  2013    REIMPLANT URETER IN BLADDER       Social History     Occupational History    Not on file   Tobacco Use     Smoking status: Never     Passive exposure: Never    Smokeless tobacco: Never   Vaping Use    Vaping Use: Never used   Substance and Sexual Activity    Alcohol use: No    Drug use: No    Sexual activity: Not Currently     Partners: Male     Birth control/protection: Condom, Spermicide          Allergies:   Allergies   Allergen Reactions    Amoxicillin Other (See Comments)      unsure of type of reaction - states it was so long ago she can't remember     Penicillins Nausea And Vomiting       Medications:   Home Medications:  Current Outpatient Medications on File Prior to Visit   Medication Sig    acetaminophen (TYLENOL) 500 MG tablet Take 1 tablet by mouth Every 6 (Six) Hours As Needed for Fever or Mild Pain.    albuterol (ACCUNEB) 1.25 MG/3ML nebulizer solution Take 3 mL by nebulization Every 6 (Six) Hours As Needed for Wheezing.    calcium carbonate (OS-STEVEN) 600 MG tablet Take 1 tablet by mouth Daily.    cholecalciferol (VITAMIN D3) 1000 units tablet Take 1 tablet by mouth Daily.    colestipol (COLESTID) 1 g tablet TAKE 1 TABLET BY MOUTH TWICE A DAY AS NEEDED    Diclofenac Sodium (VOLTAREN) 1 % gel gel APPLY 4 GRAMS TO AFFECTED AREA FOUR TIMES A DAY AS NEEDED FOR PAIN    dicyclomine (BENTYL) 10 MG capsule TAKE ONE CAPSULE BY MOUTH THREE TIMES A DAY AS NEEDED FOR IBS    FeroSul 325 (65 Fe) MG tablet TAKE 1 TABLET BY MOUTH DAILY WITH BREAKFAST    gabapentin (NEURONTIN) 300 MG capsule TAKE 1 CAPSULE BY MOUTH TWICE A DAY    melatonin 3 MG tablet Take 1 tablet by mouth Every Night.    metoprolol succinate XL (TOPROL-XL) 25 MG 24 hr tablet Take 1 tablet by mouth Daily.    Mirabegron ER (MYRBETRIQ) 50 MG tablet sustained-release 24 hour 24 hr tablet Take 50 mg by mouth Daily.    pantoprazole (PROTONIX) 40 MG EC tablet TAKE 1 TABLET BY MOUTH DAILY    potassium chloride 10 MEQ CR tablet TAKE 1 TABLET BY MOUTH TWICE A DAY    Riboflavin (B2 PO) Take 1 tablet by mouth Daily.    rivaroxaban (XARELTO) 20 MG tablet Take 1 tablet  by mouth Daily.    sulfamethoxazole-trimethoprim (BACTRIM DS,SEPTRA DS) 800-160 MG per tablet     vitamin B-12 (CYANOCOBALAMIN) 1000 MCG tablet Take 1 tablet by mouth Daily.     No current facility-administered medications on file prior to visit.         ROS:  Negative unless listed in the HPI    Physical Exam:   78 y.o. female  Body mass index is 28.89 kg/m²., 81.2 kg (179 lb)  Vitals:    12/20/23 1348   Resp: 20   SpO2: 98%     General: Alert, cooperative, appears well and in no observable distress.   HEENT: Normocephalic, atraumatic on external visual inspection. No icterus.   CV: No significant peripheral edema.    Respiratory: Normal respiratory effort.   Skin: Warm & well perfused; appropriate skin turgor.  Psych: Appropriate mood & affect.  Neuro: Gross sensation and motor intact in affected extremity/extremities.        Radiology:  Outside MRI reviewed   Right sacral insufficiency fracture, non displaced    Assessment:  Sacral insufficiency fracture  Body mass index is 28.89 kg/m².  BMI consistent with Overweight: 25.0-29.9kg/m2       Plan:  Reviewed MRI imaging and pathology with the patient today  Recommend treatment of her osteoporosis - resumes Prolia tomorrow with Dr. Hoffman (last dose January 2022)  Pain control with ES Tylenol PRN  Reviewed fall and safety precautions with patient today  Discussed PT referral but not ordered at this time - patient independent and improving  BMI reviewed  Follow up as needed  Patient encouraged to call with any questions or concerns in the interim    JW Wise

## 2023-12-21 ENCOUNTER — OFFICE VISIT (OUTPATIENT)
Dept: ENDOCRINOLOGY | Facility: CLINIC | Age: 78
End: 2023-12-21
Payer: MEDICARE

## 2023-12-21 ENCOUNTER — LAB (OUTPATIENT)
Dept: LAB | Facility: HOSPITAL | Age: 78
End: 2023-12-21
Payer: MEDICARE

## 2023-12-21 VITALS
WEIGHT: 181 LBS | BODY MASS INDEX: 29.09 KG/M2 | HEART RATE: 75 BPM | DIASTOLIC BLOOD PRESSURE: 65 MMHG | SYSTOLIC BLOOD PRESSURE: 100 MMHG | OXYGEN SATURATION: 95 % | HEIGHT: 66 IN

## 2023-12-21 DIAGNOSIS — M81.0 AGE-RELATED OSTEOPOROSIS WITHOUT CURRENT PATHOLOGICAL FRACTURE: Primary | ICD-10-CM

## 2023-12-21 DIAGNOSIS — M81.0 AGE-RELATED OSTEOPOROSIS WITHOUT CURRENT PATHOLOGICAL FRACTURE: ICD-10-CM

## 2023-12-21 LAB
ALBUMIN SERPL-MCNC: 4.3 G/DL (ref 3.5–5.2)
ALBUMIN/GLOB SERPL: 1.7 G/DL
ALP SERPL-CCNC: 118 U/L (ref 39–117)
ALT SERPL W P-5'-P-CCNC: 22 U/L (ref 1–33)
ANION GAP SERPL CALCULATED.3IONS-SCNC: 10.4 MMOL/L (ref 5–15)
AST SERPL-CCNC: 15 U/L (ref 1–32)
BILIRUB SERPL-MCNC: 0.7 MG/DL (ref 0–1.2)
BUN SERPL-MCNC: 19 MG/DL (ref 8–23)
BUN/CREAT SERPL: 22.6 (ref 7–25)
CALCIUM SPEC-SCNC: 9.8 MG/DL (ref 8.6–10.5)
CHLORIDE SERPL-SCNC: 100 MMOL/L (ref 98–107)
CO2 SERPL-SCNC: 28.6 MMOL/L (ref 22–29)
CREAT SERPL-MCNC: 0.84 MG/DL (ref 0.57–1)
EGFRCR SERPLBLD CKD-EPI 2021: 72.1 ML/MIN/1.73
GLOBULIN UR ELPH-MCNC: 2.6 GM/DL
GLUCOSE SERPL-MCNC: 96 MG/DL (ref 65–99)
POTASSIUM SERPL-SCNC: 4.6 MMOL/L (ref 3.5–5.2)
PROT SERPL-MCNC: 6.9 G/DL (ref 6–8.5)
PTH-INTACT SERPL-MCNC: 65.7 PG/ML (ref 15–65)
SODIUM SERPL-SCNC: 139 MMOL/L (ref 136–145)

## 2023-12-21 PROCEDURE — 99204 OFFICE O/P NEW MOD 45 MIN: CPT | Performed by: INTERNAL MEDICINE

## 2023-12-21 PROCEDURE — 1160F RVW MEDS BY RX/DR IN RCRD: CPT | Performed by: INTERNAL MEDICINE

## 2023-12-21 PROCEDURE — 80053 COMPREHEN METABOLIC PANEL: CPT

## 2023-12-21 PROCEDURE — 36415 COLL VENOUS BLD VENIPUNCTURE: CPT

## 2023-12-21 PROCEDURE — 3074F SYST BP LT 130 MM HG: CPT | Performed by: INTERNAL MEDICINE

## 2023-12-21 PROCEDURE — 83970 ASSAY OF PARATHORMONE: CPT

## 2023-12-21 PROCEDURE — 1159F MED LIST DOCD IN RCRD: CPT | Performed by: INTERNAL MEDICINE

## 2023-12-21 PROCEDURE — 3078F DIAST BP <80 MM HG: CPT | Performed by: INTERNAL MEDICINE

## 2023-12-21 RX ORDER — ABALOPARATIDE 2000 UG/ML
80 INJECTION, SOLUTION SUBCUTANEOUS DAILY
Qty: 1.56 ML | Refills: 5 | Status: SHIPPED | OUTPATIENT
Start: 2023-12-21

## 2023-12-21 NOTE — PATIENT INSTRUCTIONS
Please continue calcium and vitamin D supplementation  Check CMP and PTH level today  Start Tymlos 80 mg subcu daily  Follow-up in 6 months.

## 2023-12-21 NOTE — PROGRESS NOTES
Endocrine Consult Outpatient  Referred by Dr. Matute for osteoporosis consultation  Patient Care Team:  Darlene Matute MD as PCP - General  KaibitoRolando ro MD as Consulting Physician (Urology)  Banet, Duane Edward, MD as Consulting Physician (Dermatology)  Milla Urias MD as Consulting Physician (Hematology and Oncology)  Brett Zuniga MD as Consulting Physician (Cardiology)     Chief Complaint: Osteoporosis         HPI: This is a 78-year-old female with history of pulmonary embolism, reflux disease, was diagnosed with osteoporosis in August 2022 with a DEXA scan.  She has seen Ms. Frances Wade.  She was advised to take calcium and vitamin D supplementation which she is still taking.  She was started on Prolia injection which she took 1 injection about a year ago.  She forgot to do the repeat injection.  Recently in the month of May she started having some hip pain and underwent some evaluation was found to have some mild chronic fracture.  She is not suggested to undergo surgery.  She is now here for further evaluation and management.  She is not taking any osteoporosis medications at this time.    Past Medical History:   Diagnosis Date    GERD (gastroesophageal reflux disease)     History of recurrent UTIs     IBS (irritable bowel syndrome)     Osteoarthritis     Osteoporosis     on prolia(12/5/22)    Primary osteoarthritis of both knees 01/24/2022    Pulmonary embolism 2011    after scoliosis surgery    Pulmonary embolism 10/2022    Scoliosis     Vitamin D deficiency        Social History     Socioeconomic History    Marital status: Single    Number of children: 3    Years of education: 12    Highest education level: Master's degree (e.g., MA, MS, Hayes, MEd, MSW, [employer])   Tobacco Use    Smoking status: Never     Passive exposure: Never    Smokeless tobacco: Never   Vaping Use    Vaping Use: Never used   Substance and Sexual Activity    Alcohol use: No    Drug use: No    Sexual activity: Not  Currently     Partners: Male     Birth control/protection: Condom, Spermicide       Family History   Problem Relation Age of Onset    Heart failure Mother     Cancer Father        Allergies   Allergen Reactions    Amoxicillin Other (See Comments)      unsure of type of reaction - states it was so long ago she can't remember     Penicillins Nausea And Vomiting       ROS:   Constitutional:  Admit fatigue, tiredness.    Eyes:  Denies change in visual acuity   HENT:  Denies nasal congestion or sore throat   Respiratory: denies cough, shortness of breath.   Cardiovascular:  denies chest pain, edema   GI:  Denies abdominal pain, nausea, vomiting.    :  Denies dysuria   Musculoskeletal:  Denies back pain or joint pain   Integument:  Denies dry skin, rash   Neurologic:  Denies headache, focal weakness or sensory changes   Endocrine:  Denies polyuria or polydipsia   Psychiatric:  Denies depression or anxiety      Vitals:    12/21/23 0837   BP: 100/65   Pulse: 75   SpO2: 95%     Body mass index is 29.21 kg/m².      Physical Exam:  GEN: NAD, conversant  EYES: EOMI, PERRL  NECK: no thyromegaly, full ROM   CV: RRR  LUNG: CTA  SKIN: no rashes, no acanthosis  MSK: no deformities,   NEURO: no tremors, DTR normal  PSYCH: Awake and coherent      Results Review:     I reviewed the patient's new clinical results.    Lab Results   Component Value Date    GLUCOSE 84 09/20/2023    BUN 20 09/20/2023    CREATININE 0.82 09/20/2023    EGFRIFNONA 83 02/07/2022    BCR 24.4 09/20/2023    K 4.4 09/20/2023    CO2 26.0 09/20/2023    CALCIUM 9.3 09/20/2023    ALBUMIN 4.2 09/20/2023    LABIL2 1.4 04/11/2019    AST 17 09/20/2023    ALT 17 09/20/2023    CHOL 189 09/20/2023    TRIG 69 09/20/2023    HDL 91 (H) 09/20/2023    LDL 85 09/20/2023       Lab Results   Component Value Date    CREATININE 0.82 09/20/2023     Lab Results   Component Value Date    TSH 2.340 04/20/2023     SCANNED - DEXA (08/18/2022)    Medication Review: Reviewed.       Current  Outpatient Medications:     acetaminophen (TYLENOL) 500 MG tablet, Take 1 tablet by mouth Every 6 (Six) Hours As Needed for Fever or Mild Pain., Disp: , Rfl:     calcium carbonate (OS-STEVEN) 600 MG tablet, Take 1 tablet by mouth Daily., Disp: , Rfl:     cholecalciferol (VITAMIN D3) 1000 units tablet, Take 1 tablet by mouth Daily., Disp: , Rfl:     colestipol (COLESTID) 1 g tablet, TAKE 1 TABLET BY MOUTH TWICE A DAY AS NEEDED, Disp: 60 tablet, Rfl: 0    Diclofenac Sodium (VOLTAREN) 1 % gel gel, APPLY 4 GRAMS TO AFFECTED AREA FOUR TIMES A DAY AS NEEDED FOR PAIN, Disp: 100 g, Rfl: 0    dicyclomine (BENTYL) 10 MG capsule, TAKE ONE CAPSULE BY MOUTH THREE TIMES A DAY AS NEEDED FOR IBS, Disp: 90 capsule, Rfl: 0    FeroSul 325 (65 Fe) MG tablet, TAKE 1 TABLET BY MOUTH DAILY WITH BREAKFAST, Disp: 90 tablet, Rfl: 0    gabapentin (NEURONTIN) 300 MG capsule, TAKE 1 CAPSULE BY MOUTH TWICE A DAY, Disp: 180 capsule, Rfl: 0    melatonin 3 MG tablet, Take 1 tablet by mouth Every Night., Disp: , Rfl:     metoprolol succinate XL (TOPROL-XL) 25 MG 24 hr tablet, Take 1 tablet by mouth Daily., Disp: 30 tablet, Rfl: 11    Mirabegron ER (MYRBETRIQ) 50 MG tablet sustained-release 24 hour 24 hr tablet, Take 50 mg by mouth Daily., Disp: , Rfl:     pantoprazole (PROTONIX) 40 MG EC tablet, TAKE 1 TABLET BY MOUTH DAILY, Disp: 90 tablet, Rfl: 1    potassium chloride 10 MEQ CR tablet, TAKE 1 TABLET BY MOUTH TWICE A DAY, Disp: 180 tablet, Rfl: 0    Riboflavin (B2 PO), Take 1 tablet by mouth Daily., Disp: , Rfl:     rivaroxaban (XARELTO) 20 MG tablet, Take 1 tablet by mouth Daily., Disp: 30 tablet, Rfl: 3    sulfamethoxazole-trimethoprim (BACTRIM DS,SEPTRA DS) 800-160 MG per tablet, , Disp: , Rfl:     vitamin B-12 (CYANOCOBALAMIN) 1000 MCG tablet, Take 1 tablet by mouth Daily., Disp: , Rfl:         Assessment and plan:  Osteoporosis: This is a 78-year-old female was diagnosed with osteoporosis back in August 2022.  She did take 1 Prolia injection and  then forgot to take follow-up for a second injection.  She unfortunately developed a pelvic fracture.  She is now here for evaluation and management.  I reviewed the data.  She is currently taking calcium and vitamin D supplementation.  We talked about anabolic versus antiresorptive therapies.  Each option was discussed in detail.  We have decided to go with anabolic therapies at this time.  We talked about Evenity injection versus Tymlos versus Forteo injection.  She does have history of PE therefore we are avoiding Evenity.  I will add Tymlos 80 mg subcu once a day for total of 2 years and then we will consider send changing her to antiresorptive therapies.  She will continue calcium and vitamin D supplementation.  I will follow CMP.  I will check baseline CMP and PTH level as well.    Yesi Hoffman MD FACE.

## 2024-01-01 NOTE — PROGRESS NOTES
Cardiology Office Follow Up Visit      Primary Care Provider:  Darlene Matute MD    Reason for f/u:     Routine follow up      Subjective     CC:    Routine follow up    History of Present Illness       Abida Becker is a 78 y.o. female who is a patient of Dr. Zuniga. Pmh includes paroxysmal atrial fibrillation, PE on chronic anticoagulation, HTN, normal ECHO.     She presents today for routine follow up. She is doing well. She denies chest pain or shortness of breath. She remains on anticoagulation. She denies palpitations, dizziness, lightheadedness. She has no exertional symptoms. Blood pressure controlled. She is scheduled for a sleep study.      ASSESSMENT/PLAN:      Diagnoses and all orders for this visit:    1. Follow-up exam (Primary)    2. Paroxysmal atrial fibrillation    3. Essential hypertension        MEDICAL DECISION MAKING:  Ms. Becker presents for routine follow up. Overall she is doing well. Blood pressure stable. She remains on a/c with xarelto and no bleeding issues. No palpitations no exertional symptoms chest pain or shortness of breath. She is scheduled for sleep study. Continue current medical therapy we will see her back in 1 year or sooner should new issues arise.       Past Medical History:   Diagnosis Date    GERD (gastroesophageal reflux disease)     History of recurrent UTIs     IBS (irritable bowel syndrome)     Osteoarthritis     Osteoporosis     on prolia(12/5/22)    Primary osteoarthritis of both knees 01/24/2022    Pulmonary embolism 2011    after scoliosis surgery    Pulmonary embolism 10/2022    Scoliosis     Vitamin D deficiency        Past Surgical History:   Procedure Laterality Date    BACK SURGERY  11/2011    Dr. Olsen, due to scoliosis, earnestine and some fusions    BREAST BIOPSY      CHOLECYSTECTOMY      COLONOSCOPY  01/18/2018    HIATAL HERNIA REPAIR  2013    REIMPLANT URETER IN BLADDER           Current Outpatient Medications:     acetaminophen (TYLENOL) 500 MG tablet,  Take 1 tablet by mouth Every 6 (Six) Hours As Needed for Fever or Mild Pain., Disp: , Rfl:     calcium carbonate (OS-STEVEN) 600 MG tablet, Take 1 tablet by mouth Daily., Disp: , Rfl:     cholecalciferol (VITAMIN D3) 1000 units tablet, Take 1 tablet by mouth Daily., Disp: , Rfl:     colestipol (COLESTID) 1 g tablet, TAKE 1 TABLET BY MOUTH TWICE A DAY AS NEEDED, Disp: 60 tablet, Rfl: 0    dicyclomine (BENTYL) 10 MG capsule, TAKE ONE CAPSULE BY MOUTH THREE TIMES A DAY AS NEEDED FOR IBS, Disp: 90 capsule, Rfl: 0    gabapentin (NEURONTIN) 300 MG capsule, TAKE 1 CAPSULE BY MOUTH TWICE A DAY, Disp: 180 capsule, Rfl: 0    melatonin 3 MG tablet, Take 1 tablet by mouth Every Night., Disp: , Rfl:     metoprolol succinate XL (TOPROL-XL) 25 MG 24 hr tablet, Take 1 tablet by mouth Daily., Disp: 30 tablet, Rfl: 11    Mirabegron ER (MYRBETRIQ) 50 MG tablet sustained-release 24 hour 24 hr tablet, Take 50 mg by mouth Daily., Disp: , Rfl:     pantoprazole (PROTONIX) 40 MG EC tablet, TAKE 1 TABLET BY MOUTH DAILY, Disp: 90 tablet, Rfl: 1    potassium chloride 10 MEQ CR tablet, TAKE 1 TABLET BY MOUTH TWICE A DAY, Disp: 180 tablet, Rfl: 0    Riboflavin (B2 PO), Take 1 tablet by mouth Daily., Disp: , Rfl:     rivaroxaban (XARELTO) 20 MG tablet, Take 1 tablet by mouth Daily., Disp: 30 tablet, Rfl: 3    vitamin B-12 (CYANOCOBALAMIN) 1000 MCG tablet, Take 1 tablet by mouth Daily., Disp: , Rfl:     Abaloparatide (Tymlos) 3120 MCG/1.56ML solution pen-injector, Inject 0.04 mL under the skin into the appropriate area as directed Daily., Disp: 1.56 mL, Rfl: 5    Diclofenac Sodium (VOLTAREN) 1 % gel gel, APPLY 4 GRAMS TO AFFECTED AREA FOUR TIMES A DAY AS NEEDED FOR PAIN, Disp: 100 g, Rfl: 0    FeroSul 325 (65 Fe) MG tablet, TAKE 1 TABLET BY MOUTH DAILY WITH BREAKFAST, Disp: 90 tablet, Rfl: 0    sulfamethoxazole-trimethoprim (BACTRIM DS,SEPTRA DS) 800-160 MG per tablet, , Disp: , Rfl:     Social History     Socioeconomic History    Marital status:  "Single    Number of children: 3    Years of education: 12    Highest education level: Master's degree (e.g., MA, MS, Hayes, MEd, MSW, [employer])   Tobacco Use    Smoking status: Never     Passive exposure: Never    Smokeless tobacco: Never   Vaping Use    Vaping Use: Never used   Substance and Sexual Activity    Alcohol use: No    Drug use: No    Sexual activity: Not Currently     Partners: Male     Birth control/protection: Condom, Spermicide       Family History   Problem Relation Age of Onset    Heart failure Mother     Cancer Father        The following portions of the patient's history were reviewed and updated as appropriate: allergies, current medications, past family history, past medical history, past social history, past surgical history and problem list.    Review of Systems   Constitutional: Negative for chills, diaphoresis and malaise/fatigue.   Cardiovascular:  Negative for chest pain, dyspnea on exertion, irregular heartbeat, leg swelling, near-syncope, orthopnea, palpitations, paroxysmal nocturnal dyspnea and syncope.   Respiratory:  Negative for cough, shortness of breath, sleep disturbances due to breathing and sputum production.    Gastrointestinal:  Negative for change in bowel habit.   Genitourinary:  Negative for urgency.   Neurological:  Negative for dizziness and headaches.   Psychiatric/Behavioral:  Negative for altered mental status.        Pertinent items are noted in HPI, all other systems reviewed and negative    /76 (BP Location: Left arm, Patient Position: Sitting, Cuff Size: Large Adult)   Pulse 80   Resp 18   Ht 167.6 cm (66\")   Wt 81.2 kg (179 lb)   SpO2 97%   BMI 28.89 kg/m² .  Objective     Constitutional:       Appearance: Not in distress.   Neck:      Vascular: JVD normal.   Pulmonary:      Effort: Pulmonary effort is normal.      Breath sounds: Normal breath sounds.   Cardiovascular:      Normal rate. Regular rhythm.   Pulses:     Intact distal pulses.   Edema:     " Peripheral edema absent.   Abdominal:      General: Bowel sounds are normal.      Palpations: Abdomen is soft.   Musculoskeletal: Normal range of motion. Skin:     General: Skin is warm and dry.   Neurological:      General: No focal deficit present.      Mental Status: Oriented to person, place and time.             ECG 12 Lead    Date/Time: 12/1/2023 9:44 AM  Performed by: Berenice Llamas APRN    Authorized by: Berenice Llamas APRN  Comparison: not compared with previous ECG   Previous ECG: no previous ECG available  Rhythm: sinus rhythm  Rate: normal  BPM: 80          EKG ordered by and reviewed by me in office

## 2024-01-07 RX ORDER — GABAPENTIN 300 MG/1
CAPSULE ORAL
Qty: 180 CAPSULE | Refills: 0 | Status: SHIPPED | OUTPATIENT
Start: 2024-01-07

## 2024-01-18 ENCOUNTER — TELEPHONE (OUTPATIENT)
Dept: ENDOCRINOLOGY | Facility: CLINIC | Age: 79
End: 2024-01-18
Payer: MEDICARE

## 2024-01-19 NOTE — TELEPHONE ENCOUNTER
Pt called looking for christal - said she didn't get call back yesterday - I explained to pt that she was not in yesterday or today but will be back Monday - pt just wanted to see if there was a update on her tymlos treatment - pt said christal was looking for a way to cut the cost down - no notes available on this so I told pt I will send to christal so she can call and give her an update

## 2024-01-20 DIAGNOSIS — I10 ESSENTIAL HYPERTENSION: ICD-10-CM

## 2024-01-20 RX ORDER — POTASSIUM CHLORIDE 750 MG/1
TABLET, FILM COATED, EXTENDED RELEASE ORAL
Qty: 180 TABLET | Refills: 0 | Status: SHIPPED | OUTPATIENT
Start: 2024-01-20

## 2024-01-22 NOTE — TELEPHONE ENCOUNTER
Pt called back looking for christal- christal is still out sick - pt notified - I told pt if she is not in tomorrow we will call and let her know - pt said last jacob she talked to christal she was told christal was looking into PAP for Tymlos and she had paperwork pt would need to fill out - no notes in chart - unsure where christal is at in the process

## 2024-01-25 ENCOUNTER — LAB (OUTPATIENT)
Dept: FAMILY MEDICINE CLINIC | Facility: CLINIC | Age: 79
End: 2024-01-25
Payer: MEDICARE

## 2024-01-25 ENCOUNTER — OFFICE VISIT (OUTPATIENT)
Dept: FAMILY MEDICINE CLINIC | Facility: CLINIC | Age: 79
End: 2024-01-25
Payer: MEDICARE

## 2024-01-25 VITALS
SYSTOLIC BLOOD PRESSURE: 128 MMHG | HEIGHT: 66 IN | HEART RATE: 78 BPM | WEIGHT: 178.6 LBS | DIASTOLIC BLOOD PRESSURE: 59 MMHG | OXYGEN SATURATION: 97 % | TEMPERATURE: 97.8 F | RESPIRATION RATE: 16 BRPM | BODY MASS INDEX: 28.7 KG/M2

## 2024-01-25 DIAGNOSIS — R42 VERTIGO: ICD-10-CM

## 2024-01-25 DIAGNOSIS — K21.9 GASTROESOPHAGEAL REFLUX DISEASE WITHOUT ESOPHAGITIS: Primary | ICD-10-CM

## 2024-01-25 LAB
ALBUMIN SERPL-MCNC: 4.1 G/DL (ref 3.5–5.2)
ALBUMIN/GLOB SERPL: 1.7 G/DL
ALP SERPL-CCNC: 78 U/L (ref 39–117)
ALT SERPL W P-5'-P-CCNC: 16 U/L (ref 1–33)
ANION GAP SERPL CALCULATED.3IONS-SCNC: 12.9 MMOL/L (ref 5–15)
AST SERPL-CCNC: 17 U/L (ref 1–32)
BASOPHILS # BLD AUTO: 0.04 10*3/MM3 (ref 0–0.2)
BASOPHILS NFR BLD AUTO: 0.7 % (ref 0–1.5)
BILIRUB SERPL-MCNC: 0.9 MG/DL (ref 0–1.2)
BUN SERPL-MCNC: 16 MG/DL (ref 8–23)
BUN/CREAT SERPL: 21.1 (ref 7–25)
CALCIUM SPEC-SCNC: 9.2 MG/DL (ref 8.6–10.5)
CHLORIDE SERPL-SCNC: 104 MMOL/L (ref 98–107)
CO2 SERPL-SCNC: 23.1 MMOL/L (ref 22–29)
CREAT SERPL-MCNC: 0.76 MG/DL (ref 0.57–1)
DEPRECATED RDW RBC AUTO: 47.6 FL (ref 37–54)
EGFRCR SERPLBLD CKD-EPI 2021: 81.3 ML/MIN/1.73
EOSINOPHIL # BLD AUTO: 0.04 10*3/MM3 (ref 0–0.4)
EOSINOPHIL NFR BLD AUTO: 0.7 % (ref 0.3–6.2)
ERYTHROCYTE [DISTWIDTH] IN BLOOD BY AUTOMATED COUNT: 14 % (ref 12.3–15.4)
GLOBULIN UR ELPH-MCNC: 2.4 GM/DL
GLUCOSE SERPL-MCNC: 100 MG/DL (ref 65–99)
HCT VFR BLD AUTO: 40.4 % (ref 34–46.6)
HGB BLD-MCNC: 13 G/DL (ref 12–15.9)
IMM GRANULOCYTES # BLD AUTO: 0.03 10*3/MM3 (ref 0–0.05)
IMM GRANULOCYTES NFR BLD AUTO: 0.5 % (ref 0–0.5)
LYMPHOCYTES # BLD AUTO: 1.36 10*3/MM3 (ref 0.7–3.1)
LYMPHOCYTES NFR BLD AUTO: 22.3 % (ref 19.6–45.3)
MCH RBC QN AUTO: 30.2 PG (ref 26.6–33)
MCHC RBC AUTO-ENTMCNC: 32.2 G/DL (ref 31.5–35.7)
MCV RBC AUTO: 93.7 FL (ref 79–97)
MONOCYTES # BLD AUTO: 0.6 10*3/MM3 (ref 0.1–0.9)
MONOCYTES NFR BLD AUTO: 9.9 % (ref 5–12)
NEUTROPHILS NFR BLD AUTO: 4.02 10*3/MM3 (ref 1.7–7)
NEUTROPHILS NFR BLD AUTO: 65.9 % (ref 42.7–76)
NRBC BLD AUTO-RTO: 0 /100 WBC (ref 0–0.2)
PLATELET # BLD AUTO: 383 10*3/MM3 (ref 140–450)
PMV BLD AUTO: 9.7 FL (ref 6–12)
POTASSIUM SERPL-SCNC: 4.3 MMOL/L (ref 3.5–5.2)
PROT SERPL-MCNC: 6.5 G/DL (ref 6–8.5)
RBC # BLD AUTO: 4.31 10*6/MM3 (ref 3.77–5.28)
SODIUM SERPL-SCNC: 140 MMOL/L (ref 136–145)
WBC NRBC COR # BLD AUTO: 6.09 10*3/MM3 (ref 3.4–10.8)

## 2024-01-25 PROCEDURE — 3078F DIAST BP <80 MM HG: CPT | Performed by: FAMILY MEDICINE

## 2024-01-25 PROCEDURE — 87186 SC STD MICRODIL/AGAR DIL: CPT | Performed by: FAMILY MEDICINE

## 2024-01-25 PROCEDURE — 99214 OFFICE O/P EST MOD 30 MIN: CPT | Performed by: FAMILY MEDICINE

## 2024-01-25 PROCEDURE — 85025 COMPLETE CBC W/AUTO DIFF WBC: CPT | Performed by: FAMILY MEDICINE

## 2024-01-25 PROCEDURE — 3074F SYST BP LT 130 MM HG: CPT | Performed by: FAMILY MEDICINE

## 2024-01-25 PROCEDURE — 87077 CULTURE AEROBIC IDENTIFY: CPT | Performed by: FAMILY MEDICINE

## 2024-01-25 PROCEDURE — 80053 COMPREHEN METABOLIC PANEL: CPT | Performed by: FAMILY MEDICINE

## 2024-01-25 PROCEDURE — 36415 COLL VENOUS BLD VENIPUNCTURE: CPT | Performed by: FAMILY MEDICINE

## 2024-01-25 PROCEDURE — 87086 URINE CULTURE/COLONY COUNT: CPT | Performed by: FAMILY MEDICINE

## 2024-01-25 PROCEDURE — 81001 URINALYSIS AUTO W/SCOPE: CPT | Performed by: FAMILY MEDICINE

## 2024-01-25 RX ORDER — MECLIZINE HYDROCHLORIDE 25 MG/1
25 TABLET ORAL 3 TIMES DAILY PRN
Qty: 30 TABLET | Refills: 0 | Status: SHIPPED | OUTPATIENT
Start: 2024-01-25

## 2024-01-25 RX ORDER — METHYLPREDNISOLONE 4 MG/1
TABLET ORAL
Qty: 1 EACH | Refills: 0 | Status: SHIPPED | OUTPATIENT
Start: 2024-01-25

## 2024-01-25 NOTE — PROGRESS NOTES
Subjective   Chief Complaint   Patient presents with    belching     Abdominal discomfort  X 3 weeks    Dizziness     Abida Becker is a 78 y.o. female.     Patient Care Team:  Darlene Matute MD as PCP - General  El CastilloRolando ro MD as Consulting Physician (Urology)  Banet, Duane Edward, MD as Consulting Physician (Dermatology)  Milla Urias MD as Consulting Physician (Hematology and Oncology)  Brett Zuniga MD as Consulting Physician (Cardiology)    History of Present Illness  She is coming in today as she has not been feeling well for the last 3 weeks.  She reports that over the last 3 weeks she has been experiencing some GI upset with a lot of belching.  She also had some on and off dizziness and she feels like she is at the beginning of the vertigo she had issues with that in the past.  She also had some on and off pressure in her head and feels like some congestion might be going on even though she denies any runny nose or any cough or fever.  Patient previously had hiatal hernia repair over 10 years ago.  She is on pantoprazole and also takes some additional over-the-counter acid reducer.  She tells me that she has not seen a GI doctor in a long time and she is not even sure if she had EGD done since her hiatal hernia surgery.  She has tried Tylenol over-the-counter for headache, but that is not really helping.  She does not take anti-inflammatories due to being on a blood thinner.       The following portions of the patient's history were reviewed and updated as appropriate: allergies, current medications, past family history, past medical history, past social history, past surgical history, and problem list.  Past Medical History:   Diagnosis Date    GERD (gastroesophageal reflux disease)     History of recurrent UTIs     IBS (irritable bowel syndrome)     Osteoarthritis     Osteoporosis     on prolia(12/5/22)    Primary osteoarthritis of both knees 01/24/2022    Pulmonary embolism 2011     "after scoliosis surgery    Pulmonary embolism 10/2022    Scoliosis     Vitamin D deficiency      Past Surgical History:   Procedure Laterality Date    BACK SURGERY  11/2011    Dr. Olsen, due to scoliosis, earnestine and some fusions    BREAST BIOPSY      CHOLECYSTECTOMY      COLONOSCOPY  01/18/2018    HIATAL HERNIA REPAIR  2013    REIMPLANT URETER IN BLADDER       The patient has a family history of  Family History   Problem Relation Age of Onset    Heart failure Mother     Cancer Father      Social History     Socioeconomic History    Marital status: Single    Number of children: 3    Years of education: 12    Highest education level: Master's degree (e.g., MA, MS, Hayes, MEd, MSW, [employer])   Tobacco Use    Smoking status: Never     Passive exposure: Never    Smokeless tobacco: Never   Vaping Use    Vaping Use: Never used   Substance and Sexual Activity    Alcohol use: No    Drug use: No    Sexual activity: Not Currently     Partners: Male     Birth control/protection: Condom, Spermicide       Review of Systems   Constitutional:  Negative for activity change and fatigue.   Respiratory:  Negative for shortness of breath and wheezing.    Cardiovascular:  Negative for chest pain, palpitations and leg swelling.   Gastrointestinal:  Positive for GERD. Negative for nausea and vomiting.   Musculoskeletal:  Negative for back pain.   Skin:  Negative for rash.   Neurological:  Positive for dizziness and headache. Negative for tremors.     Visit Vitals  /59 (BP Location: Left arm, Patient Position: Sitting, Cuff Size: Adult)   Pulse 78   Temp 97.8 °F (36.6 °C) (Infrared)   Resp 16   Ht 167.6 cm (65.98\")   Wt 81 kg (178 lb 9.6 oz)   SpO2 97%   BMI 28.84 kg/m²              Current Outpatient Medications:     Abaloparatide (Tymlos) 3120 MCG/1.56ML solution pen-injector, Inject 0.04 mL under the skin into the appropriate area as directed Daily., Disp: 1.56 mL, Rfl: 5    acetaminophen (TYLENOL) 500 MG tablet, Take 1 tablet by mouth " Every 6 (Six) Hours As Needed for Fever or Mild Pain., Disp: , Rfl:     calcium carbonate (OS-STEVEN) 600 MG tablet, Take 1 tablet by mouth Daily., Disp: , Rfl:     cholecalciferol (VITAMIN D3) 1000 units tablet, Take 1 tablet by mouth Daily., Disp: , Rfl:     colestipol (COLESTID) 1 g tablet, TAKE 1 TABLET BY MOUTH TWICE A DAY AS NEEDED, Disp: 60 tablet, Rfl: 0    Diclofenac Sodium (VOLTAREN) 1 % gel gel, APPLY 4 GRAMS TO AFFECTED AREA FOUR TIMES A DAY AS NEEDED FOR PAIN, Disp: 100 g, Rfl: 0    dicyclomine (BENTYL) 10 MG capsule, TAKE ONE CAPSULE BY MOUTH THREE TIMES A DAY AS NEEDED FOR IBS, Disp: 90 capsule, Rfl: 0    FeroSul 325 (65 Fe) MG tablet, TAKE 1 TABLET BY MOUTH DAILY WITH BREAKFAST, Disp: 90 tablet, Rfl: 0    gabapentin (NEURONTIN) 300 MG capsule, TAKE 1 CAPSULE BY MOUTH TWICE A DAY, Disp: 180 capsule, Rfl: 0    melatonin 3 MG tablet, Take 1 tablet by mouth Every Night., Disp: , Rfl:     metoprolol succinate XL (TOPROL-XL) 25 MG 24 hr tablet, Take 1 tablet by mouth Daily., Disp: 30 tablet, Rfl: 11    Mirabegron ER (MYRBETRIQ) 50 MG tablet sustained-release 24 hour 24 hr tablet, Take 50 mg by mouth Daily., Disp: , Rfl:     pantoprazole (PROTONIX) 40 MG EC tablet, TAKE 1 TABLET BY MOUTH DAILY, Disp: 90 tablet, Rfl: 1    potassium chloride 10 MEQ CR tablet, TAKE 1 TABLET BY MOUTH TWICE A DAY, Disp: 180 tablet, Rfl: 0    Riboflavin (B2 PO), Take 1 tablet by mouth Daily., Disp: , Rfl:     rivaroxaban (XARELTO) 20 MG tablet, Take 1 tablet by mouth Daily., Disp: 30 tablet, Rfl: 3    vitamin B-12 (CYANOCOBALAMIN) 1000 MCG tablet, Take 1 tablet by mouth Daily., Disp: , Rfl:     meclizine (ANTIVERT) 25 MG tablet, Take 1 tablet by mouth 3 (Three) Times a Day As Needed for Dizziness., Disp: 30 tablet, Rfl: 0    methylPREDNISolone (Medrol) 4 MG dose pack, Take as directed on package instructions., Disp: 1 each, Rfl: 0    Objective   Physical Exam  Vitals and nursing note reviewed.   Constitutional:       General: She is  not in acute distress.     Appearance: Normal appearance. She is well-developed. She is not ill-appearing.   HENT:      Head: Normocephalic and atraumatic.      Right Ear: Tympanic membrane and ear canal normal.      Left Ear: Tympanic membrane and ear canal normal.      Nose: No congestion.      Mouth/Throat:      Pharynx: No posterior oropharyngeal erythema.   Cardiovascular:      Rate and Rhythm: Normal rate and regular rhythm.      Heart sounds: Normal heart sounds. No murmur heard.     No gallop.   Pulmonary:      Effort: Pulmonary effort is normal. No respiratory distress.      Breath sounds: Normal breath sounds. No wheezing, rhonchi or rales.   Chest:      Chest wall: No tenderness.   Abdominal:      Comments: There are some palpation tenderness in epigastric area noted.   Musculoskeletal:      Cervical back: Normal range of motion and neck supple.   Neurological:      General: No focal deficit present.      Mental Status: She is alert and oriented to person, place, and time. Mental status is at baseline.   Psychiatric:         Mood and Affect: Mood normal.       TSH          4/20/2023    11:09   TSH   TSH 2.340          Assessment & Plan   Diagnoses and all orders for this visit:    1. Gastroesophageal reflux disease without esophagitis (Primary)  -     Urinalysis With Culture If Indicated - Urine, Clean Catch  -     CBC Auto Differential  -     Comprehensive Metabolic Panel  -     Ambulatory Referral to Gastroenterology  -     [place] Urine Culture Tube - Urine, Clean Catch    2. Vertigo  -     meclizine (ANTIVERT) 25 MG tablet; Take 1 tablet by mouth 3 (Three) Times a Day As Needed for Dizziness.  Dispense: 30 tablet; Refill: 0  -     methylPREDNISolone (Medrol) 4 MG dose pack; Take as directed on package instructions.  Dispense: 1 each; Refill: 0      Patient presents today with about 3-week history of some belching and overall rundown feeling with some dizziness symptoms.  Patient is a status post  hiatal hernia repair over 10 years ago.  I will be referring her to see the GI as she might need a repeat EGD.  She will continue PPI.  I also gave her prescription for steroid and meclizine for her vertigo symptoms.        Return if symptoms worsen or fail to improve, for Recheck.    Requested Prescriptions     Signed Prescriptions Disp Refills    meclizine (ANTIVERT) 25 MG tablet 30 tablet 0     Sig: Take 1 tablet by mouth 3 (Three) Times a Day As Needed for Dizziness.    methylPREDNISolone (Medrol) 4 MG dose pack 1 each 0     Sig: Take as directed on package instructions.

## 2024-01-26 LAB
BACTERIA UR QL AUTO: ABNORMAL /HPF
BILIRUB UR QL STRIP: NEGATIVE
CLARITY UR: ABNORMAL
COD CRY URNS QL: ABNORMAL /HPF
COLOR UR: ABNORMAL
GLUCOSE UR STRIP-MCNC: NEGATIVE MG/DL
HGB UR QL STRIP.AUTO: NEGATIVE
HOLD SPECIMEN: NORMAL
HYALINE CASTS UR QL AUTO: ABNORMAL /LPF
KETONES UR QL STRIP: ABNORMAL
LEUKOCYTE ESTERASE UR QL STRIP.AUTO: ABNORMAL
NITRITE UR QL STRIP: POSITIVE
PH UR STRIP.AUTO: 5.5 [PH] (ref 5–8)
PROT UR QL STRIP: ABNORMAL
RBC # UR STRIP: ABNORMAL /HPF
REF LAB TEST METHOD: ABNORMAL
SP GR UR STRIP: >=1.03 (ref 1–1.03)
SQUAMOUS #/AREA URNS HPF: ABNORMAL /HPF
URATE CRY URNS QL MICRO: ABNORMAL /HPF
UROBILINOGEN UR QL STRIP: ABNORMAL
WBC # UR STRIP: ABNORMAL /HPF

## 2024-01-26 RX ORDER — NITROFURANTOIN 25; 75 MG/1; MG/1
100 CAPSULE ORAL 2 TIMES DAILY
Qty: 14 CAPSULE | Refills: 0 | Status: SHIPPED | OUTPATIENT
Start: 2024-01-26 | End: 2024-02-02

## 2024-01-26 NOTE — PROGRESS NOTES
Patient notified verbally on 1/26 @ 10:08AM      Patient states she has a  stomach scope setup on 4/10 and she was wondering if she can get in sooner? Please advise

## 2024-01-28 LAB — BACTERIA SPEC AEROBE CULT: ABNORMAL

## 2024-01-29 ENCOUNTER — TELEPHONE (OUTPATIENT)
Dept: ENDOCRINOLOGY | Facility: CLINIC | Age: 79
End: 2024-01-29

## 2024-01-29 NOTE — TELEPHONE ENCOUNTER
Hub staff attempted to follow warm transfer process and was unsuccessful     Caller: Abida Becker    Relationship to patient: Self    Best call back number: [phone]    Patient is needing: PATIENT CALLING TO FOLLOW-UP ON FINANCIAL ASSISTANCE PAPERWORK ON Tymlos 80 mg subcu once a day. HASN'T HEARD BACK FROM [facility] AND WOULD LIKE A CALL BACK.    PLEASE SEE NOTES: Telephone with Yesi Hoffman MD (01/18/2024)

## 2024-02-07 ENCOUNTER — OFFICE VISIT (OUTPATIENT)
Dept: FAMILY MEDICINE CLINIC | Facility: CLINIC | Age: 79
End: 2024-02-07
Payer: MEDICARE

## 2024-02-07 VITALS
SYSTOLIC BLOOD PRESSURE: 111 MMHG | HEIGHT: 66 IN | OXYGEN SATURATION: 98 % | RESPIRATION RATE: 16 BRPM | HEART RATE: 71 BPM | TEMPERATURE: 96.6 F | DIASTOLIC BLOOD PRESSURE: 73 MMHG | BODY MASS INDEX: 28.32 KG/M2 | WEIGHT: 176.2 LBS

## 2024-02-07 DIAGNOSIS — K21.9 GASTROESOPHAGEAL REFLUX DISEASE WITHOUT ESOPHAGITIS: Primary | ICD-10-CM

## 2024-02-07 DIAGNOSIS — K44.9 HIATAL HERNIA: ICD-10-CM

## 2024-02-07 DIAGNOSIS — R10.13 EPIGASTRIC PAIN: ICD-10-CM

## 2024-02-07 PROBLEM — R15.2 INCONTINENCE OF FECES WITH FECAL URGENCY: Status: RESOLVED | Noted: 2022-04-22 | Resolved: 2024-02-07

## 2024-02-07 PROBLEM — R15.9 INCONTINENCE OF FECES WITH FECAL URGENCY: Status: RESOLVED | Noted: 2022-04-22 | Resolved: 2024-02-07

## 2024-02-07 PROCEDURE — 3078F DIAST BP <80 MM HG: CPT | Performed by: FAMILY MEDICINE

## 2024-02-07 PROCEDURE — 99214 OFFICE O/P EST MOD 30 MIN: CPT | Performed by: FAMILY MEDICINE

## 2024-02-07 PROCEDURE — 3074F SYST BP LT 130 MM HG: CPT | Performed by: FAMILY MEDICINE

## 2024-02-07 RX ORDER — ONDANSETRON 4 MG/1
4 TABLET, ORALLY DISINTEGRATING ORAL EVERY 8 HOURS PRN
Qty: 20 TABLET | Refills: 0 | Status: SHIPPED | OUTPATIENT
Start: 2024-02-07

## 2024-02-07 RX ORDER — FAMOTIDINE 40 MG/1
40 TABLET, FILM COATED ORAL DAILY
Qty: 30 TABLET | Refills: 0 | Status: SHIPPED | OUTPATIENT
Start: 2024-02-07

## 2024-02-07 NOTE — PROGRESS NOTES
Subjective   Chief Complaint   Patient presents with    Follow-up    Heartburn    Abdominal Pain     Abida Becker is a 78 y.o. female.     Patient Care Team:  Darlene Matute MD as PCP - General  [place] RanchRolando ro MD as Consulting Physician (Urology)  Banet, Duane Edward, MD as Consulting Physician (Dermatology)  Milla Urias MD as Consulting Physician (Hematology and Oncology)  Brett Zuniga MD as Consulting Physician (Cardiology)    History of Present Illness  She is coming in today as she wants to talk about her ongoing GI issues which she has been dealing with for the last few weeks.  She reports having a lot of nausea and belching, her symptoms are worse in the morning.  She also has been experiencing some discomfort across her upper abdomen and at times has burning sensations.  Patient is a status post hiatal hernia repair over 10 years ago.  Patient was seen in our office about 10 days ago and at that time she was referred to see the GI as she will need an endoscopy, but the appointment is not scheduled until 4/10/2024 and she would like to see the GI rather sooner.  She is currently on pantoprazole for acid reflux symptoms and also supplemented with over-the-counter acid reducer, but she is not really sure what it is.  Her appetite is not very good, but there is no weight loss reported.       The following portions of the patient's history were reviewed and updated as appropriate: allergies, current medications, past family history, past medical history, past social history, past surgical history, and problem list.  Past Medical History:   Diagnosis Date    GERD (gastroesophageal reflux disease)     History of recurrent UTIs     IBS (irritable bowel syndrome)     Osteoarthritis     Osteoporosis     on prolia(12/5/22)    Primary osteoarthritis of both knees 01/24/2022    Pulmonary embolism 2011    after scoliosis surgery    Pulmonary embolism 10/2022    Scoliosis     Vitamin D deficiency   "    Past Surgical History:   Procedure Laterality Date    BACK SURGERY  11/2011    Dr. Olsen, due to scoliosis, earnestine and some fusions    BREAST BIOPSY      CHOLECYSTECTOMY      COLONOSCOPY  01/18/2018    HIATAL HERNIA REPAIR  2013    REIMPLANT URETER IN BLADDER       The patient has a family history of  Family History   Problem Relation Age of Onset    Heart failure Mother     Cancer Father      Social History     Socioeconomic History    Marital status: Single    Number of children: 3    Years of education: 12    Highest education level: Master's degree (e.g., MA, MS, Hayes, MEd, MSW, [employer])   Tobacco Use    Smoking status: Never     Passive exposure: Never    Smokeless tobacco: Never   Vaping Use    Vaping Use: Never used   Substance and Sexual Activity    Alcohol use: No    Drug use: No    Sexual activity: Not Currently     Partners: Male     Birth control/protection: Condom, Spermicide       Review of Systems   Constitutional:  Negative for activity change, fatigue and fever.   Respiratory:  Negative for shortness of breath and wheezing.    Cardiovascular:  Negative for chest pain, palpitations and leg swelling.   Gastrointestinal:  Positive for abdominal pain, nausea and GERD. Negative for diarrhea and vomiting.   Musculoskeletal:  Negative for arthralgias and back pain.   Skin:  Negative for rash.   Neurological:  Negative for tremors and headache.     Visit Vitals  /73 (BP Location: Left arm, Patient Position: Sitting, Cuff Size: Adult)   Pulse 71   Temp 96.6 °F (35.9 °C) (Infrared)   Resp 16   Ht 167.6 cm (65.98\")   Wt 79.9 kg (176 lb 3.2 oz)   SpO2 98%   BMI 28.45 kg/m²              Current Outpatient Medications:     acetaminophen (TYLENOL) 500 MG tablet, Take 1 tablet by mouth Every 6 (Six) Hours As Needed for Fever or Mild Pain., Disp: , Rfl:     calcium carbonate (OS-STEVEN) 600 MG tablet, Take 1 tablet by mouth Daily., Disp: , Rfl:     cholecalciferol (VITAMIN D3) 1000 units tablet, Take 1 tablet by " mouth Daily., Disp: , Rfl:     colestipol (COLESTID) 1 g tablet, TAKE 1 TABLET BY MOUTH TWICE A DAY AS NEEDED, Disp: 60 tablet, Rfl: 0    Diclofenac Sodium (VOLTAREN) 1 % gel gel, APPLY 4 GRAMS TO AFFECTED AREA FOUR TIMES A DAY AS NEEDED FOR PAIN, Disp: 100 g, Rfl: 0    dicyclomine (BENTYL) 10 MG capsule, TAKE ONE CAPSULE BY MOUTH THREE TIMES A DAY AS NEEDED FOR IBS, Disp: 90 capsule, Rfl: 0    FeroSul 325 (65 Fe) MG tablet, TAKE 1 TABLET BY MOUTH DAILY WITH BREAKFAST, Disp: 90 tablet, Rfl: 0    gabapentin (NEURONTIN) 300 MG capsule, TAKE 1 CAPSULE BY MOUTH TWICE A DAY, Disp: 180 capsule, Rfl: 0    meclizine (ANTIVERT) 25 MG tablet, Take 1 tablet by mouth 3 (Three) Times a Day As Needed for Dizziness., Disp: 30 tablet, Rfl: 0    melatonin 3 MG tablet, Take 1 tablet by mouth Every Night., Disp: , Rfl:     metoprolol succinate XL (TOPROL-XL) 25 MG 24 hr tablet, Take 1 tablet by mouth Daily., Disp: 30 tablet, Rfl: 11    Mirabegron ER (MYRBETRIQ) 50 MG tablet sustained-release 24 hour 24 hr tablet, Take 50 mg by mouth Daily., Disp: , Rfl:     Multiple Vitamin (VITAMIN E/FOLIC ACID/B-6/B-12 PO), Take  by mouth., Disp: , Rfl:     pantoprazole (PROTONIX) 40 MG EC tablet, TAKE 1 TABLET BY MOUTH DAILY, Disp: 90 tablet, Rfl: 1    potassium chloride 10 MEQ CR tablet, TAKE 1 TABLET BY MOUTH TWICE A DAY, Disp: 180 tablet, Rfl: 0    Riboflavin (B2 PO), Take 1 tablet by mouth Daily., Disp: , Rfl:     rivaroxaban (XARELTO) 20 MG tablet, Take 1 tablet by mouth Daily., Disp: 30 tablet, Rfl: 3    vitamin B-12 (CYANOCOBALAMIN) 1000 MCG tablet, Take 1 tablet by mouth Daily., Disp: , Rfl:     famotidine (Pepcid) 40 MG tablet, Take 1 tablet by mouth Daily., Disp: 30 tablet, Rfl: 0    ondansetron ODT (ZOFRAN-ODT) 4 MG disintegrating tablet, Place 1 tablet on the tongue Every 8 (Eight) Hours As Needed for Nausea., Disp: 20 tablet, Rfl: 0    Objective   Physical Exam  Constitutional:       General: She is not in acute distress.      Appearance: Normal appearance. She is well-developed. She is not ill-appearing or diaphoretic.      Comments: Patient is in no distress, patient has normal voice and speech.  Normal respiratory effort.   HENT:      Head: Normocephalic and atraumatic.   Pulmonary:      Effort: Pulmonary effort is normal.   Abdominal:      General: There is no distension.      Palpations: There is no mass.      Tenderness: There is abdominal tenderness. There is no guarding or rebound.      Hernia: No hernia is present.      Comments: Palpation tenderness noted in the epigastric area and left mid abdomen.   Musculoskeletal:      Cervical back: Normal range of motion and neck supple.   Neurological:      General: No focal deficit present.      Mental Status: She is alert and oriented to person, place, and time. Mental status is at baseline.   Psychiatric:         Mood and Affect: Mood normal.       FOLLOWING LABS WERE REVIEWED TODAY:  CMP          9/20/2023    09:27 12/21/2023    09:10 1/25/2024    10:11   CMP   Glucose 84  96  100    BUN 20  19  16    Creatinine 0.82  0.84  0.76    EGFR 74.2  72.1  81.3    Sodium 140  139  140    Potassium 4.4  4.6  4.3    Chloride 103  100  104    Calcium 9.3  9.8  9.2    Total Protein 6.5  6.9  6.5    Albumin 4.2  4.3  4.1    Globulin 2.3  2.6  2.4    Total Bilirubin 0.6  0.7  0.9    Alkaline Phosphatase 108  118  78    AST (SGOT) 17  15  17    ALT (SGPT) 17  22  16    Albumin/Globulin Ratio 1.8  1.7  1.7    BUN/Creatinine Ratio 24.4  22.6  21.1    Anion Gap 11.0  10.4  12.9      CBC          9/20/2023    09:27 11/9/2023    09:21 1/25/2024    10:11   CBC   WBC 5.70  6.59  6.09    RBC 4.29  4.55  4.31    Hemoglobin 12.0  13.2  13.0    Hematocrit 37.4  42.3  40.4    MCV 87.2  93.0  93.7    MCH 28.1  29.0  30.2    MCHC 32.2  31.2  32.2    RDW 17.2  16.6  14.0    Platelets 358  308  383        Assessment & Plan   Diagnoses and all orders for this visit:    1. Gastroesophageal reflux disease without  esophagitis (Primary)  -     famotidine (Pepcid) 40 MG tablet; Take 1 tablet by mouth Daily.  Dispense: 30 tablet; Refill: 0  -     ondansetron ODT (ZOFRAN-ODT) 4 MG disintegrating tablet; Place 1 tablet on the tongue Every 8 (Eight) Hours As Needed for Nausea.  Dispense: 20 tablet; Refill: 0  -     Ambulatory Referral to Gastroenterology    2. Hiatal hernia  -     CT Abdomen Pelvis With Contrast  -     Ambulatory Referral to Gastroenterology    3. Epigastric pain  -     CT Abdomen Pelvis With Contrast      Her GI symptoms are poorly controlled.  I reviewed her recent blood work results.  Patient was advised to continue pantoprazole in the morning and I will be adding Pepcid at bedtime.  I also gave her prescription for some Zofran to take as needed.  I will be getting abdominal imaging for further evaluation and we will try to get her in to see the GI sooner than in 4/2024 as patient will need endoscopy.  A new referral was provided for the patient.      Return if symptoms worsen or fail to improve, for Recheck.    Requested Prescriptions     Signed Prescriptions Disp Refills    famotidine (Pepcid) 40 MG tablet 30 tablet 0     Sig: Take 1 tablet by mouth Daily.    ondansetron ODT (ZOFRAN-ODT) 4 MG disintegrating tablet 20 tablet 0     Sig: Place 1 tablet on the tongue Every 8 (Eight) Hours As Needed for Nausea.

## 2024-02-10 DIAGNOSIS — K58.0 IRRITABLE BOWEL SYNDROME WITH DIARRHEA: ICD-10-CM

## 2024-02-10 DIAGNOSIS — M79.89 RIGHT LEG SWELLING: ICD-10-CM

## 2024-02-10 RX ORDER — DICYCLOMINE HYDROCHLORIDE 10 MG/1
CAPSULE ORAL
Qty: 90 CAPSULE | Refills: 0 | Status: SHIPPED | OUTPATIENT
Start: 2024-02-10

## 2024-02-14 ENCOUNTER — TELEPHONE (OUTPATIENT)
Dept: ONCOLOGY | Facility: CLINIC | Age: 79
End: 2024-02-14
Payer: MEDICARE

## 2024-02-14 ENCOUNTER — OFFICE VISIT (OUTPATIENT)
Dept: ONCOLOGY | Facility: CLINIC | Age: 79
End: 2024-02-14
Payer: MEDICARE

## 2024-02-14 ENCOUNTER — LAB (OUTPATIENT)
Dept: LAB | Facility: HOSPITAL | Age: 79
End: 2024-02-14
Payer: MEDICARE

## 2024-02-14 VITALS
DIASTOLIC BLOOD PRESSURE: 74 MMHG | HEIGHT: 66 IN | TEMPERATURE: 98.2 F | SYSTOLIC BLOOD PRESSURE: 118 MMHG | OXYGEN SATURATION: 96 % | HEART RATE: 86 BPM | WEIGHT: 176.8 LBS | RESPIRATION RATE: 18 BRPM | BODY MASS INDEX: 28.42 KG/M2

## 2024-02-14 DIAGNOSIS — I26.94 MULTIPLE SUBSEGMENTAL PULMONARY EMBOLI WITHOUT ACUTE COR PULMONALE: ICD-10-CM

## 2024-02-14 DIAGNOSIS — N91.2 AMENIA: ICD-10-CM

## 2024-02-14 DIAGNOSIS — I26.94 MULTIPLE SUBSEGMENTAL PULMONARY EMBOLI WITHOUT ACUTE COR PULMONALE: Primary | ICD-10-CM

## 2024-02-14 DIAGNOSIS — Z86.718 PERSONAL HISTORY OF OTHER VENOUS THROMBOSIS AND EMBOLISM: ICD-10-CM

## 2024-02-14 LAB
BASOPHILS # BLD AUTO: 0.02 10*3/MM3 (ref 0–0.2)
BASOPHILS NFR BLD AUTO: 0.3 % (ref 0–1.5)
D DIMER PPP FEU-MCNC: <0.19 MG/L (FEU) (ref 0–0.76)
DEPRECATED RDW RBC AUTO: 51.6 FL (ref 37–54)
EOSINOPHIL # BLD AUTO: 0.06 10*3/MM3 (ref 0–0.4)
EOSINOPHIL NFR BLD AUTO: 0.9 % (ref 0.3–6.2)
ERYTHROCYTE [DISTWIDTH] IN BLOOD BY AUTOMATED COUNT: 14.8 % (ref 12.3–15.4)
HCT VFR BLD AUTO: 42.8 % (ref 34–46.6)
HGB BLD-MCNC: 13.6 G/DL (ref 12–15.9)
LYMPHOCYTES # BLD AUTO: 1.4 10*3/MM3 (ref 0.7–3.1)
LYMPHOCYTES NFR BLD AUTO: 20.6 % (ref 19.6–45.3)
MCH RBC QN AUTO: 30.9 PG (ref 26.6–33)
MCHC RBC AUTO-ENTMCNC: 31.8 G/DL (ref 31.5–35.7)
MCV RBC AUTO: 97.3 FL (ref 79–97)
MONOCYTES # BLD AUTO: 0.66 10*3/MM3 (ref 0.1–0.9)
MONOCYTES NFR BLD AUTO: 9.7 % (ref 5–12)
NEUTROPHILS NFR BLD AUTO: 4.65 10*3/MM3 (ref 1.7–7)
NEUTROPHILS NFR BLD AUTO: 68.5 % (ref 42.7–76)
PLATELET # BLD AUTO: 328 10*3/MM3 (ref 140–450)
PMV BLD AUTO: 9.4 FL (ref 6–12)
RBC # BLD AUTO: 4.4 10*6/MM3 (ref 3.77–5.28)
WBC NRBC COR # BLD AUTO: 6.79 10*3/MM3 (ref 3.4–10.8)

## 2024-02-14 PROCEDURE — 1126F AMNT PAIN NOTED NONE PRSNT: CPT | Performed by: INTERNAL MEDICINE

## 2024-02-14 PROCEDURE — 3074F SYST BP LT 130 MM HG: CPT | Performed by: INTERNAL MEDICINE

## 2024-02-14 PROCEDURE — 99213 OFFICE O/P EST LOW 20 MIN: CPT | Performed by: INTERNAL MEDICINE

## 2024-02-14 PROCEDURE — 36415 COLL VENOUS BLD VENIPUNCTURE: CPT

## 2024-02-14 PROCEDURE — 85379 FIBRIN DEGRADATION QUANT: CPT | Performed by: INTERNAL MEDICINE

## 2024-02-14 PROCEDURE — 85025 COMPLETE CBC W/AUTO DIFF WBC: CPT

## 2024-02-14 PROCEDURE — 3078F DIAST BP <80 MM HG: CPT | Performed by: INTERNAL MEDICINE

## 2024-02-15 ENCOUNTER — HOSPITAL ENCOUNTER (OUTPATIENT)
Dept: CT IMAGING | Facility: HOSPITAL | Age: 79
Discharge: HOME | End: 2024-02-15
Admitting: FAMILY MEDICINE
Payer: MEDICARE

## 2024-02-15 PROCEDURE — 74177 CT ABD & PELVIS W/CONTRAST: CPT

## 2024-02-15 PROCEDURE — 25510000001 IOPAMIDOL PER 1 ML: Performed by: FAMILY MEDICINE

## 2024-02-15 RX ADMIN — IOPAMIDOL 100 ML: 755 INJECTION, SOLUTION INTRAVENOUS at 12:44

## 2024-02-16 ENCOUNTER — TELEPHONE (OUTPATIENT)
Dept: ONCOLOGY | Facility: CLINIC | Age: 79
End: 2024-02-16
Payer: MEDICARE

## 2024-02-16 DIAGNOSIS — I26.94 MULTIPLE SUBSEGMENTAL PULMONARY EMBOLI WITHOUT ACUTE COR PULMONALE: Primary | ICD-10-CM

## 2024-02-23 ENCOUNTER — HOSPITAL ENCOUNTER (EMERGENCY)
Facility: HOSPITAL | Age: 79
Discharge: HOME | End: 2024-02-23
Attending: EMERGENCY MEDICINE
Payer: MEDICARE

## 2024-02-23 VITALS
BODY MASS INDEX: 29.73 KG/M2 | HEIGHT: 66 IN | HEART RATE: 83 BPM | SYSTOLIC BLOOD PRESSURE: 125 MMHG | RESPIRATION RATE: 18 BRPM | DIASTOLIC BLOOD PRESSURE: 60 MMHG | TEMPERATURE: 98 F | OXYGEN SATURATION: 94 % | WEIGHT: 185 LBS

## 2024-02-23 DIAGNOSIS — T88.7XXA NON-DOSE-RELATED ADVERSE REACTION TO MEDICATION, INITIAL ENCOUNTER: Primary | ICD-10-CM

## 2024-02-23 LAB
ALBUMIN SERPL-MCNC: 4.2 G/DL (ref 3.5–5.2)
ALBUMIN/GLOB SERPL: 2 G/DL
ALP SERPL-CCNC: 65 U/L (ref 39–117)
ALT SERPL W P-5'-P-CCNC: 19 U/L (ref 1–33)
ANION GAP SERPL CALCULATED.3IONS-SCNC: 9 MMOL/L (ref 5–15)
AST SERPL-CCNC: 17 U/L (ref 1–32)
BASOPHILS # BLD AUTO: 0 10*3/MM3 (ref 0–0.2)
BASOPHILS NFR BLD AUTO: 0.4 % (ref 0–1.5)
BILIRUB SERPL-MCNC: 0.7 MG/DL (ref 0–1.2)
BUN SERPL-MCNC: 16 MG/DL (ref 8–23)
BUN/CREAT SERPL: 26.7 (ref 7–25)
CALCIUM SPEC-SCNC: 9.4 MG/DL (ref 8.6–10.5)
CHLORIDE SERPL-SCNC: 107 MMOL/L (ref 98–107)
CO2 SERPL-SCNC: 29 MMOL/L (ref 22–29)
CREAT SERPL-MCNC: 0.6 MG/DL (ref 0.57–1)
DEPRECATED RDW RBC AUTO: 51.2 FL (ref 37–54)
EGFRCR SERPLBLD CKD-EPI 2021: 93.2 ML/MIN/1.73
EOSINOPHIL # BLD AUTO: 0.1 10*3/MM3 (ref 0–0.4)
EOSINOPHIL NFR BLD AUTO: 1 % (ref 0.3–6.2)
ERYTHROCYTE [DISTWIDTH] IN BLOOD BY AUTOMATED COUNT: 14.9 % (ref 12.3–15.4)
GLOBULIN UR ELPH-MCNC: 2.1 GM/DL
GLUCOSE SERPL-MCNC: 99 MG/DL (ref 65–99)
HCT VFR BLD AUTO: 39.5 % (ref 34–46.6)
HGB BLD-MCNC: 12.7 G/DL (ref 12–15.9)
HOLD SPECIMEN: NORMAL
LYMPHOCYTES # BLD AUTO: 1.1 10*3/MM3 (ref 0.7–3.1)
LYMPHOCYTES NFR BLD AUTO: 21.2 % (ref 19.6–45.3)
MCH RBC QN AUTO: 30.2 PG (ref 26.6–33)
MCHC RBC AUTO-ENTMCNC: 32.3 G/DL (ref 31.5–35.7)
MCV RBC AUTO: 93.6 FL (ref 79–97)
MONOCYTES # BLD AUTO: 0.5 10*3/MM3 (ref 0.1–0.9)
MONOCYTES NFR BLD AUTO: 10 % (ref 5–12)
NEUTROPHILS NFR BLD AUTO: 3.5 10*3/MM3 (ref 1.7–7)
NEUTROPHILS NFR BLD AUTO: 67.4 % (ref 42.7–76)
NRBC BLD AUTO-RTO: 0.1 /100 WBC (ref 0–0.2)
PLATELET # BLD AUTO: 324 10*3/MM3 (ref 140–450)
PMV BLD AUTO: 7.3 FL (ref 6–12)
POTASSIUM SERPL-SCNC: 4.3 MMOL/L (ref 3.5–5.2)
PROT SERPL-MCNC: 6.3 G/DL (ref 6–8.5)
RBC # BLD AUTO: 4.22 10*6/MM3 (ref 3.77–5.28)
SODIUM SERPL-SCNC: 145 MMOL/L (ref 136–145)
WBC NRBC COR # BLD AUTO: 5.2 10*3/MM3 (ref 3.4–10.8)

## 2024-02-23 PROCEDURE — 93005 ELECTROCARDIOGRAM TRACING: CPT | Performed by: EMERGENCY MEDICINE

## 2024-02-23 PROCEDURE — 82948 REAGENT STRIP/BLOOD GLUCOSE: CPT | Performed by: FAMILY MEDICINE

## 2024-02-23 PROCEDURE — 99283 EMERGENCY DEPT VISIT LOW MDM: CPT

## 2024-02-23 PROCEDURE — 85025 COMPLETE CBC W/AUTO DIFF WBC: CPT | Performed by: EMERGENCY MEDICINE

## 2024-02-23 PROCEDURE — 80053 COMPREHEN METABOLIC PANEL: CPT | Performed by: EMERGENCY MEDICINE

## 2024-02-23 RX ORDER — SODIUM CHLORIDE 0.9 % (FLUSH) 0.9 %
10 SYRINGE (ML) INJECTION AS NEEDED
Status: DISCONTINUED | OUTPATIENT
Start: 2024-02-23 | End: 2024-02-23 | Stop reason: HOSPADM

## 2024-02-23 RX ORDER — ACETAMINOPHEN 500 MG
1000 TABLET ORAL ONCE
Status: COMPLETED | OUTPATIENT
Start: 2024-02-23 | End: 2024-02-23

## 2024-02-23 RX ADMIN — ACETAMINOPHEN 1000 MG: 500 TABLET ORAL at 13:20

## 2024-02-23 NOTE — DISCHARGE INSTRUCTIONS
Follow-up with your prescribing physician.  Avoid the injection that contributed to today's reaction.  Return for increased pain, shortness of breath, rash or any other concerns.

## 2024-02-23 NOTE — ED PROVIDER NOTES
"Subjective   History of Present Illness  78-year-old female states she had an adverse reaction to a injection she received at the physician office today for \"weak bones \"states about 5 minutes after receiving the injection she felt like she had some muscle spasms in the back and felt a gradual onset headache.  States it lasted about 20 minutes and then was resolving and has waxed and waned in intensity since the time of onset about 90 minutes ago.  She reports no fevers or chills or chest pain or shortness of breath or palpitations or vomiting.  Review of Systems    Past Medical History:   Diagnosis Date    GERD (gastroesophageal reflux disease)     History of recurrent UTIs     IBS (irritable bowel syndrome)     Osteoarthritis     Osteoporosis     on prolia(12/5/22)    Primary osteoarthritis of both knees 01/24/2022    Pulmonary embolism 2011    after scoliosis surgery    Pulmonary embolism 10/2022    Scoliosis     Vitamin D deficiency        Allergies   Allergen Reactions    Amoxicillin Other (See Comments)      unsure of type of reaction - states it was so long ago she can't remember     Penicillins Nausea And Vomiting       Past Surgical History:   Procedure Laterality Date    BACK SURGERY  11/2011    Dr. Olsen, due to scoliosis, earnestine and some fusions    BREAST BIOPSY      CHOLECYSTECTOMY      COLONOSCOPY  01/18/2018    HIATAL HERNIA REPAIR  2013    REIMPLANT URETER IN BLADDER         Family History   Problem Relation Age of Onset    Heart failure Mother     Cancer Father        Social History     Socioeconomic History    Marital status: Single    Number of children: 3    Years of education: 12    Highest education level: Master's degree (e.g., MA, MS, Hayes, MEd, MSW, [employer])   Tobacco Use    Smoking status: Never     Passive exposure: Never    Smokeless tobacco: Never   Vaping Use    Vaping Use: Never used   Substance and Sexual Activity    Alcohol use: No    Drug use: No    Sexual activity: Not Currently     " Partners: Male     Birth control/protection: Condom, Spermicide       Prior to Admission medications    Medication Sig Start Date End Date Taking? Authorizing Provider   acetaminophen (TYLENOL) 500 MG tablet Take 1 tablet by mouth Every 6 (Six) Hours As Needed for Fever or Mild Pain. 1/26/20   Liberty Richards MD   calcium carbonate (OS-STEVEN) 600 MG tablet Take 1 tablet by mouth Daily.    Liberty Richards MD   cholecalciferol (VITAMIN D3) 1000 units tablet Take 1 tablet by mouth Daily.    Liberty Richards MD   colestipol (COLESTID) 1 g tablet TAKE 1 TABLET BY MOUTH TWICE A DAY AS NEEDED 10/13/23   Darlene Matute MD   Diclofenac Sodium (VOLTAREN) 1 % gel gel APPLY 4 GRAMS TO AFFECTED AREA(S) FOUR TIMES A DAY AS NEEDED FOR PAIN 2/10/24   Darlene Matute MD   dicyclomine (BENTYL) 10 MG capsule TAKE 1 CAPSULE BY MOUTH THREE TIMES A DAY AS NEEDED FOR IBS 2/10/24   Darlene Matute MD   famotidine (Pepcid) 40 MG tablet Take 1 tablet by mouth Daily. 2/7/24   Darlene Matute MD   FeroSul 325 (65 Fe) MG tablet TAKE 1 TABLET BY MOUTH DAILY WITH BREAKFAST 12/18/23   Milla Urias MD   gabapentin (NEURONTIN) 300 MG capsule TAKE 1 CAPSULE BY MOUTH TWICE A DAY 1/7/24   Darlene Matute MD   meclizine (ANTIVERT) 25 MG tablet Take 1 tablet by mouth 3 (Three) Times a Day As Needed for Dizziness. 1/25/24   Darlene Matute MD   melatonin 3 MG tablet Take 1 tablet by mouth Every Night. 10/9/22   Liberty Richards MD   metoprolol succinate XL (TOPROL-XL) 25 MG 24 hr tablet Take 1 tablet by mouth Daily. 5/11/23   Brett Zuniga MD   Mirabegron ER (MYRBETRIQ) 50 MG tablet sustained-release 24 hour 24 hr tablet Take 50 mg by mouth Daily. 4/10/19   Liberty Richards MD   Multiple Vitamin (VITAMIN E/FOLIC ACID/B-6/B-12 PO) Take  by mouth.    Liberty Richards MD   ondansetron ODT (ZOFRAN-ODT) 4 MG disintegrating tablet Place 1 tablet on the tongue Every 8 (Eight) Hours As Needed for Nausea.  "2/7/24   Darlene Matute MD   pantoprazole (PROTONIX) 40 MG EC tablet TAKE 1 TABLET BY MOUTH DAILY 11/22/23   Darlene Matute MD   potassium chloride 10 MEQ CR tablet TAKE 1 TABLET BY MOUTH TWICE A DAY 1/20/24   Darlene Matute MD   Riboflavin (B2 PO) Take 1 tablet by mouth Daily.    ProviderLiberty MD   rivaroxaban (Xarelto) 10 MG tablet Take 1 tablet by mouth Daily. 2/16/24   Milla Urias MD   vitamin B-12 (CYANOCOBALAMIN) 1000 MCG tablet Take 1 tablet by mouth Daily.    Provider, MD Liberty     /60   Pulse 83   Temp 98.7 °F (37.1 °C) (Oral)   Resp 18   Ht 167.6 cm (66\")   Wt 83.9 kg (185 lb)   SpO2 94%   BMI 29.86 kg/m²       Objective   Physical Exam  General: Well-developed well-appearing, no acute distress, alert and appropriate  Eyes: Pupils round and normal, sclera nonicteric  HEENT: Mucous membranes moist, no mucosal swelling  Neck: Supple, no nuchal rigidity, no JVD  Respirations: Respirations nonlabored, equal breath sounds bilaterally, clear lungs  Heart regular rate and rhythm, no murmurs rubs or gallops,   Abdomen soft nontender nondistended, no hepatosplenomegaly, no hernia, no mass, normal bowel sounds, no CVA tenderness  Extremities no clubbing cyanosis or edema, calves are symmetric and nontender  Neuro cranial nerves II through XII intact , normal sensory/motor function and strength in four extremities, no slurred speech, no facial droop, normal finger to nose, normal heel to shin, no nuchal rigidity  Psych oriented, pleasant affect  Skin no rash, brisk cap refill  Procedures           ED Course      Results for orders placed or performed during the hospital encounter of 02/23/24   Comprehensive Metabolic Panel    Specimen: Blood   Result Value Ref Range    Glucose 99 65 - 99 mg/dL    BUN 16 8 - 23 mg/dL    Creatinine 0.60 0.57 - 1.00 mg/dL    Sodium 145 136 - 145 mmol/L    Potassium 4.3 3.5 - 5.2 mmol/L    Chloride 107 98 - 107 mmol/L    CO2 29.0 22.0 - 29.0 mmol/L " "   Calcium 9.4 8.6 - 10.5 mg/dL    Total Protein 6.3 6.0 - 8.5 g/dL    Albumin 4.2 3.5 - 5.2 g/dL    ALT (SGPT) 19 1 - 33 U/L    AST (SGOT) 17 1 - 32 U/L    Alkaline Phosphatase 65 39 - 117 U/L    Total Bilirubin 0.7 0.0 - 1.2 mg/dL    Globulin 2.1 gm/dL    A/G Ratio 2.0 g/dL    BUN/Creatinine Ratio 26.7 (H) 7.0 - 25.0    Anion Gap 9.0 5.0 - 15.0 mmol/L    eGFR 93.2 >60.0 mL/min/1.73   CBC Auto Differential    Specimen: Blood   Result Value Ref Range    WBC 5.20 3.40 - 10.80 10*3/mm3    RBC 4.22 3.77 - 5.28 10*6/mm3    Hemoglobin 12.7 12.0 - 15.9 g/dL    Hematocrit 39.5 34.0 - 46.6 %    MCV 93.6 79.0 - 97.0 fL    MCH 30.2 26.6 - 33.0 pg    MCHC 32.3 31.5 - 35.7 g/dL    RDW 14.9 12.3 - 15.4 %    RDW-SD 51.2 37.0 - 54.0 fl    MPV 7.3 6.0 - 12.0 fL    Platelets 324 140 - 450 10*3/mm3    Neutrophil % 67.4 42.7 - 76.0 %    Lymphocyte % 21.2 19.6 - 45.3 %    Monocyte % 10.0 5.0 - 12.0 %    Eosinophil % 1.0 0.3 - 6.2 %    Basophil % 0.4 0.0 - 1.5 %    Neutrophils, Absolute 3.50 1.70 - 7.00 10*3/mm3    Lymphocytes, Absolute 1.10 0.70 - 3.10 10*3/mm3    Monocytes, Absolute 0.50 0.10 - 0.90 10*3/mm3    Eosinophils, Absolute 0.10 0.00 - 0.40 10*3/mm3    Basophils, Absolute 0.00 0.00 - 0.20 10*3/mm3    nRBC 0.1 0.0 - 0.2 /100 WBC   ECG 12 Lead Rhythm Change   Result Value Ref Range    QT Interval 357 ms    QTC Interval 417 ms                                            Medical Decision Making  Patient presents after feeling some muscle spasms and headache after injection of a new medication for \"weak bones \".  Throughout the emergency room course patient was stating she is feeling much better.  She was given some Tylenol.  She does not have findings of anaphylaxis or histamine related allergic reaction.  I suspect this is more of an adverse reaction to the medication.  CBC and Chem-7 were essentially normal.  Patient was advised of findings she is discharged in good condition.  She states she is not taking that injection any " longer and will discuss this with her prescribing doctor.  Patient is given warning signs for return.    Problems Addressed:  Non-dose-related adverse reaction to medication, initial encounter: complicated acute illness or injury    Amount and/or Complexity of Data Reviewed  Labs: ordered. Decision-making details documented in ED Course.  ECG/medicine tests: ordered and independent interpretation performed.     Details: My EKG interpretation sinus rhythm, rate of 82, no acute ST or T wave abnormality    Risk  OTC drugs.  Prescription drug management.        Final diagnoses:   Non-dose-related adverse reaction to medication, initial encounter       ED Disposition  ED Disposition       ED Disposition   Discharge    Condition   Stable    Comment   --               No follow-up provider specified.       Medication List      No changes were made to your prescriptions during this visit.            Octaviano Ochoa MD  02/23/24 1773

## 2024-02-24 LAB
QT INTERVAL: 357 MS
QTC INTERVAL: 417 MS

## 2024-02-26 ENCOUNTER — TELEPHONE (OUTPATIENT)
Dept: ENDOCRINOLOGY | Facility: CLINIC | Age: 79
End: 2024-02-26
Payer: MEDICARE

## 2024-02-26 NOTE — TELEPHONE ENCOUNTER
Pt called to let you know that she had a adverse reacting to the Tymlos and she dose not want to take this anymore. Please advise.

## 2024-02-28 ENCOUNTER — TELEPHONE (OUTPATIENT)
Dept: ENDOCRINOLOGY | Facility: CLINIC | Age: 79
End: 2024-02-28
Payer: MEDICARE

## 2024-02-28 NOTE — TELEPHONE ENCOUNTER
PATIENT CALLED TO SAY SHE WOULD LIKE TO TRY PROLIA INJECTIONS, SINCE SHE HAD A  REACTION  TO TYMLOUS. IS THAT ACCEPTABLE WITH YOU? PLEASE ADVISE, THANKS  HERMILA

## 2024-03-03 DIAGNOSIS — K21.9 GASTROESOPHAGEAL REFLUX DISEASE WITHOUT ESOPHAGITIS: ICD-10-CM

## 2024-03-03 RX ORDER — FAMOTIDINE 40 MG/1
40 TABLET, FILM COATED ORAL DAILY
Qty: 30 TABLET | Refills: 0 | Status: SHIPPED | OUTPATIENT
Start: 2024-03-03

## 2024-03-06 ENCOUNTER — TELEPHONE (OUTPATIENT)
Dept: ENDOCRINOLOGY | Facility: CLINIC | Age: 79
End: 2024-03-06
Payer: MEDICARE

## 2024-03-15 ENCOUNTER — CLINICAL SUPPORT (OUTPATIENT)
Dept: ENDOCRINOLOGY | Facility: CLINIC | Age: 79
End: 2024-03-15
Payer: MEDICARE

## 2024-03-15 DIAGNOSIS — M81.0 AGE-RELATED OSTEOPOROSIS WITHOUT CURRENT PATHOLOGICAL FRACTURE: Primary | ICD-10-CM

## 2024-03-15 PROCEDURE — 96372 THER/PROPH/DIAG INJ SC/IM: CPT | Performed by: INTERNAL MEDICINE

## 2024-03-16 DIAGNOSIS — N91.2 AMENIA: ICD-10-CM

## 2024-03-18 RX ORDER — FERROUS SULFATE 325(65) MG
1 TABLET ORAL
Qty: 90 TABLET | Refills: 0 | Status: SHIPPED | OUTPATIENT
Start: 2024-03-18

## 2024-03-20 DIAGNOSIS — I26.94 MULTIPLE SUBSEGMENTAL PULMONARY EMBOLI WITHOUT ACUTE COR PULMONALE: ICD-10-CM

## 2024-03-22 NOTE — TELEPHONE ENCOUNTER
Caller:     Relationship:     Best call back number:[phone]  What is the best time to reach you: ANYTIME   Who are you requesting to speak with (clinical staff, provider,  specific staff member): CLINICAL STAFF    Do you know the name of the person who called: FARIDEH  What was the call regarding: FARIDEH [place] [facility] NURSE WITH [facility] CALLED AND STATED PATIENT HAS BEEN RELEASED FROM THERAPY AND WILL NEED TO MAKE AN APPOINTMENT IN OFFICE TO CHECK INR. PLEASE CALL  Do you require a callback:YES        [FreeTextEntry1] : Brigitte Tellez is a 43 year old woman with past medical history of Hypertension (with history of Preeclampsia), Hyperlipidemia and Obesity presents for follow up visit.  Since her last visit the patient felt dizzy with Nifedipine 60 mg daily and so this was decreased to 30 mg daily. She reports less dizziness but SBP now increased back to 140s on the lower dose of Nifedipine. She denies chest pain, shortness of breath or headache.

## 2024-04-01 ENCOUNTER — OFFICE (OUTPATIENT)
Dept: URBAN - METROPOLITAN AREA CLINIC 64 | Facility: CLINIC | Age: 79
End: 2024-04-01
Payer: COMMERCIAL

## 2024-04-01 VITALS
HEIGHT: 67 IN | DIASTOLIC BLOOD PRESSURE: 45 MMHG | WEIGHT: 175 LBS | HEART RATE: 88 BPM | SYSTOLIC BLOOD PRESSURE: 83 MMHG

## 2024-04-01 DIAGNOSIS — R14.0 ABDOMINAL DISTENSION (GASEOUS): ICD-10-CM

## 2024-04-01 DIAGNOSIS — R10.10 UPPER ABDOMINAL PAIN, UNSPECIFIED: ICD-10-CM

## 2024-04-01 DIAGNOSIS — R19.7 DIARRHEA, UNSPECIFIED: ICD-10-CM

## 2024-04-01 DIAGNOSIS — R15.9 FULL INCONTINENCE OF FECES: ICD-10-CM

## 2024-04-01 PROCEDURE — 99213 OFFICE O/P EST LOW 20 MIN: CPT | Performed by: NURSE PRACTITIONER

## 2024-04-06 DIAGNOSIS — K21.9 GASTROESOPHAGEAL REFLUX DISEASE WITHOUT ESOPHAGITIS: ICD-10-CM

## 2024-04-06 RX ORDER — FAMOTIDINE 40 MG/1
40 TABLET, FILM COATED ORAL DAILY
Qty: 30 TABLET | Refills: 0 | Status: SHIPPED | OUTPATIENT
Start: 2024-04-06

## 2024-04-16 ENCOUNTER — TELEPHONE (OUTPATIENT)
Dept: ONCOLOGY | Facility: CLINIC | Age: 79
End: 2024-04-16

## 2024-04-16 NOTE — TELEPHONE ENCOUNTER
Spoke w/ pt and let her know that we are currently out of Xarelto 10 mg samples.  Instructed her that she could call back on Thursday to see if we have gotten any in.  She v/u.

## 2024-04-16 NOTE — TELEPHONE ENCOUNTER
Caller: Abida Becker    Relationship: Self    Best call back number: [phone]       Who are you requesting to speak with (clinical staff, provider,  specific staff member): CLINICAL    What was the call regarding: PATIENT REQUESTING SAMPLES OF XARELTO 10 MG AS PATIENT WILL BE OUT OF MEDICATION ON THURSDAY

## 2024-04-18 NOTE — TELEPHONE ENCOUNTER
Caller: Abida Becker    Relationship: Self    Best call back number: [phone]    Who are you requesting to speak with (clinical staff, provider,  specific staff member): CLINICAL    What was the call regarding: PT CALLING BACK TO SEE IF THE OFFICE HAS RECEIVED  XARELTO SAMPLES. PT WILL BE TAKING HER LAST ONE TODAY

## 2024-04-20 DIAGNOSIS — K58.0 IRRITABLE BOWEL SYNDROME WITH DIARRHEA: ICD-10-CM

## 2024-04-20 DIAGNOSIS — I10 ESSENTIAL HYPERTENSION: ICD-10-CM

## 2024-04-21 RX ORDER — POTASSIUM CHLORIDE 750 MG/1
TABLET, FILM COATED, EXTENDED RELEASE ORAL
Qty: 180 TABLET | Refills: 0 | Status: SHIPPED | OUTPATIENT
Start: 2024-04-21

## 2024-04-21 RX ORDER — DICYCLOMINE HYDROCHLORIDE 10 MG/1
CAPSULE ORAL
Qty: 90 CAPSULE | Refills: 0 | Status: SHIPPED | OUTPATIENT
Start: 2024-04-21

## 2024-05-04 DIAGNOSIS — K21.9 GASTROESOPHAGEAL REFLUX DISEASE WITHOUT ESOPHAGITIS: ICD-10-CM

## 2024-05-04 RX ORDER — FAMOTIDINE 40 MG/1
40 TABLET, FILM COATED ORAL DAILY
Qty: 30 TABLET | Refills: 0 | Status: SHIPPED | OUTPATIENT
Start: 2024-05-04

## 2024-05-13 RX ORDER — METOPROLOL SUCCINATE 25 MG/1
25 TABLET, EXTENDED RELEASE ORAL DAILY
Qty: 30 TABLET | Refills: 11 | Status: SHIPPED | OUTPATIENT
Start: 2024-05-13

## 2024-05-16 ENCOUNTER — TELEPHONE (OUTPATIENT)
Dept: ONCOLOGY | Facility: CLINIC | Age: 79
End: 2024-05-16
Payer: MEDICARE

## 2024-05-16 DIAGNOSIS — I26.94 MULTIPLE SUBSEGMENTAL PULMONARY EMBOLI WITHOUT ACUTE COR PULMONALE: ICD-10-CM

## 2024-05-16 NOTE — TELEPHONE ENCOUNTER
Caller Name: Abida Becker [place]  Relationship: Self  Best Contact Number: [phone] CAN L/M     Patient is requesting samples of rivaroxaban (Xarelto) 10 MG tablet     How many days of medication do you have left? 3    Additional Information: WOULD LIKE TO  TODAY OR TOMORROW

## 2024-05-16 NOTE — TELEPHONE ENCOUNTER
Contacted patient to let her know samples will be provided at the .  She v/u and had no other questions.

## 2024-05-23 ENCOUNTER — OFFICE VISIT (OUTPATIENT)
Dept: FAMILY MEDICINE CLINIC | Facility: CLINIC | Age: 79
End: 2024-05-23
Payer: MEDICARE

## 2024-05-23 VITALS
OXYGEN SATURATION: 97 % | BODY MASS INDEX: 29.51 KG/M2 | TEMPERATURE: 97.5 F | WEIGHT: 183.6 LBS | SYSTOLIC BLOOD PRESSURE: 107 MMHG | HEIGHT: 66 IN | RESPIRATION RATE: 16 BRPM | DIASTOLIC BLOOD PRESSURE: 71 MMHG | HEART RATE: 71 BPM

## 2024-05-23 DIAGNOSIS — G89.29 CHRONIC NECK PAIN: Primary | ICD-10-CM

## 2024-05-23 DIAGNOSIS — M54.2 CHRONIC NECK PAIN: Primary | ICD-10-CM

## 2024-05-23 DIAGNOSIS — M54.50 CHRONIC BILATERAL LOW BACK PAIN WITHOUT SCIATICA: ICD-10-CM

## 2024-05-23 DIAGNOSIS — G89.29 CHRONIC BILATERAL LOW BACK PAIN WITHOUT SCIATICA: ICD-10-CM

## 2024-05-23 PROBLEM — R10.13 EPIGASTRIC PAIN: Status: RESOLVED | Noted: 2024-02-07 | Resolved: 2024-05-23

## 2024-05-23 PROBLEM — Z23 NEED FOR VACCINATION: Status: RESOLVED | Noted: 2022-10-27 | Resolved: 2024-05-23

## 2024-05-23 PROBLEM — R42 VERTIGO: Status: RESOLVED | Noted: 2024-01-25 | Resolved: 2024-05-23

## 2024-05-23 PROCEDURE — 1126F AMNT PAIN NOTED NONE PRSNT: CPT | Performed by: FAMILY MEDICINE

## 2024-05-23 PROCEDURE — 99213 OFFICE O/P EST LOW 20 MIN: CPT | Performed by: FAMILY MEDICINE

## 2024-05-23 PROCEDURE — 3074F SYST BP LT 130 MM HG: CPT | Performed by: FAMILY MEDICINE

## 2024-05-23 PROCEDURE — 3078F DIAST BP <80 MM HG: CPT | Performed by: FAMILY MEDICINE

## 2024-05-23 NOTE — PROGRESS NOTES
Subjective   Chief Complaint   Patient presents with    Back Pain    Neck Pain     Abida Becker is a 78 y.o. female.     Patient Care Team:  Darlene Matute MD as PCP - General  Deale, Rolando HUNTER MD as Consulting Physician (Urology)  Banet, Duane Edward, MD as Consulting Physician (Dermatology)  Milla Urias MD as Consulting Physician (Hematology and Oncology)  Brett Zuniga MD as Consulting Physician (Cardiology)    History of Present Illness  She is coming in today as she wants to talk about her back pain and neck pain.  Patient has dealt with these issues for a long time.  She previously had a thoracolumbar surgery in 2011 due to scoliosis.  Over the years her symptoms have been getting worse, she reports that for quite some time she has been having neck problems and has hard time to hold her neck up.  She is walking with a head tilted forward due to neck weakness.  When she wants to hold the neck up she has to actually hold it up with her hand.  She is walking with a support of a cane.  She denies any recent falls.  When she is holding her neck up with her hand that creates more stress on the upper back and she has been experiencing more pain and they are due to that.  Patient lives by herself and she is fully independent with her ADLs, she really enjoys working in her garden, but she feels that this is somehow limited due to her back and neck problems.  Patient previously was seen by a neurosurgeon last year for evaluation of the same problem.  These notes are being reviewed today.       The following portions of the patient's history were reviewed and updated as appropriate: allergies, current medications, past family history, past medical history, past social history, past surgical history, and problem list.  Past Medical History:   Diagnosis Date    GERD (gastroesophageal reflux disease)     History of recurrent UTIs     IBS (irritable bowel syndrome)     Osteoarthritis     Osteoporosis      "on prolia(12/5/22)    Primary osteoarthritis of both knees 01/24/2022    Pulmonary embolism 2011    after scoliosis surgery    Pulmonary embolism 10/2022    Scoliosis     Vitamin D deficiency      Past Surgical History:   Procedure Laterality Date    BACK SURGERY  11/2011    Dr. Olsen, due to scoliosis, earnestine and some fusions    BREAST BIOPSY      CHOLECYSTECTOMY      COLONOSCOPY  01/18/2018    HIATAL HERNIA REPAIR  2013    REIMPLANT URETER IN BLADDER       The patient has a family history of  Family History   Problem Relation Age of Onset    Heart failure Mother     Cancer Father      Social History     Socioeconomic History    Marital status: Single    Number of children: 3    Years of education: 12    Highest education level: Master's degree (e.g., MA, MS, Hayes, MEd, MSW, [employer])   Tobacco Use    Smoking status: Never     Passive exposure: Never    Smokeless tobacco: Never   Vaping Use    Vaping status: Never Used   Substance and Sexual Activity    Alcohol use: No    Drug use: No    Sexual activity: Not Currently     Partners: Male     Birth control/protection: Condom, Spermicide       Review of Systems   Musculoskeletal:  Positive for arthralgias, back pain, gait problem and neck pain. Negative for joint swelling.   Neurological:  Positive for weakness. Negative for memory problem.     Visit Vitals  /71 (BP Location: Left arm, Patient Position: Sitting, Cuff Size: Adult)   Pulse 71   Temp 97.5 °F (36.4 °C) (Infrared)   Resp 16   Ht 167.6 cm (65.98\")   Wt 83.3 kg (183 lb 9.6 oz)   SpO2 97%   BMI 29.65 kg/m²       BMI is >= 25 and <30. (Overweight) The following options were offered after discussion;: exercise counseling/recommendations      Current Outpatient Medications:     acetaminophen (TYLENOL) 500 MG tablet, Take 1 tablet by mouth Every 6 (Six) Hours As Needed for Fever or Mild Pain., Disp: , Rfl:     calcium carbonate (OS-STEVEN) 600 MG tablet, Take 1 tablet by mouth Daily., Disp: , Rfl:     " cholecalciferol (VITAMIN D3) 1000 units tablet, Take 1 tablet by mouth Daily., Disp: , Rfl:     Diclofenac Sodium (VOLTAREN) 1 % gel gel, APPLY 4 GRAMS TO AFFECTED AREA(S) FOUR TIMES A DAY AS NEEDED FOR PAIN, Disp: 100 g, Rfl: 0    dicyclomine (BENTYL) 10 MG capsule, TAKE 1 CAPSULE BY MOUTH THREE TIMES A DAY AS NEEDED FOR  IBS, Disp: 90 capsule, Rfl: 0    famotidine (PEPCID) 40 MG tablet, TAKE 1 TABLET BY MOUTH DAILY, Disp: 30 tablet, Rfl: 0    FeroSul 325 (65 Fe) MG tablet, TAKE 1 TABLET BY MOUTH DAILY WITH BREAKFAST, Disp: 90 tablet, Rfl: 0    gabapentin (NEURONTIN) 300 MG capsule, TAKE 1 CAPSULE BY MOUTH TWICE A DAY, Disp: 180 capsule, Rfl: 0    melatonin 3 MG tablet, Take 1 tablet by mouth Every Night., Disp: , Rfl:     metoprolol succinate XL (TOPROL-XL) 25 MG 24 hr tablet, TAKE ONE TABLET BY MOUTH DAILY, Disp: 30 tablet, Rfl: 11    Mirabegron ER (MYRBETRIQ) 50 MG tablet sustained-release 24 hour 24 hr tablet, Take 50 mg by mouth Daily., Disp: , Rfl:     pantoprazole (PROTONIX) 40 MG EC tablet, TAKE 1 TABLET BY MOUTH DAILY, Disp: 90 tablet, Rfl: 1    potassium chloride 10 MEQ CR tablet, TAKE 1 TABLET BY MOUTH TWICE A DAY, Disp: 180 tablet, Rfl: 0    Riboflavin (B2 PO), Take 1 tablet by mouth Daily., Disp: , Rfl:     rivaroxaban (Xarelto) 10 MG tablet, Take 1 tablet by mouth Daily., Disp: 21 tablet, Rfl: 0    vitamin B-12 (CYANOCOBALAMIN) 1000 MCG tablet, Take 1 tablet by mouth Daily., Disp: , Rfl:     ondansetron ODT (ZOFRAN-ODT) 4 MG disintegrating tablet, Place 1 tablet on the tongue Every 8 (Eight) Hours As Needed for Nausea., Disp: 20 tablet, Rfl: 0    Current Facility-Administered Medications:     denosumab (PROLIA) syringe 60 mg, 60 mg, Subcutaneous, Q6 Months, Yesi Hoffman MD, 60 mg at 03/18/24 0820    Objective   Physical Exam  Constitutional:       General: She is not in acute distress.     Appearance: Normal appearance. She is well-developed. She is not ill-appearing or diaphoretic.      Comments:  Patient is in no distress, patient has normal voice and speech.  Normal respiratory effort.  Her gait is slow with a support of a cane.   HENT:      Head: Normocephalic and atraumatic.   Pulmonary:      Effort: Pulmonary effort is normal.   Musculoskeletal:      Cervical back: Normal range of motion and neck supple.   Neurological:      General: No focal deficit present.      Mental Status: She is alert and oriented to person, place, and time. Mental status is at baseline.   Psychiatric:         Mood and Affect: Mood normal.         Assessment & Plan   Diagnoses and all orders for this visit:    1. Chronic neck pain (Primary)  -     Ambulatory Referral to Occupational Therapy    2. Chronic bilateral low back pain without sciatica  -     Ambulatory Referral to Occupational Therapy      Patient has dealt with a chronic lower back pain and neck pain for several years.  She is a status post upper thoracic to lumbar fusion for deformity performed in 2011.  I reviewed the office notes from the neurosurgery office from 3/20/2023 and 4/20/2023.  Patient at that time had repeat MRI done which ruled out spinal cord or nerve compression.  Continues physical therapy was recommended which she completed at that time.  I will be referring her to occupational therapy.  Fall prevention was also discussed and stressed.  Patient will continue to use a cane for the support.  She may take over-the-counter Tylenol as needed for pain.  Patient is not a good candidate for anti-inflammatories due to her GI issues and also anticoagulation.        No follow-ups on file.    Requested Prescriptions      No prescriptions requested or ordered in this encounter       Darlene Matute MD  05/23/2024

## 2024-05-25 RX ORDER — GABAPENTIN 300 MG/1
CAPSULE ORAL
Qty: 180 CAPSULE | Refills: 0 | Status: SHIPPED | OUTPATIENT
Start: 2024-05-25

## 2024-05-26 DIAGNOSIS — K21.9 GASTROESOPHAGEAL REFLUX DISEASE WITHOUT ESOPHAGITIS: ICD-10-CM

## 2024-05-28 RX ORDER — PANTOPRAZOLE SODIUM 40 MG/1
40 TABLET, DELAYED RELEASE ORAL DAILY
Qty: 90 TABLET | Refills: 1 | Status: SHIPPED | OUTPATIENT
Start: 2024-05-28

## 2024-06-05 ENCOUNTER — OFFICE (OUTPATIENT)
Dept: URBAN - METROPOLITAN AREA CLINIC 64 | Facility: CLINIC | Age: 79
End: 2024-06-05
Payer: COMMERCIAL

## 2024-06-05 VITALS
WEIGHT: 183 LBS | HEIGHT: 67 IN | HEART RATE: 86 BPM | SYSTOLIC BLOOD PRESSURE: 95 MMHG | DIASTOLIC BLOOD PRESSURE: 55 MMHG

## 2024-06-05 DIAGNOSIS — R15.2 FECAL URGENCY: ICD-10-CM

## 2024-06-05 DIAGNOSIS — R19.7 DIARRHEA, UNSPECIFIED: ICD-10-CM

## 2024-06-05 DIAGNOSIS — R14.0 ABDOMINAL DISTENSION (GASEOUS): ICD-10-CM

## 2024-06-05 PROCEDURE — 99214 OFFICE O/P EST MOD 30 MIN: CPT | Performed by: NURSE PRACTITIONER

## 2024-06-05 RX ORDER — METRONIDAZOLE 500 MG/1
1500 TABLET, FILM COATED ORAL
Qty: 30 | Refills: 0 | Status: ACTIVE
Start: 2024-06-05

## 2024-06-08 DIAGNOSIS — K58.0 IRRITABLE BOWEL SYNDROME WITH DIARRHEA: ICD-10-CM

## 2024-06-08 DIAGNOSIS — K21.9 GASTROESOPHAGEAL REFLUX DISEASE WITHOUT ESOPHAGITIS: ICD-10-CM

## 2024-06-08 RX ORDER — DICYCLOMINE HYDROCHLORIDE 10 MG/1
CAPSULE ORAL
Qty: 90 CAPSULE | Refills: 0 | Status: SHIPPED | OUTPATIENT
Start: 2024-06-08

## 2024-06-08 RX ORDER — FAMOTIDINE 40 MG/1
40 TABLET, FILM COATED ORAL DAILY
Qty: 30 TABLET | Refills: 0 | Status: SHIPPED | OUTPATIENT
Start: 2024-06-08

## 2024-06-10 ENCOUNTER — APPOINTMENT (OUTPATIENT)
Dept: CT IMAGING | Facility: HOSPITAL | Age: 79
End: 2024-06-10
Payer: MEDICARE

## 2024-06-10 ENCOUNTER — HOSPITAL ENCOUNTER (EMERGENCY)
Facility: HOSPITAL | Age: 79
Discharge: HOME | End: 2024-06-10
Admitting: EMERGENCY MEDICINE
Payer: MEDICARE

## 2024-06-10 VITALS
DIASTOLIC BLOOD PRESSURE: 67 MMHG | RESPIRATION RATE: 16 BRPM | TEMPERATURE: 98 F | OXYGEN SATURATION: 95 % | SYSTOLIC BLOOD PRESSURE: 123 MMHG | HEIGHT: 66 IN | BODY MASS INDEX: 29.44 KG/M2 | WEIGHT: 183.2 LBS | HEART RATE: 88 BPM

## 2024-06-10 DIAGNOSIS — S00.03XA CONTUSION OF SCALP, INITIAL ENCOUNTER: ICD-10-CM

## 2024-06-10 DIAGNOSIS — I26.94 MULTIPLE SUBSEGMENTAL PULMONARY EMBOLI WITHOUT ACUTE COR PULMONALE: ICD-10-CM
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
## 2024-06-10 PROCEDURE — 99284 EMERGENCY DEPT VISIT MOD MDM: CPT

## 2024-06-10 PROCEDURE — 70450 CT HEAD/BRAIN W/O DYE: CPT

## 2024-06-10 NOTE — DISCHARGE INSTRUCTIONS
Return to the ER for any worsening symptoms.  Follow-up with your primary care provider as needed.

## 2024-06-10 NOTE — ED PROVIDER NOTES
Subjective   History of Present Illness  Patient presents from urgent care for evaluation for her fall.  She reports that last week she got dizzy and lightheaded and fell backwards and had a significant headache.  She went to urgent care today this was about 5 days ago that her fall occurred and she was told in urgent care she had a left ear infection but because of her fall and headache it was recommended she come to the ER for evaluation to rule out a head bleed.  She denies any nausea vomiting she reports her  dizziness        Review of Systems   Neurological:  Positive for dizziness and headaches.       Past Medical History:   Diagnosis Date    GERD (gastroesophageal reflux disease)     History of recurrent UTIs     IBS (irritable bowel syndrome)     Osteoarthritis     Osteoporosis     on prolia(12/5/22)    Primary osteoarthritis of both knees 01/24/2022    Pulmonary embolism 2011    after scoliosis surgery    Pulmonary embolism 10/2022    Scoliosis     Vitamin D deficiency        Allergies   Allergen Reactions    Amoxicillin Other (See Comments)      unsure of type of reaction - states it was so long ago she can't remember     Penicillins Nausea And Vomiting       Past Surgical History:   Procedure Laterality Date    BACK SURGERY  11/2011    Dr. Olsen, due to scoliosis, earnestine and some fusions    BREAST BIOPSY      CHOLECYSTECTOMY      COLONOSCOPY  01/18/2018    HIATAL HERNIA REPAIR  2013    REIMPLANT URETER IN BLADDER         Family History   Problem Relation Age of Onset    Heart failure Mother     Cancer Father        Social History     Socioeconomic History    Marital status:     Number of children: 3    Years of education: 12    Highest education level: Master's degree (e.g., MA, MS, Hayes, MEd, MSW, [employer])   Tobacco Use    Smoking status: Never     Passive exposure: Never    Smokeless tobacco: Never   Vaping Use    Vaping status: Never Used   Substance and Sexual Activity    Alcohol use: No    Drug  "use: No    Sexual activity: Not Currently     Partners: Male     Birth control/protection: Condom, Spermicide           Objective   Physical Exam  Vitals and nursing note reviewed.   Constitutional:       Appearance: She is well-developed.   HENT:      Head: Normocephalic and atraumatic.      Nose: Nose normal.   Eyes:      Pupils: Pupils are equal, round, and reactive to light.   Pulmonary:      Effort: Pulmonary effort is normal.   Musculoskeletal:         General: Normal range of motion.      Cervical back: Normal range of motion.   Skin:     General: Skin is warm and dry.   Neurological:      General: No focal deficit present.      Mental Status: She is alert and oriented to person, place, and time.      Cranial Nerves: No cranial nerve deficit.      Motor: No weakness.   Psychiatric:         Mood and Affect: Mood normal.         Behavior: Behavior normal.         Thought Content: Thought content normal.         Judgment: Judgment normal.         Procedures           ED Course                                             Medical Decision Making  Appropriate PPE worn during exam.    /67   Pulse 88   Temp 98 °F (36.7 °C) (Oral)   Resp 16   Ht 167.6 cm (66\")   Wt 83.1 kg (183 lb 3.2 oz)   SpO2 95%   BMI 29.57 kg/m²      Co-morbidities --  has a past medical history of GERD (gastroesophageal reflux disease), History of recurrent UTIs, IBS (irritable bowel syndrome), Osteoarthritis, Osteoporosis, Primary osteoarthritis of both knees (01/24/2022), Pulmonary embolism (2011), Pulmonary embolism (10/2022), Scoliosis, and Vitamin D deficiency.  Radiology interpretation --  X-rays reviewed by me and independently interpreted by radiologist:  CT Head Without Contrast    Result Date: 6/10/2024  Impression: No acute intracranial findings by CT. Electronically Signed: Yash Black MD  6/10/2024 7:40 PM EDT  Workstation ID: [id number]      Is a 79-year-old female who presents for evaluation for her fall.  Her head " CT was negative.  No evidence of acute abdomen intracranial abnormality.  I recommended that she follow-up with her primary care doctor.  She has antibiotics at home for ear infection she was stable at time of discharge return precautions were provided she was in agreement plan  I discussed the findings and recommendations with the patient who voices understanding. Stable while in the ER.     Note Disclaimer: At [facility], we believe that sharing information builds trust and better relationships. You are receiving this note because you are receiving care at [facility] or recently visited. It is possible you will see health information before a provider has talked with you about it. This kind of information can be easy to misunderstand. To help you fully understand what it means for your health, we urge you to discuss this note with your provider.        Problems Addressed:  Contusion of scalp, initial encounter: complicated acute illness or injury  Fall, initial encounter: complicated acute illness or injury    Amount and/or Complexity of Data Reviewed  Radiology: ordered.        Final diagnoses:   Fall, initial encounter   Contusion of scalp, initial encounter       ED Disposition  ED Disposition       ED Disposition   Discharge    Condition   Stable    Comment   --               Darlene Matute MD  [address] [id number]  [phone]    Schedule an appointment as soon as possible for a visit   As needed, If symptoms worsen         Medication List      No changes were made to your prescriptions during this visit.            Augustina Khan PA-C  06/10/24 2009

## 2024-06-11 ENCOUNTER — PATIENT OUTREACH (OUTPATIENT)
Dept: CASE MANAGEMENT | Facility: OTHER | Age: 79
End: 2024-06-11
Payer: MEDICARE

## 2024-06-11 NOTE — OUTREACH NOTE
AMBULATORY CASE MANAGEMENT NOTE    Names and Relationships of Patient/Support Persons: Caller: Abida Becker; Relationship: Self -     Patient Outreach    Pt discharged from [facility] ED on 6/10/24, seen for fall. RN-ACM outreach call made to pt, spoke briefly. Explained role of RN-ACM and reason for call. Pt reports dizziness and lightheadedness as baseline. Reviewed ED AVS with pt. Pt has PCP follow up appt scheduled. No questions per pt. She expresses appreciation for the call. Advised her to call RN-ACM or [facility] Nurse Line with any needs. Follow up outreach prn.     Send Education  Questions/Answers      Flowsheet Row Most Recent Value   Annual Wellness Visit:  Patient Has Completed   Other Patient Education/Resources  24/7 [facility] Nurse Call Line   24/7 Nurse Call Line Education Method Verbal   Advanced Directives: Patient Has          SDOH updated and reviewed with the patient during this program:  Questionnaire sent via [facility]  Ambulatory Case Management    6/11/2024, 15:31 EDT

## 2024-06-15 DIAGNOSIS — N91.2 AMENIA: ICD-10-CM

## 2024-06-17 RX ORDER — FERROUS SULFATE 325(65) MG
1 TABLET ORAL
Qty: 90 TABLET | Refills: 0 | Status: SHIPPED | OUTPATIENT
Start: 2024-06-17

## 2024-06-20 ENCOUNTER — OFFICE VISIT (OUTPATIENT)
Dept: FAMILY MEDICINE CLINIC | Facility: CLINIC | Age: 79
End: 2024-06-20
Payer: MEDICARE

## 2024-06-20 VITALS
HEIGHT: 66 IN | WEIGHT: 184.6 LBS | TEMPERATURE: 97.4 F | DIASTOLIC BLOOD PRESSURE: 70 MMHG | BODY MASS INDEX: 29.67 KG/M2 | SYSTOLIC BLOOD PRESSURE: 107 MMHG | OXYGEN SATURATION: 94 % | HEART RATE: 83 BPM | RESPIRATION RATE: 16 BRPM

## 2024-06-20 DIAGNOSIS — H81.13 BENIGN PAROXYSMAL POSITIONAL VERTIGO DUE TO BILATERAL VESTIBULAR DISORDER: Primary | ICD-10-CM

## 2024-06-20 DIAGNOSIS — S80.12XA HEMATOMA OF LEG, LEFT, INITIAL ENCOUNTER: ICD-10-CM

## 2024-06-20 PROCEDURE — 99213 OFFICE O/P EST LOW 20 MIN: CPT | Performed by: FAMILY MEDICINE

## 2024-06-20 PROCEDURE — G2211 COMPLEX E/M VISIT ADD ON: HCPCS | Performed by: FAMILY MEDICINE

## 2024-06-20 PROCEDURE — 3074F SYST BP LT 130 MM HG: CPT | Performed by: FAMILY MEDICINE

## 2024-06-20 PROCEDURE — 3078F DIAST BP <80 MM HG: CPT | Performed by: FAMILY MEDICINE

## 2024-06-20 PROCEDURE — 1126F AMNT PAIN NOTED NONE PRSNT: CPT | Performed by: FAMILY MEDICINE

## 2024-06-20 RX ORDER — MECLIZINE HYDROCHLORIDE 25 MG/1
25 TABLET ORAL 3 TIMES DAILY PRN
Qty: 30 TABLET | Refills: 0 | Status: SHIPPED | OUTPATIENT
Start: 2024-06-20

## 2024-06-20 NOTE — PROGRESS NOTES
Subjective   Chief Complaint   Patient presents with    Joint Swelling    Fall     2 Weeks Ago Today     Abida Becker is a 79 y.o. female.     Patient Care Team:  Darlene Matute MD as PCP - General  HerreidRolando ro MD as Consulting Physician (Urology)  Banet, Duane Edward, MD as Consulting Physician (Dermatology)  Milla Urias MD as Consulting Physician (Hematology and Oncology)  Brett Zuniga MD as Consulting Physician (Cardiology)  Tomás Manley RN as Ambulatory  ([facility])    History of Present Illness  She is coming in today to follow-up on her recent fall and ER visit.  Patient took a fall about 12 days ago and hit her head, she decided to go to the ER for evaluation couple of days after that and had CT of the head which was negative.  She tells me that couple days ago she was seen at urgent care again as she sustained a big hematoma on the left shin.  Patient has been on Xarelto for few years now.  She has been experiencing some on and off dizziness and lightheadedness and has been experiencing spinning sensations.  She typically uses a cane for the support while walking.       The following portions of the patient's history were reviewed and updated as appropriate: allergies, current medications, past family history, past medical history, past social history, past surgical history, and problem list.  Past Medical History:   Diagnosis Date    GERD (gastroesophageal reflux disease)     History of recurrent UTIs     IBS (irritable bowel syndrome)     Osteoarthritis     Osteoporosis     on prolia(12/5/22)    Primary osteoarthritis of both knees 01/24/2022    Pulmonary embolism 2011    after scoliosis surgery    Pulmonary embolism 10/2022    Scoliosis     Vitamin D deficiency      Past Surgical History:   Procedure Laterality Date    BACK SURGERY  11/2011    Dr. Olsen, due to scoliosis, earnestine and some fusions    BREAST BIOPSY      CHOLECYSTECTOMY      COLONOSCOPY   "01/18/2018    HIATAL HERNIA REPAIR  2013    REIMPLANT URETER IN BLADDER       The patient has a family history of  Family History   Problem Relation Age of Onset    Heart failure Mother     Cancer Father      Social History     Socioeconomic History    Marital status:     Number of children: 3    Years of education: 12    Highest education level: Master's degree (e.g., MA, MS, Hayes, MEd, MSW, [employer])   Tobacco Use    Smoking status: Never     Passive exposure: Never    Smokeless tobacco: Never   Vaping Use    Vaping status: Never Used   Substance and Sexual Activity    Alcohol use: No    Drug use: No    Sexual activity: Not Currently     Partners: Male     Birth control/protection: Condom, Spermicide       Review of Systems   Constitutional:  Negative for chills and fever.   Musculoskeletal:  Positive for arthralgias, back pain and gait problem.   Neurological:  Positive for dizziness. Negative for seizures, numbness, headache and confusion.     Visit Vitals  /70 (BP Location: Left arm, Patient Position: Sitting, Cuff Size: Adult)   Pulse 83   Temp 97.4 °F (36.3 °C) (Infrared)   Resp 16   Ht 167.6 cm (66\")   Wt 83.7 kg (184 lb 9.6 oz)   SpO2 94%   BMI 29.80 kg/m²              Current Outpatient Medications:     acetaminophen (TYLENOL) 500 MG tablet, Take 1 tablet by mouth Every 6 (Six) Hours As Needed for Fever or Mild Pain., Disp: , Rfl:     calcium carbonate (OS-STEVEN) 600 MG tablet, Take 1 tablet by mouth Daily., Disp: , Rfl:     cholecalciferol (VITAMIN D3) 1000 units tablet, Take 1 tablet by mouth Daily., Disp: , Rfl:     Diclofenac Sodium (VOLTAREN) 1 % gel gel, APPLY 4 GRAMS TO AFFECTED AREA(S) FOUR TIMES A DAY AS NEEDED FOR PAIN, Disp: 100 g, Rfl: 0    dicyclomine (BENTYL) 10 MG capsule, TAKE 1 CAPSULE BY MOUTH THREE TIMES A DAY AS NEEDED FOR  IBS, Disp: 90 capsule, Rfl: 0    famotidine (PEPCID) 40 MG tablet, TAKE 1 TABLET BY MOUTH DAILY, Disp: 30 tablet, Rfl: 0    FeroSul 325 (65 Fe) MG tablet, " TAKE 1 TABLET BY MOUTH DAILY WITH BREAKFAST, Disp: 90 tablet, Rfl: 0    gabapentin (NEURONTIN) 300 MG capsule, TAKE 1 CAPSULE BY MOUTH TWICE A DAY, Disp: 180 capsule, Rfl: 0    meclizine (ANTIVERT) 25 MG tablet, Take 1 tablet by mouth 3 (Three) Times a Day As Needed for Dizziness., Disp: 30 tablet, Rfl: 0    melatonin 3 MG tablet, Take 1 tablet by mouth Every Night., Disp: , Rfl:     metoprolol succinate XL (TOPROL-XL) 25 MG 24 hr tablet, TAKE ONE TABLET BY MOUTH DAILY, Disp: 30 tablet, Rfl: 11    Mirabegron ER (MYRBETRIQ) 50 MG tablet sustained-release 24 hour 24 hr tablet, Take 50 mg by mouth Daily., Disp: , Rfl:     nitrofurantoin, macrocrystal-monohydrate, (MACROBID) 100 MG capsule, , Disp: , Rfl:     ondansetron ODT (ZOFRAN-ODT) 4 MG disintegrating tablet, Place 1 tablet on the tongue Every 8 (Eight) Hours As Needed for Nausea., Disp: 20 tablet, Rfl: 0    pantoprazole (PROTONIX) 40 MG EC tablet, TAKE 1 TABLET BY MOUTH DAILY, Disp: 90 tablet, Rfl: 1    potassium chloride 10 MEQ CR tablet, TAKE 1 TABLET BY MOUTH TWICE A DAY, Disp: 180 tablet, Rfl: 0    Riboflavin (B2 PO), Take 1 tablet by mouth Daily., Disp: , Rfl:     rivaroxaban (Xarelto) 10 MG tablet, Take 1 tablet by mouth Daily., Disp: 28 tablet, Rfl: 0    vitamin B-12 (CYANOCOBALAMIN) 1000 MCG tablet, Take 1 tablet by mouth Daily., Disp: , Rfl:     Current Facility-Administered Medications:     denosumab (PROLIA) syringe 60 mg, 60 mg, Subcutaneous, Q6 Months, Yesi oHffman MD, 60 mg at 03/18/24 0820    Objective   Physical Exam  Constitutional:       General: She is not in acute distress.     Appearance: Normal appearance. She is well-developed. She is not ill-appearing or diaphoretic.      Comments: Patient is in no distress, patient has normal voice and speech.  Normal respiratory effort.  Patient is using a cane for the support while walking.   HENT:      Head: Normocephalic and atraumatic.   Pulmonary:      Effort: Pulmonary effort is normal.    Musculoskeletal:      Cervical back: Normal range of motion and neck supple.   Skin:     Comments: There is a big hematoma covering significant portion of the left shin.  No signs of infection.  No bleeding.   Neurological:      General: No focal deficit present.      Mental Status: She is alert and oriented to person, place, and time. Mental status is at baseline.   Psychiatric:         Mood and Affect: Mood normal.         CT Head Without Contrast    Result Date: 6/10/2024  Impression: Impression: No acute intracranial findings by CT. Electronically Signed: Yash Black MD  6/10/2024 7:40 PM EDT  Workstation ID: [id number]       Assessment & Plan   Diagnoses and all orders for this visit:    1. Benign paroxysmal positional vertigo due to bilateral vestibular disorder (Primary)  -     meclizine (ANTIVERT) 25 MG tablet; Take 1 tablet by mouth 3 (Three) Times a Day As Needed for Dizziness.  Dispense: 30 tablet; Refill: 0  -     Ambulatory Referral to ENT (Otolaryngology)    2. Hematoma of leg, left, initial encounter        She was previously evaluated in the ER after the fall and the head injury and the CT of the head was done and did not show any acute intracranial findings.  Fall prevention was discussed and stressed.  Patient was strongly encouraged to use cane and possibly even a walker for the support to decrease the risk of fall.  Patient previously mended physical therapy.  I will be referring her to talk to ENT for further evaluation of her recurrent BPPV symptoms.  In meantime I also refilled her meclizine to take as needed.  Patient will follow-up with me down the road as scheduled for further management of her chronic medical problems.      Return if symptoms worsen or fail to improve, for Recheck.    Requested Prescriptions     Signed Prescriptions Disp Refills    meclizine (ANTIVERT) 25 MG tablet 30 tablet 0     Sig: Take 1 tablet by mouth 3 (Three) Times a Day As Needed for Dizziness.       Darlene  MD Juju  06/20/2024

## 2024-06-24 ENCOUNTER — TELEPHONE (OUTPATIENT)
Dept: FAMILY MEDICINE CLINIC | Facility: CLINIC | Age: 79
End: 2024-06-24

## 2024-06-24 DIAGNOSIS — R26.89 BALANCE PROBLEM: ICD-10-CM

## 2024-06-24 DIAGNOSIS — M51.36 DEGENERATIVE DISC DISEASE, LUMBAR: Primary | ICD-10-CM

## 2024-06-24 NOTE — TELEPHONE ENCOUNTER
I called and verified the patient what type of walker and she wants a rollator. I have submitted the forms and office notes and demos to [employer]

## 2024-06-24 NOTE — TELEPHONE ENCOUNTER
Caller: Abida Becker    Relationship: Self    Best call back number:     [phone] (Home)       What orders are you requesting (i.e. lab or imaging): LINETTE     In what timeframe would the patient need to come in: SOON    Where will you receive your lab/imaging services: [facility]?    Additional notes: PLEASE CALL AND LET HER KNOW WHERE IT'S CALLED IN TO

## 2024-06-27 ENCOUNTER — TELEPHONE (OUTPATIENT)
Dept: CARDIOLOGY | Facility: CLINIC | Age: 79
End: 2024-06-27

## 2024-06-27 NOTE — TELEPHONE ENCOUNTER
Caller: Abida Becker    Relationship: Self    Best call back number: [phone]    What is the best time to reach you: ANY    Who are you requesting to speak with (clinical staff, provider,  specific staff member): CLINICAL    What was the call regarding: PATIENT STATED THAT SHE HAS BEEN FEELING FATIGUE AND HER SMART WATCH SHOWS THAT HER HEART RATE RANGES FROM 48 . PATIENT STATED THAT SHE HAS NO OTHER SYMPTOMS AND WOULD LIKE A CALL BACK TO ADVISE.     Is it okay if the provider responds through MyChart: CALL

## 2024-07-05 ENCOUNTER — TELEPHONE (OUTPATIENT)
Dept: ONCOLOGY | Facility: CLINIC | Age: 79
End: 2024-07-05
Payer: MEDICARE

## 2024-07-05 NOTE — TELEPHONE ENCOUNTER
Caller: Liza Becker    Relationship: Self    Best call back number: [phone]    What is the best time to reach you: ANY    Who are you requesting to speak with (clinical staff, provider,  specific staff member): CLINICAL     What was the call regarding: LIZA IS CALLING NEEDING SAMPLES OF rivaroxaban (Xarelto) 10 MG tablet   WOULD LIKE TO  TODAY     PLEASE ADVISE

## 2024-07-06 DIAGNOSIS — K21.9 GASTROESOPHAGEAL REFLUX DISEASE WITHOUT ESOPHAGITIS: ICD-10-CM

## 2024-07-07 RX ORDER — FAMOTIDINE 40 MG/1
40 TABLET, FILM COATED ORAL DAILY
Qty: 30 TABLET | Refills: 0 | Status: SHIPPED | OUTPATIENT
Start: 2024-07-07

## 2024-07-11 ENCOUNTER — TELEPHONE (OUTPATIENT)
Dept: ONCOLOGY | Facility: CLINIC | Age: 79
End: 2024-07-11

## 2024-07-11 NOTE — TELEPHONE ENCOUNTER
Attempted to call pt to let her know that I only have one bottle (one week) of Xarelto 10 mg samples.  Voicemail left for pt letting her know that I will leave the sample at the  for her to .  I also let her know that I do not know when we will be getting any more samples.  My direct number was left for pt to call back with any questions or concerns.  Sample left a .

## 2024-07-11 NOTE — TELEPHONE ENCOUNTER
Caller Name: Abida Becker [place]    Relationship: Self    Best Contact Number: [phone] CAN L/M     Patient is requesting samples of rivaroxaban (Xarelto) 10 MG tablet     How many days of medication do you have left? 4 OR 5 DAYS    Additional Information: PATIENT WILL

## 2024-07-18 ENCOUNTER — TELEPHONE (OUTPATIENT)
Dept: ONCOLOGY | Facility: CLINIC | Age: 79
End: 2024-07-18
Payer: MEDICARE

## 2024-07-18 DIAGNOSIS — I10 ESSENTIAL HYPERTENSION: ICD-10-CM

## 2024-07-18 RX ORDER — POTASSIUM CHLORIDE 750 MG/1
TABLET, FILM COATED, EXTENDED RELEASE ORAL
Qty: 180 TABLET | Refills: 0 | Status: SHIPPED | OUTPATIENT
Start: 2024-07-18

## 2024-07-18 NOTE — TELEPHONE ENCOUNTER
Caller: LIZA GRAVES    Relationship:SELF     Callback number: [phone]   Is it ok to leave a message: [x] Yes [] No    Requested medication for samples: XARELTO 10MG    How much medication does the patient currently have left: HAS  ENOUGH UNTIL MONDAY     Who will be picking up the samples: SELF    Do you need information about patient financial assistance for this medication: [x] Yes [] No

## 2024-07-20 DIAGNOSIS — K58.0 IRRITABLE BOWEL SYNDROME WITH DIARRHEA: ICD-10-CM

## 2024-07-20 RX ORDER — DICYCLOMINE HYDROCHLORIDE 10 MG/1
CAPSULE ORAL
Qty: 90 CAPSULE | Refills: 0 | Status: SHIPPED | OUTPATIENT
Start: 2024-07-20

## 2024-07-22 DIAGNOSIS — I26.94 MULTIPLE SUBSEGMENTAL PULMONARY EMBOLI WITHOUT ACUTE COR PULMONALE: ICD-10-CM

## 2024-07-22 NOTE — TELEPHONE ENCOUNTER
Phoned pt and informed her our office does not have any samples at this time. Gave her the number for Xarelto financial assistance per Cheikh's instruction. Pt encouraged to call them to enroll and $85 a month. If she is unable to qualify I informed her to call office back and we will need ot talk with Dr. Urias about other medication options.

## 2024-07-22 NOTE — TELEPHONE ENCOUNTER
PATIENT CALLING BACK TODAY 7/22/2024   Caller: LIZA GRAVES     Relationship:SELF      Callback number: [phone]              Is it ok to leave a message: [x] Yes [] No     Requested medication for samples: XARELTO 10MG     How much medication does the patient currently have left: HAS  ENOUGH UNTIL MONDAY      Who will be picking up the samples: SELF     Do you need information about patient financial assistance for this medication: [x] Yes [] No

## 2024-07-31 ENCOUNTER — LAB (OUTPATIENT)
Dept: LAB | Facility: HOSPITAL | Age: 79
End: 2024-07-31
Payer: MEDICARE

## 2024-07-31 DIAGNOSIS — Z86.718 PERSONAL HISTORY OF OTHER VENOUS THROMBOSIS AND EMBOLISM: ICD-10-CM

## 2024-07-31 DIAGNOSIS — M81.0 AGE-RELATED OSTEOPOROSIS WITHOUT CURRENT PATHOLOGICAL FRACTURE: ICD-10-CM

## 2024-07-31 DIAGNOSIS — I26.94 MULTIPLE SUBSEGMENTAL PULMONARY EMBOLI WITHOUT ACUTE COR PULMONALE: ICD-10-CM

## 2024-07-31 DIAGNOSIS — N91.2 AMENIA: ICD-10-CM

## 2024-07-31 LAB
ALBUMIN SERPL-MCNC: 4.3 G/DL (ref 3.5–5.2)
ALBUMIN/GLOB SERPL: 2 G/DL
ALP SERPL-CCNC: 69 U/L (ref 39–117)
ALT SERPL W P-5'-P-CCNC: 18 U/L (ref 1–33)
ANION GAP SERPL CALCULATED.3IONS-SCNC: 10.7 MMOL/L (ref 5–15)
AST SERPL-CCNC: 16 U/L (ref 1–32)
BASOPHILS # BLD AUTO: 0.02 10*3/MM3 (ref 0–0.2)
BASOPHILS NFR BLD AUTO: 0.3 % (ref 0–1.5)
BILIRUB SERPL-MCNC: 0.8 MG/DL (ref 0–1.2)
BUN SERPL-MCNC: 19 MG/DL (ref 8–23)
BUN/CREAT SERPL: 25.7 (ref 7–25)
CALCIUM SPEC-SCNC: 9.3 MG/DL (ref 8.6–10.5)
CHLORIDE SERPL-SCNC: 104 MMOL/L (ref 98–107)
CO2 SERPL-SCNC: 25.3 MMOL/L (ref 22–29)
CREAT SERPL-MCNC: 0.74 MG/DL (ref 0.57–1)
D DIMER PPP FEU-MCNC: 0.35 MG/L (FEU) (ref 0–0.77)
DEPRECATED RDW RBC AUTO: 47.7 FL (ref 37–54)
EGFRCR SERPLBLD CKD-EPI 2021: 83.4 ML/MIN/1.73
EOSINOPHIL # BLD AUTO: 0.09 10*3/MM3 (ref 0–0.4)
EOSINOPHIL NFR BLD AUTO: 1.4 % (ref 0.3–6.2)
ERYTHROCYTE [DISTWIDTH] IN BLOOD BY AUTOMATED COUNT: 13.3 % (ref 12.3–15.4)
GLOBULIN UR ELPH-MCNC: 2.1 GM/DL
GLUCOSE SERPL-MCNC: 122 MG/DL (ref 65–99)
HCT VFR BLD AUTO: 41.8 % (ref 34–46.6)
HGB BLD-MCNC: 13.2 G/DL (ref 12–15.9)
LYMPHOCYTES # BLD AUTO: 1.42 10*3/MM3 (ref 0.7–3.1)
LYMPHOCYTES NFR BLD AUTO: 21.5 % (ref 19.6–45.3)
MCH RBC QN AUTO: 31.5 PG (ref 26.6–33)
MCHC RBC AUTO-ENTMCNC: 31.6 G/DL (ref 31.5–35.7)
MCV RBC AUTO: 99.8 FL (ref 79–97)
MONOCYTES # BLD AUTO: 0.78 10*3/MM3 (ref 0.1–0.9)
MONOCYTES NFR BLD AUTO: 11.8 % (ref 5–12)
NEUTROPHILS NFR BLD AUTO: 4.31 10*3/MM3 (ref 1.7–7)
NEUTROPHILS NFR BLD AUTO: 65 % (ref 42.7–76)
PLATELET # BLD AUTO: 337 10*3/MM3 (ref 140–450)
PMV BLD AUTO: 9.4 FL (ref 6–12)
POTASSIUM SERPL-SCNC: 3.9 MMOL/L (ref 3.5–5.2)
PROT SERPL-MCNC: 6.4 G/DL (ref 6–8.5)
RBC # BLD AUTO: 4.19 10*6/MM3 (ref 3.77–5.28)
SODIUM SERPL-SCNC: 140 MMOL/L (ref 136–145)
WBC NRBC COR # BLD AUTO: 6.62 10*3/MM3 (ref 3.4–10.8)

## 2024-07-31 PROCEDURE — 85025 COMPLETE CBC W/AUTO DIFF WBC: CPT

## 2024-07-31 PROCEDURE — 80053 COMPREHEN METABOLIC PANEL: CPT | Performed by: INTERNAL MEDICINE

## 2024-07-31 PROCEDURE — 85379 FIBRIN DEGRADATION QUANT: CPT | Performed by: INTERNAL MEDICINE

## 2024-07-31 PROCEDURE — 36415 COLL VENOUS BLD VENIPUNCTURE: CPT

## 2024-08-08 NOTE — PROGRESS NOTES
HEMATOLOGY ONCOLOGY OUTPATIENT FOLLOW UP       Patient name: Abida Becker  : 1945  MRN: [id number]  Primary Care Physician: Darlene Matute MD  Referring Physician: Darlene Matute MD  Reason For Consult: Venous thromboembolism      History of Present Illness:    Patient is a 79 y.o. female who has a history of prior provoked venous thromboembolism .  At that time she was anticoagulated for 3 to 4 months.  She was subsequently taken off anticoagulation and did well.  Now patient was admitted for pneumonia recently to the hospital.    After discharge she noted to have worsening of symptoms and went back to the hospital.    10/13/2022 -CT PE with moderate large abnormal pulmonary emboli in the distal end of the right main pulmonary artery, moderate pulmonary emboli in the left pulmonary artery branches, pneumonia changes.  Ultrasound Doppler with acute right lower extremity DVT in the peroneal, acute left lower extremity superficial thrombophlebitis  Patient was discharged on Coumadin.  Patient takes coumadin 4 mg daily. INR therapeutic, hypercoagulable workup with elevated factor 8 activity, low protein C    23 - hb 11.4, plt 376,   Switched to Xarelto     24 - cbc normal, ddimer normal 0.3    Subjective:  Continues to be on xarelto, 10 mg, no bleeding, has ongoing bruising.      Past Medical History:   Diagnosis Date    GERD (gastroesophageal reflux disease)     History of recurrent UTIs     IBS (irritable bowel syndrome)     Osteoarthritis     Osteoporosis     on prolia(22)    Primary osteoarthritis of both knees 2022    Pulmonary embolism     after scoliosis surgery    Pulmonary embolism 10/2022    Scoliosis     Vitamin D deficiency        Past Surgical History:   Procedure Laterality Date    BACK SURGERY  2011    Dr. lOsen, due to scoliosis, earnestine and some fusions    BREAST BIOPSY      CHOLECYSTECTOMY      COLONOSCOPY  2018    HIATAL  HERNIA REPAIR  2013    REIMPLANT URETER IN BLADDER           Current Outpatient Medications:     acetaminophen (TYLENOL) 500 MG tablet, Take 1 tablet by mouth Every 6 (Six) Hours As Needed for Fever or Mild Pain., Disp: , Rfl:     calcium carbonate (OS-STEVEN) 600 MG tablet, Take 1 tablet by mouth Daily., Disp: , Rfl:     cholecalciferol (VITAMIN D3) 1000 units tablet, Take 1 tablet by mouth Daily., Disp: , Rfl:     Diclofenac Sodium (VOLTAREN) 1 % gel gel, APPLY 4 GRAMS TO AFFECTED AREA(S) FOUR TIMES A DAY AS NEEDED FOR PAIN, Disp: 100 g, Rfl: 0    dicyclomine (BENTYL) 10 MG capsule, TAKE 1 CAPSULE BY MOUTH THREE TIMES A DAY AS NEEDED FOR  IBS, Disp: 90 capsule, Rfl: 0    famotidine (PEPCID) 40 MG tablet, TAKE 1 TABLET BY MOUTH DAILY, Disp: 30 tablet, Rfl: 0    FeroSul 325 (65 Fe) MG tablet, TAKE 1 TABLET BY MOUTH DAILY WITH BREAKFAST, Disp: 90 tablet, Rfl: 0    gabapentin (NEURONTIN) 300 MG capsule, TAKE 1 CAPSULE BY MOUTH TWICE A DAY, Disp: 180 capsule, Rfl: 0    meclizine (ANTIVERT) 25 MG tablet, Take 1 tablet by mouth 3 (Three) Times a Day As Needed for Dizziness., Disp: 30 tablet, Rfl: 0    melatonin 3 MG tablet, Take 1 tablet by mouth Every Night., Disp: , Rfl:     metoprolol succinate XL (TOPROL-XL) 25 MG 24 hr tablet, TAKE ONE TABLET BY MOUTH DAILY, Disp: 30 tablet, Rfl: 11    Mirabegron ER (MYRBETRIQ) 50 MG tablet sustained-release 24 hour 24 hr tablet, Take 50 mg by mouth Daily., Disp: , Rfl:     mupirocin (BACTROBAN) 2 % ointment, Apply 1 Application topically to the appropriate area as directed 2 (Two) Times a Day for 10 days., Disp: 22 g, Rfl: 0    nitrofurantoin, macrocrystal-monohydrate, (MACROBID) 100 MG capsule, , Disp: , Rfl:     pantoprazole (PROTONIX) 40 MG EC tablet, TAKE 1 TABLET BY MOUTH DAILY, Disp: 90 tablet, Rfl: 1    potassium chloride 10 MEQ CR tablet, TAKE 1 TABLET BY MOUTH TWICE A DAY, Disp: 180 tablet, Rfl: 0    Riboflavin (B2 PO), Take 1 tablet by mouth Daily., Disp: , Rfl:      "rivaroxaban (Xarelto) 10 MG tablet, Take 1 tablet by mouth Daily., Disp: 90 tablet, Rfl: 1    vitamin B-12 (CYANOCOBALAMIN) 1000 MCG tablet, Take 1 tablet by mouth Daily., Disp: , Rfl:     Current Facility-Administered Medications:     denosumab (PROLIA) syringe 60 mg, 60 mg, Subcutaneous, Q6 Months, Yesi Hoffman MD, 60 mg at 03/18/24 0820    Allergies   Allergen Reactions    Amoxicillin Other (See Comments)      unsure of type of reaction - states it was so long ago she can't remember     Penicillins Nausea And Vomiting       Family History   Problem Relation Age of Onset    Heart failure Mother     Cancer Father        Cancer-related family history includes Cancer in her father.      Social History     Tobacco Use    Smoking status: Never     Passive exposure: Never    Smokeless tobacco: Never   Vaping Use    Vaping status: Never Used   Substance Use Topics    Alcohol use: No    Drug use: No     Social History     Social History Narrative    Not on file         Objective:    Vital Signs:  Vitals:    08/14/24 0936   BP: 104/67   Pulse: 76   Resp: 18   Temp: 97.4 °F (36.3 °C)   SpO2: 96%   Weight: 81.7 kg (180 lb 3.2 oz)   Height: 167.6 cm (66\")   PainSc: 0-No pain         Body mass index is 29.09 kg/m².    ECOG  (1) Restricted in physically strenuous activity, ambulatory and able to do work of light nature    Physical Exam:   Physical Exam  Constitutional:       Appearance: Normal appearance.   HENT:      Head: Normocephalic and atraumatic.   Eyes:      Extraocular Movements: Extraocular movements intact.      Pupils: Pupils are equal, round, and reactive to light.   Cardiovascular:      Rate and Rhythm: Normal rate and regular rhythm.      Pulses: Normal pulses.      Heart sounds: No murmur heard.  Pulmonary:      Effort: Pulmonary effort is normal.      Breath sounds: Normal breath sounds.   Abdominal:      General: There is no distension.      Palpations: Abdomen is soft. There is no mass.      Tenderness: " "There is no abdominal tenderness.   Musculoskeletal:         General: Normal range of motion.      Cervical back: Normal range of motion and neck supple.   Skin:     General: Skin is warm.   Neurological:      General: No focal deficit present.      Mental Status: She is alert.   Psychiatric:         Mood and Affect: Mood normal.       Lab Results - Last 18 Months   Lab Units 07/31/24  1014 02/23/24  1316 02/14/24  1401   WBC 10*3/mm3 6.62 5.20 6.79   HEMOGLOBIN g/dL 13.2 12.7 13.6   HEMATOCRIT % 41.8 39.5 42.8   PLATELETS 10*3/mm3 337 324 328   MCV fL 99.8* 93.6 97.3*     Lab Results - Last 18 Months   Lab Units 07/31/24  1014 02/23/24  1316 01/25/24  1011   SODIUM mmol/L 140 145 140   POTASSIUM mmol/L 3.9 4.3 4.3   CHLORIDE mmol/L 104 107 104   CO2 mmol/L 25.3 29.0 23.1   BUN mg/dL 19 16 16   CREATININE mg/dL 0.74 0.60 0.76   CALCIUM mg/dL 9.3 9.4 9.2   BILIRUBIN mg/dL 0.8 0.7 0.9   ALK PHOS U/L 69 65 78   ALT (SGPT) U/L 18 19 16   AST (SGOT) U/L 16 17 17   GLUCOSE mg/dL 122* 99 100*       Lab Results   Component Value Date    GLUCOSE 122 (H) 07/31/2024    BUN 19 07/31/2024    CREATININE 0.74 07/31/2024    EGFRIFNONA 83 02/07/2022    BCR 25.7 (H) 07/31/2024    K 3.9 07/31/2024    CO2 25.3 07/31/2024    CALCIUM 9.3 07/31/2024    ALBUMIN 4.3 07/31/2024    LABIL2 1.4 04/11/2019    AST 16 07/31/2024    ALT 18 07/31/2024       Lab Results - Last 18 Months   Lab Units 11/10/23  1022 11/06/23  1203 10/11/23  1403   INR  1.83* 3.98* 2.00       Lab Results   Component Value Date    IRON 99 11/09/2023    TIBC 395 11/09/2023    FERRITIN 64.19 11/09/2023       No results found for: \"FOLATE\"    No results found for: \"OCCULTBLD\"    No results found for: \"RETICCTPCT\"  Lab Results   Component Value Date    DIHEXIMN74 >2,000 (H) 08/24/2023     No results found for: \"SPEP\", \"UPEP\"  No results found for: \"LDH\", \"URICACID\"  Lab Results   Component Value Date    DELLA Negative 09/11/2019    SEDRATE 17 09/11/2019     No results found " "for: \"FIBRINOGEN\", \"HAPTOGLOBIN\"  Lab Results   Component Value Date    PTT 76.2 10/15/2022    INR 1.83 (L) 11/10/2023     No results found for: \"\"  No results found for: \"CEA\"  No components found for: \"CA-19-9\"  No results found for: \"PSA\"  Lab Results   Component Value Date    SEDRATE 17 09/11/2019      Assessment & Plan     Patient is a 79-year-old female with provoked venous thromboembolism.    Venous thromboembolism   Even though this is a recurrent event, this is still provoked with recent hospitalization   check for hypercoagulable work-up since this is her second episode this is concerning for factor VIII high, protein C deficiency   I would recommend indefinite anticoagulation INR was monitored by PCP has had significant fluctuation, interested in switching to NOAC  Switched to xarelto in 8/2023  Continues to be on xarelto 10 mg daily. Will remain on it indefinitely. Prescribe as needed.  No s/s of VTE    Anemia  Normocytic  Now resolved.     F/u in a year.    Thank you very much for providing the opportunity to participate in this patient’s care. Please do not hesitate to call if there are any other questions.    "

## 2024-08-12 ENCOUNTER — TELEPHONE (OUTPATIENT)
Dept: ENDOCRINOLOGY | Facility: CLINIC | Age: 79
End: 2024-08-12

## 2024-08-12 NOTE — TELEPHONE ENCOUNTER
Caller: Abida Becker    Relationship to patient: Self    Best call back number: [phone]     Chief complaint: PATIENT WAS DUE FOR A 6M SHOT AT HER LAST APPOINTMENT BUT WAS SICK AND HAD TO CANCEL. THE NEXT AVAILABLE IS NOT UNTIL FEB 18, BUT THAT WILL OUT HER WAY OUT OF DATE FOR HER SHOT.    Type of visit: FOLLOW UP    Requested date: AS SOON AS POSSIBLE     If rescheduling, when is the original appointment: CURRENTLY SCHEDULED FOR FEB 18TH    Additional notes:PLEASE CALL TO ADVISE.

## 2024-08-14 ENCOUNTER — OFFICE VISIT (OUTPATIENT)
Dept: ONCOLOGY | Facility: CLINIC | Age: 79
End: 2024-08-14
Payer: MEDICARE

## 2024-08-14 VITALS
TEMPERATURE: 97.4 F | DIASTOLIC BLOOD PRESSURE: 67 MMHG | HEART RATE: 76 BPM | HEIGHT: 66 IN | BODY MASS INDEX: 28.96 KG/M2 | SYSTOLIC BLOOD PRESSURE: 104 MMHG | OXYGEN SATURATION: 96 % | WEIGHT: 180.2 LBS | RESPIRATION RATE: 18 BRPM

## 2024-08-14 DIAGNOSIS — I26.94 MULTIPLE SUBSEGMENTAL PULMONARY EMBOLI WITHOUT ACUTE COR PULMONALE: Primary | ICD-10-CM

## 2024-08-14 PROCEDURE — 3078F DIAST BP <80 MM HG: CPT | Performed by: INTERNAL MEDICINE

## 2024-08-14 PROCEDURE — 1159F MED LIST DOCD IN RCRD: CPT | Performed by: INTERNAL MEDICINE

## 2024-08-14 PROCEDURE — 99214 OFFICE O/P EST MOD 30 MIN: CPT | Performed by: INTERNAL MEDICINE

## 2024-08-14 PROCEDURE — 1160F RVW MEDS BY RX/DR IN RCRD: CPT | Performed by: INTERNAL MEDICINE

## 2024-08-14 PROCEDURE — 3074F SYST BP LT 130 MM HG: CPT | Performed by: INTERNAL MEDICINE

## 2024-08-14 PROCEDURE — 1126F AMNT PAIN NOTED NONE PRSNT: CPT | Performed by: INTERNAL MEDICINE

## 2024-08-20 ENCOUNTER — OFFICE VISIT (OUTPATIENT)
Dept: WOUND CARE | Facility: HOSPITAL | Age: 79
End: 2024-08-20
Payer: MEDICARE

## 2024-08-20 PROCEDURE — G0463 HOSPITAL OUTPT CLINIC VISIT: HCPCS

## 2024-08-27 ENCOUNTER — OFFICE VISIT (OUTPATIENT)
Dept: WOUND CARE | Facility: HOSPITAL | Age: 79
End: 2024-08-27
Payer: MEDICARE

## 2024-08-28 ENCOUNTER — TELEPHONE (OUTPATIENT)
Dept: ENDOCRINOLOGY | Facility: CLINIC | Age: 79
End: 2024-08-28
Payer: MEDICARE

## 2024-08-28 DIAGNOSIS — M81.0 AGE-RELATED OSTEOPOROSIS WITHOUT CURRENT PATHOLOGICAL FRACTURE: Primary | ICD-10-CM

## 2024-08-30 ENCOUNTER — LAB (OUTPATIENT)
Dept: LAB | Facility: HOSPITAL | Age: 79
End: 2024-08-30
Payer: MEDICARE

## 2024-08-30 DIAGNOSIS — M81.0 AGE-RELATED OSTEOPOROSIS WITHOUT CURRENT PATHOLOGICAL FRACTURE: ICD-10-CM

## 2024-08-30 LAB
ALBUMIN SERPL-MCNC: 4.2 G/DL (ref 3.5–5.2)
ALBUMIN/GLOB SERPL: 1.8 G/DL
ALP SERPL-CCNC: 72 U/L (ref 39–117)
ALT SERPL W P-5'-P-CCNC: 28 U/L (ref 1–33)
ANION GAP SERPL CALCULATED.3IONS-SCNC: 9.9 MMOL/L (ref 5–15)
AST SERPL-CCNC: 14 U/L (ref 1–32)
BILIRUB SERPL-MCNC: 0.6 MG/DL (ref 0–1.2)
BUN SERPL-MCNC: 18 MG/DL (ref 8–23)
BUN/CREAT SERPL: 24 (ref 7–25)
CALCIUM SPEC-SCNC: 10.2 MG/DL (ref 8.6–10.5)
CHLORIDE SERPL-SCNC: 106 MMOL/L (ref 98–107)
CO2 SERPL-SCNC: 25.1 MMOL/L (ref 22–29)
CREAT SERPL-MCNC: 0.75 MG/DL (ref 0.57–1)
EGFRCR SERPLBLD CKD-EPI 2021: 82.1 ML/MIN/1.73
GLOBULIN UR ELPH-MCNC: 2.3 GM/DL
GLUCOSE SERPL-MCNC: 81 MG/DL (ref 65–99)
POTASSIUM SERPL-SCNC: 4.5 MMOL/L (ref 3.5–5.2)
PROT SERPL-MCNC: 6.5 G/DL (ref 6–8.5)
SODIUM SERPL-SCNC: 141 MMOL/L (ref 136–145)

## 2024-08-30 PROCEDURE — 80053 COMPREHEN METABOLIC PANEL: CPT

## 2024-08-30 PROCEDURE — 36415 COLL VENOUS BLD VENIPUNCTURE: CPT

## 2024-08-30 RX ORDER — GABAPENTIN 300 MG/1
CAPSULE ORAL
Qty: 180 CAPSULE | Refills: 0 | Status: SHIPPED | OUTPATIENT
Start: 2024-08-30

## 2024-09-03 ENCOUNTER — OFFICE VISIT (OUTPATIENT)
Dept: WOUND CARE | Facility: HOSPITAL | Age: 79
End: 2024-09-03
Payer: MEDICARE

## 2024-09-03 DIAGNOSIS — M81.0 AGE-RELATED OSTEOPOROSIS WITHOUT CURRENT PATHOLOGICAL FRACTURE: Primary | ICD-10-CM

## 2024-09-07 DIAGNOSIS — K58.0 IRRITABLE BOWEL SYNDROME WITH DIARRHEA: ICD-10-CM

## 2024-09-08 RX ORDER — DICYCLOMINE HYDROCHLORIDE 10 MG/1
CAPSULE ORAL
Qty: 90 CAPSULE | Refills: 0 | Status: SHIPPED | OUTPATIENT
Start: 2024-09-08

## 2024-09-10 ENCOUNTER — OFFICE VISIT (OUTPATIENT)
Dept: WOUND CARE | Facility: HOSPITAL | Age: 79
End: 2024-09-10
Payer: MEDICARE

## 2024-09-17 ENCOUNTER — OFFICE VISIT (OUTPATIENT)
Dept: WOUND CARE | Facility: HOSPITAL | Age: 79
End: 2024-09-17
Payer: MEDICARE

## 2024-09-19 ENCOUNTER — CLINICAL SUPPORT (OUTPATIENT)
Dept: ENDOCRINOLOGY | Facility: CLINIC | Age: 79
End: 2024-09-19
Payer: MEDICARE

## 2024-09-26 DIAGNOSIS — N91.2 AMENIA: ICD-10-CM

## 2024-09-26 PROCEDURE — 87186 SC STD MICRODIL/AGAR DIL: CPT | Performed by: NURSE PRACTITIONER

## 2024-09-26 PROCEDURE — 87077 CULTURE AEROBIC IDENTIFY: CPT | Performed by: NURSE PRACTITIONER

## 2024-09-26 PROCEDURE — 87086 URINE CULTURE/COLONY COUNT: CPT | Performed by: NURSE PRACTITIONER

## 2024-09-26 RX ORDER — FERROUS SULFATE 325(65) MG
1 TABLET ORAL
Qty: 90 TABLET | Refills: 0 | Status: SHIPPED | OUTPATIENT
Start: 2024-09-26

## 2024-09-27 ENCOUNTER — TELEPHONE (OUTPATIENT)
Dept: ONCOLOGY | Facility: CLINIC | Age: 79
End: 2024-09-27

## 2024-09-27 NOTE — TELEPHONE ENCOUNTER
Caller: Abida Becker    Relationship: Self    Best call back number: [phone]    What test/procedure requested: PRE-AUTH FOR FERROUS SULFATE THAT WAS SENT TO PHARMACY YESTERDAY.    When is it needed: ASAP, AS PT IS OUT OF MEDICATION

## 2024-09-27 NOTE — TELEPHONE ENCOUNTER
Spoke w/ pt and let her know that PA was denied due to insurance not covering OTC meds.  Pt v/u and states that she will buy an iron tablet OTC.

## 2024-09-30 ENCOUNTER — PATIENT ROUNDING (BHMG ONLY) (OUTPATIENT)
Dept: URGENT CARE | Facility: CLINIC | Age: 79
End: 2024-09-30
Payer: MEDICARE

## 2024-09-30 NOTE — ED NOTES
Thank you for letting us care for you in your recent visit to our urgent care center. We would love to hear about your experience with us. Was this the first time you have visited our location?    We’re always looking for ways to make our patients’ experiences even better. Do you have any recommendations on ways we may improve?     I appreciate you taking the time to respond. Please be on the lookout for a survey about your recent visit from [email or web address] via text or email. We would greatly appreciate if you could fill that out and turn it back in. We want your voice to be heard and we value your feedback.   Thank you for choosing [facility] for your healthcare needs.     Gabriela Practice Manager

## 2024-10-04 ENCOUNTER — OFFICE VISIT (OUTPATIENT)
Dept: FAMILY MEDICINE CLINIC | Facility: CLINIC | Age: 79
End: 2024-10-04
Payer: MEDICARE

## 2024-10-04 ENCOUNTER — HOSPITAL ENCOUNTER (OUTPATIENT)
Dept: INFUSION THERAPY | Facility: HOSPITAL | Age: 79
Discharge: HOME | End: 2024-10-04
Payer: MEDICARE

## 2024-10-04 VITALS
WEIGHT: 178.5 LBS | BODY MASS INDEX: 28.69 KG/M2 | OXYGEN SATURATION: 93 % | HEIGHT: 66 IN | DIASTOLIC BLOOD PRESSURE: 57 MMHG | RESPIRATION RATE: 16 BRPM | TEMPERATURE: 97.9 F | SYSTOLIC BLOOD PRESSURE: 103 MMHG | HEART RATE: 83 BPM

## 2024-10-04 DIAGNOSIS — N30.00 ACUTE CYSTITIS WITHOUT HEMATURIA: Primary | ICD-10-CM

## 2024-10-04 PROCEDURE — 25010000002 ERTAPENEM PER 500 MG: Performed by: FAMILY MEDICINE

## 2024-10-04 PROCEDURE — 1126F AMNT PAIN NOTED NONE PRSNT: CPT | Performed by: FAMILY MEDICINE

## 2024-10-04 PROCEDURE — 3078F DIAST BP <80 MM HG: CPT | Performed by: FAMILY MEDICINE

## 2024-10-04 PROCEDURE — 99214 OFFICE O/P EST MOD 30 MIN: CPT | Performed by: FAMILY MEDICINE

## 2024-10-04 PROCEDURE — 96365 THER/PROPH/DIAG IV INF INIT: CPT

## 2024-10-04 PROCEDURE — 36415 COLL VENOUS BLD VENIPUNCTURE: CPT

## 2024-10-04 PROCEDURE — 3074F SYST BP LT 130 MM HG: CPT | Performed by: FAMILY MEDICINE

## 2024-10-04 RX ORDER — SULFAMETHOXAZOLE AND TRIMETHOPRIM 400; 80 MG/1; MG/1
TABLET ORAL
COMMUNITY
Start: 2024-09-27

## 2024-10-04 RX ADMIN — ERTAPENEM SODIUM 1000 MG: 1 INJECTION, POWDER, LYOPHILIZED, FOR SOLUTION INTRAMUSCULAR; INTRAVENOUS at 14:12

## 2024-10-04 NOTE — PROGRESS NOTES
Subjective   Chief Complaint   Patient presents with    Fatigue    Chills     TAKING ABX FOR UTI AND NOT GETTING ANY BETTER     Abida Becker is a 79 y.o. female.     Patient Care Team:  Darlene Matute MD as PCP - General  Ben WheelerRolando MD as Consulting Physician (Urology)  Banet, Duane Edward, MD as Consulting Physician (Dermatology)  Brett Zuniga MD as Consulting Physician (Cardiology)  Thelma Lopez APRN as Nurse Practitioner (Hematology)    History of Present Illness  She is coming in today due to urinary tract infection issues.  Patient reports that she for started having some symptoms of UTI about 3 weeks ago.  She decided to go to the urgent care on 9/26/2024.  After urine was checked she was given prescription for Macrobid.  Later on she was called with the result of urine culture and Macrobid was switched to cefdinir.  She has been on cefdinir now since 9/29/2024, however she is not getting better.  She feels that she is getting worse.  She feels tired and rundown.  Her urinary symptoms are not any better.  She even had some chills.  Patient is already under the care of the urologist due to recurrent urinary tract infections.       The following portions of the patient's history were reviewed and updated as appropriate: allergies, current medications, past family history, past medical history, past social history, past surgical history, and problem list.  Past Medical History:   Diagnosis Date    Dysuria 09/26/2024    Dysuria 09/26/2024    GERD (gastroesophageal reflux disease)     History of recurrent UTIs     IBS (irritable bowel syndrome)     Osteoarthritis     Osteoporosis     on prolia(12/5/22)    Primary osteoarthritis of both knees 01/24/2022    Pulmonary embolism 2011    after scoliosis surgery    Pulmonary embolism 10/2022    Scoliosis     Vitamin D deficiency      Past Surgical History:   Procedure Laterality Date    BACK SURGERY  11/2011    Dr. Olsen, due to  "scoliosis, earnestine and some fusions    BREAST BIOPSY      CHOLECYSTECTOMY      COLONOSCOPY  01/18/2018    HIATAL HERNIA REPAIR  2013    REIMPLANT URETER IN BLADDER       The patient has a family history of  Family History   Problem Relation Age of Onset    Heart failure Mother     Cancer Father      Social History     Socioeconomic History    Marital status:     Number of children: 3    Years of education: 12    Highest education level: Master's degree (e.g., MA, MS, [employer], MEd, MSW, [employer])   Tobacco Use    Smoking status: Never     Passive exposure: Never    Smokeless tobacco: Never   Vaping Use    Vaping status: Never Used   Substance and Sexual Activity    Alcohol use: No    Drug use: No    Sexual activity: Not Currently     Partners: Male     Birth control/protection: Condom, Spermicide       Review of Systems   Constitutional:  Positive for chills and fatigue. Negative for activity change, appetite change and fever.   HENT:  Negative for congestion, ear pain, postnasal drip, sinus pressure, sore throat and swollen glands.    Respiratory:  Negative for cough, choking, chest tightness, shortness of breath, wheezing and stridor.    Cardiovascular:  Negative for chest pain.   Gastrointestinal:  Negative for nausea and vomiting.   Skin:  Negative for dry skin and rash.     Visit Vitals  /57   Pulse 83   Temp 97.9 °F (36.6 °C) (Temporal)   Resp 16   Ht 167.6 cm (66\")   Wt 81 kg (178 lb 8 oz)   SpO2 93%   BMI 28.81 kg/m²              Current Outpatient Medications:     acetaminophen (TYLENOL) 500 MG tablet, Take 1 tablet by mouth Every 6 (Six) Hours As Needed for Fever or Mild Pain., Disp: , Rfl:     calcium carbonate (OS-STEVEN) 600 MG tablet, Take 1 tablet by mouth Daily., Disp: , Rfl:     cholecalciferol (VITAMIN D3) 1000 units tablet, Take 1 tablet by mouth Daily., Disp: , Rfl:     Diclofenac Sodium (VOLTAREN) 1 % gel gel, APPLY 4 GRAMS TO AFFECTED AREA(S) FOUR TIMES A DAY AS NEEDED FOR PAIN, Disp: 100 g, Rfl: " 0    dicyclomine (BENTYL) 10 MG capsule, TAKE 1 CAPSULE BY MOUTH THREE TIMES A DAY AS NEEDED FOR  IBS, Disp: 90 capsule, Rfl: 0    famotidine (PEPCID) 40 MG tablet, TAKE 1 TABLET BY MOUTH DAILY, Disp: 30 tablet, Rfl: 0    ferrous sulfate (FeroSul) 325 (65 FE) MG tablet, Take 1 tablet by mouth Daily With Breakfast., Disp: 90 tablet, Rfl: 0    gabapentin (NEURONTIN) 300 MG capsule, TAKE 1 CAPSULE BY MOUTH TWICE A DAY, Disp: 180 capsule, Rfl: 0    meclizine (ANTIVERT) 25 MG tablet, Take 1 tablet by mouth 3 (Three) Times a Day As Needed for Dizziness., Disp: 30 tablet, Rfl: 0    melatonin 3 MG tablet, Take 1 tablet by mouth Every Night., Disp: , Rfl:     metoprolol succinate XL (TOPROL-XL) 25 MG 24 hr tablet, TAKE ONE TABLET BY MOUTH DAILY, Disp: 30 tablet, Rfl: 11    Mirabegron ER (MYRBETRIQ) 50 MG tablet sustained-release 24 hour 24 hr tablet, Take 50 mg by mouth Daily., Disp: , Rfl:     pantoprazole (PROTONIX) 40 MG EC tablet, TAKE 1 TABLET BY MOUTH DAILY, Disp: 90 tablet, Rfl: 1    potassium chloride 10 MEQ CR tablet, TAKE 1 TABLET BY MOUTH TWICE A DAY, Disp: 180 tablet, Rfl: 0    Riboflavin (B2 PO), Take 1 tablet by mouth Daily., Disp: , Rfl:     rivaroxaban (Xarelto) 10 MG tablet, Take 1 tablet by mouth Daily., Disp: 90 tablet, Rfl: 1    sulfamethoxazole-trimethoprim (BACTRIM,SEPTRA) 400-80 MG tablet, , Disp: , Rfl:     vitamin B-12 (CYANOCOBALAMIN) 1000 MCG tablet, Take 1 tablet by mouth Daily., Disp: , Rfl:     ertapenem (INVanz) MBP 1 g in 100 NS, Infuse 1 g into a venous catheter Daily. X7 days, Disp: , Rfl:     Current Facility-Administered Medications:     denosumab (PROLIA) syringe 60 mg, 60 mg, Subcutaneous, Q6 Months, Yesi Hoffman MD, 60 mg at 03/18/24 0820    denosumab (PROLIA) syringe 60 mg, 60 mg, Subcutaneous, Q6 Months, Yesi Hoffman MD, 60 mg at 09/19/24 1146    Facility-Administered Medications Ordered in Other Visits:     ertapenem (INVanz) 1,000 mg in sodium chloride 0.9 % 100 mL MBP, 1,000  mg, Intravenous, Once, Darlene Matute MD, Last Rate: 200 mL/hr at 10/04/24 1412, 1,000 mg at 10/04/24 1412    Objective   Physical Exam  Vitals and nursing note reviewed.   Constitutional:       General: She is not in acute distress.     Appearance: Normal appearance. She is well-developed. She is not ill-appearing.   HENT:      Head: Normocephalic and atraumatic.   Cardiovascular:      Rate and Rhythm: Normal rate and regular rhythm.      Heart sounds: Normal heart sounds. No murmur heard.     No gallop.   Pulmonary:      Effort: Pulmonary effort is normal. No respiratory distress.      Breath sounds: Normal breath sounds. No wheezing, rhonchi or rales.   Chest:      Chest wall: No tenderness.   Musculoskeletal:      Cervical back: Normal range of motion and neck supple.   Neurological:      General: No focal deficit present.      Mental Status: She is alert and oriented to person, place, and time. Mental status is at baseline.   Psychiatric:         Mood and Affect: Mood normal.       Urine Culture          1/25/2024    15:59 9/26/2024    10:42   Urine Culture   Urine Culture >100,000 CFU/mL Escherichia coli ESBL  >100,000 CFU/mL Klebsiella pneumoniae ESBL       UA          1/25/2024    15:59 9/26/2024    10:39   Urinalysis   Squamous Epithelial Cells, UA 3-6     Specific Gravity, UA >=1.030     Ketones, UA Trace  Negative    Blood, UA Negative     Leukocytes, UA Trace  Small (1+)    Nitrite, UA Positive     RBC, UA 0-2     WBC, UA 6-10     Bacteria, UA 4+       CBC          2/14/2024    14:01 2/23/2024    13:16 7/31/2024    10:14   CBC   WBC 6.79  5.20  6.62    RBC 4.40  4.22  4.19    Hemoglobin 13.6  12.7  13.2    Hematocrit 42.8  39.5  41.8    MCV 97.3  93.6  99.8    MCH 30.9  30.2  31.5    MCHC 31.8  32.3  31.6    RDW 14.8  14.9  13.3    Platelets 328  324  337      CMP          2/23/2024    13:16 7/31/2024    10:14 8/30/2024    11:42   CMP   Glucose 99  122  81    BUN 16  19  18    Creatinine 0.60  0.74   0.75    EGFR 93.2  83.4  82.1    Sodium 145  140  141    Potassium 4.3  3.9  4.5    Chloride 107  104  106    Calcium 9.4  9.3  10.2    Total Protein 6.3  6.4  6.5    Albumin 4.2  4.3  4.2    Globulin 2.1  2.1  2.3    Total Bilirubin 0.7  0.8  0.6    Alkaline Phosphatase 65  69  72    AST (SGOT) 17  16  14    ALT (SGPT) 19  18  28    Albumin/Globulin Ratio 2.0  2.0  1.8    BUN/Creatinine Ratio 26.7  25.7  24.0    Anion Gap 9.0  10.7  9.9        Assessment & Plan   Diagnoses and all orders for this visit:    1. Acute cystitis without hematuria (Primary)  -     ertapenem (INVanz) MBP 1 g in 100 NS; Infuse 1 g into a venous catheter Daily. X7 days      Patient presents today with a worsening of her UTI symptoms, which she reports having for the last couple of weeks.  Patient was seen in urgent care 9/26/2024 and given prescription for Macrobid.  Urine culture was done and after that Macrobid was switched to cefdinir.  I reviewed the urine culture result which grew Klebsiella pneumonia ESBL resistant to several antibiotics including Macrobid, sensitivity to cephalosporins not reported.  After reviewing her symptoms and culture results I will be starting her on IV Invanz through ambulatory services.  I personally discussed this with the nurse at the ambulatory services and patient will be given her first IV antibiotic today and then once a day for 7 days.  I advised the patient to go to the ER if there is any worsening of her symptoms through the weekend.        Return if symptoms worsen or fail to improve, for Recheck.    Requested Prescriptions     Signed Prescriptions Disp Refills    ertapenem (INVanz) MBP 1 g in 100 NS       Sig: Infuse 1 g into a venous catheter Daily. X7 days       Darlene Matute MD  10/04/2024

## 2024-10-05 ENCOUNTER — HOSPITAL ENCOUNTER (OUTPATIENT)
Dept: INFUSION THERAPY | Facility: HOSPITAL | Age: 79
Discharge: HOME | End: 2024-10-05
Payer: MEDICARE

## 2024-10-05 VITALS
RESPIRATION RATE: 16 BRPM | DIASTOLIC BLOOD PRESSURE: 71 MMHG | TEMPERATURE: 97 F | HEART RATE: 77 BPM | OXYGEN SATURATION: 98 % | SYSTOLIC BLOOD PRESSURE: 109 MMHG

## 2024-10-05 DIAGNOSIS — N30.00 ACUTE CYSTITIS WITHOUT HEMATURIA: Primary | ICD-10-CM

## 2024-10-05 PROCEDURE — 96365 THER/PROPH/DIAG IV INF INIT: CPT

## 2024-10-05 PROCEDURE — 25010000002 ERTAPENEM PER 500 MG: Performed by: FAMILY MEDICINE

## 2024-10-05 RX ADMIN — ERTAPENEM SODIUM 1000 MG: 1 INJECTION, POWDER, LYOPHILIZED, FOR SOLUTION INTRAMUSCULAR; INTRAVENOUS at 08:33

## 2024-10-06 ENCOUNTER — HOSPITAL ENCOUNTER (OUTPATIENT)
Dept: INFUSION THERAPY | Facility: HOSPITAL | Age: 79
Discharge: HOME | End: 2024-10-06
Admitting: FAMILY MEDICINE
Payer: MEDICARE

## 2024-10-06 VITALS
DIASTOLIC BLOOD PRESSURE: 61 MMHG | SYSTOLIC BLOOD PRESSURE: 97 MMHG | RESPIRATION RATE: 18 BRPM | HEART RATE: 78 BPM | TEMPERATURE: 97.7 F | OXYGEN SATURATION: 91 %

## 2024-10-06 DIAGNOSIS — N30.00 ACUTE CYSTITIS WITHOUT HEMATURIA: Primary | ICD-10-CM

## 2024-10-06 PROCEDURE — 96365 THER/PROPH/DIAG IV INF INIT: CPT

## 2024-10-06 PROCEDURE — 25010000002 ERTAPENEM PER 500 MG: Performed by: FAMILY MEDICINE

## 2024-10-06 RX ADMIN — ERTAPENEM SODIUM 1000 MG: 1 INJECTION, POWDER, LYOPHILIZED, FOR SOLUTION INTRAMUSCULAR; INTRAVENOUS at 08:52

## 2024-10-07 ENCOUNTER — HOSPITAL ENCOUNTER (OUTPATIENT)
Dept: INFUSION THERAPY | Facility: HOSPITAL | Age: 79
Discharge: HOME | End: 2024-10-07
Admitting: FAMILY MEDICINE
Payer: MEDICARE

## 2024-10-07 VITALS
TEMPERATURE: 97.8 F | OXYGEN SATURATION: 100 % | SYSTOLIC BLOOD PRESSURE: 101 MMHG | HEART RATE: 81 BPM | DIASTOLIC BLOOD PRESSURE: 67 MMHG | RESPIRATION RATE: 17 BRPM

## 2024-10-07 DIAGNOSIS — N30.00 ACUTE CYSTITIS WITHOUT HEMATURIA: Primary | ICD-10-CM

## 2024-10-07 PROCEDURE — 36415 COLL VENOUS BLD VENIPUNCTURE: CPT

## 2024-10-07 PROCEDURE — 96365 THER/PROPH/DIAG IV INF INIT: CPT

## 2024-10-07 PROCEDURE — 25010000002 ERTAPENEM PER 500 MG: Performed by: FAMILY MEDICINE

## 2024-10-07 RX ADMIN — ERTAPENEM SODIUM 1000 MG: 1 INJECTION, POWDER, LYOPHILIZED, FOR SOLUTION INTRAMUSCULAR; INTRAVENOUS at 09:53

## 2024-10-08 ENCOUNTER — HOSPITAL ENCOUNTER (OUTPATIENT)
Dept: INFUSION THERAPY | Facility: HOSPITAL | Age: 79
Discharge: HOME | End: 2024-10-08
Admitting: FAMILY MEDICINE
Payer: MEDICARE

## 2024-10-08 VITALS
HEART RATE: 81 BPM | RESPIRATION RATE: 16 BRPM | SYSTOLIC BLOOD PRESSURE: 91 MMHG | TEMPERATURE: 97.4 F | DIASTOLIC BLOOD PRESSURE: 50 MMHG | OXYGEN SATURATION: 97 %

## 2024-10-08 DIAGNOSIS — N30.00 ACUTE CYSTITIS WITHOUT HEMATURIA: Primary | ICD-10-CM

## 2024-10-08 PROCEDURE — 96365 THER/PROPH/DIAG IV INF INIT: CPT

## 2024-10-08 PROCEDURE — 25010000002 ERTAPENEM PER 500 MG: Performed by: FAMILY MEDICINE

## 2024-10-08 RX ADMIN — ERTAPENEM SODIUM 1000 MG: 1 INJECTION, POWDER, LYOPHILIZED, FOR SOLUTION INTRAMUSCULAR; INTRAVENOUS at 10:31

## 2024-10-08 NOTE — CODE DOCUMENTATION
Messaged Dr. Matute regarding patient's blood pressure being low the last couple of day. Dr. Matute encouraged patient to go to the ER for reevaluation, but patient unable to today.  Patient is going to call and get an appointment with Dr. Matute for tomorrow.

## 2024-10-09 ENCOUNTER — OFFICE VISIT (OUTPATIENT)
Dept: FAMILY MEDICINE CLINIC | Facility: CLINIC | Age: 79
End: 2024-10-09
Payer: MEDICARE

## 2024-10-09 ENCOUNTER — LAB (OUTPATIENT)
Dept: FAMILY MEDICINE CLINIC | Facility: CLINIC | Age: 79
End: 2024-10-09
Payer: MEDICARE

## 2024-10-09 ENCOUNTER — HOSPITAL ENCOUNTER (OUTPATIENT)
Dept: INFUSION THERAPY | Facility: HOSPITAL | Age: 79
Discharge: HOME | End: 2024-10-09
Payer: MEDICARE

## 2024-10-09 VITALS
TEMPERATURE: 98.5 F | HEART RATE: 78 BPM | WEIGHT: 181.6 LBS | OXYGEN SATURATION: 93 % | RESPIRATION RATE: 16 BRPM | DIASTOLIC BLOOD PRESSURE: 70 MMHG | BODY MASS INDEX: 29.18 KG/M2 | HEIGHT: 66 IN | SYSTOLIC BLOOD PRESSURE: 108 MMHG

## 2024-10-09 VITALS
DIASTOLIC BLOOD PRESSURE: 58 MMHG | HEART RATE: 79 BPM | RESPIRATION RATE: 17 BRPM | OXYGEN SATURATION: 97 % | SYSTOLIC BLOOD PRESSURE: 101 MMHG | TEMPERATURE: 97.8 F

## 2024-10-09 DIAGNOSIS — N30.00 ACUTE CYSTITIS WITHOUT HEMATURIA: Primary | ICD-10-CM

## 2024-10-09 DIAGNOSIS — N39.0 RECURRENT URINARY TRACT INFECTION: Primary | ICD-10-CM

## 2024-10-09 DIAGNOSIS — R53.83 OTHER FATIGUE: ICD-10-CM

## 2024-10-09 PROBLEM — I95.9 HYPOTENSION: Status: ACTIVE | Noted: 2022-10-13

## 2024-10-09 LAB — HOLD SPECIMEN: NORMAL

## 2024-10-09 PROCEDURE — 96365 THER/PROPH/DIAG IV INF INIT: CPT

## 2024-10-09 PROCEDURE — 84443 ASSAY THYROID STIM HORMONE: CPT | Performed by: FAMILY MEDICINE

## 2024-10-09 PROCEDURE — 1126F AMNT PAIN NOTED NONE PRSNT: CPT | Performed by: FAMILY MEDICINE

## 2024-10-09 PROCEDURE — 81001 URINALYSIS AUTO W/SCOPE: CPT | Performed by: FAMILY MEDICINE

## 2024-10-09 PROCEDURE — 85025 COMPLETE CBC W/AUTO DIFF WBC: CPT | Performed by: FAMILY MEDICINE

## 2024-10-09 PROCEDURE — 36415 COLL VENOUS BLD VENIPUNCTURE: CPT

## 2024-10-09 PROCEDURE — 87086 URINE CULTURE/COLONY COUNT: CPT | Performed by: FAMILY MEDICINE

## 2024-10-09 PROCEDURE — 99213 OFFICE O/P EST LOW 20 MIN: CPT | Performed by: FAMILY MEDICINE

## 2024-10-09 PROCEDURE — 36415 COLL VENOUS BLD VENIPUNCTURE: CPT | Performed by: FAMILY MEDICINE

## 2024-10-09 PROCEDURE — 3078F DIAST BP <80 MM HG: CPT | Performed by: FAMILY MEDICINE

## 2024-10-09 PROCEDURE — 80053 COMPREHEN METABOLIC PANEL: CPT | Performed by: FAMILY MEDICINE

## 2024-10-09 PROCEDURE — 3074F SYST BP LT 130 MM HG: CPT | Performed by: FAMILY MEDICINE

## 2024-10-09 PROCEDURE — 25010000002 ERTAPENEM PER 500 MG: Performed by: FAMILY MEDICINE

## 2024-10-09 RX ADMIN — ERTAPENEM SODIUM 1000 MG: 1 INJECTION, POWDER, LYOPHILIZED, FOR SOLUTION INTRAMUSCULAR; INTRAVENOUS at 09:45

## 2024-10-09 NOTE — PROGRESS NOTES
Subjective   Chief Complaint   Patient presents with    Follow-up    Urinary Tract Infection    Fatigue     Abida Becker is a 79 y.o. female.     Patient Care Team:  Darlene Matute MD as PCP - General  LoxleyRolando ro MD as Consulting Physician (Urology)  Banet, Duane Edward, MD as Consulting Physician (Dermatology)  Brett Zuniga MD as Consulting Physician (Cardiology)  Thelma Lopez APRN as Nurse Practitioner (Hematology)    History of Present Illness  She is coming in today to follow-up on her urinary tract infection and also address her fatigue.  Patient was started on IV Invanz last week through ambulatory services and today was her infusion #6.  She tells me that since starting the antibiotic her urinary discomfort has improved significantly, she denies any fever or chills.  No abdominal pain.  No nausea, vomiting, or diarrhea.  She still however feels rundown.  Her blood pressure yesterday at the ambulatory services was noted to be low with systolic reading to be 91.  Patient blood pressure runs on the lower side with systolic readings between 100-110.  No chest pains or difficulty breathing are being reported.  Patient is on metoprolol 25 mg 2 times a day as per cardiology due to paroxysmal atrial fibrillation.  She is also on Xarelto.       The following portions of the patient's history were reviewed and updated as appropriate: allergies, current medications, past family history, past medical history, past social history, past surgical history, and problem list.  Past Medical History:   Diagnosis Date    Dysuria 09/26/2024    Dysuria 09/26/2024    GERD (gastroesophageal reflux disease)     History of recurrent UTIs     IBS (irritable bowel syndrome)     Osteoarthritis     Osteoporosis     on prolia(12/5/22)    Primary osteoarthritis of both knees 01/24/2022    Pulmonary embolism 2011    after scoliosis surgery    Pulmonary embolism 10/2022    Scoliosis     Vitamin D deficiency   "    Past Surgical History:   Procedure Laterality Date    BACK SURGERY  11/2011    Dr. Olsen, due to scoliosis, earnestine and some fusions    BREAST BIOPSY      CHOLECYSTECTOMY      COLONOSCOPY  01/18/2018    HIATAL HERNIA REPAIR  2013    REIMPLANT URETER IN BLADDER       The patient has a family history of  Family History   Problem Relation Age of Onset    Heart failure Mother     Cancer Father      Social History     Socioeconomic History    Marital status:     Number of children: 3    Years of education: 12    Highest education level: Master's degree (e.g., MA, MS, Hayes, MEd, MSW, [employer])   Tobacco Use    Smoking status: Never     Passive exposure: Never    Smokeless tobacco: Never   Vaping Use    Vaping status: Never Used   Substance and Sexual Activity    Alcohol use: No    Drug use: No    Sexual activity: Not Currently     Partners: Male     Birth control/protection: Condom, Spermicide       Review of Systems   Constitutional:  Positive for fatigue. Negative for activity change and fever.   Respiratory:  Negative for shortness of breath and wheezing.    Cardiovascular:  Negative for chest pain, palpitations and leg swelling.   Genitourinary:  Negative for dysuria, frequency, hematuria and urgency.   Musculoskeletal:  Negative for arthralgias and back pain.   Skin:  Negative for rash.   Neurological:  Negative for tremors and headache.     Visit Vitals  /70   Pulse 78   Temp 98.5 °F (36.9 °C) (Infrared)   Resp 16   Ht 167.6 cm (66\")   Wt 82.4 kg (181 lb 9.6 oz)   SpO2 93%   BMI 29.31 kg/m²              Current Outpatient Medications:     acetaminophen (TYLENOL) 500 MG tablet, Take 1 tablet by mouth Every 6 (Six) Hours As Needed for Fever or Mild Pain., Disp: , Rfl:     calcium carbonate (OS-STEVEN) 600 MG tablet, Take 1 tablet by mouth Daily., Disp: , Rfl:     cholecalciferol (VITAMIN D3) 1000 units tablet, Take 1 tablet by mouth Daily., Disp: , Rfl:     Diclofenac Sodium (VOLTAREN) 1 % gel gel, APPLY 4 " GRAMS TO AFFECTED AREA(S) FOUR TIMES A DAY AS NEEDED FOR PAIN, Disp: 100 g, Rfl: 0    dicyclomine (BENTYL) 10 MG capsule, TAKE 1 CAPSULE BY MOUTH THREE TIMES A DAY AS NEEDED FOR  IBS, Disp: 90 capsule, Rfl: 0    ertapenem (INVanz) MBP 1 g in 100 NS, Infuse 1 g into a venous catheter Daily. X7 days, Disp: , Rfl:     famotidine (PEPCID) 40 MG tablet, TAKE 1 TABLET BY MOUTH DAILY, Disp: 30 tablet, Rfl: 0    ferrous sulfate (FeroSul) 325 (65 FE) MG tablet, Take 1 tablet by mouth Daily With Breakfast., Disp: 90 tablet, Rfl: 0    gabapentin (NEURONTIN) 300 MG capsule, TAKE 1 CAPSULE BY MOUTH TWICE A DAY, Disp: 180 capsule, Rfl: 0    melatonin 3 MG tablet, Take 1 tablet by mouth Every Night., Disp: , Rfl:     metoprolol succinate XL (TOPROL-XL) 25 MG 24 hr tablet, TAKE ONE TABLET BY MOUTH DAILY, Disp: 30 tablet, Rfl: 11    Mirabegron ER (MYRBETRIQ) 50 MG tablet sustained-release 24 hour 24 hr tablet, Take 50 mg by mouth Daily., Disp: , Rfl:     pantoprazole (PROTONIX) 40 MG EC tablet, TAKE 1 TABLET BY MOUTH DAILY, Disp: 90 tablet, Rfl: 1    potassium chloride 10 MEQ CR tablet, TAKE 1 TABLET BY MOUTH TWICE A DAY, Disp: 180 tablet, Rfl: 0    Riboflavin (B2 PO), Take 1 tablet by mouth Daily., Disp: , Rfl:     rivaroxaban (Xarelto) 10 MG tablet, Take 1 tablet by mouth Daily., Disp: 90 tablet, Rfl: 1    vitamin B-12 (CYANOCOBALAMIN) 1000 MCG tablet, Take 1 tablet by mouth Daily., Disp: , Rfl:     meclizine (ANTIVERT) 25 MG tablet, Take 1 tablet by mouth 3 (Three) Times a Day As Needed for Dizziness. (Patient not taking: Reported on 10/9/2024), Disp: 30 tablet, Rfl: 0    Current Facility-Administered Medications:     denosumab (PROLIA) syringe 60 mg, 60 mg, Subcutaneous, Q6 Months, Yesi Hoffman MD, 60 mg at 03/18/24 0820    denosumab (PROLIA) syringe 60 mg, 60 mg, Subcutaneous, Q6 Months, Yesi Hoffman MD, 60 mg at 09/19/24 1146    Objective   Physical Exam  Constitutional:       General: She is not in acute distress.      Appearance: Normal appearance. She is well-developed. She is not ill-appearing or diaphoretic.      Comments: Patient is in no distress, patient has normal voice and speech.  Normal respiratory effort.   HENT:      Head: Normocephalic and atraumatic.   Pulmonary:      Effort: Pulmonary effort is normal.   Musculoskeletal:      Cervical back: Normal range of motion and neck supple.   Neurological:      General: No focal deficit present.      Mental Status: She is alert and oriented to person, place, and time. Mental status is at baseline.   Psychiatric:         Mood and Affect: Mood normal.         FOLLOWING LABS WERE REVIEWED TODAY:  CMP          2/23/2024    13:16 7/31/2024    10:14 8/30/2024    11:42   CMP   Glucose 99  122  81    BUN 16  19  18    Creatinine 0.60  0.74  0.75    EGFR 93.2  83.4  82.1    Sodium 145  140  141    Potassium 4.3  3.9  4.5    Chloride 107  104  106    Calcium 9.4  9.3  10.2    Total Protein 6.3  6.4  6.5    Albumin 4.2  4.3  4.2    Globulin 2.1  2.1  2.3    Total Bilirubin 0.7  0.8  0.6    Alkaline Phosphatase 65  69  72    AST (SGOT) 17  16  14    ALT (SGPT) 19  18  28    Albumin/Globulin Ratio 2.0  2.0  1.8    BUN/Creatinine Ratio 26.7  25.7  24.0    Anion Gap 9.0  10.7  9.9      CBC          2/14/2024    14:01 2/23/2024    13:16 7/31/2024    10:14   CBC   WBC 6.79  5.20  6.62    RBC 4.40  4.22  4.19    Hemoglobin 13.6  12.7  13.2    Hematocrit 42.8  39.5  41.8    MCV 97.3  93.6  99.8    MCH 30.9  30.2  31.5    MCHC 31.8  32.3  31.6    RDW 14.8  14.9  13.3    Platelets 328  324  337      UA          1/25/2024    15:59 9/26/2024    10:39   Urinalysis   Squamous Epithelial Cells, UA 3-6     Specific Gravity, UA >=1.030     Ketones, UA Trace  Negative    Blood, UA Negative     Leukocytes, UA Trace  Small (1+)    Nitrite, UA Positive     RBC, UA 0-2     WBC, UA 6-10     Bacteria, UA 4+       Urine Culture          1/25/2024    15:59 9/26/2024    10:42   Urine Culture   Urine Culture  >100,000 CFU/mL Escherichia coli ESBL  >100,000 CFU/mL Klebsiella pneumoniae ESBL              Assessment & Plan   Diagnoses and all orders for this visit:    1. Recurrent urinary tract infection (Primary)  -     Comprehensive Metabolic Panel  -     CBC Auto Differential  -     Urinalysis With Culture If Indicated - Urine, Clean Catch  -     TSH  -     [place] Urine Culture Tube - Urine, Clean Catch    2. Other fatigue  -     Comprehensive Metabolic Panel  -     CBC Auto Differential  -     TSH      I will be checking some labs and recheck and urinalysis.  Patient is scheduled for her final Invanz infusion tomorrow.  Her blood pressure upon my recheck today is at her baseline, but she had some low readings recently, I advised the patient to lower her metoprolol from 25 mg 2 times a day to 12.5 mg 2 times a day.  Her cardiovascular exam today is intact.  Patient was also advised to start monitoring blood pressure at home more regularly now.    Return if symptoms worsen or fail to improve, for Recheck.    Requested Prescriptions      No prescriptions requested or ordered in this encounter       Darlene Matute MD  10/09/2024

## 2024-10-10 ENCOUNTER — HOSPITAL ENCOUNTER (OUTPATIENT)
Dept: INFUSION THERAPY | Facility: HOSPITAL | Age: 79
Discharge: HOME | End: 2024-10-10
Payer: MEDICARE

## 2024-10-10 DIAGNOSIS — N30.00 ACUTE CYSTITIS WITHOUT HEMATURIA: Primary | ICD-10-CM

## 2024-10-10 LAB
ALBUMIN SERPL-MCNC: 4 G/DL (ref 3.5–5.2)
ALBUMIN/GLOB SERPL: 1.8 G/DL
ALP SERPL-CCNC: 84 U/L (ref 39–117)
ALT SERPL W P-5'-P-CCNC: 23 U/L (ref 1–33)
ANION GAP SERPL CALCULATED.3IONS-SCNC: 8 MMOL/L (ref 5–15)
AST SERPL-CCNC: 13 U/L (ref 1–32)
BACTERIA UR QL AUTO: ABNORMAL /HPF
BASOPHILS # BLD AUTO: 0.06 10*3/MM3 (ref 0–0.2)
BASOPHILS NFR BLD AUTO: 1 % (ref 0–1.5)
BILIRUB SERPL-MCNC: 0.5 MG/DL (ref 0–1.2)
BILIRUB UR QL STRIP: NEGATIVE
BUN SERPL-MCNC: 14 MG/DL (ref 8–23)
BUN/CREAT SERPL: 20 (ref 7–25)
CALCIUM SPEC-SCNC: 9.2 MG/DL (ref 8.6–10.5)
CHLORIDE SERPL-SCNC: 105 MMOL/L (ref 98–107)
CLARITY UR: ABNORMAL
CO2 SERPL-SCNC: 26 MMOL/L (ref 22–29)
COLOR UR: ABNORMAL
CREAT SERPL-MCNC: 0.7 MG/DL (ref 0.57–1)
DEPRECATED RDW RBC AUTO: 43.3 FL (ref 37–54)
EGFRCR SERPLBLD CKD-EPI 2021: 89.2 ML/MIN/1.73
EOSINOPHIL # BLD AUTO: 0.11 10*3/MM3 (ref 0–0.4)
EOSINOPHIL NFR BLD AUTO: 1.8 % (ref 0.3–6.2)
ERYTHROCYTE [DISTWIDTH] IN BLOOD BY AUTOMATED COUNT: 12.3 % (ref 12.3–15.4)
GLOBULIN UR ELPH-MCNC: 2.2 GM/DL
GLUCOSE SERPL-MCNC: 103 MG/DL (ref 65–99)
GLUCOSE UR STRIP-MCNC: NEGATIVE MG/DL
HCT VFR BLD AUTO: 40.5 % (ref 34–46.6)
HGB BLD-MCNC: 13.2 G/DL (ref 12–15.9)
HGB UR QL STRIP.AUTO: NEGATIVE
HYALINE CASTS UR QL AUTO: ABNORMAL /LPF
IMM GRANULOCYTES # BLD AUTO: 0.03 10*3/MM3 (ref 0–0.05)
IMM GRANULOCYTES NFR BLD AUTO: 0.5 % (ref 0–0.5)
KETONES UR QL STRIP: ABNORMAL
LEUKOCYTE ESTERASE UR QL STRIP.AUTO: ABNORMAL
LYMPHOCYTES # BLD AUTO: 1.56 10*3/MM3 (ref 0.7–3.1)
LYMPHOCYTES NFR BLD AUTO: 26.1 % (ref 19.6–45.3)
MCH RBC QN AUTO: 31.1 PG (ref 26.6–33)
MCHC RBC AUTO-ENTMCNC: 32.6 G/DL (ref 31.5–35.7)
MCV RBC AUTO: 95.5 FL (ref 79–97)
MONOCYTES # BLD AUTO: 0.63 10*3/MM3 (ref 0.1–0.9)
MONOCYTES NFR BLD AUTO: 10.6 % (ref 5–12)
NEUTROPHILS NFR BLD AUTO: 3.58 10*3/MM3 (ref 1.7–7)
NEUTROPHILS NFR BLD AUTO: 60 % (ref 42.7–76)
NITRITE UR QL STRIP: NEGATIVE
NRBC BLD AUTO-RTO: 0 /100 WBC (ref 0–0.2)
PH UR STRIP.AUTO: 5.5 [PH] (ref 5–8)
PLATELET # BLD AUTO: 392 10*3/MM3 (ref 140–450)
PMV BLD AUTO: 10.5 FL (ref 6–12)
POTASSIUM SERPL-SCNC: 4.5 MMOL/L (ref 3.5–5.2)
PROT SERPL-MCNC: 6.2 G/DL (ref 6–8.5)
PROT UR QL STRIP: ABNORMAL
RBC # BLD AUTO: 4.24 10*6/MM3 (ref 3.77–5.28)
RBC # UR STRIP: ABNORMAL /HPF
REF LAB TEST METHOD: ABNORMAL
SODIUM SERPL-SCNC: 139 MMOL/L (ref 136–145)
SP GR UR STRIP: >=1.03 (ref 1–1.03)
SQUAMOUS #/AREA URNS HPF: ABNORMAL /HPF
STARCH GRANULES URNS QL MICRO: ABNORMAL /HPF
TSH SERPL DL<=0.05 MIU/L-ACNC: 2.5 UIU/ML (ref 0.27–4.2)
UROBILINOGEN UR QL STRIP: ABNORMAL
WBC # UR STRIP: ABNORMAL /HPF
WBC NRBC COR # BLD AUTO: 5.97 10*3/MM3 (ref 3.4–10.8)
YEAST URNS QL MICRO: ABNORMAL /HPF

## 2024-10-10 PROCEDURE — 25010000002 ERTAPENEM PER 500 MG: Performed by: FAMILY MEDICINE

## 2024-10-10 PROCEDURE — 96365 THER/PROPH/DIAG IV INF INIT: CPT

## 2024-10-10 RX ORDER — FLUCONAZOLE 100 MG/1
TABLET ORAL
Qty: 8 TABLET | Refills: 0 | Status: SHIPPED | OUTPATIENT
Start: 2024-10-10

## 2024-10-10 RX ADMIN — ERTAPENEM SODIUM 1000 MG: 1 INJECTION, POWDER, LYOPHILIZED, FOR SOLUTION INTRAMUSCULAR; INTRAVENOUS at 10:52

## 2024-10-10 NOTE — PROGRESS NOTES
Patient notified and verbalized understanding . Patient is scheduled for Friday 10/18/2024 @ 11:15am

## 2024-10-11 LAB — BACTERIA SPEC AEROBE CULT: ABNORMAL

## 2024-10-18 ENCOUNTER — OFFICE VISIT (OUTPATIENT)
Dept: FAMILY MEDICINE CLINIC | Facility: CLINIC | Age: 79
End: 2024-10-18
Payer: MEDICARE

## 2024-10-18 VITALS
HEIGHT: 66 IN | OXYGEN SATURATION: 93 % | RESPIRATION RATE: 18 BRPM | HEART RATE: 79 BPM | BODY MASS INDEX: 28.91 KG/M2 | DIASTOLIC BLOOD PRESSURE: 73 MMHG | WEIGHT: 179.9 LBS | TEMPERATURE: 97 F | SYSTOLIC BLOOD PRESSURE: 111 MMHG

## 2024-10-18 DIAGNOSIS — Z23 NEEDS FLU SHOT: ICD-10-CM

## 2024-10-18 DIAGNOSIS — N30.00 ACUTE CYSTITIS WITHOUT HEMATURIA: Primary | ICD-10-CM

## 2024-10-18 PROCEDURE — 90662 IIV NO PRSV INCREASED AG IM: CPT | Performed by: FAMILY MEDICINE

## 2024-10-18 PROCEDURE — 3078F DIAST BP <80 MM HG: CPT | Performed by: FAMILY MEDICINE

## 2024-10-18 PROCEDURE — G0008 ADMIN INFLUENZA VIRUS VAC: HCPCS | Performed by: FAMILY MEDICINE

## 2024-10-18 PROCEDURE — 3074F SYST BP LT 130 MM HG: CPT | Performed by: FAMILY MEDICINE

## 2024-10-18 PROCEDURE — 99213 OFFICE O/P EST LOW 20 MIN: CPT | Performed by: FAMILY MEDICINE

## 2024-10-18 PROCEDURE — 1126F AMNT PAIN NOTED NONE PRSNT: CPT | Performed by: FAMILY MEDICINE

## 2024-10-18 NOTE — PROGRESS NOTES
Injection  Injection performed in left detloid by Carlyle Pryor MA. Patient tolerated the procedure well without complications.  10/18/24   Carlyle Pryor MA

## 2024-10-18 NOTE — PROGRESS NOTES
Subjective   Chief Complaint   Patient presents with    Follow-up     UTI follow up    Flu Vaccine     Abida Becker is a 79 y.o. female.     Patient Care Team:  Darlene Matute MD as PCP - General  [place] MD as Consulting Physician (Urology)  Banet, Duane Edward, MD as Consulting Physician (Dermatology)  Brett Zuniga MD as Consulting Physician (Cardiology)  Thelma Lopez APRN as Nurse Practitioner (Hematology)    History of Present Illness  She is coming in today to follow-up on her recent UTI.  Patient was recently on a 7-day course of IV Invanz due to ESBL Klebsiella.  Her repeat urine culture last week showed heavy growth of yeast.  Patient was treated with a course of Diflucan afterwards and she tells me that she feels much better and pretty much feels to be back in her baseline.  She also tells me that she did not even realize that she had also thrush and yeast in her mouth, she had a cough and feeling around her teeth and on her tongue and all that also went away with the treatment with Diflucan.  She also wonders if anything else can be done to prevent her from getting those frequent UTIs.  Patient is already under the care of the urologist and had a workup done due to her frequent UTIs.       The following portions of the patient's history were reviewed and updated as appropriate: allergies, current medications, past family history, past medical history, past social history, past surgical history, and problem list.  Past Medical History:   Diagnosis Date    Dysuria 09/26/2024    Dysuria 09/26/2024    GERD (gastroesophageal reflux disease)     History of recurrent UTIs     IBS (irritable bowel syndrome)     Osteoarthritis     Osteoporosis     on prolia(12/5/22)    Primary osteoarthritis of both knees 01/24/2022    Pulmonary embolism 2011    after scoliosis surgery    Pulmonary embolism 10/2022    Scoliosis     Vitamin D deficiency      Past Surgical History:   Procedure  "Laterality Date    BACK SURGERY  11/2011    Dr. Olsen, due to scoliosis, earnestine and some fusions    BREAST BIOPSY      CHOLECYSTECTOMY      COLONOSCOPY  01/18/2018    HIATAL HERNIA REPAIR  2013    REIMPLANT URETER IN BLADDER       The patient has a family history of  Family History   Problem Relation Age of Onset    Heart failure Mother     Cancer Father      Social History     Socioeconomic History    Marital status:     Number of children: 3    Years of education: 12    Highest education level: Master's degree (e.g., MA, MS, Hayes, MEd, MSW, [employer])   Tobacco Use    Smoking status: Never     Passive exposure: Never    Smokeless tobacco: Never   Vaping Use    Vaping status: Never Used   Substance and Sexual Activity    Alcohol use: No    Drug use: No    Sexual activity: Not Currently     Partners: Male     Birth control/protection: Condom, Spermicide       Review of Systems   Constitutional:  Negative for activity change, fatigue and fever.   Respiratory:  Negative for shortness of breath and wheezing.    Cardiovascular:  Negative for chest pain, palpitations and leg swelling.   Musculoskeletal:  Negative for arthralgias and back pain.   Skin:  Negative for rash.   Neurological:  Negative for tremors and headache.     Visit Vitals  /73 (BP Location: Left arm, Patient Position: Sitting, Cuff Size: Adult)   Pulse 79   Temp 97 °F (36.1 °C) (Temporal)   Resp 18   Ht 167.6 cm (66\")   Wt 81.6 kg (179 lb 14.4 oz)   SpO2 93%   BMI 29.04 kg/m²              Current Outpatient Medications:     acetaminophen (TYLENOL) 500 MG tablet, Take 1 tablet by mouth Every 6 (Six) Hours As Needed for Fever or Mild Pain., Disp: , Rfl:     calcium carbonate (OS-STEVEN) 600 MG tablet, Take 1 tablet by mouth Daily., Disp: , Rfl:     cholecalciferol (VITAMIN D3) 1000 units tablet, Take 1 tablet by mouth Daily., Disp: , Rfl:     Diclofenac Sodium (VOLTAREN) 1 % gel gel, APPLY 4 GRAMS TO AFFECTED AREA(S) FOUR TIMES A DAY AS NEEDED FOR " PAIN, Disp: 100 g, Rfl: 0    dicyclomine (BENTYL) 10 MG capsule, TAKE 1 CAPSULE BY MOUTH THREE TIMES A DAY AS NEEDED FOR  IBS, Disp: 90 capsule, Rfl: 0    ertapenem (INVanz) MBP 1 g in 100 NS, Infuse 1 g into a venous catheter Daily. X7 days, Disp: , Rfl:     famotidine (PEPCID) 40 MG tablet, TAKE 1 TABLET BY MOUTH DAILY, Disp: 30 tablet, Rfl: 0    ferrous sulfate (FeroSul) 325 (65 FE) MG tablet, Take 1 tablet by mouth Daily With Breakfast., Disp: 90 tablet, Rfl: 0    fluconazole (Diflucan) 100 MG tablet, Take 2 tablets p.o. once a day x 1, then take 1 tablet p.o. once a day until gone, Disp: 8 tablet, Rfl: 0    gabapentin (NEURONTIN) 300 MG capsule, TAKE 1 CAPSULE BY MOUTH TWICE A DAY, Disp: 180 capsule, Rfl: 0    meclizine (ANTIVERT) 25 MG tablet, Take 1 tablet by mouth 3 (Three) Times a Day As Needed for Dizziness., Disp: 30 tablet, Rfl: 0    melatonin 3 MG tablet, Take 1 tablet by mouth Every Night., Disp: , Rfl:     metoprolol succinate XL (TOPROL-XL) 25 MG 24 hr tablet, TAKE ONE TABLET BY MOUTH DAILY, Disp: 30 tablet, Rfl: 11    Mirabegron ER (MYRBETRIQ) 50 MG tablet sustained-release 24 hour 24 hr tablet, Take 50 mg by mouth Daily., Disp: , Rfl:     pantoprazole (PROTONIX) 40 MG EC tablet, TAKE 1 TABLET BY MOUTH DAILY, Disp: 90 tablet, Rfl: 1    potassium chloride 10 MEQ CR tablet, TAKE 1 TABLET BY MOUTH TWICE A DAY, Disp: 180 tablet, Rfl: 0    Riboflavin (B2 PO), Take 1 tablet by mouth Daily., Disp: , Rfl:     rivaroxaban (Xarelto) 10 MG tablet, Take 1 tablet by mouth Daily., Disp: 90 tablet, Rfl: 1    vitamin B-12 (CYANOCOBALAMIN) 1000 MCG tablet, Take 1 tablet by mouth Daily., Disp: , Rfl:     D-Mannose 500 MG capsule, Take 1 capsule by mouth Daily., Disp: 90 capsule, Rfl: 0    Current Facility-Administered Medications:     denosumab (PROLIA) syringe 60 mg, 60 mg, Subcutaneous, Q6 Months, Yesi Hoffman MD, 60 mg at 03/18/24 0820    denosumab (PROLIA) syringe 60 mg, 60 mg, Subcutaneous, Q6 Months, Yasmin,  MD Yesi, 60 mg at 09/19/24 1146    Objective   Physical Exam  Constitutional:       General: She is not in acute distress.     Appearance: Normal appearance. She is well-developed. She is not ill-appearing or diaphoretic.      Comments: Patient is in no distress, patient has normal voice and speech.  Normal respiratory effort.   HENT:      Head: Normocephalic and atraumatic.   Pulmonary:      Effort: Pulmonary effort is normal.   Musculoskeletal:      Cervical back: Normal range of motion and neck supple.   Neurological:      General: No focal deficit present.      Mental Status: She is alert and oriented to person, place, and time. Mental status is at baseline.   Psychiatric:         Mood and Affect: Mood normal.         FOLLOWING LABS WERE REVIEWED TODAY:  UA          1/25/2024    15:59 9/26/2024    10:39 10/9/2024    14:05   Urinalysis   Squamous Epithelial Cells, UA 3-6   3-6    Specific Gravity, UA >=1.030   >=1.030    Ketones, UA Trace  Negative  Trace    Blood, UA Negative   Negative    Leukocytes, UA Trace  Small (1+)  Small (1+)    Nitrite, UA Positive   Negative    RBC, UA 0-2   None Seen    WBC, UA 6-10   6-10    Bacteria, UA 4+   Trace      Urine Culture          1/25/2024    15:59 9/26/2024    10:42 10/9/2024    14:05   Urine Culture   Urine Culture >100,000 CFU/mL Escherichia coli ESBL  >100,000 CFU/mL Klebsiella pneumoniae ESBL  Yeast isolated          Assessment & Plan   Diagnoses and all orders for this visit:    1. Acute cystitis without hematuria (Primary)  -     D-Mannose 500 MG capsule; Take 1 capsule by mouth Daily.  Dispense: 90 capsule; Refill: 0    2. Needs flu shot  -     Fluzone High-Dose 65+yrs      Patient reports to be back to her baseline and all of her previous symptoms have resolved.  We talked at length about her recurrent urinary tract infections and discussed the risks and prevention measures.  Patient was encouraged to keep up with p.o. water intake and regularly empty her  bladder.  I also suggested for her to try over-the-counter D-mannose for prophylaxis.  Patient previously had evaluation done through the urology office and she is followed by them periodically.  Immunization was also updated and she was given a flu shot today.      Return in about 3 months (around 1/18/2025) for Medicare Wellness.    Requested Prescriptions     Signed Prescriptions Disp Refills    D-Mannose 500 MG capsule 90 capsule 0     Sig: Take 1 capsule by mouth Daily.       Darlene Matute MD  10/18/2024

## 2024-10-22 DIAGNOSIS — I10 ESSENTIAL HYPERTENSION: ICD-10-CM

## 2024-10-22 RX ORDER — POTASSIUM CHLORIDE 750 MG/1
TABLET, EXTENDED RELEASE ORAL
Qty: 180 TABLET | Refills: 0 | Status: SHIPPED | OUTPATIENT
Start: 2024-10-22

## 2024-10-23 ENCOUNTER — LAB (OUTPATIENT)
Dept: FAMILY MEDICINE CLINIC | Facility: CLINIC | Age: 79
End: 2024-10-23
Payer: MEDICARE

## 2024-10-23 ENCOUNTER — OFFICE VISIT (OUTPATIENT)
Dept: FAMILY MEDICINE CLINIC | Facility: CLINIC | Age: 79
End: 2024-10-23
Payer: MEDICARE

## 2024-10-23 VITALS
RESPIRATION RATE: 16 BRPM | HEART RATE: 75 BPM | WEIGHT: 178.2 LBS | SYSTOLIC BLOOD PRESSURE: 109 MMHG | DIASTOLIC BLOOD PRESSURE: 71 MMHG | BODY MASS INDEX: 28.64 KG/M2 | HEIGHT: 66 IN | TEMPERATURE: 97.8 F | OXYGEN SATURATION: 95 %

## 2024-10-23 DIAGNOSIS — K92.1 BLACK STOOLS: ICD-10-CM

## 2024-10-23 DIAGNOSIS — R30.0 DYSURIA: Primary | ICD-10-CM

## 2024-10-23 LAB
BASOPHILS # BLD AUTO: 0.04 10*3/MM3 (ref 0–0.2)
BASOPHILS NFR BLD AUTO: 0.6 % (ref 0–1.5)
BILIRUB BLD-MCNC: NEGATIVE MG/DL
CLARITY, POC: ABNORMAL
COLOR UR: ABNORMAL
DEPRECATED RDW RBC AUTO: 44.3 FL (ref 37–54)
DEVELOPER EXPIRATION DATE: NORMAL
DEVELOPER LOT NUMBER: NORMAL
EOSINOPHIL # BLD AUTO: 0.1 10*3/MM3 (ref 0–0.4)
EOSINOPHIL NFR BLD AUTO: 1.6 % (ref 0.3–6.2)
ERYTHROCYTE [DISTWIDTH] IN BLOOD BY AUTOMATED COUNT: 12.6 % (ref 12.3–15.4)
EXPIRATION DATE: ABNORMAL
EXPIRATION DATE: NORMAL
FECAL OCCULT BLOOD SCREEN, POC: NEGATIVE
GLUCOSE UR STRIP-MCNC: NEGATIVE MG/DL
HCT VFR BLD AUTO: 39 % (ref 34–46.6)
HGB BLD-MCNC: 13 G/DL (ref 12–15.9)
IMM GRANULOCYTES # BLD AUTO: 0.02 10*3/MM3 (ref 0–0.05)
IMM GRANULOCYTES NFR BLD AUTO: 0.3 % (ref 0–0.5)
KETONES UR QL: NEGATIVE
LEUKOCYTE EST, POC: ABNORMAL
LYMPHOCYTES # BLD AUTO: 1.56 10*3/MM3 (ref 0.7–3.1)
LYMPHOCYTES NFR BLD AUTO: 24.5 % (ref 19.6–45.3)
Lab: ABNORMAL
Lab: NORMAL
MCH RBC QN AUTO: 32 PG (ref 26.6–33)
MCHC RBC AUTO-ENTMCNC: 33.3 G/DL (ref 31.5–35.7)
MCV RBC AUTO: 96.1 FL (ref 79–97)
MONOCYTES # BLD AUTO: 0.74 10*3/MM3 (ref 0.1–0.9)
MONOCYTES NFR BLD AUTO: 11.6 % (ref 5–12)
NEGATIVE CONTROL: NEGATIVE
NEUTROPHILS NFR BLD AUTO: 3.9 10*3/MM3 (ref 1.7–7)
NEUTROPHILS NFR BLD AUTO: 61.4 % (ref 42.7–76)
NITRITE UR-MCNC: POSITIVE MG/ML
NRBC BLD AUTO-RTO: 0 /100 WBC (ref 0–0.2)
PH UR: 6 [PH] (ref 5–8)
PLATELET # BLD AUTO: 327 10*3/MM3 (ref 140–450)
PMV BLD AUTO: 10.6 FL (ref 6–12)
POSITIVE CONTROL: POSITIVE
PROT UR STRIP-MCNC: ABNORMAL MG/DL
RBC # BLD AUTO: 4.06 10*6/MM3 (ref 3.77–5.28)
RBC # UR STRIP: ABNORMAL /UL
SP GR UR: 1.03 (ref 1–1.03)
UROBILINOGEN UR QL: NORMAL
WBC NRBC COR # BLD AUTO: 6.36 10*3/MM3 (ref 3.4–10.8)

## 2024-10-23 PROCEDURE — 82270 OCCULT BLOOD FECES: CPT | Performed by: FAMILY MEDICINE

## 2024-10-23 PROCEDURE — 3078F DIAST BP <80 MM HG: CPT | Performed by: FAMILY MEDICINE

## 2024-10-23 PROCEDURE — 87086 URINE CULTURE/COLONY COUNT: CPT | Performed by: FAMILY MEDICINE

## 2024-10-23 PROCEDURE — 3074F SYST BP LT 130 MM HG: CPT | Performed by: FAMILY MEDICINE

## 2024-10-23 PROCEDURE — 85025 COMPLETE CBC W/AUTO DIFF WBC: CPT | Performed by: FAMILY MEDICINE

## 2024-10-23 PROCEDURE — 1126F AMNT PAIN NOTED NONE PRSNT: CPT | Performed by: FAMILY MEDICINE

## 2024-10-23 PROCEDURE — 87077 CULTURE AEROBIC IDENTIFY: CPT | Performed by: FAMILY MEDICINE

## 2024-10-23 PROCEDURE — 87186 SC STD MICRODIL/AGAR DIL: CPT | Performed by: FAMILY MEDICINE

## 2024-10-23 PROCEDURE — 81003 URINALYSIS AUTO W/O SCOPE: CPT | Performed by: FAMILY MEDICINE

## 2024-10-23 PROCEDURE — 36415 COLL VENOUS BLD VENIPUNCTURE: CPT | Performed by: FAMILY MEDICINE

## 2024-10-23 PROCEDURE — 99213 OFFICE O/P EST LOW 20 MIN: CPT | Performed by: FAMILY MEDICINE

## 2024-10-23 RX ORDER — NITROFURANTOIN 25; 75 MG/1; MG/1
100 CAPSULE ORAL 2 TIMES DAILY
Qty: 14 CAPSULE | Refills: 0 | Status: SHIPPED | OUTPATIENT
Start: 2024-10-23 | End: 2024-10-30

## 2024-10-23 NOTE — PROGRESS NOTES
Subjective   Chief Complaint   Patient presents with    Fatigue    Pain When Urinating     Abida Becker is a 79 y.o. female.     Patient Care Team:  Darlene Matute MD as PCP - General  TwainRolando MD as Consulting Physician (Urology)  Banet, Duane Edward, MD as Consulting Physician (Dermatology)  Brett Zuniga MD as Consulting Physician (Cardiology)  Thelma Lopez APRN as Nurse Practitioner (Hematology)    History of Present Illness  She is coming in today due to urinary symptoms which she started experiencing above 3 4 days ago.  Patient reports having urinary frequency and discomfort upon urination and has been somehow feeling not right like low bit of internal shakiness.  She tells me that she always has similar symptoms with her UTIs and she was recently treated for ESBL Klebsiella UTI earlier this month with a outpatient course of Invanz.  Patient had a follow-up appointment with us last week on Friday and at that time she felt great and all of her symptoms were gone.  No chills or fever reported.  No abdominal pain.  No vomiting or diarrhea.  While in the office today patient also mention to me that she has been having black bowel movements for the last month or so.  Her last bowel movement was yesterday and was black.  She just thought it was because she takes iron pills, but she has been on iron pills for about a year and did not have black bowel movements all that time just until about a month ago.       The following portions of the patient's history were reviewed and updated as appropriate: allergies, current medications, past family history, past medical history, past social history, past surgical history, and problem list.  Past Medical History:   Diagnosis Date    Dysuria 09/26/2024    Dysuria 09/26/2024    GERD (gastroesophageal reflux disease)     History of recurrent UTIs     IBS (irritable bowel syndrome)     Osteoarthritis     Osteoporosis     on prolia(12/5/22)     "Primary osteoarthritis of both knees 01/24/2022    Pulmonary embolism 2011    after scoliosis surgery    Pulmonary embolism 10/2022    Scoliosis     Vitamin D deficiency      Past Surgical History:   Procedure Laterality Date    BACK SURGERY  11/2011    Dr. Olsen, due to scoliosis, earnestine and some fusions    BREAST BIOPSY      CHOLECYSTECTOMY      COLONOSCOPY  01/18/2018    HIATAL HERNIA REPAIR  2013    REIMPLANT URETER IN BLADDER       The patient has a family history of  Family History   Problem Relation Age of Onset    Heart failure Mother     Cancer Father      Social History     Socioeconomic History    Marital status:     Number of children: 3    Years of education: 12    Highest education level: Master's degree (e.g., MA, MS, Hayes, MEd, MSW, [employer])   Tobacco Use    Smoking status: Never     Passive exposure: Never    Smokeless tobacco: Never   Vaping Use    Vaping status: Never Used   Substance and Sexual Activity    Alcohol use: No    Drug use: No    Sexual activity: Not Currently     Partners: Male     Birth control/protection: Condom, Spermicide       Review of Systems   Constitutional:  Negative for chills and fever.   Gastrointestinal:  Negative for diarrhea, nausea and vomiting.   Genitourinary:  Positive for dysuria and frequency. Negative for hematuria and pelvic pain.     Visit Vitals  /71 (BP Location: Left arm, Patient Position: Sitting, Cuff Size: Adult)   Pulse 75   Temp 97.8 °F (36.6 °C) (Infrared)   Resp 16   Ht 167.6 cm (66\")   Wt 80.8 kg (178 lb 3.2 oz)   SpO2 95%   BMI 28.76 kg/m²              Current Outpatient Medications:     acetaminophen (TYLENOL) 500 MG tablet, Take 1 tablet by mouth Every 6 (Six) Hours As Needed for Fever or Mild Pain., Disp: , Rfl:     calcium carbonate (OS-STEVEN) 600 MG tablet, Take 1 tablet by mouth Daily., Disp: , Rfl:     cholecalciferol (VITAMIN D3) 1000 units tablet, Take 1 tablet by mouth Daily., Disp: , Rfl:     D-Mannose 500 MG capsule, Take 1 " capsule by mouth Daily., Disp: 90 capsule, Rfl: 0    Diclofenac Sodium (VOLTAREN) 1 % gel gel, APPLY 4 GRAMS TO AFFECTED AREA(S) FOUR TIMES A DAY AS NEEDED FOR PAIN, Disp: 100 g, Rfl: 0    dicyclomine (BENTYL) 10 MG capsule, TAKE 1 CAPSULE BY MOUTH THREE TIMES A DAY AS NEEDED FOR  IBS, Disp: 90 capsule, Rfl: 0    ertapenem (INVanz) MBP 1 g in 100 NS, Infuse 1 g into a venous catheter Daily. X7 days, Disp: , Rfl:     famotidine (PEPCID) 40 MG tablet, TAKE 1 TABLET BY MOUTH DAILY, Disp: 30 tablet, Rfl: 0    ferrous sulfate (FeroSul) 325 (65 FE) MG tablet, Take 1 tablet by mouth Daily With Breakfast., Disp: 90 tablet, Rfl: 0    fluconazole (Diflucan) 100 MG tablet, Take 2 tablets p.o. once a day x 1, then take 1 tablet p.o. once a day until gone, Disp: 8 tablet, Rfl: 0    gabapentin (NEURONTIN) 300 MG capsule, TAKE 1 CAPSULE BY MOUTH TWICE A DAY, Disp: 180 capsule, Rfl: 0    meclizine (ANTIVERT) 25 MG tablet, Take 1 tablet by mouth 3 (Three) Times a Day As Needed for Dizziness., Disp: 30 tablet, Rfl: 0    melatonin 3 MG tablet, Take 1 tablet by mouth Every Night., Disp: , Rfl:     metoprolol succinate XL (TOPROL-XL) 25 MG 24 hr tablet, TAKE ONE TABLET BY MOUTH DAILY, Disp: 30 tablet, Rfl: 11    Mirabegron ER (MYRBETRIQ) 50 MG tablet sustained-release 24 hour 24 hr tablet, Take 50 mg by mouth Daily., Disp: , Rfl:     pantoprazole (PROTONIX) 40 MG EC tablet, TAKE 1 TABLET BY MOUTH DAILY, Disp: 90 tablet, Rfl: 1    potassium chloride 10 MEQ CR tablet, TAKE 1 TABLET BY MOUTH TWICE A DAY, Disp: 180 tablet, Rfl: 0    Riboflavin (B2 PO), Take 1 tablet by mouth Daily., Disp: , Rfl:     rivaroxaban (Xarelto) 10 MG tablet, Take 1 tablet by mouth Daily., Disp: 90 tablet, Rfl: 1    vitamin B-12 (CYANOCOBALAMIN) 1000 MCG tablet, Take 1 tablet by mouth Daily., Disp: , Rfl:     nitrofurantoin, macrocrystal-monohydrate, (Macrobid) 100 MG capsule, Take 1 capsule by mouth 2 (Two) Times a Day for 7 days., Disp: 14 capsule, Rfl:  0    Current Facility-Administered Medications:     denosumab (PROLIA) syringe 60 mg, 60 mg, Subcutaneous, Q6 Months, Yesi Hoffman MD, 60 mg at 03/18/24 0820    denosumab (PROLIA) syringe 60 mg, 60 mg, Subcutaneous, Q6 Months, Yesi Hoffman MD, 60 mg at 09/19/24 1146    Objective   Physical Exam  Constitutional:       General: She is not in acute distress.     Appearance: Normal appearance. She is well-developed. She is not ill-appearing or diaphoretic.      Comments: Patient is in no distress, patient has normal voice and speech.  Normal respiratory effort.   HENT:      Head: Normocephalic and atraumatic.   Pulmonary:      Effort: Pulmonary effort is normal.   Abdominal:      Comments: Rectal exam was done today, normal sphincter, there is a very small amount of a brownish stool noted on the lateral, no blood.   Musculoskeletal:      Cervical back: Normal range of motion and neck supple.   Neurological:      General: No focal deficit present.      Mental Status: She is alert and oriented to person, place, and time. Mental status is at baseline.   Psychiatric:         Mood and Affect: Mood normal.         FOLLOWING LABS WERE REVIEWED TODAY:  CBC          2/23/2024    13:16 7/31/2024    10:14 10/9/2024    14:05   CBC   WBC 5.20  6.62  5.97    RBC 4.22  4.19  4.24    Hemoglobin 12.7  13.2  13.2    Hematocrit 39.5  41.8  40.5    MCV 93.6  99.8  95.5    MCH 30.2  31.5  31.1    MCHC 32.3  31.6  32.6    RDW 14.9  13.3  12.3    Platelets 324  337  392              Assessment & Plan   Diagnoses and all orders for this visit:    1. Dysuria (Primary)  -     POC Urinalysis Dipstick, Automated  -     Urine Culture - Urine, Urine, Clean Catch  -     nitrofurantoin, macrocrystal-monohydrate, (Macrobid) 100 MG capsule; Take 1 capsule by mouth 2 (Two) Times a Day for 7 days.  Dispense: 14 capsule; Refill: 0    2. Black stools  -     POC Occult Blood Stool  -     CBC Auto Differential  -     Cancel: POCT FECAL OCCULT BLOOD BY  IMMUNOASSAY        This patient with recurrent frequent urinary tract infections.  She was treated with a 7-day course of outpatient IV Invanz earlier this month due to ESBL Klebsiella UTI.  She started with UTI symptoms few days ago again.  I will be sending urine off for culture and in meantime starting her on Macrobid and I will advise further once the result of the culture is available.  Patient also mentions some black stool for the last month.  Rectal exam was done today and only small amount of brownish stool was recovered and Hemoccult was negative.  She has been on iron supplementation for some time and this possibly might be the cause of her dark stool, I will be checking CBC.  Patient denies any dizziness or abdominal pain.      Return if symptoms worsen or fail to improve, for Recheck.    Requested Prescriptions     Signed Prescriptions Disp Refills    nitrofurantoin, macrocrystal-monohydrate, (Macrobid) 100 MG capsule 14 capsule 0     Sig: Take 1 capsule by mouth 2 (Two) Times a Day for 7 days.       Darlene Matute MD  10/23/2024

## 2024-10-24 DIAGNOSIS — K92.1 BLACK STOOLS: Primary | ICD-10-CM

## 2024-10-25 LAB — BACTERIA SPEC AEROBE CULT: ABNORMAL

## 2024-10-25 RX ORDER — LEVOFLOXACIN 500 MG/1
500 TABLET, FILM COATED ORAL DAILY
Qty: 7 TABLET | Refills: 0 | Status: SHIPPED | OUTPATIENT
Start: 2024-10-25

## 2024-10-25 RX ORDER — FLUCONAZOLE 150 MG/1
150 TABLET ORAL ONCE
Qty: 1 TABLET | Refills: 0 | Status: SHIPPED | OUTPATIENT
Start: 2024-10-25 | End: 2024-10-25

## 2024-10-25 NOTE — PROGRESS NOTES
LVM for patient the levaquin was sent into the pharmacy and she needs to stop taking the macrobid. If symptom get worse  she needs to go to the ER

## 2024-10-25 NOTE — PROGRESS NOTES
Patient is on her third day of Levaquin and is still have some discomfort with urination. She wants to know I that's normal

## 2024-10-28 ENCOUNTER — LAB (OUTPATIENT)
Dept: FAMILY MEDICINE CLINIC | Facility: CLINIC | Age: 79
End: 2024-10-28
Payer: MEDICARE

## 2024-10-28 DIAGNOSIS — K92.1 BLACK STOOLS: ICD-10-CM

## 2024-10-28 LAB — HEMOCCULT STL QL IA: NEGATIVE

## 2024-10-28 PROCEDURE — 82274 ASSAY TEST FOR BLOOD FECAL: CPT | Performed by: FAMILY MEDICINE

## 2024-11-20 PROCEDURE — 87086 URINE CULTURE/COLONY COUNT: CPT | Performed by: PHYSICIAN ASSISTANT

## 2024-11-20 PROCEDURE — 87077 CULTURE AEROBIC IDENTIFY: CPT | Performed by: PHYSICIAN ASSISTANT

## 2024-11-20 PROCEDURE — 87186 SC STD MICRODIL/AGAR DIL: CPT | Performed by: PHYSICIAN ASSISTANT

## 2024-11-21 ENCOUNTER — TELEPHONE (OUTPATIENT)
Dept: FAMILY MEDICINE CLINIC | Facility: CLINIC | Age: 79
End: 2024-11-21

## 2024-11-21 DIAGNOSIS — K21.9 GASTROESOPHAGEAL REFLUX DISEASE WITHOUT ESOPHAGITIS: ICD-10-CM

## 2024-11-21 RX ORDER — PANTOPRAZOLE SODIUM 40 MG/1
40 TABLET, DELAYED RELEASE ORAL DAILY
Qty: 90 TABLET | Refills: 1 | Status: SHIPPED | OUTPATIENT
Start: 2024-11-21

## 2024-11-21 NOTE — TELEPHONE ENCOUNTER
Caller: Abida Becker    Relationship: Self    Best call back number:     [phone]       What is the medical concern/diagnosis: KEEPS GETTING UTI'S    What specialty or service is being requested: UROLOGY    Any additional details:     PATIENT IS WANTING TO KNOW IF YOU CAN SUGGEST A DIFFERENT UROLOGIST TO HER OTHER THAN THE ONE SHE IS CURRENTLY GOING TO.  SHE FEELS LIKE THE ONE SHE CURRENTLY HAS IS NOT LISTENING TO HER CONCERNS AND IS NOT WORKING OUT FOR THE PATIENT.    PLEASE ADVISE.

## 2024-11-21 NOTE — TELEPHONE ENCOUNTER
I believe [facility]  She can either ask the same office to see a different physician or I can refer her to [facility] urology or we can possibly refer her to see a urogynecologist, that also would be in [place].  Thank you.

## 2024-11-22 ENCOUNTER — OFFICE VISIT (OUTPATIENT)
Dept: FAMILY MEDICINE CLINIC | Facility: CLINIC | Age: 79
End: 2024-11-22
Payer: MEDICARE

## 2024-11-22 ENCOUNTER — TELEPHONE (OUTPATIENT)
Dept: FAMILY MEDICINE CLINIC | Facility: CLINIC | Age: 79
End: 2024-11-22

## 2024-11-22 VITALS
TEMPERATURE: 97.8 F | OXYGEN SATURATION: 93 % | SYSTOLIC BLOOD PRESSURE: 92 MMHG | WEIGHT: 174.4 LBS | RESPIRATION RATE: 16 BRPM | BODY MASS INDEX: 28.03 KG/M2 | HEIGHT: 66 IN | HEART RATE: 93 BPM | DIASTOLIC BLOOD PRESSURE: 59 MMHG

## 2024-11-22 DIAGNOSIS — Z87.440 H/O RECURRENT URINARY TRACT INFECTION: ICD-10-CM

## 2024-11-22 DIAGNOSIS — J06.9 ACUTE URI: Primary | ICD-10-CM

## 2024-11-22 DIAGNOSIS — B96.89 URINARY TRACT INFECTION DUE TO ESBL KLEBSIELLA: ICD-10-CM

## 2024-11-22 DIAGNOSIS — N39.0 URINARY TRACT INFECTION DUE TO ESBL KLEBSIELLA: ICD-10-CM

## 2024-11-22 PROCEDURE — 87428 SARSCOV & INF VIR A&B AG IA: CPT | Performed by: FAMILY MEDICINE

## 2024-11-22 PROCEDURE — 3078F DIAST BP <80 MM HG: CPT | Performed by: FAMILY MEDICINE

## 2024-11-22 PROCEDURE — 99214 OFFICE O/P EST MOD 30 MIN: CPT | Performed by: FAMILY MEDICINE

## 2024-11-22 PROCEDURE — 3074F SYST BP LT 130 MM HG: CPT | Performed by: FAMILY MEDICINE

## 2024-11-22 PROCEDURE — 1126F AMNT PAIN NOTED NONE PRSNT: CPT | Performed by: FAMILY MEDICINE

## 2024-11-22 RX ORDER — GABAPENTIN 300 MG/1
CAPSULE ORAL
Qty: 180 CAPSULE | Refills: 0 | Status: SHIPPED | OUTPATIENT
Start: 2024-11-22

## 2024-11-22 NOTE — TELEPHONE ENCOUNTER
Yes, she needs to stop Cipro.  Please see the urine culture sign off for my recommendation.  Thank you.

## 2024-11-22 NOTE — TELEPHONE ENCOUNTER
PATIENT CALLED AND STATES SHE RECEIVED A CALL FROM URGENT CARE , SHE WAS SEEN ON 11/20/24, TOLD TO GET AN ORDER FOR ANTIBIOTIC IV DUE TO URINE CULTURE; IS RESISTANT TO ANTIBIOTICS.     SHE IS NEEDING TO KNOW IF SHE SHOULD STOP TAKING ciprofloxacin (CIPRO) 500 MG tablet     CALL BACK NUMBER [phone]

## 2024-11-22 NOTE — PROGRESS NOTES
Subjective   Chief Complaint   Patient presents with    Generalized Body Aches    Cough    Nasal Congestion    Headache     All symptoms since last Sunday     Follow-up     She was seen at the urgent care earlier this week due to UTI     Abida Becker is a 79 y.o. female.     Patient Care Team:  Darlene Matute MD as PCP - General  Orcutt, Rolando HUNTER MD as Consulting Physician (Urology)  Banet, Duane Edward, MD as Consulting Physician (Dermatology)  Brett Zuniga MD as Consulting Physician (Cardiology)  Thelma Lopez APRN as Nurse Practitioner (Hematology)    History of Present Illness  She is coming in today due to upper respiratory symptoms which she has been experiencing for the last 5 to 6 days.  Patient reports having some fatigue, body aches, cough, and congestion.  She also has been having some headaches.  She went to the urgent care few days ago due to the symptoms and she was tested for RSV which was negative as her grandson who she spent some time with had RSV in the recent past.  Patient tells me that today he actually feels to be a little better today from upper respiratory standpoint.  While in the urgent care she also had her urinary symptoms addressed and patient was started on Cipro for possible urinary tract infection, patient reports that her symptoms are not really improving.  She is also under the care of the urologist due to her multiple urinary tract infections.  She is now in the process of scheduling an appointment with a different urology for a second opinion.       The following portions of the patient's history were reviewed and updated as appropriate: allergies, current medications, past family history, past medical history, past social history, past surgical history, and problem list.  Past Medical History:   Diagnosis Date    Dysuria 09/26/2024    Dysuria 09/26/2024    GERD (gastroesophageal reflux disease)     History of recurrent UTIs     IBS (irritable bowel  "syndrome)     Osteoarthritis     Osteoporosis     on prolia(12/5/22)    Primary osteoarthritis of both knees 01/24/2022    Pulmonary embolism 2011    after scoliosis surgery    Pulmonary embolism 10/2022    Scoliosis     Vitamin D deficiency      Past Surgical History:   Procedure Laterality Date    BACK SURGERY  11/2011    Dr. Olsen, due to scoliosis, earnestine and some fusions    BREAST BIOPSY      CHOLECYSTECTOMY      COLONOSCOPY  01/18/2018    HIATAL HERNIA REPAIR  2013    REIMPLANT URETER IN BLADDER       The patient has a family history of  Family History   Problem Relation Age of Onset    Heart failure Mother     Cancer Father      Social History     Socioeconomic History    Marital status:     Number of children: 3    Years of education: 12    Highest education level: Master's degree (e.g., MA, MS, Hayes, MEd, MSW, [employer])   Tobacco Use    Smoking status: Never     Passive exposure: Never    Smokeless tobacco: Never   Vaping Use    Vaping status: Never Used   Substance and Sexual Activity    Alcohol use: No    Drug use: No    Sexual activity: Not Currently     Partners: Male     Birth control/protection: Condom, Spermicide       Review of Systems   Constitutional:  Positive for fatigue. Negative for activity change, appetite change, chills and fever.   HENT:  Positive for congestion. Negative for ear pain, postnasal drip, sinus pressure, sore throat and swollen glands.    Respiratory:  Positive for cough. Negative for choking, chest tightness, shortness of breath, wheezing and stridor.    Cardiovascular:  Negative for chest pain.   Genitourinary:  Positive for dysuria and frequency. Negative for hematuria, pelvic pain and urgency.   Skin:  Negative for dry skin and rash.     Visit Vitals  BP 92/59 (BP Location: Left arm, Patient Position: Sitting, Cuff Size: Large Adult)   Pulse 93   Temp 97.8 °F (36.6 °C) (Oral)   Resp 16   Ht 167.6 cm (65.98\")   Wt 79.1 kg (174 lb 6.4 oz)   SpO2 93%   BMI 28.16 kg/m² "              Current Outpatient Medications:     acetaminophen (TYLENOL) 500 MG tablet, Take 1 tablet by mouth Every 6 (Six) Hours As Needed for Fever or Mild Pain., Disp: , Rfl:     calcium carbonate (OS-STEVEN) 600 MG tablet, Take 1 tablet by mouth Daily., Disp: , Rfl:     cholecalciferol (VITAMIN D3) 1000 units tablet, Take 1 tablet by mouth Daily., Disp: , Rfl:     ciprofloxacin (CIPRO) 500 MG tablet, Take 1 tablet by mouth 2 (Two) Times a Day., Disp: 14 tablet, Rfl: 0    D-Mannose 500 MG capsule, Take 1 capsule by mouth Daily., Disp: 90 capsule, Rfl: 0    Diclofenac Sodium (VOLTAREN) 1 % gel gel, APPLY 4 GRAMS TO AFFECTED AREA(S) FOUR TIMES A DAY AS NEEDED FOR PAIN, Disp: 100 g, Rfl: 0    dicyclomine (BENTYL) 10 MG capsule, TAKE 1 CAPSULE BY MOUTH THREE TIMES A DAY AS NEEDED FOR  IBS, Disp: 90 capsule, Rfl: 0    famotidine (PEPCID) 40 MG tablet, TAKE 1 TABLET BY MOUTH DAILY, Disp: 30 tablet, Rfl: 0    ferrous sulfate (FeroSul) 325 (65 FE) MG tablet, Take 1 tablet by mouth Daily With Breakfast., Disp: 90 tablet, Rfl: 0    meclizine (ANTIVERT) 25 MG tablet, Take 1 tablet by mouth 3 (Three) Times a Day As Needed for Dizziness., Disp: 30 tablet, Rfl: 0    melatonin 3 MG tablet, Take 1 tablet by mouth Every Night., Disp: , Rfl:     metoprolol succinate XL (TOPROL-XL) 25 MG 24 hr tablet, TAKE ONE TABLET BY MOUTH DAILY, Disp: 30 tablet, Rfl: 11    Mirabegron ER (MYRBETRIQ) 50 MG tablet sustained-release 24 hour 24 hr tablet, Take 50 mg by mouth Daily., Disp: , Rfl:     pantoprazole (PROTONIX) 40 MG EC tablet, TAKE 1 TABLET BY MOUTH DAILY, Disp: 90 tablet, Rfl: 1    potassium chloride 10 MEQ CR tablet, TAKE 1 TABLET BY MOUTH TWICE A DAY, Disp: 180 tablet, Rfl: 0    Riboflavin (B2 PO), Take 1 tablet by mouth Daily., Disp: , Rfl:     rivaroxaban (Xarelto) 10 MG tablet, Take 1 tablet by mouth Daily., Disp: 90 tablet, Rfl: 1    vitamin B-12 (CYANOCOBALAMIN) 1000 MCG tablet, Take 1 tablet by mouth Daily., Disp: , Rfl:      ertapenem (INVanz) MBP 1 g in 100 NS, Infuse 1 g into a venous catheter Daily. Every 24 hours x 7 days, Disp: , Rfl:     gabapentin (NEURONTIN) 300 MG capsule, TAKE 1 CAPSULE BY MOUTH TWICE A DAY, Disp: 180 capsule, Rfl: 0    Current Facility-Administered Medications:     denosumab (PROLIA) syringe 60 mg, 60 mg, Subcutaneous, Q6 Months, Yesi Hoffman MD, 60 mg at 09/19/24 1146    Objective   Physical Exam  Vitals and nursing note reviewed.   Constitutional:       General: She is not in acute distress.     Appearance: She is well-developed.   HENT:      Head: Normocephalic and atraumatic.      Right Ear: Tympanic membrane and external ear normal.      Left Ear: Tympanic membrane and external ear normal.      Nose: No congestion.      Mouth/Throat:      Pharynx: No oropharyngeal exudate or posterior oropharyngeal erythema.   Eyes:      Conjunctiva/sclera: Conjunctivae normal.      Pupils: Pupils are equal, round, and reactive to light.   Cardiovascular:      Rate and Rhythm: Normal rate and regular rhythm.      Heart sounds: Normal heart sounds.   Pulmonary:      Effort: Pulmonary effort is normal. No respiratory distress.      Breath sounds: Normal breath sounds. No wheezing or rales.   Musculoskeletal:      Cervical back: Normal range of motion and neck supple.   Skin:     General: Skin is warm and dry.      Findings: No rash.         XR Chest 2 View    Result Date: 11/20/2024  Impression: Impression: No acute cardiopulmonary process. Electronically Signed: Jonathan Mooney MD  11/20/2024 12:27 PM EST  Workstation ID: [id number]     FOLLOWING LABS WERE REVIEWED TODAY:  UA          10/9/2024    14:05 10/23/2024    10:13 11/20/2024    12:08   Urinalysis   Squamous Epithelial Cells, UA 3-6      Specific Gravity, UA >=1.030      Ketones, UA Trace  Negative  15 mg/dL    Blood, UA Negative      Leukocytes, UA Small (1+)  Moderate (2+)  Small (1+)    Nitrite, UA Negative      RBC, UA None Seen      WBC, UA 6-10      Bacteria,  UA Trace        Urine Culture          10/9/2024    14:05 10/23/2024    10:15 11/20/2024    12:12   Urine Culture   Urine Culture Yeast isolated  >100,000 CFU/mL Klebsiella pneumoniae ESBL  >100,000 CFU/mL Klebsiella pneumoniae ESBL         BMP          7/31/2024    10:14 8/30/2024    11:42 10/9/2024    14:05   BMP   BUN 19  18  14    Creatinine 0.74  0.75  0.70    Sodium 140  141  139    Potassium 3.9  4.5  4.5    Chloride 104  106  105    CO2 25.3  25.1  26.0    Calcium 9.3  10.2  9.2           Assessment & Plan   Diagnoses and all orders for this visit:    1. Acute URI (Primary)  -     POCT SARS-CoV-2 + Flu Antigen SURYA    2. Urinary tract infection due to ESBL Klebsiella  -     ertapenem (INVanz) MBP 1 g in 100 NS; Infuse 1 g into a venous catheter Daily. Every 24 hours x 7 days    3. H/O recurrent urinary tract infection      Rapid COVID-19 and flu test were done today and were negative.  Her symptoms are consistent with viral upper respiratory infection and patient was advised to continue over-the-counter medications for symptoms relief.  Chest x-ray was done at urgent care and was negative for pneumonia.  I also reviewed the urine culture result from earlier this week which shows Klebsiella pneumonia ESBL infection.  Patient is currently on Cipro, but I advised the patient to discontinue it due to resistance.  Patient will be started on IV ertapenem through ambulatory services every 24 hours x 7 days and the first infusion is scheduled for 11/23/2024.  Patient will need to follow-up with her urologist due to multiple recurrent urinary tract infection and she is not in the process of scheduling an appointment with a new urology office.      Return if symptoms worsen or fail to improve, for Recheck.    Requested Prescriptions     Signed Prescriptions Disp Refills    ertapenem (INVanz) MBP 1 g in 100 NS       Sig: Infuse 1 g into a venous catheter Daily. Every 24 hours x 7 days       Darlene Matute  MD  11/22/2024

## 2024-11-22 NOTE — TELEPHONE ENCOUNTER
[facility] will be reaching to patient to let her know her when to arrive for her appointment tomorrow. The appointment for the next three day have already been scheduled

## 2024-11-23 ENCOUNTER — HOSPITAL ENCOUNTER (OUTPATIENT)
Dept: INFUSION THERAPY | Facility: HOSPITAL | Age: 79
Discharge: HOME | End: 2024-11-23
Admitting: FAMILY MEDICINE
Payer: MEDICARE

## 2024-11-23 VITALS
HEART RATE: 90 BPM | TEMPERATURE: 98 F | SYSTOLIC BLOOD PRESSURE: 102 MMHG | DIASTOLIC BLOOD PRESSURE: 63 MMHG | OXYGEN SATURATION: 95 %

## 2024-11-23 DIAGNOSIS — N30.00 ACUTE CYSTITIS WITHOUT HEMATURIA: Primary | ICD-10-CM

## 2024-11-23 PROBLEM — Z87.440 H/O RECURRENT URINARY TRACT INFECTION: Status: ACTIVE | Noted: 2024-11-23

## 2024-11-23 PROCEDURE — 36415 COLL VENOUS BLD VENIPUNCTURE: CPT

## 2024-11-23 PROCEDURE — 25010000002 ERTAPENEM PER 500 MG: Performed by: FAMILY MEDICINE

## 2024-11-23 PROCEDURE — 96365 THER/PROPH/DIAG IV INF INIT: CPT

## 2024-11-23 RX ADMIN — ERTAPENEM SODIUM 1000 MG: 1 INJECTION, POWDER, LYOPHILIZED, FOR SOLUTION INTRAMUSCULAR; INTRAVENOUS at 09:08

## 2024-11-24 ENCOUNTER — HOSPITAL ENCOUNTER (OUTPATIENT)
Dept: INFUSION THERAPY | Facility: HOSPITAL | Age: 79
Discharge: HOME | End: 2024-11-24
Admitting: FAMILY MEDICINE
Payer: MEDICARE

## 2024-11-24 VITALS
DIASTOLIC BLOOD PRESSURE: 63 MMHG | HEART RATE: 84 BPM | OXYGEN SATURATION: 96 % | SYSTOLIC BLOOD PRESSURE: 122 MMHG | TEMPERATURE: 97.7 F

## 2024-11-24 DIAGNOSIS — N30.00 ACUTE CYSTITIS WITHOUT HEMATURIA: Primary | ICD-10-CM

## 2024-11-24 PROCEDURE — 25010000002 ERTAPENEM PER 500 MG: Performed by: FAMILY MEDICINE

## 2024-11-24 PROCEDURE — 96365 THER/PROPH/DIAG IV INF INIT: CPT

## 2024-11-24 RX ADMIN — ERTAPENEM SODIUM 1000 MG: 1 INJECTION, POWDER, LYOPHILIZED, FOR SOLUTION INTRAMUSCULAR; INTRAVENOUS at 09:06

## 2024-11-25 ENCOUNTER — HOSPITAL ENCOUNTER (OUTPATIENT)
Dept: INFUSION THERAPY | Facility: HOSPITAL | Age: 79
Discharge: HOME | End: 2024-11-25
Admitting: FAMILY MEDICINE
Payer: MEDICARE

## 2024-11-25 VITALS
RESPIRATION RATE: 16 BRPM | HEART RATE: 92 BPM | DIASTOLIC BLOOD PRESSURE: 49 MMHG | OXYGEN SATURATION: 91 % | TEMPERATURE: 98.2 F | SYSTOLIC BLOOD PRESSURE: 104 MMHG

## 2024-11-25 DIAGNOSIS — N30.00 ACUTE CYSTITIS WITHOUT HEMATURIA: Primary | ICD-10-CM

## 2024-11-25 PROCEDURE — 25010000002 ERTAPENEM PER 500 MG: Performed by: FAMILY MEDICINE

## 2024-11-25 PROCEDURE — 96365 THER/PROPH/DIAG IV INF INIT: CPT

## 2024-11-25 RX ADMIN — ERTAPENEM SODIUM 1000 MG: 1 INJECTION, POWDER, LYOPHILIZED, FOR SOLUTION INTRAMUSCULAR; INTRAVENOUS at 13:28

## 2024-11-26 ENCOUNTER — OFFICE (OUTPATIENT)
Age: 79
End: 2024-11-26
Payer: MEDICARE

## 2024-11-26 ENCOUNTER — OFFICE (OUTPATIENT)
Dept: URBAN - METROPOLITAN AREA CLINIC 64 | Facility: CLINIC | Age: 79
End: 2024-11-26
Payer: MEDICARE

## 2024-11-26 ENCOUNTER — HOSPITAL ENCOUNTER (OUTPATIENT)
Dept: INFUSION THERAPY | Facility: HOSPITAL | Age: 79
Discharge: HOME | End: 2024-11-26
Admitting: FAMILY MEDICINE
Payer: MEDICARE

## 2024-11-26 VITALS
WEIGHT: 175 LBS | HEIGHT: 67 IN | SYSTOLIC BLOOD PRESSURE: 117 MMHG | HEIGHT: 67 IN | DIASTOLIC BLOOD PRESSURE: 73 MMHG | WEIGHT: 175 LBS | HEART RATE: 73 BPM | WEIGHT: 175 LBS | DIASTOLIC BLOOD PRESSURE: 73 MMHG | HEIGHT: 67 IN | DIASTOLIC BLOOD PRESSURE: 73 MMHG | SYSTOLIC BLOOD PRESSURE: 117 MMHG | HEART RATE: 73 BPM | DIASTOLIC BLOOD PRESSURE: 73 MMHG | DIASTOLIC BLOOD PRESSURE: 73 MMHG | SYSTOLIC BLOOD PRESSURE: 117 MMHG | HEIGHT: 67 IN | HEART RATE: 73 BPM | SYSTOLIC BLOOD PRESSURE: 117 MMHG | HEIGHT: 67 IN | SYSTOLIC BLOOD PRESSURE: 117 MMHG | HEIGHT: 67 IN | HEIGHT: 67 IN | WEIGHT: 175 LBS | WEIGHT: 175 LBS | DIASTOLIC BLOOD PRESSURE: 73 MMHG | WEIGHT: 175 LBS | HEART RATE: 73 BPM | HEART RATE: 73 BPM | SYSTOLIC BLOOD PRESSURE: 117 MMHG | HEART RATE: 73 BPM | SYSTOLIC BLOOD PRESSURE: 117 MMHG | HEART RATE: 73 BPM | DIASTOLIC BLOOD PRESSURE: 73 MMHG | WEIGHT: 175 LBS

## 2024-11-26 VITALS
OXYGEN SATURATION: 94 % | TEMPERATURE: 97.4 F | HEART RATE: 79 BPM | DIASTOLIC BLOOD PRESSURE: 69 MMHG | SYSTOLIC BLOOD PRESSURE: 107 MMHG | RESPIRATION RATE: 16 BRPM

## 2024-11-26 DIAGNOSIS — R14.0 ABDOMINAL DISTENSION (GASEOUS): ICD-10-CM

## 2024-11-26 DIAGNOSIS — R10.9 UNSPECIFIED ABDOMINAL PAIN: ICD-10-CM

## 2024-11-26 DIAGNOSIS — K59.00 CONSTIPATION, UNSPECIFIED: ICD-10-CM

## 2024-11-26 DIAGNOSIS — N30.00 ACUTE CYSTITIS WITHOUT HEMATURIA: Primary | ICD-10-CM

## 2024-11-26 PROCEDURE — 99213 OFFICE O/P EST LOW 20 MIN: CPT | Performed by: NURSE PRACTITIONER

## 2024-11-26 PROCEDURE — 25010000002 ERTAPENEM PER 500 MG: Performed by: FAMILY MEDICINE

## 2024-11-26 PROCEDURE — 96365 THER/PROPH/DIAG IV INF INIT: CPT

## 2024-11-26 RX ADMIN — ERTAPENEM SODIUM 1000 MG: 1 INJECTION, POWDER, LYOPHILIZED, FOR SOLUTION INTRAMUSCULAR; INTRAVENOUS at 08:59

## 2024-11-27 ENCOUNTER — HOSPITAL ENCOUNTER (OUTPATIENT)
Dept: INFUSION THERAPY | Facility: HOSPITAL | Age: 79
Discharge: HOME | End: 2024-11-27
Admitting: FAMILY MEDICINE
Payer: MEDICARE

## 2024-11-27 VITALS
OXYGEN SATURATION: 94 % | SYSTOLIC BLOOD PRESSURE: 96 MMHG | HEART RATE: 85 BPM | RESPIRATION RATE: 18 BRPM | DIASTOLIC BLOOD PRESSURE: 62 MMHG

## 2024-11-27 DIAGNOSIS — N30.00 ACUTE CYSTITIS WITHOUT HEMATURIA: Primary | ICD-10-CM

## 2024-11-27 PROCEDURE — 25010000002 ERTAPENEM PER 500 MG: Performed by: FAMILY MEDICINE

## 2024-11-27 PROCEDURE — 96365 THER/PROPH/DIAG IV INF INIT: CPT

## 2024-11-27 RX ADMIN — ERTAPENEM SODIUM 1000 MG: 1 INJECTION, POWDER, LYOPHILIZED, FOR SOLUTION INTRAMUSCULAR; INTRAVENOUS at 08:34

## 2024-11-28 ENCOUNTER — HOSPITAL ENCOUNTER (OUTPATIENT)
Dept: INFUSION THERAPY | Facility: HOSPITAL | Age: 79
Discharge: HOME | End: 2024-11-28
Admitting: FAMILY MEDICINE
Payer: MEDICARE

## 2024-11-28 VITALS
OXYGEN SATURATION: 91 % | SYSTOLIC BLOOD PRESSURE: 98 MMHG | RESPIRATION RATE: 16 BRPM | TEMPERATURE: 97.3 F | HEART RATE: 84 BPM | DIASTOLIC BLOOD PRESSURE: 43 MMHG

## 2024-11-28 DIAGNOSIS — N30.00 ACUTE CYSTITIS WITHOUT HEMATURIA: Primary | ICD-10-CM

## 2024-11-28 PROCEDURE — 96365 THER/PROPH/DIAG IV INF INIT: CPT

## 2024-11-28 PROCEDURE — 25010000002 ERTAPENEM PER 500 MG: Performed by: FAMILY MEDICINE

## 2024-11-28 RX ADMIN — ERTAPENEM SODIUM 1000 MG: 1 INJECTION, POWDER, LYOPHILIZED, FOR SOLUTION INTRAMUSCULAR; INTRAVENOUS at 09:31

## 2024-11-29 ENCOUNTER — HOSPITAL ENCOUNTER (OUTPATIENT)
Dept: INFUSION THERAPY | Facility: HOSPITAL | Age: 79
Discharge: HOME | End: 2024-11-29
Admitting: FAMILY MEDICINE
Payer: MEDICARE

## 2024-11-29 VITALS
TEMPERATURE: 97.5 F | SYSTOLIC BLOOD PRESSURE: 110 MMHG | OXYGEN SATURATION: 95 % | DIASTOLIC BLOOD PRESSURE: 57 MMHG | HEART RATE: 82 BPM

## 2024-11-29 DIAGNOSIS — N30.00 ACUTE CYSTITIS WITHOUT HEMATURIA: Primary | ICD-10-CM

## 2024-11-29 PROCEDURE — 96365 THER/PROPH/DIAG IV INF INIT: CPT

## 2024-11-29 PROCEDURE — 25010000002 ERTAPENEM PER 500 MG: Performed by: FAMILY MEDICINE

## 2024-11-29 RX ADMIN — ERTAPENEM SODIUM 1000 MG: 1 INJECTION, POWDER, LYOPHILIZED, FOR SOLUTION INTRAMUSCULAR; INTRAVENOUS at 09:21

## 2024-12-02 ENCOUNTER — TELEPHONE (OUTPATIENT)
Dept: FAMILY MEDICINE CLINIC | Facility: CLINIC | Age: 79
End: 2024-12-02
Payer: MEDICARE

## 2024-12-02 RX ORDER — FLUCONAZOLE 100 MG/1
100 TABLET ORAL DAILY
Qty: 7 TABLET | Refills: 0 | Status: SHIPPED | OUTPATIENT
Start: 2024-12-02

## 2024-12-02 NOTE — TELEPHONE ENCOUNTER
Caller: Abida Becker    Relationship: Self    Best call back number: [phone]     What medication are you requesting:     What are your current symptoms: YEAST INFECTION IN MOUTH, ITCHING     How long have you been experiencing symptoms:     Have you had these symptoms before:    [x] Yes  [] No    Have you been treated for these symptoms before:   [x] Yes  [] No    If a prescription is needed, what is your preferred pharmacy and phone number: [facility] [id number] - [place], [place] - [address] - [phone] [facility] [phone]      Additional notes:  PATIENT STATES THIS HAS HAPPENED FOLLOWING HER INFUSION

## 2024-12-05 ENCOUNTER — OFFICE VISIT (OUTPATIENT)
Dept: FAMILY MEDICINE CLINIC | Facility: CLINIC | Age: 79
End: 2024-12-05
Payer: MEDICARE

## 2024-12-05 ENCOUNTER — OFFICE VISIT (OUTPATIENT)
Dept: CARDIOLOGY | Facility: CLINIC | Age: 79
End: 2024-12-05
Payer: MEDICARE

## 2024-12-05 VITALS
HEART RATE: 84 BPM | RESPIRATION RATE: 16 BRPM | DIASTOLIC BLOOD PRESSURE: 55 MMHG | SYSTOLIC BLOOD PRESSURE: 99 MMHG | OXYGEN SATURATION: 92 % | BODY MASS INDEX: 28.45 KG/M2 | HEIGHT: 66 IN | WEIGHT: 177 LBS | TEMPERATURE: 98.1 F

## 2024-12-05 VITALS
HEART RATE: 80 BPM | BODY MASS INDEX: 28.28 KG/M2 | HEIGHT: 66 IN | OXYGEN SATURATION: 93 % | WEIGHT: 176 LBS | RESPIRATION RATE: 18 BRPM | DIASTOLIC BLOOD PRESSURE: 69 MMHG | SYSTOLIC BLOOD PRESSURE: 107 MMHG

## 2024-12-05 DIAGNOSIS — Z78.0 POSTMENOPAUSAL: ICD-10-CM

## 2024-12-05 DIAGNOSIS — Z00.00 MEDICARE ANNUAL WELLNESS VISIT, SUBSEQUENT: Primary | ICD-10-CM

## 2024-12-05 DIAGNOSIS — Z12.31 VISIT FOR SCREENING MAMMOGRAM: ICD-10-CM

## 2024-12-05 DIAGNOSIS — N30.00 ACUTE CYSTITIS WITHOUT HEMATURIA: ICD-10-CM

## 2024-12-05 DIAGNOSIS — I48.0 PAROXYSMAL ATRIAL FIBRILLATION: Primary | ICD-10-CM

## 2024-12-05 DIAGNOSIS — Z86.711 HISTORY OF PULMONARY EMBOLISM: ICD-10-CM

## 2024-12-05 PROBLEM — H81.13 BENIGN PAROXYSMAL POSITIONAL VERTIGO DUE TO BILATERAL VESTIBULAR DISORDER: Status: RESOLVED | Noted: 2024-06-20 | Resolved: 2024-12-05

## 2024-12-05 PROBLEM — J06.9 ACUTE URI: Status: RESOLVED | Noted: 2023-05-23 | Resolved: 2024-12-05

## 2024-12-05 PROBLEM — K92.1 BLACK STOOLS: Status: RESOLVED | Noted: 2024-10-23 | Resolved: 2024-12-05

## 2024-12-05 PROBLEM — T14.8XXA BRUISING: Status: RESOLVED | Noted: 2022-08-15 | Resolved: 2024-12-05

## 2024-12-05 PROBLEM — Z23 NEEDS FLU SHOT: Status: RESOLVED | Noted: 2022-10-27 | Resolved: 2024-12-05

## 2024-12-05 PROCEDURE — 3074F SYST BP LT 130 MM HG: CPT | Performed by: NURSE PRACTITIONER

## 2024-12-05 PROCEDURE — 93000 ELECTROCARDIOGRAM COMPLETE: CPT | Performed by: NURSE PRACTITIONER

## 2024-12-05 PROCEDURE — 3078F DIAST BP <80 MM HG: CPT | Performed by: FAMILY MEDICINE

## 2024-12-05 PROCEDURE — G0439 PPPS, SUBSEQ VISIT: HCPCS | Performed by: FAMILY MEDICINE

## 2024-12-05 PROCEDURE — 1126F AMNT PAIN NOTED NONE PRSNT: CPT | Performed by: FAMILY MEDICINE

## 2024-12-05 PROCEDURE — 3074F SYST BP LT 130 MM HG: CPT | Performed by: FAMILY MEDICINE

## 2024-12-05 PROCEDURE — 99213 OFFICE O/P EST LOW 20 MIN: CPT | Performed by: NURSE PRACTITIONER

## 2024-12-05 PROCEDURE — 1170F FXNL STATUS ASSESSED: CPT | Performed by: FAMILY MEDICINE

## 2024-12-05 PROCEDURE — 3078F DIAST BP <80 MM HG: CPT | Performed by: NURSE PRACTITIONER

## 2024-12-05 NOTE — PROGRESS NOTES
Subjective   The ABCs of the Annual Wellness Visit  Medicare Wellness Visit    Chief Complaint   Patient presents with    Medicare Wellness-subsequent       Abida Becker is a 79 y.o. patient who presents for a Medicare Wellness Visit.  She lives by herself, she is fully independent with her activities of daily living.  No issues with depression or memory problems are being reported.  She has dealt with a lot of orthopedic issues due to degenerative disc disease and arthritis as well as scoliosis.  She denies any recent falls, but she is aware of her increased risk of fall and she has been very cautious.  Patient also has dealt for some time now with recurrent urinary tract infections.  She was treated 2 times within the last couple of months through our office with IV Invanz due to ESBL UTI.  Patient recently was seen by her urologist to further address that.  She has been trying to keep up with water intake better. Patient does not report any chest pain, shortness of breath, dizziness, nausea, vomiting, or diarrhea, visual issues, headaches, numbness or tingling. No urinary issues reported like urgency, frequency, or discomfort upon urination.  No significant weight changes reported.  No swelling reported.  No rashes or any other skin issues reported. No emotional issues or insomnia.    The following portions of the patient's history were reviewed and   updated as appropriate: allergies, current medications, past family history, past medical history, past social history, past surgical history, and problem list.    Compared to one year ago, the patient's physical   health is the same.  Compared to one year ago, the patient's mental   health is the same.    Recent Hospitalizations:  She was not admitted to the hospital during the last year.     Current Medical Providers:  Patient Care Team:  Darlene Matute MD as PCP - General Banet, Duane Edward, MD as Consulting Physician (Dermatology)  Brett Zuniga  MD Guilherme as Consulting Physician (Cardiology)  Thelma Lopez APRN as Nurse Practitioner (Hematology)  Esthela Smith MD as Consulting Physician (Dermatology)    Outpatient Medications Prior to Visit   Medication Sig Dispense Refill    acetaminophen (TYLENOL) 500 MG tablet Take 1 tablet by mouth Every 6 (Six) Hours As Needed for Fever or Mild Pain.      ferrous sulfate (FeroSul) 325 (65 FE) MG tablet Take 1 tablet by mouth Daily With Breakfast. 90 tablet 0    fluconazole (Diflucan) 100 MG tablet Take 1 tablet by mouth Daily. 7 tablet 0    gabapentin (NEURONTIN) 300 MG capsule TAKE 1 CAPSULE BY MOUTH TWICE A  capsule 0    meclizine (ANTIVERT) 25 MG tablet Take 1 tablet by mouth 3 (Three) Times a Day As Needed for Dizziness. 30 tablet 0    melatonin 3 MG tablet Take 1 tablet by mouth Every Night.      metoprolol succinate XL (TOPROL-XL) 25 MG 24 hr tablet TAKE ONE TABLET BY MOUTH DAILY 30 tablet 11    Mirabegron ER (MYRBETRIQ) 50 MG tablet sustained-release 24 hour 24 hr tablet Take 50 mg by mouth Daily.      pantoprazole (PROTONIX) 40 MG EC tablet TAKE 1 TABLET BY MOUTH DAILY 90 tablet 1    potassium chloride 10 MEQ CR tablet TAKE 1 TABLET BY MOUTH TWICE A  tablet 0    rivaroxaban (Xarelto) 10 MG tablet Take 1 tablet by mouth Daily. 90 tablet 1    vitamin B-12 (CYANOCOBALAMIN) 1000 MCG tablet Take 1 tablet by mouth Daily.      calcium carbonate (OS-STEVEN) 600 MG tablet Take 1 tablet by mouth Daily.      cholecalciferol (VITAMIN D3) 1000 units tablet Take 1 tablet by mouth Daily.      Diclofenac Sodium (VOLTAREN) 1 % gel gel APPLY 4 GRAMS TO AFFECTED AREA(S) FOUR TIMES A DAY AS NEEDED FOR PAIN 100 g 0     Facility-Administered Medications Prior to Visit   Medication Dose Route Frequency Provider Last Rate Last Admin    denosumab (PROLIA) syringe 60 mg  60 mg Subcutaneous Q6 Months Yesi Hoffman MD   60 mg at 09/19/24 1146     No opioid medication identified on active medication list. I have  reviewed chart for other potential  high risk medication/s and harmful drug interactions in the elderly.      Aspirin is not on active medication list.  Aspirin use is not indicated based on review of current medical condition/s. Risk of harm outweighs potential benefits.  .    Patient Active Problem List   Diagnosis    Allergic rhinitis    Gait abnormality    Gastroesophageal reflux disease    Right hip pain    Low back pain    Recurrent urinary tract infection    Scoliosis    Vaginitis, atrophic    Medicare annual wellness visit, subsequent    Balance problem    Vitamin D deficiency    Acute pain of left knee    Primary osteoarthritis of left knee    Tear of MCL (medial collateral ligament) of knee, left, subsequent encounter    Overweight (BMI 25.0-29.9)    Visit for screening mammogram    Postmenopausal    Chronic pain of left knee    Right lumbar radiculopathy    Tremor, essential    Other fatigue    Osteoarthritis, generalized    Irritable bowel syndrome with diarrhea    Primary osteoarthritis of both knees    Obesity (BMI 30.0-34.9)    Hiatal hernia    Age-related osteoporosis without current pathological fracture    Family hx osteoporosis    Personal history of spine surgery    Nonintractable headache    Essential hypertension    Hypotension    Multiple subsegmental pulmonary emboli without acute cor pulmonale    Encounter for monitoring Coumadin therapy    Acute deep vein thrombosis (DVT) of proximal vein of right lower extremity    Anxiety    Memory problem    Chronic neck pain    DDD (degenerative disc disease), cervical    Upper back pain    Spinal deformity    Chronic bilateral thoracic back pain    Paroxysmal atrial fibrillation    History of pulmonary embolism    Hematoma of right lower leg    Right leg swelling    Daytime sleepiness    Hematoma of leg, left, initial encounter    Acute cystitis    Dysuria    H/O recurrent urinary tract infection     Advance Care Planning Advance Directive is on file.   "ACP discussion was held with the patient during this visit. Patient has an advance directive in EMR which is still valid.       Review of Systems   Constitutional:  Negative for activity change, fatigue and fever.   Respiratory:  Negative for cough, shortness of breath and wheezing.    Cardiovascular:  Negative for chest pain, palpitations and leg swelling.   Gastrointestinal:  Negative for constipation, diarrhea and indigestion.   Skin:  Negative for color change, dry skin and rash.   Neurological:  Negative for tremors and headache.       Objective   Vitals:    12/05/24 1312 12/05/24 1317   BP: (!) 88/50 99/55   BP Location: Left arm Left arm   Patient Position: Sitting Sitting   Cuff Size: Adult Adult   Pulse: 84    Resp: 16    Temp: 98.1 °F (36.7 °C)    TempSrc: Infrared    SpO2: 92%    Weight: 80.3 kg (177 lb)    Height: 167.6 cm (66\")    PainSc: 0-No pain        Estimated body mass index is 28.57 kg/m² as calculated from the following:    Height as of this encounter: 167.6 cm (66\").    Weight as of this encounter: 80.3 kg (177 lb).       Physical Exam  Vitals and nursing note reviewed.   Constitutional:       General: She is not in acute distress.     Appearance: Normal appearance. She is well-developed. She is not ill-appearing.   HENT:      Head: Normocephalic and atraumatic.   Cardiovascular:      Rate and Rhythm: Normal rate and regular rhythm.      Heart sounds: Normal heart sounds. No murmur heard.     No gallop.   Pulmonary:      Effort: Pulmonary effort is normal. No respiratory distress.      Breath sounds: Normal breath sounds. No wheezing, rhonchi or rales.   Chest:      Chest wall: No tenderness.   Musculoskeletal:      Cervical back: Normal range of motion and neck supple.   Neurological:      General: No focal deficit present.      Mental Status: She is alert and oriented to person, place, and time. Mental status is at baseline.   Psychiatric:         Mood and Affect: Mood normal.            Does " the patient have evidence of cognitive impairment? No                                                                                               Health  Risk Assessment    Smoking Status:  Social History     Tobacco Use   Smoking Status Never    Passive exposure: Never   Smokeless Tobacco Never     Alcohol Consumption:  Social History     Substance and Sexual Activity   Alcohol Use No       Fall Risk Screen  WILLYADI Fall Risk Assessment was completed, and patient is at HIGH risk for falls. Assessment completed on:2024    Depression Screening   Little interest or pleasure in doing things? Not at all   Feeling down, depressed, or hopeless? Not at all   PHQ-2 Total Score 0      Health Habits and Functional and Cognitive Screenin/5/2024     1:09 PM   Functional & Cognitive Status   Do you have difficulty preparing food and eating? No   Do you have difficulty bathing yourself, getting dressed or grooming yourself? No   Do you have difficulty using the toilet? No   Do you have difficulty moving around from place to place? Yes   Do you have trouble with steps or getting out of a bed or a chair? Yes   Current Diet Frequent Junk Food   Dental Exam Up to date   Eye Exam Up to date   Exercise (times per week) 0 times per week   Current Exercises Include No Regular Exercise   Do you need help using the phone?  No   Are you deaf or do you have serious difficulty hearing?  No   Do you need help to go to places out of walking distance? Yes   Do you need help shopping? Yes   Do you need help preparing meals?  No   Do you need help with housework?  No   Do you need help with laundry? No   Do you need help taking your medications? No   Do you need help managing money? No   Do you ever drive or ride in a car without wearing a seat belt? No   Have you felt unusual stress, anger or loneliness in the last month? No   Who do you live with? Alone   If you need help, do you have trouble finding someone available to you? No    Have you been bothered in the last four weeks by sexual problems? No   Do you have difficulty concentrating, remembering or making decisions? No           Age-appropriate Screening Schedule:  Refer to the list below for future screening recommendations based on patient's age, sex and/or medical conditions. Orders for these recommended tests are listed in the plan section. The patient has been provided with a written plan.    Health Maintenance List  Health Maintenance   Topic Date Due    DXA SCAN  08/18/2024    BMI FOLLOWUP  05/23/2025    ANNUAL WELLNESS VISIT  12/05/2025    TDAP/TD VACCINES (2 - Td or Tdap) 12/12/2028    HEPATITIS C SCREENING  Completed    COVID-19 Vaccine  Completed    RSV Vaccine - Adults  Completed    INFLUENZA VACCINE  Completed    Pneumococcal Vaccine 65+  Completed    ZOSTER VACCINE  Completed    MAMMOGRAM  Discontinued    COLORECTAL CANCER SCREENING  Discontinued                                                                                                                                                CMS Preventative Services Quick Reference  Risk Factors Identified During Encounter  Fall Risk-High or Moderate: Discussed Fall Prevention in the home  Immunizations Discussed/Encouraged: COVID19 and RSV (Respiratory Syncytial Virus)  Dental Screening Recommended  Vision Screening Recommended    The above risks/problems have been discussed with the patient.  Pertinent information has been shared with the patient in the After Visit Summary.  An After Visit Summary and PPPS were made available to the patient.    Follow Up:   Next Medicare Wellness visit to be scheduled in 1 year.     Assessment & Plan  Medicare annual wellness visit, subsequent    Visit for screening mammogram    Orders:    Mammo Screening Digital Tomosynthesis Bilateral With CAD; Future    Postmenopausal    Orders:    DEXA Bone Density Axial; Future    Acute cystitis without hematuria      Medicare wellness exam was done  today.  I reviewed her recent blood work results.  Her last colonoscopy was in 1/2018 and no recall colonoscopy was recommended.  Her last DEXA scan was in 8/2022, patient has been followed by endocrinology office and she is on Prolia shots.  She will continue calcium and vitamin D supplementation.  New order for the DEXA scan was given.  Her last mammogram was in 4/2021, the new order was given and she will be scheduling the test.  Immunization was also reviewed and recommended.  Patient will follow-up with her urologist to further address her recurrent urinary tract infections.  Fall prevention was discussed and stressed and healthy lifestyle was reinforced.      Orders Placed This Encounter   Procedures    DEXA Bone Density Axial    Mammo Screening Digital Tomosynthesis Bilateral With CAD       Follow Up:     Return in about 6 months (around 6/5/2025) for Next scheduled follow up.    Requested Prescriptions      No prescriptions requested or ordered in this encounter     Darlene Matute MD  12/05/2024

## 2024-12-05 NOTE — PROGRESS NOTES
Cardiology Office Follow Up Visit      Primary Care Provider:  Darlene Matute MD    Reason for f/u:     Follow up h/o HTN, paroxysmal atrial fibrillation, on chronic anticoagulation, h/o PE      Subjective     CC:    Routine follow up    History of Present Illness       Abida Becker is a 79 y.o. female who is a patient of Dr. Zuniga. Pmh includes paroxysmal atrial fibrillation, PE on chronic anticoagulation with Xarelto, HTN, normal LVEF on ECHO.    Ms. Becker presents today for routine follow up. Overall she is doing well from a CV standpoint. She denies angina or exertional symptoms. She has fatigue and daytime sleepiness which is somewhat chronic for her. She notes some occasional low blood pressures at home.           ASSESSMENT/PLAN:      Diagnoses and all orders for this visit:    1. Paroxysmal atrial fibrillation (Primary)    2. History of pulmonary embolism    3. Lower b/p at home      MEDICAL DECISION MAKING:  SR today. She denies palpitations. She remains on a/c for h/o PE and stroke risk reduction for afib. We discussed if b/p is low at home and she experiences fatigue we could consider adding midodrine. She would like to monitor symptoms for now. No chest pain or shortness of breath. fU in 1 year or sooner should new issues arise.           Past Medical History:   Diagnosis Date    Dysuria 09/26/2024    Dysuria 09/26/2024    GERD (gastroesophageal reflux disease)     History of recurrent UTIs     IBS (irritable bowel syndrome)     Osteoarthritis     Osteoporosis     on prolia(12/5/22)    Primary osteoarthritis of both knees 01/24/2022    Pulmonary embolism 2011    after scoliosis surgery    Pulmonary embolism 10/2022    Scoliosis     Vitamin D deficiency        Past Surgical History:   Procedure Laterality Date    BACK SURGERY  11/2011    Dr. Olsen, due to scoliosis, earnestine and some fusions    BREAST BIOPSY      CHOLECYSTECTOMY      COLONOSCOPY  01/18/2018    HIATAL HERNIA REPAIR  2013    REIMPLANT  URETER IN BLADDER           Current Outpatient Medications:     acetaminophen (TYLENOL) 500 MG tablet, Take 1 tablet by mouth Every 6 (Six) Hours As Needed for Fever or Mild Pain., Disp: , Rfl:     calcium carbonate (OS-STEVEN) 600 MG tablet, Take 1 tablet by mouth Daily., Disp: , Rfl:     cholecalciferol (VITAMIN D3) 1000 units tablet, Take 1 tablet by mouth Daily., Disp: , Rfl:     Diclofenac Sodium (VOLTAREN) 1 % gel gel, APPLY 4 GRAMS TO AFFECTED AREA(S) FOUR TIMES A DAY AS NEEDED FOR PAIN, Disp: 100 g, Rfl: 0    ferrous sulfate (FeroSul) 325 (65 FE) MG tablet, Take 1 tablet by mouth Daily With Breakfast., Disp: 90 tablet, Rfl: 0    fluconazole (Diflucan) 100 MG tablet, Take 1 tablet by mouth Daily., Disp: 7 tablet, Rfl: 0    gabapentin (NEURONTIN) 300 MG capsule, TAKE 1 CAPSULE BY MOUTH TWICE A DAY, Disp: 180 capsule, Rfl: 0    meclizine (ANTIVERT) 25 MG tablet, Take 1 tablet by mouth 3 (Three) Times a Day As Needed for Dizziness., Disp: 30 tablet, Rfl: 0    melatonin 3 MG tablet, Take 1 tablet by mouth Every Night., Disp: , Rfl:     metoprolol succinate XL (TOPROL-XL) 25 MG 24 hr tablet, TAKE ONE TABLET BY MOUTH DAILY, Disp: 30 tablet, Rfl: 11    Mirabegron ER (MYRBETRIQ) 50 MG tablet sustained-release 24 hour 24 hr tablet, Take 50 mg by mouth Daily., Disp: , Rfl:     pantoprazole (PROTONIX) 40 MG EC tablet, TAKE 1 TABLET BY MOUTH DAILY, Disp: 90 tablet, Rfl: 1    potassium chloride 10 MEQ CR tablet, TAKE 1 TABLET BY MOUTH TWICE A DAY, Disp: 180 tablet, Rfl: 0    rivaroxaban (Xarelto) 10 MG tablet, Take 1 tablet by mouth Daily., Disp: 90 tablet, Rfl: 1    vitamin B-12 (CYANOCOBALAMIN) 1000 MCG tablet, Take 1 tablet by mouth Daily., Disp: , Rfl:     Current Facility-Administered Medications:     denosumab (PROLIA) syringe 60 mg, 60 mg, Subcutaneous, Q6 Months, Yesi Hoffman MD, 60 mg at 09/19/24 1146    Social History     Socioeconomic History    Marital status:     Number of children: 3    Years of  "education: 12    Highest education level: Master's degree (e.g., MA, MS, [employer], MEd, MSW, [employer])   Tobacco Use    Smoking status: Never     Passive exposure: Never    Smokeless tobacco: Never   Vaping Use    Vaping status: Never Used   Substance and Sexual Activity    Alcohol use: No    Drug use: No    Sexual activity: Not Currently     Partners: Male     Birth control/protection: Condom, Spermicide       Family History   Problem Relation Age of Onset    Heart failure Mother     Cancer Father        The following portions of the patient's history were reviewed and updated as appropriate: allergies, current medications, past family history, past medical history, past social history, past surgical history and problem list.    Review of Systems   Constitutional: Positive for malaise/fatigue. Negative for chills and diaphoresis.   Cardiovascular:  Negative for chest pain, dyspnea on exertion, irregular heartbeat, leg swelling, near-syncope, orthopnea, palpitations, paroxysmal nocturnal dyspnea and syncope.   Respiratory:  Negative for cough, shortness of breath, sleep disturbances due to breathing and sputum production.    Gastrointestinal:  Negative for change in bowel habit.   Genitourinary:  Negative for urgency.   Neurological:  Negative for dizziness and headaches.   Psychiatric/Behavioral:  Negative for altered mental status.        Pertinent items are noted in HPI, all other systems reviewed and negative    /69 (BP Location: Right arm, Patient Position: Sitting, Cuff Size: Large Adult)   Pulse 80   Resp 18   Ht 167.6 cm (66\")   Wt 79.8 kg (176 lb)   SpO2 93%   BMI 28.41 kg/m² .  Objective     Constitutional:       Appearance: Not in distress.   Neck:      Vascular: JVD normal.   Pulmonary:      Effort: Pulmonary effort is normal.      Breath sounds: Normal breath sounds.   Cardiovascular:      Normal rate. Regular rhythm.   Pulses:     Intact distal pulses.   Edema:     Peripheral edema absent. "   Abdominal:      General: Bowel sounds are normal.      Palpations: Abdomen is soft.   Musculoskeletal: Normal range of motion. Skin:     General: Skin is warm and dry.   Neurological:      General: No focal deficit present.      Mental Status: Oriented to person, place and time.             ECG 12 Lead    Date/Time: 12/5/2024 10:00 AM  Performed by: Berenice Llamas APRN    Authorized by: Berenice Llamas APRN  Comparison: not compared with previous ECG   Previous ECG: no previous ECG available  Rhythm: sinus rhythm  Rate: normal  BPM: 80          EKG ordered by and reviewed by me in office

## 2024-12-23 ENCOUNTER — TELEPHONE (OUTPATIENT)
Dept: FAMILY MEDICINE CLINIC | Facility: CLINIC | Age: 79
End: 2024-12-23

## 2024-12-23 NOTE — TELEPHONE ENCOUNTER
Caller: Abida Becker    Relationship: Self    Best call back number: [phone]    What medication are you requesting: UTI MEDICATION    What are your current symptoms: LACK OF FOCUS, JITTERY, WEEPY, HURTS TO GO TO THE BATHROOM    How long have you been experiencing symptoms: ONGOING SYMPTOMS    Have you had these symptoms before:    [x] Yes  [] No    Have you been treated for these symptoms before:   [x] Yes  [] No    If a prescription is needed, what is your preferred pharmacy and phone number:      Additional notes:  PATIENT STATES THAT DR. SCHULZ REFERRED TO A UROLOGIST, SHE SEEN THEM ON 12/17/2024 AND SENT IN A URINE CULTURE, THEY STILL HAVEN'T CALLED HER ABOUT HER RESULTS AND THE OFFICE IS CLOSED UNTIL 12/26/2024.

## 2024-12-23 NOTE — TELEPHONE ENCOUNTER
Please advise the patient that I am out of the office this week and she needs to go to the urgent care to have this addressed and be checked.  Thank you.

## 2025-01-03 ENCOUNTER — HOSPITAL ENCOUNTER (OUTPATIENT)
Dept: BONE DENSITY | Facility: HOSPITAL | Age: 80
Discharge: HOME | End: 2025-01-03
Payer: MEDICARE

## 2025-01-03 ENCOUNTER — HOSPITAL ENCOUNTER (OUTPATIENT)
Dept: MAMMOGRAPHY | Facility: HOSPITAL | Age: 80
Discharge: HOME | End: 2025-01-03
Payer: MEDICARE

## 2025-01-03 DIAGNOSIS — Z78.0 POSTMENOPAUSAL: ICD-10-CM

## 2025-01-03 DIAGNOSIS — Z12.31 VISIT FOR SCREENING MAMMOGRAM: ICD-10-CM

## 2025-01-03 PROCEDURE — 77063 BREAST TOMOSYNTHESIS BI: CPT

## 2025-01-03 PROCEDURE — 77080 DXA BONE DENSITY AXIAL: CPT

## 2025-01-03 PROCEDURE — 77067 SCR MAMMO BI INCL CAD: CPT

## 2025-01-23 DIAGNOSIS — I10 ESSENTIAL HYPERTENSION: ICD-10-CM

## 2025-01-23 RX ORDER — POTASSIUM CHLORIDE 750 MG/1
TABLET, EXTENDED RELEASE ORAL
Qty: 180 TABLET | Refills: 0 | Status: SHIPPED | OUTPATIENT
Start: 2025-01-23

## 2025-01-24 DIAGNOSIS — I26.94 MULTIPLE SUBSEGMENTAL PULMONARY EMBOLI WITHOUT ACUTE COR PULMONALE: ICD-10-CM

## 2025-01-24 NOTE — TELEPHONE ENCOUNTER
Patient came into office to state Xarelto is no longer covered with her mail order pharmacy. She request medication to be sent to [facility] on [address]. Medication sent.

## 2025-02-11 DIAGNOSIS — M81.0 AGE-RELATED OSTEOPOROSIS WITHOUT CURRENT PATHOLOGICAL FRACTURE: Primary | ICD-10-CM

## 2025-02-23 RX ORDER — GABAPENTIN 300 MG/1
CAPSULE ORAL
Qty: 180 CAPSULE | Refills: 0 | Status: SHIPPED | OUTPATIENT
Start: 2025-02-23

## 2025-03-05 ENCOUNTER — OFFICE (OUTPATIENT)
Dept: URBAN - METROPOLITAN AREA CLINIC 64 | Facility: CLINIC | Age: 80
End: 2025-03-05
Payer: MEDICARE

## 2025-03-05 VITALS
SYSTOLIC BLOOD PRESSURE: 116 MMHG | HEIGHT: 67 IN | DIASTOLIC BLOOD PRESSURE: 73 MMHG | HEART RATE: 82 BPM | WEIGHT: 170 LBS

## 2025-03-05 DIAGNOSIS — R10.9 UNSPECIFIED ABDOMINAL PAIN: ICD-10-CM

## 2025-03-05 DIAGNOSIS — R14.0 ABDOMINAL DISTENSION (GASEOUS): ICD-10-CM

## 2025-03-05 DIAGNOSIS — R19.4 CHANGE IN BOWEL HABIT: ICD-10-CM

## 2025-03-05 PROCEDURE — 99214 OFFICE O/P EST MOD 30 MIN: CPT | Performed by: NURSE PRACTITIONER

## 2025-03-05 RX ORDER — DICYCLOMINE HYDROCHLORIDE 10 MG/1
CAPSULE ORAL
Qty: 60 | Refills: 6 | Status: ACTIVE
Start: 2025-03-05

## 2025-03-21 ENCOUNTER — CLINICAL SUPPORT (OUTPATIENT)
Dept: ENDOCRINOLOGY | Facility: CLINIC | Age: 80
End: 2025-03-21
Payer: MEDICARE

## 2025-03-21 NOTE — PROGRESS NOTES
I administered an in clinic injection at [facility] of  60 mg of Prolia for patient Abida Becker  on 03/21/25.  The patient did not supply the medication and tolerated the injection well.    .inject

## 2025-03-24 ENCOUNTER — TELEPHONE (OUTPATIENT)
Dept: ONCOLOGY | Facility: CLINIC | Age: 80
End: 2025-03-24
Payer: MEDICARE

## 2025-03-24 NOTE — TELEPHONE ENCOUNTER
Caller: Abida Becker    Relationship: Self    Best call back number: [phone] CAN L/M       What was the call regarding: PATIENT IS HAVING SURGERY ON 5/1/2025.  WHEN SHOULD SHE STOP HER BLOOD THINNER AND THEN WHEN DOES SHE RESTART AFTER SURGERY?    CALL TO ADVISE    Is it okay if the provider responds through MyChart: NO

## 2025-04-01 NOTE — TELEPHONE ENCOUNTER
caller: Abida Becker    Relationship: Self    Best call back number: [phone]    What is the best time to reach you: ASAP    Who are you requesting to speak with (clinical staff, provider,  specific staff member): CLINICAL     What was the call regarding: PT HAS NOT HEARD BACK REGARDING THE ORIGINAL TE, PLEASE ADVISE.    Is it okay if the provider responds through MyChart: NO

## 2025-04-02 NOTE — TELEPHONE ENCOUNTER
Advised patient that her surgeon will need to submit a surgical clearance form for our office to fill out. She voices understanding

## 2025-04-03 ENCOUNTER — TELEPHONE (OUTPATIENT)
Dept: ONCOLOGY | Facility: CLINIC | Age: 80
End: 2025-04-03
Payer: MEDICARE

## 2025-04-03 NOTE — TELEPHONE ENCOUNTER
Provider: tevin    Caller: rashawn pierce    Relationship to Patient: self    Phone Number: [phone]    Reason for Call: pt came into the office: this is 3rd attempt for response  pt having surgery may 1st, and would like to know when to discontinue taking xarelto     When was the patient last seen: 08/04/2024

## 2025-04-03 NOTE — TELEPHONE ENCOUNTER
Spoke with patient regarding this, upon looking into her chart there has already been a surgery clearance form filled out by JW Ng I advised her to reach out to this office to get clarification

## 2025-04-04 ENCOUNTER — TELEPHONE (OUTPATIENT)
Dept: CARDIOLOGY | Facility: CLINIC | Age: 80
End: 2025-04-04

## 2025-04-04 RX ORDER — ENOXAPARIN SODIUM 100 MG/ML
1 INJECTION SUBCUTANEOUS EVERY 12 HOURS SCHEDULED
Qty: 4.8 ML | Refills: 0 | Status: SHIPPED | OUTPATIENT
Start: 2025-04-04 | End: 2025-04-07

## 2025-04-04 NOTE — TELEPHONE ENCOUNTER
Caller: Abida Becker    Relationship to patient: Self    Best call back number: [phone]    Patient is needing: PT IS HAVING A KNEE REPLACEMENT MAY1ST, SHE HAS BEEN CLEARED ALREADY... HOWEVER SHE HAS MORE QUESTIONS ABOUT THE LOVENOX BRIDGE THAT SHE IS TO TAKE WHILE NOT ON THE XARELTO. PLEASE CALL TO DISCUSS.

## 2025-04-21 DIAGNOSIS — I10 ESSENTIAL HYPERTENSION: ICD-10-CM

## 2025-04-21 RX ORDER — POTASSIUM CHLORIDE 750 MG/1
10 TABLET, EXTENDED RELEASE ORAL EVERY 12 HOURS SCHEDULED
Qty: 180 TABLET | Refills: 0 | Status: SHIPPED | OUTPATIENT
Start: 2025-04-21

## 2025-04-22 ENCOUNTER — PRE-ADMISSION TESTING (OUTPATIENT)
Dept: PREADMISSION TESTING | Facility: HOSPITAL | Age: 80
End: 2025-04-22
Payer: MEDICARE

## 2025-04-22 ENCOUNTER — HOSPITAL ENCOUNTER (OUTPATIENT)
Dept: GENERAL RADIOLOGY | Facility: HOSPITAL | Age: 80
Discharge: HOME | End: 2025-04-22
Payer: MEDICARE

## 2025-04-22 ENCOUNTER — LAB (OUTPATIENT)
Dept: LAB | Facility: HOSPITAL | Age: 80
End: 2025-04-22
Payer: MEDICARE

## 2025-04-22 ENCOUNTER — HOSPITAL ENCOUNTER (OUTPATIENT)
Dept: CARDIOLOGY | Facility: HOSPITAL | Age: 80
Discharge: HOME | End: 2025-04-22
Payer: MEDICARE

## 2025-04-22 VITALS
DIASTOLIC BLOOD PRESSURE: 57 MMHG | OXYGEN SATURATION: 95 % | RESPIRATION RATE: 18 BRPM | HEIGHT: 66 IN | BODY MASS INDEX: 27.68 KG/M2 | SYSTOLIC BLOOD PRESSURE: 93 MMHG | WEIGHT: 172.2 LBS | TEMPERATURE: 97.5 F | HEART RATE: 80 BPM

## 2025-04-22 LAB
ABO GROUP BLD: NORMAL
ALBUMIN SERPL-MCNC: 3.9 G/DL (ref 3.5–5.2)
ALBUMIN/GLOB SERPL: 1.6 G/DL
ALP SERPL-CCNC: 65 U/L (ref 39–117)
ALT SERPL W P-5'-P-CCNC: 19 U/L (ref 1–33)
ANION GAP SERPL CALCULATED.3IONS-SCNC: 7.7 MMOL/L (ref 5–15)
AST SERPL-CCNC: 18 U/L (ref 1–32)
BASOPHILS # BLD AUTO: 0.05 10*3/MM3 (ref 0–0.2)
BASOPHILS NFR BLD AUTO: 0.7 % (ref 0–1.5)
BILIRUB SERPL-MCNC: 1.1 MG/DL (ref 0–1.2)
BLD GP AB SCN SERPL QL: NEGATIVE
BUN SERPL-MCNC: 19 MG/DL (ref 8–23)
BUN/CREAT SERPL: 28.4 (ref 7–25)
CALCIUM SPEC-SCNC: 9.3 MG/DL (ref 8.6–10.5)
CHLORIDE SERPL-SCNC: 106 MMOL/L (ref 98–107)
CO2 SERPL-SCNC: 26.3 MMOL/L (ref 22–29)
CREAT SERPL-MCNC: 0.67 MG/DL (ref 0.57–1)
DEPRECATED RDW RBC AUTO: 46.7 FL (ref 37–54)
EGFRCR SERPLBLD CKD-EPI 2021: 90.2 ML/MIN/1.73
EOSINOPHIL # BLD AUTO: 0.68 10*3/MM3 (ref 0–0.4)
EOSINOPHIL NFR BLD AUTO: 10.2 % (ref 0.3–6.2)
ERYTHROCYTE [DISTWIDTH] IN BLOOD BY AUTOMATED COUNT: 13.1 % (ref 12.3–15.4)
GLOBULIN UR ELPH-MCNC: 2.4 GM/DL
GLUCOSE SERPL-MCNC: 99 MG/DL (ref 65–99)
HBA1C MFR BLD: 5.57 % (ref 4.8–5.6)
HCT VFR BLD AUTO: 39.4 % (ref 34–46.6)
HGB BLD-MCNC: 12.3 G/DL (ref 12–15.9)
IMM GRANULOCYTES # BLD AUTO: 0.02 10*3/MM3 (ref 0–0.05)
IMM GRANULOCYTES NFR BLD AUTO: 0.3 % (ref 0–0.5)
INR PPP: 1.32 (ref 0.9–1.1)
LYMPHOCYTES # BLD AUTO: 1.61 10*3/MM3 (ref 0.7–3.1)
LYMPHOCYTES NFR BLD AUTO: 24.1 % (ref 19.6–45.3)
MCH RBC QN AUTO: 30.2 PG (ref 26.6–33)
MCHC RBC AUTO-ENTMCNC: 31.2 G/DL (ref 31.5–35.7)
MCV RBC AUTO: 96.8 FL (ref 79–97)
MONOCYTES # BLD AUTO: 0.66 10*3/MM3 (ref 0.1–0.9)
MONOCYTES NFR BLD AUTO: 9.9 % (ref 5–12)
MRSA DNA SPEC QL NAA+PROBE: NORMAL
NEUTROPHILS NFR BLD AUTO: 3.66 10*3/MM3 (ref 1.7–7)
NEUTROPHILS NFR BLD AUTO: 54.8 % (ref 42.7–76)
NRBC BLD AUTO-RTO: 0 /100 WBC (ref 0–0.2)
PLATELET # BLD AUTO: 323 10*3/MM3 (ref 140–450)
PMV BLD AUTO: 9.9 FL (ref 6–12)
POTASSIUM SERPL-SCNC: 4.2 MMOL/L (ref 3.5–5.2)
PROT SERPL-MCNC: 6.3 G/DL (ref 6–8.5)
PROTHROMBIN TIME: 16.4 SECONDS (ref 11.7–14.2)
QT INTERVAL: 365 MS
QTC INTERVAL: 414 MS
RBC # BLD AUTO: 4.07 10*6/MM3 (ref 3.77–5.28)
RH BLD: POSITIVE
SODIUM SERPL-SCNC: 140 MMOL/L (ref 136–145)
T&S EXPIRATION DATE: NORMAL
WBC NRBC COR # BLD AUTO: 6.68 10*3/MM3 (ref 3.4–10.8)

## 2025-04-22 PROCEDURE — 86901 BLOOD TYPING SEROLOGIC RH(D): CPT

## 2025-04-22 PROCEDURE — 86900 BLOOD TYPING SEROLOGIC ABO: CPT | Performed by: ORTHOPAEDIC SURGERY

## 2025-04-22 PROCEDURE — 36415 COLL VENOUS BLD VENIPUNCTURE: CPT | Performed by: ORTHOPAEDIC SURGERY

## 2025-04-22 PROCEDURE — 83036 HEMOGLOBIN GLYCOSYLATED A1C: CPT | Performed by: ORTHOPAEDIC SURGERY

## 2025-04-22 PROCEDURE — 93005 ELECTROCARDIOGRAM TRACING: CPT | Performed by: ORTHOPAEDIC SURGERY

## 2025-04-22 PROCEDURE — 86901 BLOOD TYPING SEROLOGIC RH(D): CPT | Performed by: ORTHOPAEDIC SURGERY

## 2025-04-22 PROCEDURE — 85025 COMPLETE CBC W/AUTO DIFF WBC: CPT | Performed by: ORTHOPAEDIC SURGERY

## 2025-04-22 PROCEDURE — 71046 X-RAY EXAM CHEST 2 VIEWS: CPT

## 2025-04-22 PROCEDURE — 86900 BLOOD TYPING SEROLOGIC ABO: CPT

## 2025-04-22 PROCEDURE — 87641 MR-STAPH DNA AMP PROBE: CPT

## 2025-04-22 PROCEDURE — 85610 PROTHROMBIN TIME: CPT | Performed by: ORTHOPAEDIC SURGERY

## 2025-04-22 PROCEDURE — 93010 ELECTROCARDIOGRAM REPORT: CPT | Performed by: STUDENT IN AN ORGANIZED HEALTH CARE EDUCATION/TRAINING PROGRAM

## 2025-04-22 PROCEDURE — 86850 RBC ANTIBODY SCREEN: CPT | Performed by: ORTHOPAEDIC SURGERY

## 2025-04-22 PROCEDURE — 80053 COMPREHEN METABOLIC PANEL: CPT | Performed by: ORTHOPAEDIC SURGERY

## 2025-04-22 PROCEDURE — 73560 X-RAY EXAM OF KNEE 1 OR 2: CPT

## 2025-04-22 RX ORDER — NITROFURANTOIN MACROCRYSTALS 100 MG/1
100 CAPSULE ORAL 4 TIMES DAILY
COMMUNITY

## 2025-04-22 RX ORDER — SACCHAROMYCES BOULARDII 250 MG
250 CAPSULE ORAL DAILY
COMMUNITY

## 2025-04-22 RX ORDER — MAGNESIUM OXIDE 400 MG/1
400 TABLET ORAL DAILY
COMMUNITY

## 2025-04-22 RX ORDER — DICLOFENAC SODIUM 1 MG/ML
1 SOLUTION/ DROPS OPHTHALMIC 4 TIMES DAILY
COMMUNITY
End: 2025-04-22

## 2025-04-22 RX ORDER — CETIRIZINE HYDROCHLORIDE 10 MG/1
10 TABLET ORAL DAILY
COMMUNITY
End: 2025-04-22

## 2025-04-22 RX ORDER — MULTIVIT WITH MINERALS/LUTEIN
500 TABLET ORAL DAILY
COMMUNITY

## 2025-04-22 RX ORDER — VIT C/B6/B5/MAGNESIUM/HERB 173 50-5-6-5MG
CAPSULE ORAL
COMMUNITY

## 2025-04-30 ENCOUNTER — ANESTHESIA EVENT (OUTPATIENT)
Dept: PERIOP | Facility: HOSPITAL | Age: 80
End: 2025-04-30
Payer: MEDICARE

## 2025-04-30 NOTE — NURSING NOTE
Patient plans to discharge home with family and [facility] PT POD 1. Pt will require a RW at discharge

## 2025-05-01 ENCOUNTER — ANESTHESIA EVENT CONVERTED (OUTPATIENT)
Dept: ANESTHESIOLOGY | Facility: HOSPITAL | Age: 80
End: 2025-05-01
Payer: MEDICARE

## 2025-05-01 ENCOUNTER — APPOINTMENT (OUTPATIENT)
Dept: GENERAL RADIOLOGY | Facility: HOSPITAL | Age: 80
End: 2025-05-01
Payer: MEDICARE

## 2025-05-01 ENCOUNTER — HOSPITAL ENCOUNTER (OUTPATIENT)
Facility: HOSPITAL | Age: 80
Discharge: HOME | End: 2025-05-03
Attending: ORTHOPAEDIC SURGERY | Admitting: ORTHOPAEDIC SURGERY
Payer: MEDICARE

## 2025-05-01 ENCOUNTER — ANESTHESIA (OUTPATIENT)
Dept: PERIOP | Facility: HOSPITAL | Age: 80
End: 2025-05-01
Payer: MEDICARE

## 2025-05-01 DIAGNOSIS — Z96.659 STATUS POST KNEE REPLACEMENT, UNSPECIFIED LATERALITY: Primary | ICD-10-CM

## 2025-05-01 PROCEDURE — 25010000002 LIDOCAINE PF 1% 1 % SOLUTION: Performed by: NURSE ANESTHETIST, CERTIFIED REGISTERED

## 2025-05-01 PROCEDURE — A9270 NON-COVERED ITEM OR SERVICE: HCPCS | Performed by: ORTHOPAEDIC SURGERY

## 2025-05-01 PROCEDURE — 63710000001 GABAPENTIN 300 MG CAPSULE: Performed by: ORTHOPAEDIC SURGERY

## 2025-05-01 PROCEDURE — 63710000001 OXYCODONE 5 MG TABLET: Performed by: ORTHOPAEDIC SURGERY

## 2025-05-01 PROCEDURE — 25810000003 LACTATED RINGERS PER 1000 ML: Performed by: NURSE ANESTHETIST, CERTIFIED REGISTERED

## 2025-05-01 PROCEDURE — C1776 JOINT DEVICE (IMPLANTABLE): HCPCS | Performed by: ORTHOPAEDIC SURGERY

## 2025-05-01 PROCEDURE — 25010000002 FENTANYL CITRATE (PF) 50 MCG/ML SOLUTION: Performed by: ANESTHESIOLOGY

## 2025-05-01 PROCEDURE — 25010000002 HYDROMORPHONE 1 MG/ML SOLUTION: Performed by: NURSE ANESTHETIST, CERTIFIED REGISTERED

## 2025-05-01 PROCEDURE — 25010000002 CEFAZOLIN PER 500 MG: Performed by: ORTHOPAEDIC SURGERY

## 2025-05-01 PROCEDURE — 25010000002 LIDOCAINE PF 1% 1 % SOLUTION: Performed by: ORTHOPAEDIC SURGERY

## 2025-05-01 PROCEDURE — 25010000002 EPINEPHRINE 1 MG/ML SOLUTION: Performed by: ORTHOPAEDIC SURGERY

## 2025-05-01 PROCEDURE — 25010000002 GLYCOPYRROLATE 0.2 MG/ML SOLUTION: Performed by: NURSE ANESTHETIST, CERTIFIED REGISTERED

## 2025-05-01 PROCEDURE — 25010000002 ROPIVACAINE PER 1 MG: Performed by: ANESTHESIOLOGY

## 2025-05-01 PROCEDURE — 63710000001 METOPROLOL SUCCINATE XL 25 MG TABLET SUSTAINED-RELEASE 24 HOUR: Performed by: ORTHOPAEDIC SURGERY

## 2025-05-01 PROCEDURE — 25010000002 FENTANYL CITRATE (PF) 50 MCG/ML SOLUTION: Performed by: NURSE ANESTHETIST, CERTIFIED REGISTERED

## 2025-05-01 PROCEDURE — C1713 ANCHOR/SCREW BN/BN,TIS/BN: HCPCS | Performed by: ORTHOPAEDIC SURGERY

## 2025-05-01 PROCEDURE — 25010000002 ROPIVACAINE PER 1 MG: Performed by: ORTHOPAEDIC SURGERY

## 2025-05-01 PROCEDURE — 97162 PT EVAL MOD COMPLEX 30 MIN: CPT

## 2025-05-01 PROCEDURE — 63710000001 RIVAROXABAN 20 MG TABLET: Performed by: ORTHOPAEDIC SURGERY

## 2025-05-01 PROCEDURE — 25010000002 PROPOFOL 1000 MG/100ML EMULSION: Performed by: NURSE ANESTHETIST, CERTIFIED REGISTERED

## 2025-05-01 PROCEDURE — 25810000003 LACTATED RINGERS PER 1000 ML: Performed by: ORTHOPAEDIC SURGERY

## 2025-05-01 PROCEDURE — 73560 X-RAY EXAM OF KNEE 1 OR 2: CPT

## 2025-05-01 PROCEDURE — 63710000001 CALCIUM CARBONATE 500 MG CHEWABLE TABLET: Performed by: ORTHOPAEDIC SURGERY

## 2025-05-01 PROCEDURE — 25010000002 CLONIDINE PER 1 MG: Performed by: ORTHOPAEDIC SURGERY

## 2025-05-01 PROCEDURE — 25010000002 VANCOMYCIN 1 G RECONSTITUTED SOLUTION: Performed by: ORTHOPAEDIC SURGERY

## 2025-05-01 PROCEDURE — 63710000001 ACETAMINOPHEN 500 MG TABLET: Performed by: ORTHOPAEDIC SURGERY

## 2025-05-01 PROCEDURE — 25010000002 KETOROLAC TROMETHAMINE PER 15 MG: Performed by: ORTHOPAEDIC SURGERY

## 2025-05-01 PROCEDURE — 25010000002 HYDROMORPHONE PER 4 MG: Performed by: NURSE ANESTHETIST, CERTIFIED REGISTERED

## 2025-05-01 PROCEDURE — 63710000001 MELOXICAM 15 MG TABLET: Performed by: ORTHOPAEDIC SURGERY

## 2025-05-01 DEVICE — JOURNEY TIBIAL BASEPLATE NONPOROUS                                    RIGHT SIZE 3
Type: IMPLANTABLE DEVICE | Site: KNEE | Status: FUNCTIONAL
Brand: JOURNEY

## 2025-05-01 DEVICE — CMT BONE PALACOS R HI/VISC 1X40: Type: IMPLANTABLE DEVICE | Site: KNEE | Status: FUNCTIONAL

## 2025-05-01 DEVICE — JOURNEY II BCS XLPE ARTICULAR                                    INSERT SIZE 3-4 RIGHT 10MM
Type: IMPLANTABLE DEVICE | Site: KNEE | Status: FUNCTIONAL
Brand: JOURNEY

## 2025-05-01 DEVICE — JOURNEY 7.5 ROUND RESURF PAT 35MM STANDARD
Type: IMPLANTABLE DEVICE | Site: KNEE | Status: FUNCTIONAL
Brand: JOURNEY

## 2025-05-01 DEVICE — DEV WND/CLS CONTRL TISS STRATAFIX SYMM PDS PLS CTX 60CM VIL: Type: IMPLANTABLE DEVICE | Site: KNEE | Status: FUNCTIONAL

## 2025-05-01 DEVICE — IMPLANTABLE DEVICE: Type: IMPLANTABLE DEVICE | Status: FUNCTIONAL

## 2025-05-01 DEVICE — JOURNEY II BCS FEMORAL OXINIUM                                    RIGHT SIZE 5
Type: IMPLANTABLE DEVICE | Site: KNEE | Status: FUNCTIONAL
Brand: JOURNEY

## 2025-05-01 DEVICE — DEV CONTRL TISS STRATAFIX SPIRAL MNCRYL UD 3/0 PLS 30CM: Type: IMPLANTABLE DEVICE | Site: KNEE | Status: FUNCTIONAL

## 2025-05-01 RX ORDER — OXYCODONE HYDROCHLORIDE 5 MG/1
10 TABLET ORAL EVERY 4 HOURS PRN
Status: DISCONTINUED | OUTPATIENT
Start: 2025-05-01 | End: 2025-05-03 | Stop reason: HOSPADM

## 2025-05-01 RX ORDER — LIDOCAINE HYDROCHLORIDE 10 MG/ML
0.5 INJECTION, SOLUTION EPIDURAL; INFILTRATION; INTRACAUDAL; PERINEURAL ONCE AS NEEDED
Status: COMPLETED | OUTPATIENT
Start: 2025-05-01 | End: 2025-05-01

## 2025-05-01 RX ORDER — TRANEXAMIC ACID 100 MG/ML
INJECTION, SOLUTION INTRAVENOUS AS NEEDED
Status: DISCONTINUED | OUTPATIENT
Start: 2025-05-01 | End: 2025-05-01 | Stop reason: HOSPADM

## 2025-05-01 RX ORDER — DEXMEDETOMIDINE HYDROCHLORIDE 100 UG/ML
INJECTION, SOLUTION INTRAVENOUS
Status: COMPLETED | OUTPATIENT
Start: 2025-05-01 | End: 2025-05-01

## 2025-05-01 RX ORDER — PREGABALIN 75 MG/1
150 CAPSULE ORAL ONCE
Status: COMPLETED | OUTPATIENT
Start: 2025-05-01 | End: 2025-05-01

## 2025-05-01 RX ORDER — NALOXONE HCL 0.4 MG/ML
0.4 VIAL (ML) INJECTION AS NEEDED
Status: DISCONTINUED | OUTPATIENT
Start: 2025-05-01 | End: 2025-05-01 | Stop reason: HOSPADM

## 2025-05-01 RX ORDER — FENTANYL CITRATE 50 UG/ML
50 INJECTION, SOLUTION INTRAMUSCULAR; INTRAVENOUS
Refills: 0 | Status: DISCONTINUED | OUTPATIENT
Start: 2025-05-01 | End: 2025-05-01 | Stop reason: HOSPADM

## 2025-05-01 RX ORDER — LIDOCAINE HYDROCHLORIDE 10 MG/ML
INJECTION, SOLUTION EPIDURAL; INFILTRATION; INTRACAUDAL; PERINEURAL AS NEEDED
Status: DISCONTINUED | OUTPATIENT
Start: 2025-05-01 | End: 2025-05-01 | Stop reason: SURG

## 2025-05-01 RX ORDER — CEFAZOLIN SODIUM 1 G/3ML
INJECTION, POWDER, FOR SOLUTION INTRAMUSCULAR; INTRAVENOUS AS NEEDED
Status: DISCONTINUED | OUTPATIENT
Start: 2025-05-01 | End: 2025-05-01 | Stop reason: HOSPADM

## 2025-05-01 RX ORDER — GABAPENTIN 300 MG/1
300 CAPSULE ORAL 2 TIMES DAILY
Status: DISCONTINUED | OUTPATIENT
Start: 2025-05-01 | End: 2025-05-03 | Stop reason: HOSPADM

## 2025-05-01 RX ORDER — VANCOMYCIN HYDROCHLORIDE 1 G/20ML
INJECTION, POWDER, LYOPHILIZED, FOR SOLUTION INTRAVENOUS AS NEEDED
Status: DISCONTINUED | OUTPATIENT
Start: 2025-05-01 | End: 2025-05-01 | Stop reason: HOSPADM

## 2025-05-01 RX ORDER — ONDANSETRON 2 MG/ML
4 INJECTION INTRAMUSCULAR; INTRAVENOUS ONCE AS NEEDED
Status: DISCONTINUED | OUTPATIENT
Start: 2025-05-01 | End: 2025-05-01 | Stop reason: HOSPADM

## 2025-05-01 RX ORDER — SODIUM CHLORIDE, SODIUM LACTATE, POTASSIUM CHLORIDE, CALCIUM CHLORIDE 600; 310; 30; 20 MG/100ML; MG/100ML; MG/100ML; MG/100ML
INJECTION, SOLUTION INTRAVENOUS CONTINUOUS PRN
Status: DISCONTINUED | OUTPATIENT
Start: 2025-05-01 | End: 2025-05-01 | Stop reason: SURG

## 2025-05-01 RX ORDER — EPHEDRINE SULFATE/0.9% NACL/PF 25 MG/5 ML
5 SYRINGE (ML) INTRAVENOUS ONCE AS NEEDED
Status: DISCONTINUED | OUTPATIENT
Start: 2025-05-01 | End: 2025-05-01 | Stop reason: HOSPADM

## 2025-05-01 RX ORDER — NALOXONE HCL 0.4 MG/ML
0.1 VIAL (ML) INJECTION
Status: DISCONTINUED | OUTPATIENT
Start: 2025-05-01 | End: 2025-05-03 | Stop reason: HOSPADM

## 2025-05-01 RX ORDER — HYDRALAZINE HYDROCHLORIDE 20 MG/ML
5 INJECTION INTRAMUSCULAR; INTRAVENOUS
Status: DISCONTINUED | OUTPATIENT
Start: 2025-05-01 | End: 2025-05-01 | Stop reason: HOSPADM

## 2025-05-01 RX ORDER — OXYCODONE HYDROCHLORIDE 5 MG/1
5 TABLET ORAL ONCE
Refills: 0 | Status: COMPLETED | OUTPATIENT
Start: 2025-05-01 | End: 2025-05-01

## 2025-05-01 RX ORDER — HYDROMORPHONE HYDROCHLORIDE 1 MG/ML
0.5 INJECTION, SOLUTION INTRAMUSCULAR; INTRAVENOUS; SUBCUTANEOUS
Status: DISCONTINUED | OUTPATIENT
Start: 2025-05-01 | End: 2025-05-01 | Stop reason: HOSPADM

## 2025-05-01 RX ORDER — FENTANYL CITRATE 50 UG/ML
INJECTION, SOLUTION INTRAMUSCULAR; INTRAVENOUS
Status: COMPLETED | OUTPATIENT
Start: 2025-05-01 | End: 2025-05-01

## 2025-05-01 RX ORDER — ONDANSETRON 4 MG/1
4 TABLET, ORALLY DISINTEGRATING ORAL EVERY 6 HOURS PRN
Status: DISCONTINUED | OUTPATIENT
Start: 2025-05-01 | End: 2025-05-03 | Stop reason: HOSPADM

## 2025-05-01 RX ORDER — GLYCOPYRROLATE 0.2 MG/ML
INJECTION INTRAMUSCULAR; INTRAVENOUS AS NEEDED
Status: DISCONTINUED | OUTPATIENT
Start: 2025-05-01 | End: 2025-05-01 | Stop reason: SURG

## 2025-05-01 RX ORDER — CALCIUM CARBONATE 500 MG/1
2 TABLET, CHEWABLE ORAL 3 TIMES DAILY PRN
Status: DISCONTINUED | OUTPATIENT
Start: 2025-05-01 | End: 2025-05-03 | Stop reason: HOSPADM

## 2025-05-01 RX ORDER — MELOXICAM 15 MG/1
15 TABLET ORAL DAILY
Status: DISCONTINUED | OUTPATIENT
Start: 2025-05-01 | End: 2025-05-03 | Stop reason: HOSPADM

## 2025-05-01 RX ORDER — DOCUSATE SODIUM 100 MG/1
100 CAPSULE, LIQUID FILLED ORAL 2 TIMES DAILY PRN
Status: DISCONTINUED | OUTPATIENT
Start: 2025-05-01 | End: 2025-05-03 | Stop reason: HOSPADM

## 2025-05-01 RX ORDER — CELECOXIB 200 MG/1
200 CAPSULE ORAL ONCE
Status: COMPLETED | OUTPATIENT
Start: 2025-05-01 | End: 2025-05-01

## 2025-05-01 RX ORDER — IPRATROPIUM BROMIDE AND ALBUTEROL SULFATE 2.5; .5 MG/3ML; MG/3ML
3 SOLUTION RESPIRATORY (INHALATION) ONCE AS NEEDED
Status: DISCONTINUED | OUTPATIENT
Start: 2025-05-01 | End: 2025-05-01 | Stop reason: HOSPADM

## 2025-05-01 RX ORDER — OXYCODONE HYDROCHLORIDE 5 MG/1
5 TABLET ORAL EVERY 4 HOURS PRN
Status: DISCONTINUED | OUTPATIENT
Start: 2025-05-01 | End: 2025-05-03 | Stop reason: HOSPADM

## 2025-05-01 RX ORDER — METOPROLOL SUCCINATE 25 MG/1
25 TABLET, EXTENDED RELEASE ORAL DAILY
Status: DISCONTINUED | OUTPATIENT
Start: 2025-05-01 | End: 2025-05-03 | Stop reason: HOSPADM

## 2025-05-01 RX ORDER — DIPHENHYDRAMINE HYDROCHLORIDE 50 MG/ML
12.5 INJECTION, SOLUTION INTRAMUSCULAR; INTRAVENOUS ONCE AS NEEDED
Status: DISCONTINUED | OUTPATIENT
Start: 2025-05-01 | End: 2025-05-01 | Stop reason: HOSPADM

## 2025-05-01 RX ORDER — DIPHENHYDRAMINE HYDROCHLORIDE 50 MG/ML
12.5 INJECTION, SOLUTION INTRAMUSCULAR; INTRAVENOUS
Status: DISCONTINUED | OUTPATIENT
Start: 2025-05-01 | End: 2025-05-01 | Stop reason: HOSPADM

## 2025-05-01 RX ORDER — TRANEXAMIC ACID 10 MG/ML
1000 INJECTION, SOLUTION INTRAVENOUS ONCE
Status: COMPLETED | OUTPATIENT
Start: 2025-05-01 | End: 2025-05-01

## 2025-05-01 RX ORDER — ACETAMINOPHEN 500 MG
1000 TABLET ORAL EVERY 6 HOURS
Status: DISCONTINUED | OUTPATIENT
Start: 2025-05-01 | End: 2025-05-03 | Stop reason: HOSPADM

## 2025-05-01 RX ORDER — ACETAMINOPHEN 500 MG
1000 TABLET ORAL ONCE
Status: COMPLETED | OUTPATIENT
Start: 2025-05-01 | End: 2025-05-01

## 2025-05-01 RX ORDER — LABETALOL HYDROCHLORIDE 5 MG/ML
5 INJECTION, SOLUTION INTRAVENOUS
Status: DISCONTINUED | OUTPATIENT
Start: 2025-05-01 | End: 2025-05-01 | Stop reason: HOSPADM

## 2025-05-01 RX ORDER — SODIUM CHLORIDE 0.9 % (FLUSH) 0.9 %
10 SYRINGE (ML) INJECTION AS NEEDED
Status: DISCONTINUED | OUTPATIENT
Start: 2025-05-01 | End: 2025-05-01 | Stop reason: HOSPADM

## 2025-05-01 RX ORDER — SODIUM CHLORIDE, SODIUM LACTATE, POTASSIUM CHLORIDE, CALCIUM CHLORIDE 600; 310; 30; 20 MG/100ML; MG/100ML; MG/100ML; MG/100ML
20 INJECTION, SOLUTION INTRAVENOUS ONCE
Status: COMPLETED | OUTPATIENT
Start: 2025-05-01 | End: 2025-05-01

## 2025-05-01 RX ORDER — ONDANSETRON 2 MG/ML
4 INJECTION INTRAMUSCULAR; INTRAVENOUS EVERY 6 HOURS PRN
Status: DISCONTINUED | OUTPATIENT
Start: 2025-05-01 | End: 2025-05-03 | Stop reason: HOSPADM

## 2025-05-01 RX ORDER — OXYCODONE HYDROCHLORIDE 5 MG/1
5 TABLET ORAL ONCE AS NEEDED
Status: DISCONTINUED | OUTPATIENT
Start: 2025-05-01 | End: 2025-05-01 | Stop reason: HOSPADM

## 2025-05-01 RX ORDER — ROPIVACAINE HYDROCHLORIDE 5 MG/ML
INJECTION, SOLUTION EPIDURAL; INFILTRATION; PERINEURAL
Status: COMPLETED | OUTPATIENT
Start: 2025-05-01 | End: 2025-05-01

## 2025-05-01 RX ORDER — PROPOFOL 10 MG/ML
INJECTION, EMULSION INTRAVENOUS AS NEEDED
Status: DISCONTINUED | OUTPATIENT
Start: 2025-05-01 | End: 2025-05-01 | Stop reason: SURG

## 2025-05-01 RX ADMIN — DEXMEDETOMIDINE HYDROCHLORIDE 40 MCG: 100 INJECTION, SOLUTION INTRAVENOUS at 12:27

## 2025-05-01 RX ADMIN — CEFAZOLIN 2000 MG: 2 INJECTION, POWDER, FOR SOLUTION INTRAMUSCULAR; INTRAVENOUS at 22:25

## 2025-05-01 RX ADMIN — ANTACID TABLETS 2 TABLET: 500 TABLET, CHEWABLE ORAL at 18:26

## 2025-05-01 RX ADMIN — SODIUM CHLORIDE, POTASSIUM CHLORIDE, SODIUM LACTATE AND CALCIUM CHLORIDE 20 ML/HR: 600; 310; 30; 20 INJECTION, SOLUTION INTRAVENOUS at 11:26

## 2025-05-01 RX ADMIN — OXYCODONE HYDROCHLORIDE 5 MG: 5 TABLET ORAL at 11:36

## 2025-05-01 RX ADMIN — FENTANYL CITRATE 50 MCG: 50 INJECTION, SOLUTION INTRAMUSCULAR; INTRAVENOUS at 13:16

## 2025-05-01 RX ADMIN — GLYCOPYRROLATE 0.2 MG: 0.2 INJECTION INTRAMUSCULAR; INTRAVENOUS at 14:15

## 2025-05-01 RX ADMIN — FENTANYL CITRATE 50 MCG: 50 INJECTION, SOLUTION INTRAMUSCULAR; INTRAVENOUS at 14:36

## 2025-05-01 RX ADMIN — MELOXICAM 15 MG: 15 TABLET ORAL at 16:14

## 2025-05-01 RX ADMIN — OXYCODONE 10 MG: 5 TABLET ORAL at 20:20

## 2025-05-01 RX ADMIN — HYDROMORPHONE HYDROCHLORIDE 0.5 MG: 1 INJECTION, SOLUTION INTRAMUSCULAR; INTRAVENOUS; SUBCUTANEOUS at 14:10

## 2025-05-01 RX ADMIN — FENTANYL CITRATE 50 MCG: 50 INJECTION, SOLUTION INTRAMUSCULAR; INTRAVENOUS at 13:22

## 2025-05-01 RX ADMIN — TRANEXAMIC ACID 1000 MG: 10 INJECTION, SOLUTION INTRAVENOUS at 13:10

## 2025-05-01 RX ADMIN — CEFAZOLIN 2000 MG: 2 INJECTION, POWDER, FOR SOLUTION INTRAMUSCULAR; INTRAVENOUS at 12:54

## 2025-05-01 RX ADMIN — ACETAMINOPHEN 1000 MG: 500 TABLET, FILM COATED ORAL at 11:09

## 2025-05-01 RX ADMIN — PROPOFOL INJECTABLE EMULSION 125 MCG/KG/MIN: 10 INJECTION, EMULSION INTRAVENOUS at 13:07

## 2025-05-01 RX ADMIN — PREGABALIN 150 MG: 75 CAPSULE ORAL at 11:29

## 2025-05-01 RX ADMIN — METOPROLOL SUCCINATE 25 MG: 25 TABLET, EXTENDED RELEASE ORAL at 16:14

## 2025-05-01 RX ADMIN — ACETAMINOPHEN 1000 MG: 500 TABLET, FILM COATED ORAL at 22:24

## 2025-05-01 RX ADMIN — CELECOXIB 200 MG: 200 CAPSULE ORAL at 11:24

## 2025-05-01 RX ADMIN — PROPOFOL INJECTABLE EMULSION 140 MG: 10 INJECTION, EMULSION INTRAVENOUS at 13:06

## 2025-05-01 RX ADMIN — LIDOCAINE HYDROCHLORIDE 0.5 ML: 10 INJECTION, SOLUTION EPIDURAL; INFILTRATION; INTRACAUDAL; PERINEURAL at 11:26

## 2025-05-01 RX ADMIN — ACETAMINOPHEN 1000 MG: 500 TABLET, FILM COATED ORAL at 16:14

## 2025-05-01 RX ADMIN — OXYCODONE 5 MG: 5 TABLET ORAL at 16:14

## 2025-05-01 RX ADMIN — CEFAZOLIN 2000 MG: 2 INJECTION, POWDER, FOR SOLUTION INTRAMUSCULAR; INTRAVENOUS at 16:15

## 2025-05-01 RX ADMIN — FENTANYL CITRATE 100 MCG: 50 INJECTION, SOLUTION INTRAMUSCULAR; INTRAVENOUS at 12:24

## 2025-05-01 RX ADMIN — HYDROMORPHONE HYDROCHLORIDE 0.5 MG: 1 INJECTION, SOLUTION INTRAMUSCULAR; INTRAVENOUS; SUBCUTANEOUS at 14:46

## 2025-05-01 RX ADMIN — SODIUM CHLORIDE, SODIUM LACTATE, POTASSIUM CHLORIDE, AND CALCIUM CHLORIDE: .6; .31; .03; .02 INJECTION, SOLUTION INTRAVENOUS at 12:55

## 2025-05-01 RX ADMIN — GABAPENTIN 300 MG: 300 CAPSULE ORAL at 20:21

## 2025-05-01 RX ADMIN — RIVAROXABAN 10 MG: 20 TABLET, FILM COATED ORAL at 16:16

## 2025-05-01 RX ADMIN — LIDOCAINE HYDROCHLORIDE 30 MG: 10 INJECTION, SOLUTION EPIDURAL; INFILTRATION; INTRACAUDAL; PERINEURAL at 13:06

## 2025-05-01 RX ADMIN — ROPIVACAINE HYDROCHLORIDE 50 ML: 5 INJECTION EPIDURAL; INFILTRATION; PERINEURAL at 12:27

## 2025-05-01 NOTE — ANESTHESIA POSTPROCEDURE EVALUATION
Patient: Abida Becker    Procedure Summary       Date: 05/01/25 Room / Location: [facility] OR 04 / [facility] MAIN OR    Anesthesia Start: 1258 Anesthesia Stop: 1428    Procedure: TOTAL KNEE ARTHROPLASTY WITH CORI ROBOT (Right: Knee) Diagnosis:       Osteoarthritis of right knee      (Osteoarthritis of right knee [M17.11])    Surgeons: Rolando Newberry II, MD Provider: Maggie Espino MD    Anesthesia Type: general with block, spinal, ERAS Protocol ASA Status: 3            Anesthesia Type: general with block, spinal, ERAS Protocol    Vitals  Vitals Value Taken Time   /48 05/01/25 14:52   Temp 97.5 °F (36.4 °C) 05/01/25 14:24   Pulse 74 05/01/25 14:55   Resp 12 05/01/25 14:39   SpO2 94 % 05/01/25 14:55   Vitals shown include unfiled device data.        Post Anesthesia Care and Evaluation    Patient location during evaluation: PACU  Patient participation: complete - patient participated  Level of consciousness: awake and alert  Pain management: satisfactory to patient    Airway patency: patent  Anesthetic complications: No anesthetic complications  PONV Status: none  Cardiovascular status: acceptable  Respiratory status: acceptable  Hydration status: acceptable

## 2025-05-01 NOTE — H&P
Orthopaedic Surgery  History & Physical For Elective Total Knee  Dr. MARILEE Newberry II  [phone]    HPI:  Patient is a 79 y.o. Not  or  female who presents with End-stage arthritis of the right knee. They failed conservative treatment of their knee pain and a thorough discussion of the risks and benefits of surgery was had. The patient wishes to continue with elective total knee replacement, they were scheduled and are here for surgery. They did get medical clearance as well as a thorough preoperative workup.    MEDICAL HISTORY  Past Medical History:   Diagnosis Date   • Dysuria 09/26/2024   • Dysuria 09/26/2024   • GERD (gastroesophageal reflux disease)    • History of recurrent UTIs    • Hypertension    • IBS (irritable bowel syndrome)    • Osteoarthritis    • Osteoporosis     on prolia(12/5/22)   • Primary osteoarthritis of both knees 01/24/2022   • Pulmonary embolism 2011    after scoliosis surgery   • Pulmonary embolism 10/2022   • Scoliosis    • Vitamin D deficiency      Past Surgical History:   Procedure Laterality Date   • BACK SURGERY  11/2011    Dr. Olsen, due to scoliosis, earnestine and some fusions   • BREAST BIOPSY     • CHOLECYSTECTOMY     • COLONOSCOPY  01/18/2018   • HIATAL HERNIA REPAIR  2013   • REIMPLANT URETER IN BLADDER       Prior to Admission medications    Medication Sig Start Date End Date Taking? Authorizing Provider   acetaminophen (TYLENOL) 500 MG tablet Take 1 tablet by mouth Every 6 (Six) Hours As Needed for Fever or Mild Pain. 1/26/20   Liberty Richards MD   calcium carbonate (OS-STEVEN) 600 MG tablet Take 1 tablet by mouth Daily.    Liberty Richards MD   cholecalciferol (VITAMIN D3) 1000 units tablet Take 1 tablet by mouth Daily.    Liberty Richards MD   Diclofenac Sodium (VOLTAREN) 1 % gel gel APPLY 4 GRAMS TO AFFECTED AREA(S) FOUR TIMES A DAY AS NEEDED FOR PAIN 2/10/24   Darlene Matute MD   Estrogens Conjugated (PREMARIN) 0.625 MG/GM vaginal cream  Insert  into the vagina Daily.    Liberty Richards MD   ferrous sulfate (FeroSul) 325 (65 FE) MG tablet Take 1 tablet by mouth Daily With Breakfast. 9/26/24   Thelma Lopez APRN   fluconazole (Diflucan) 100 MG tablet Take 1 tablet by mouth Daily. 12/2/24   Darlene Matute MD   gabapentin (NEURONTIN) 300 MG capsule TAKE 1 CAPSULE BY MOUTH TWICE A DAY 2/23/25   Darlene Matute MD   Loratadine (CLARITIN PO) Take  by mouth.    Liberty Richards MD   magnesium oxide (MAG-OX) 400 MG tablet Take 1 tablet by mouth Daily.    Liberty Richards MD   meclizine (ANTIVERT) 25 MG tablet Take 1 tablet by mouth 3 (Three) Times a Day As Needed for Dizziness. 6/20/24   Darlene Matute MD   melatonin 3 MG tablet Take 1 tablet by mouth Every Night. 10/9/22   Liberty Richards MD   metoprolol succinate XL (TOPROL-XL) 25 MG 24 hr tablet TAKE ONE TABLET BY MOUTH DAILY 5/13/24   Brett Zuniga MD   Mirabegron ER (MYRBETRIQ) 50 MG tablet sustained-release 24 hour 24 hr tablet Take 50 mg by mouth Daily. 4/10/19   Liberty Richards MD   nitrofurantoin (MACRODANTIN) 100 MG capsule Take 1 capsule by mouth 4 (Four) Times a Day.    Liberty Richards MD   pantoprazole (PROTONIX) 40 MG EC tablet TAKE 1 TABLET BY MOUTH DAILY 11/21/24   Darlene Matute MD   potassium chloride 10 MEQ CR tablet TAKE 1 TABLET BY MOUTH TWICE A DAY 4/21/25   Darlene Matute MD   Psyllium (METAMUCIL MULTIHEALTH FIBER PO) Take  by mouth.    Liberty Richards MD   rivaroxaban (Xarelto) 10 MG tablet Take 1 tablet by mouth Daily. 1/24/25   Thelma Lopez APRN   saccharomyces boulardii (FLORASTOR) 250 MG capsule Take 1 capsule by mouth Daily.    Liberty Richards MD   Turmeric 500 MG capsule Take  by mouth.    Liberty Richards MD   vitamin B-12 (CYANOCOBALAMIN) 1000 MCG tablet Take 1 tablet by mouth Daily.    Liberty Richards MD   vitamin C (ASCORBIC ACID) 250 MG tablet Take 2 tablets by mouth Daily.     Provider, Historical, MD     Allergies   Allergen Reactions   • Amoxicillin Other (See Comments)      unsure of type of reaction - states it was so long ago she can't remember    • Penicillins Nausea And Vomiting     Most Recent Immunizations   Administered Date(s) Administered   • ABRYSVO (RSV, 60+ or pregnant women 32-36 wks) 11/16/2024   • COVID-19 (PFIZER) 12YRS+ (COMIRNATY) 09/21/2024   • COVID-19 (PFIZER) BIVALENT 12+YRS 01/17/2023   • COVID-19 (PFIZER) Purple Cap Monovalent 11/05/2021   • Covid-19 (Pfizer) Gray Cap Monovalent 05/12/2022   • FLUAD TRI 65YR+ 11/01/2013   • Fluad Quad 65+ 11/16/2021   • Fluzone High-Dose 65+YRS 10/18/2024   • Fluzone High-Dose 65+yrs 10/25/2023   • H1N1 Inj 12/29/2009   • Influenza Seasonal Injectable 11/01/2013   • Influenza, Unspecified 10/01/2024   • Pneumococcal Conjugate 13-Valent (PCV13) 01/17/2018   • Pneumococcal Polysaccharide (PPSV23) 04/10/2019   • Shingrix 12/27/2023   • Tdap 12/12/2018   • Zostavax 09/01/2013     Social History     Tobacco Use   • Smoking status: Never     Passive exposure: Never   • Smokeless tobacco: Never   Substance Use Topics   • Alcohol use: No      Social History     Substance and Sexual Activity   Drug Use No       REVIEW OF SYSTEMS:  Head: negative for headache  Respiratory: negative for shortness of breath.   Cardiovascular: negative for chest pain.   Gastrointestinal: negative abdominal pain.   Neurological: negative for LOC  Psychiatric/Behavioral: negative for memory loss.   All other systems reviewed and are negative    VITALS: There were no vitals taken for this visit. There is no height or weight on file to calculate BMI.    PHYSICAL EXAM:   CONSTITUTIONAL: A&Ox3, No acute distress  LUNGS: Equal chest rise, no shortness of air  CARDIOVASCULAR: palpable peripheral pulses  SKIN: no skin lesions in the area examined  LYMPH: no lymphadenopathy in the area examined  EXTREMITY: Knee  Pulses:  Brisk Capillary Refill  Sensation: Intact to  Saphenous, Sural, Deep Peroneal, Superficial Peroneal, and Tibial Nerves and grossly throughout extremity  Motor: 5/5 EHL/FHL/TA/GS motor complexes    RADIOLOGY REVIEW:   No radiology results for the last 7 days    LABS:   Results for the past 24 hours: No results found for this or any previous visit (from the past 24 hours).    IMPRESSION:  Patient is a 79 y.o. Not  or  female with end-stage arthritis of the right knee    PLAN:   Surgery: Elective total knee arthroplasty  Consent: The risks and benefits of operative versus nonoperative treatment were discussed. The patient elected to undergo operative treatment of their knee arthritis. The risks discussed included but were not limited to blood clots, MI, stroke, other medical complications, infection, damage to neurovascular structures, continued pain, hardware prominence, loss of range of motion, need for further procedures, and and risk of anesthesia..  No guarantees were made   Disposition: Elective right Total Knee Arthroplasty today.    Rolando Newberry II, MD  Orthopaedic Surgery  [facility]

## 2025-05-01 NOTE — PLAN OF CARE
Goal Outcome Evaluation:   Patient a&ox4, pleasant, pain controlled, call light within reach, bed in low position. Family at bedside. Plan of care on going.

## 2025-05-01 NOTE — ANESTHESIA PREPROCEDURE EVALUATION
Anesthesia Evaluation     Patient summary reviewed   no history of anesthetic complications:   NPO Solid Status: > 8 hours  NPO Liquid Status: > 8 hours           Airway   Mallampati: II  TM distance: >3 FB  Neck ROM: full  Dental - normal exam     Pulmonary - normal exam   (+) pulmonary embolism,  (-) not a smoker  Cardiovascular - normal exam    ECG reviewed    (+) hypertension, dysrhythmias Atrial Fib, DVT      Neuro/Psych  GI/Hepatic/Renal/Endo    (+) hiatal hernia, GERD (asymptomatic) well controlled    Musculoskeletal     Abdominal    Substance History      OB/GYN          Other   arthritis,     ROS/Med Hx Other: Interstim in place for bladder incontinence  Hx severe scoliosis with multiple spine surgeries               Anesthesia Plan    ASA 3     general with block and ERAS Protocol   total IV anesthesia  intravenous induction     Anesthetic plan, risks, benefits, and alternatives have been provided, discussed and informed consent has been obtained with: patient.    Plan discussed with CRNA.    CODE STATUS:

## 2025-05-01 NOTE — PLAN OF CARE
Goal Outcome Evaluation:  Plan of Care Reviewed With: patient, child           Outcome Evaluation: 78 y/o F POD 0 R TKA per Dr. Newberry. Patient lives alone in a one story home with 1 WILLY and basement laundry. Plans for daughter to stay with her for 3 days and another person to stay with her for 1 week after daughter leaves. Patient with reported severe genu valgum and scoliosis at baseline. Reports a fall in february into snow. Has RW at home. Patient was lethargic but arousable and interactive at time of evaluation. She was in chair. Min A to stand with increased pain with Wbing. Ambulated 8 steps and reported lightheadedness at which time recliner was brought up to patient and pt returned to sitting and feet were elevated and BP was then assessed to be 116/72. Nursing informed of symptoms in standing. Patient was given TKA HEP and began performing. Patient may be appropriate for home with HHPT and family assist if she is able to safely advance mobility tomorrow however gait speed was very slow today and mobility was limited. Will progress and update d/c recs as appropriate.    Anticipated Discharge Disposition (PT): home with 24/7 care, home with home health

## 2025-05-01 NOTE — OP NOTE
ROBOTIC Total Knee Replacement Operative Note  Dr. MARILEE Newberry II  [phone]    PATIENT NAME: Abida Becker  MRN: [id number]  : 1945 AGE: 79 y.o. GENDER: female  DATE OF OPERATION: 2025  PREOPERATIVE DIAGNOSIS: End Stage Arthritis  POSTOPERATIVE DIAGNOSIS: Same  OPERATION PERFORMED: Right Robotic Assisted Total Knee Arthroplasty  SURGEON: Rolando Newberry MD  Circulator: Randy Bhardwaj RN; Niesha Palmer RN  Scrub Person: Bert Boyd  Vendor Representative: Edilson Dobbins  Assistant: Aubrey Lee CSA  ANESTHESIA: General with Block  ASSISTANT: Aubrey Lee. This case would not have been possible without another set of skilled surgical hands for retraction, use of instrumentation, and general assistance.  This assistance was vital to the success of the case.   ESTIMATED BLOOD LOSS: 100cc  SPONGE AND NEEDLE COUNT: Correct  INDICATIONS:   A discussion of operative versus nonoperative treatment was had with the patient and they failed conservative management. They elected to undergo total knee arthroplasty. The risks of surgery were discussed and included the risk of anesthesia, infection, damage to neurovascular structures, implant loosening/failure, fracture, hardware prominence, continued pain, early failure, the need for further procedures, medical complications, and others. No guarantees were made. The patient wished to proceed with surgery and a surgical consent was signed.  The patient's pain is becoming disabling, despite extensive conservative care including NSAIDs, therapy, and injections.    PERTINENT FINDINGS: Degenerative Arthritis    DETAILS OF PROCEDURE:  The patient was met in the preoperative area. The site was marked. The consent and H&P were reviewed. The patient was then wheeled back to the operative suite and transferred to the operative table. The patient underwent anesthesia. A tourniquet was placed on the upper thigh. Surgical alcohol was used to thoroughly clean the  entire operative extremity.     The leg was then prepped in the normal sterile fashion and surgical space suits were used for the entire operative team. New outer gloves were used by all sterile surgical team members after final draping. After a surgical timeout, the tourniquet was inflated.     I began by inserting 2 pins into the tibial and femoral shafts and attaching the tibial and femoral robotic .    In flexion, a midline knee incision was utilized centered on the patella and ending medial to the tibial tubercle. Dissection was carried down to the knee capsule. A medial parapatellar ararthrotomy was completed. The patellar fat pad was excised. The MCL was minimally elevated to gain adequate exposure to the knee.  The suprapatellar fat pad was also excised.  The patella was subluxed laterally. The patella was held vertical using 2 clamps, and was then cut using a saw. The patella was then sized, and the lug holes were drilled. Excess patellar bone was removed using a saw. The patella was then protected during this case using the metal patella shield.    The ACL remnant, anterior horn of the lateral meniscus, and PCL were then resected.  Next I proceeded with a standard mapping of the knee including all relevant anatomic positions, range of motion, and stressing of ligaments.  I then planned out the knee including implant sizes, rotation, and bony resection to appropriately balance the knee as needed.      After the mapping and planning was complete, I turned my attention to the distal femur.  Using the bur, I was able to remove the distal femoral bone and create the initial lug holes.  I then used the punch to create the pinholes for the 5-in-1 cutting block.  The 5-in-1 cutting block was then snapped into place and pinned.  I used a saw to resect the anterior posterior and chamfer cuts.  These were all removed using a rongeur.    I then turned my attention to the tibia.  Retractors were placed  appropriately.  Using the robot for guidance, a cutting jig was attached to the tibia using 2 pins.  This was done just above the level of measured resection.  I then used a saw to cut the tibia and remove this bone.  At that point, I was able to use the bur to resect down to the actual intended target tibial resection line.  This was verified to be accurate afterwards on the robot screen.    At that point, the remaining meniscus and osteophytes were removed.  I then attached the femoral component which was well-seated. The femoral BCS box was then prepared for.  I then trialed the knee.  Any additional bony resection and/or soft tissue releases were then noted and performed at that time to fully balance the knee.    We next turned our attention back to the tibia to finish the tibial preparation. The tibia was measured and sized. The tibial baseplate was aligned with the rotation from the trialing process and verified to be positioned near the medial third of the tibial tubercle. The tibial surface was then prepared for the keel.     The knee was thoroughly irrigated with sterile saline using a pulse-lavage system while the final tibial baseplate, femoral component and patellar component were opened. Cement was prepared and mixed using standard techniques. Outer gloves were changed before implant handling to ensure no soft tissue or oily material was exposed to the surfaces of the final implants. The bony knee surfaces were dried and the implants were cemented in place, starting with the tibia, then the femur and finally the patella. Excess cement was removed at each step. A trial poly was utilized during cementation for compression. The tourniquet was taken down and adequate hemostasis was achieved. The knee was thoroughly irrigated once again.     The soft tissues about the knee were then injected with an anesthetic cocktail. Care was taken to avoid the peroneal nerve and the neurovascular bundle posteriorly. The  "cement was allowed to harden.  While the cement was hardening, I did remove all 4 pins those sites were closed.    After the cement was fully set, the knee was ranged with various thickness of polyethylene trials to ensure that the balance was appropriate after robotic resection. The knee was inspected for excess cement, which was removed. The real poly, of corresponding thickness was then opened and inserted into the knee. One final range of motion and stability test showed the knee to be in good condition with a well tracking patellar component.    The knee capsule was then closed after applying 1 g vancomycin.  The knee was then closed in layers.  A sterile dressing was applied.    The patient was awoken from anesthesia, moved to the [facility] and taken to the recovery room in stable condition. Sponge and needle count were correct. There were no complications. Patient tolerated the procedure well.    R \"Luis\" Coni VALADEZ MD  Orthopaedic Surgery  [facility]  [phone]                "

## 2025-05-01 NOTE — ANESTHESIA PROCEDURE NOTES
Peripheral Block      Patient reassessed immediately prior to procedure    Patient location during procedure: pre-op  Start time: 5/1/2025 12:24 PM  Stop time: 5/1/2025 12:27 PM  Reason for block: at surgeon's request and post-op pain management  Performed by  Anesthesiologist: Maggie Espino MD  Preanesthetic Checklist  Completed: patient identified, IV checked, site marked, risks and benefits discussed, surgical consent, monitors and equipment checked, pre-op evaluation and timeout performed  Prep:  Pt Position: supine  Sterile barriers:alcohol skin prep, cap, gloves, mask and washed/disinfected hands  Prep: ChloraPrep  Patient monitoring: blood pressure monitoring, continuous pulse oximetry and EKG  Procedure    Sedation: yes  Performed under: local infiltration  Guidance:ultrasound guided    ULTRASOUND INTERPRETATION.  Using ultrasound guidance a 20 G gauge needle was placed in close proximity to the nerve, at which point, under ultrasound guidance anesthetic was injected in the area of the nerve and spread of the anesthesia was seen on ultrasound in close proximity thereto.  There were no abnormalities seen on ultrasound; a digital image was taken; and the patient tolerated the procedure with no complications. Images:still images obtained, printed/placed on chart    Laterality:right  Block Type:adductor canal block and iPack  Injection Technique:single-shot  Needle Type:echogenic  Needle Gauge:20 G    Sedation medications used: fentaNYL citrate (PF) (SUBLIMAZE) injection - Intravenous   100 mcg - 5/1/2025 12:24:00 PM  Medications Used: ropivacaine (NAROPIN) 0.5 % injection - Perineural   50 mL - 5/1/2025 12:27:00 PM  dexmedetomidine HCl (PRECEDEX) injection - Perineural   40 mcg - 5/1/2025 12:27:00 PM      Post Assessment  Injection Assessment: negative aspiration for heme, no paresthesia on injection and incremental injection  Patient Tolerance:comfortable throughout block  Complications:no  Additional  Notes  Skin anesthetized with 1% Lidocaine.  Using 20 G, 4 inch stimuplex echogenic needle,  20 mL Local was injected with serial aspirations under continuous ultrasound guidance into adductor canal and 30 mL into compartment between posterior capsule of knee and popliteal artery.  Small volume heme aspirated upon first needle insertion.  Needle withdrawn, heme flushed out.  Next attempt completed with negative aspiration for heme.  Needle tip visualized throughout.  Good spread noted.  No complications.  No bleeding noted.  Performed by: Maggie Espino MD

## 2025-05-01 NOTE — ANESTHESIA PROCEDURE NOTES
Airway  Reason: elective    Date/Time: 5/1/2025 1:07 PM  End Time:5/1/2025 1:07 PM  Airway not difficult    General Information and Staff    Patient location during procedure: OR  CRNA/CAA: Israel Price CRNA    Indications and Patient Condition  Indications: GA.    Preoxygenated: yes  MILS maintained throughout    Mask difficulty assessment: 0 - not attempted    Final Airway Details    Final airway type: supraglottic airway      Successful airway: I-gel  Size: 3   Number of attempts at approach: 1 (jaw thrust to fascilitate LMA placement)  Assessment: lips, teeth, and gum same as pre-op and atraumatic intubation

## 2025-05-01 NOTE — THERAPY EVALUATION
Patient Name: Abida Becker  : 1945    MRN: [id number]                              Today's Date: 2025       Admit Date: 2025    Visit Dx: No diagnosis found.  Patient Active Problem List   Diagnosis    Allergic rhinitis    Gait abnormality    Gastroesophageal reflux disease    Right hip pain    Low back pain    Recurrent urinary tract infection    Scoliosis    Vaginitis, atrophic    Medicare annual wellness visit, subsequent    Balance problem    Vitamin D deficiency    Acute pain of left knee    Primary osteoarthritis of left knee    Tear of MCL (medial collateral ligament) of knee, left, subsequent encounter    Overweight (BMI 25.0-29.9)    Visit for screening mammogram    Postmenopausal    Chronic pain of left knee    Right lumbar radiculopathy    Tremor, essential    Other fatigue    Osteoarthritis, generalized    Irritable bowel syndrome with diarrhea    Primary osteoarthritis of both knees    Obesity (BMI 30.0-34.9)    Hiatal hernia    Age-related osteoporosis without current pathological fracture    Family hx osteoporosis    Personal history of spine surgery    Nonintractable headache    Essential hypertension    Hypotension    Multiple subsegmental pulmonary emboli without acute cor pulmonale    Encounter for monitoring Coumadin therapy    Acute deep vein thrombosis (DVT) of proximal vein of right lower extremity    Anxiety    Memory problem    Chronic neck pain    DDD (degenerative disc disease), cervical    Upper back pain    Spinal deformity    Chronic bilateral thoracic back pain    Paroxysmal atrial fibrillation    History of pulmonary embolism    Hematoma of right lower leg    Right leg swelling    Daytime sleepiness    Hematoma of leg, left, initial encounter    Acute cystitis    Dysuria    H/O recurrent urinary tract infection    Knee joint replacement status     Past Medical History:   Diagnosis Date    Dysuria 2024    Dysuria 2024    GERD (gastroesophageal reflux  disease)     History of recurrent UTIs     Hypertension     IBS (irritable bowel syndrome)     Osteoarthritis     Osteoporosis     on prolia(12/5/22)    Primary osteoarthritis of both knees 01/24/2022    Pulmonary embolism 2011    after scoliosis surgery    Pulmonary embolism 10/2022    Scoliosis     Vitamin D deficiency      Past Surgical History:   Procedure Laterality Date    BACK SURGERY  11/2011    Dr. Olsen, due to scoliosis, earnestine and some fusions    BREAST BIOPSY      CHOLECYSTECTOMY      COLONOSCOPY  01/18/2018    HIATAL HERNIA REPAIR  2013    REIMPLANT URETER IN BLADDER        General Information       Row Name 05/01/25 1708          Physical Therapy Time and Intention    Document Type evaluation  -SS     Mode of Treatment physical therapy  -       Row Name 05/01/25 1708          General Information    Patient Profile Reviewed yes  -SS     Prior Level of Function independent:  -     Existing Precautions/Restrictions no known precautions/restrictions  -     Barriers to Rehab previous functional deficit  -       Row Name 05/01/25 1708          Living Environment    Current Living Arrangements home  -     People in Home alone  -       Row Name 05/01/25 1708          Cognition    Orientation Status (Cognition) oriented x 4  -       Row Name 05/01/25 1708          Safety Issues/Impairments Affecting Functional Mobility    Safety Issues Affecting Function (Mobility) awareness of need for assistance;insight into deficits/self-awareness  -     Impairments Affecting Function (Mobility) balance;endurance/activity tolerance;pain;range of motion (ROM);strength  -               User Key  (r) = Recorded By, (t) = Taken By, (c) = Cosigned By      Initials Name Provider Type    SS Fabi Badillo, PT Physical Therapist                   Mobility       Row Name 05/01/25 1716          Bed Mobility    Bed Mobility bed mobility (all) activities  -     All Activities, [place] (Bed Mobility) not  tested  in chair at time of arrival  -       Row Name 05/01/25 1716          Sit-Stand Transfer    Sit-Stand [place] (Transfers) minimum assist (75% patient effort)  -     Assistive Device (Sit-Stand Transfers) walker, front-wheeled  -       Row Name 05/01/25 1716          Gait/Stairs (Locomotion)    [place] Level (Gait) contact guard  -     Assistive Device (Gait) walker, front-wheeled  -     Patient was able to Ambulate yes  -     Distance in Feet (Gait) 8  -SS     Comment, (Gait/Stairs) severe genu valgum at baseline, pt with lightheadedness which limited gait distance. Pulled recliner up to pt once lightheadedness began. Once pt was seated and feet up, BP was 116/72  -               User Key  (r) = Recorded By, (t) = Taken By, (c) = Cosigned By      Initials Name Provider Type    Fabi Huitron PT Physical Therapist                   Obj/Interventions       Row Name 05/01/25 1717          Range of Motion Comprehensive    Comment, General Range of Motion R knee ROM 10-90; L LE WFL  -[facility] Name 05/01/25 1717          Strength Comprehensive (MMT)    Comment, General Manual Muscle Testing (MMT) Assessment B LE >3/5 per assessment  -[facility] Name 05/01/25 1717          Motor Skills    Therapeutic Exercise --  initiated TKA HEP  -[facility] Name 05/01/25 1717          Sensory Assessment (Somatosensory)    Sensory Assessment (Somatosensory) sensation intact  -               User Key  (r) = Recorded By, (t) = Taken By, (c) = Cosigned By      Initials Name Provider Type     Fabi Badillo PT Physical Therapist                   Goals/Plan       Row Name 05/01/25 1724          Bed Mobility Goal 1 (PT)    Activity/Assistive Device (Bed Mobility Goal 1, PT) sit to supine/supine to sit  -     [place] Level/Cues Needed (Bed Mobility Goal 1, PT) modified independence  -     Time Frame (Bed Mobility Goal 1, PT) long term goal (LTG);3 days  -       Row Name  05/01/25 1724          Transfer Goal 1 (PT)    Activity/Assistive Device (Transfer Goal 1, PT) sit-to-stand/stand-to-sit  -     [place] Level/Cues Needed (Transfer Goal 1, PT) modified independence  -SS     Time Frame (Transfer Goal 1, PT) long term goal (LTG);3 days  -       Row Name 05/01/25 1724          Gait Training Goal 1 (PT)    Activity/Assistive Device (Gait Training Goal 1, PT) gait (walking locomotion)  -     [place] Level (Gait Training Goal 1, PT) modified independence  -SS     Time Frame (Gait Training Goal 1, PT) long term goal (LTG);3 days  -SS       Row Name 05/01/25 1724          ROM Goal 1 (PT)    ROM Goal 1 (PT) Surgical Knee ROM:  -SS     Time Frame (ROM Goal 1, PT) long-term goal (LTG);3 days  -       Row Name 05/01/25 1724          Therapy Assessment/Plan (PT)    Planned Therapy Interventions (PT) balance training;bed mobility training;gait training;home exercise program;ROM (range of motion);patient/family education;stair training;strengthening;stretching;transfer training  -               User Key  (r) = Recorded By, (t) = Taken By, (c) = Cosigned By      Initials Name Provider Type     Fabi Badillo, PT Physical Therapist                   Clinical Impression       Row Name 05/01/25 1719          Pain    Pretreatment Pain Rating 3/10  -     Posttreatment Pain Rating 3/10  -       Row Name 05/01/25 1719          Plan of Care Review    Plan of Care Reviewed With patient;child  -     Outcome Evaluation 78 y/o F POD 0 R TKA per Dr. Newberry. Patient lives alone in a one story home with 1 [facility] and basement laundry. Plans for daughter to stay with her for 3 days and another person to stay with her for 1 week after daughter leaves. Patient with reported severe genu valgum and scoliosis at baseline. Reports a fall in february into snow. Has RW at home. Patient was lethargic but arousable and interactive at time of evaluation. She was in chair. Min A to stand with  increased pain with Wbing. Ambulated 8 steps and reported lightheadedness at which time recliner was brought up to patient and pt returned to sitting and feet were elevated and BP was then assessed to be 116/72. Nursing informed of symptoms in standing. Patient was given TKA HEP and began performing. Patient may be appropriate for home with HHPT and family assist if she is able to safely advance mobility tomorrow however gait speed was very slow today and mobility was limited. Will progress and update d/c recs as appropriate.  -SS       Row Name 05/01/25 1719          Therapy Assessment/Plan (PT)    Rehab Potential (PT) good  -SS     Criteria for Skilled Interventions Met (PT) yes;meets criteria  -SS     Therapy Frequency (PT) 5 times/wk  -SS     Predicted Duration of Therapy Intervention (PT) until d/c  -SS       Row Name 05/01/25 1719          Vital Signs    Post Systolic BP Rehab 116  -SS     Post Treatment Diastolic BP 72  -SS       Row Name 05/01/25 1719          Positioning and Restraints    Pre-Treatment Position in bed  -SS     Post Treatment Position chair  -SS     In Chair exit alarm on  -SS               User Key  (r) = Recorded By, (t) = Taken By, (c) = Cosigned By      Initials Name Provider Type    SS Fabi Badillo, PT Physical Therapist                   Outcome Measures       Row Name 05/01/25 1520          How much help from another person do you currently need...    Turning from your back to your side while in flat bed without using bedrails? 4  -SL     Moving from lying on back to sitting on the side of a flat bed without bedrails? 4  -SL     Moving to and from a bed to a chair (including a wheelchair)? 4  -SL     Standing up from a chair using your arms (e.g., wheelchair, bedside chair)? 4  -SL     Climbing 3-5 steps with a railing? 3  -SL     To walk in hospital room? 3  -SL     AM-PAC 6 Clicks Score (PT) 22  -SL               User Key  (r) = Recorded By, (t) = Taken By, (c) = Cosigned By       Initials Name Provider Type    Nati Dumas, RN Registered Nurse                                 Physical Therapy Education       Title: PT OT SLP Therapies (Done)       Topic: Physical Therapy (Done)       Point: Mobility training (Done)       Learning Progress Summary            Patient Acceptance, E, VU by  at 5/1/2025 1725                                      User Key       Initials Effective Dates Name Provider Type Discipline     06/16/21 -  Fabi Badillo, PT Physical Therapist PT                  PT Recommendation and Plan  Planned Therapy Interventions (PT): balance training, bed mobility training, gait training, home exercise program, ROM (range of motion), patient/family education, stair training, strengthening, stretching, transfer training  Outcome Evaluation: 80 y/o F POD 0 R TKA per Dr. Newberry. Patient lives alone in a one story home with 1 [address] and basement laundry. Plans for daughter to stay with her for 3 days and another person to stay with her for 1 week after daughter leaves. Patient with reported severe genu valgum and scoliosis at baseline. Reports a fall in february into snow. Has RW at home. Patient was lethargic but arousable and interactive at time of evaluation. She was in chair. Min A to stand with increased pain with Wbing. Ambulated 8 steps and reported lightheadedness at which time recliner was brought up to patient and pt returned to sitting and feet were elevated and BP was then assessed to be 116/72. Nursing informed of symptoms in standing. Patient was given TKA HEP and began performing. Patient may be appropriate for home with HHPT and family assist if she is able to safely advance mobility tomorrow however gait speed was very slow today and mobility was limited. Will progress and update d/c recs as appropriate.     Time Calculation:   PT Evaluation Complexity  History, PT Evaluation Complexity: 3 or more personal factors and/or comorbidities  Examination of  Body Systems (PT Eval Complexity): total of 3 or more elements  Clinical Presentation (PT Evaluation Complexity): evolving  Clinical Decision Making (PT Evaluation Complexity): moderate complexity  Overall Complexity (PT Evaluation Complexity): moderate complexity     PT Charges       Row Name 05/01/25 1725             Time Calculation    Start Time 1630  -SS      Stop Time 1700  -SS      Time Calculation (min) 30 min  -      PT Received On 05/01/25  -      PT - Next Appointment 05/02/25  -      PT Goal Re-Cert Due Date 05/15/25  -         Time Calculation- PT    Total Timed Code Minutes- PT 0 minute(s)  -                User Key  (r) = Recorded By, (t) = Taken By, (c) = Cosigned By      Initials Name Provider Type    SS Fabi Badillo, PT Physical Therapist                  Therapy Charges for Today       Code Description Service Date Service Provider Modifiers Qty    28298079801 HC PT EVAL MOD COMPLEXITY 4 5/1/2025 Fabi Badillo, PT GP 1            PT G-Codes  AM-PAC 6 Clicks Score (PT): 22  PT Discharge Summary  Anticipated Discharge Disposition (PT): home with 24/7 care, home with home health    Fabi Badillo PT  5/1/2025

## 2025-05-02 LAB
ANION GAP SERPL CALCULATED.3IONS-SCNC: 6.5 MMOL/L (ref 5–15)
BUN SERPL-MCNC: 14 MG/DL (ref 8–23)
BUN/CREAT SERPL: 21.9 (ref 7–25)
CALCIUM SPEC-SCNC: 8.6 MG/DL (ref 8.6–10.5)
CHLORIDE SERPL-SCNC: 106 MMOL/L (ref 98–107)
CO2 SERPL-SCNC: 25.5 MMOL/L (ref 22–29)
CREAT SERPL-MCNC: 0.64 MG/DL (ref 0.57–1)
DEPRECATED RDW RBC AUTO: 47 FL (ref 37–54)
EGFRCR SERPLBLD CKD-EPI 2021: 91.2 ML/MIN/1.73
ERYTHROCYTE [DISTWIDTH] IN BLOOD BY AUTOMATED COUNT: 13.2 % (ref 12.3–15.4)
GLUCOSE SERPL-MCNC: 113 MG/DL (ref 65–99)
HCT VFR BLD AUTO: 32.4 % (ref 34–46.6)
HGB BLD-MCNC: 10 G/DL (ref 12–15.9)
MCH RBC QN AUTO: 30.4 PG (ref 26.6–33)
MCHC RBC AUTO-ENTMCNC: 30.9 G/DL (ref 31.5–35.7)
MCV RBC AUTO: 98.5 FL (ref 79–97)
PLATELET # BLD AUTO: 273 10*3/MM3 (ref 140–450)
PMV BLD AUTO: 9.6 FL (ref 6–12)
POTASSIUM SERPL-SCNC: 3.9 MMOL/L (ref 3.5–5.2)
RBC # BLD AUTO: 3.29 10*6/MM3 (ref 3.77–5.28)
SODIUM SERPL-SCNC: 138 MMOL/L (ref 136–145)
WBC NRBC COR # BLD AUTO: 7.28 10*3/MM3 (ref 3.4–10.8)

## 2025-05-02 PROCEDURE — 63710000001 GABAPENTIN 300 MG CAPSULE: Performed by: ORTHOPAEDIC SURGERY

## 2025-05-02 PROCEDURE — 80048 BASIC METABOLIC PNL TOTAL CA: CPT

## 2025-05-02 PROCEDURE — 25810000003 SODIUM CHLORIDE 0.9 % SOLUTION: Performed by: ORTHOPAEDIC SURGERY

## 2025-05-02 PROCEDURE — A9270 NON-COVERED ITEM OR SERVICE: HCPCS

## 2025-05-02 PROCEDURE — 97110 THERAPEUTIC EXERCISES: CPT

## 2025-05-02 PROCEDURE — 63710000001 ACETAMINOPHEN EXTRA STRENGTH 500 MG TABLET: Performed by: ORTHOPAEDIC SURGERY

## 2025-05-02 PROCEDURE — 63710000001 RIVAROXABAN 20 MG TABLET: Performed by: ORTHOPAEDIC SURGERY

## 2025-05-02 PROCEDURE — A9270 NON-COVERED ITEM OR SERVICE: HCPCS | Performed by: ORTHOPAEDIC SURGERY

## 2025-05-02 PROCEDURE — 85027 COMPLETE CBC AUTOMATED: CPT

## 2025-05-02 PROCEDURE — 97530 THERAPEUTIC ACTIVITIES: CPT

## 2025-05-02 PROCEDURE — 25810000003 SODIUM CHLORIDE 0.9 % SOLUTION

## 2025-05-02 PROCEDURE — 63710000001 MIDODRINE 5 MG TABLET

## 2025-05-02 PROCEDURE — 63710000001 MELOXICAM 15 MG TABLET: Performed by: ORTHOPAEDIC SURGERY

## 2025-05-02 PROCEDURE — 63710000001 OXYCODONE 5 MG TABLET: Performed by: ORTHOPAEDIC SURGERY

## 2025-05-02 RX ORDER — MIDODRINE HYDROCHLORIDE 5 MG/1
10 TABLET ORAL EVERY 8 HOURS PRN
Status: DISCONTINUED | OUTPATIENT
Start: 2025-05-02 | End: 2025-05-03 | Stop reason: HOSPADM

## 2025-05-02 RX ORDER — ONDANSETRON 4 MG/1
4 TABLET, FILM COATED ORAL EVERY 6 HOURS PRN
Qty: 30 TABLET | Refills: 0 | Status: SHIPPED | OUTPATIENT
Start: 2025-05-02

## 2025-05-02 RX ORDER — OXYCODONE AND ACETAMINOPHEN 5; 325 MG/1; MG/1
1 TABLET ORAL EVERY 4 HOURS PRN
Qty: 50 TABLET | Refills: 0 | Status: SHIPPED | OUTPATIENT
Start: 2025-05-02

## 2025-05-02 RX ORDER — SODIUM CHLORIDE 9 MG/ML
100 INJECTION, SOLUTION INTRAVENOUS CONTINUOUS
Status: DISCONTINUED | OUTPATIENT
Start: 2025-05-02 | End: 2025-05-03 | Stop reason: HOSPADM

## 2025-05-02 RX ORDER — MIDODRINE HYDROCHLORIDE 5 MG/1
5 TABLET ORAL ONCE
Status: COMPLETED | OUTPATIENT
Start: 2025-05-02 | End: 2025-05-02

## 2025-05-02 RX ADMIN — SODIUM CHLORIDE 100 ML/HR: 9 INJECTION, SOLUTION INTRAVENOUS at 16:39

## 2025-05-02 RX ADMIN — MIDODRINE HYDROCHLORIDE 5 MG: 5 TABLET ORAL at 12:16

## 2025-05-02 RX ADMIN — RIVAROXABAN 10 MG: 20 TABLET, FILM COATED ORAL at 09:19

## 2025-05-02 RX ADMIN — SODIUM CHLORIDE 500 ML: 9 INJECTION, SOLUTION INTRAVENOUS at 12:16

## 2025-05-02 RX ADMIN — GABAPENTIN 300 MG: 300 CAPSULE ORAL at 09:19

## 2025-05-02 RX ADMIN — OXYCODONE 5 MG: 5 TABLET ORAL at 21:57

## 2025-05-02 RX ADMIN — GABAPENTIN 300 MG: 300 CAPSULE ORAL at 21:57

## 2025-05-02 RX ADMIN — ACETAMINOPHEN 1000 MG: 500 TABLET, FILM COATED ORAL at 05:34

## 2025-05-02 RX ADMIN — ACETAMINOPHEN 1000 MG: 500 TABLET, FILM COATED ORAL at 11:36

## 2025-05-02 RX ADMIN — MELOXICAM 15 MG: 15 TABLET ORAL at 09:19

## 2025-05-02 RX ADMIN — ACETAMINOPHEN 1000 MG: 500 TABLET, FILM COATED ORAL at 18:01

## 2025-05-02 RX ADMIN — OXYCODONE 10 MG: 5 TABLET ORAL at 16:38

## 2025-05-02 RX ADMIN — OXYCODONE 5 MG: 5 TABLET ORAL at 05:35

## 2025-05-02 RX ADMIN — SODIUM CHLORIDE 1000 ML: 9 INJECTION, SOLUTION INTRAVENOUS at 07:54

## 2025-05-02 RX ADMIN — OXYCODONE 10 MG: 5 TABLET ORAL at 12:29

## 2025-05-02 NOTE — CASE MANAGEMENT/SOCIAL WORK
Start PACC Note    Home Health Referral    Evaluated patient on Home Care and services available. Patient offered choice of available HHC and agreeable to PT services with [facility].    Care Types:   Isolation Precautions: Contact    Social Determinants of Health:  Tobacco Use: Low Risk  (5/1/2025)    Patient History     Smoking Tobacco Use: Never     Smokeless Tobacco Use: Never     Passive Exposure: Never     Social History     Substance and Sexual Activity   Alcohol Use No     Social History     Substance and Sexual Activity   Drug Use No       Does the patient have any financial resource strain?   Does the patient have any food insecurities?   Does the patient have any housing instabilities?     If any of the above is noted as yes - consider a MSW evaluation once the patient returns home.    START PATIENT REGISTRATION INFORMATION  Order Information  Order Signing Physician: Rolando Newberry*  Service Ordered RN?: No  Service Ordered PT?: Yes  Service Ordered OT?: No  Service Ordered ST?: No  Service Ordered MSW?: No  Service Ordered HHA?: No  Following Physician: Dr. Rolando Newberry  Following Physician Phone: [phone]  Overseeing Physician: Dr. Rolando Newberry  (Required for Residents Only)  Agreeable to Follow? Yes  Date/Time of Call 05/02/25 13:59 EDT, Spoke with: per md order    Care Coordination  Same Day SOC?: No  Primary Care Physician: Darlene Matute MD  Primary Care Physician Phone: [phone]  Primary Care Physician Address: [address] [place] [address] [place] [address]  Visit Instructions: N/A  Service Discharge Location Type: Home  Service Facility Name: N/A  Service Floor Facility: N/A  Service Room No: N/A    Demographics  Patient Last Name: Eugenio  Patient First Name: Abida  Language/Communication Barrier: none  Service Address: [address] [place] [address]  Service City: [place]  Service State: [place]  Service Zip: [address]  Service Home Phone: [phone]  Other Phone Numbers:    Telephone Information:   Mobile [phone]     Emergency Contact:   Extended Emergency Contact Information  Primary Emergency Contact: MONISHA GRAVES  Address: [address] [place], [place] [address] [place] [address] of [facility]  Home Phone: [phone]  Mobile Phone: [phone]  Relation: Daughter  Hearing or visual needs: None  Other needs: None  Preferred language: English   needed? No  Secondary Emergency Contact: MARIA ISABEL BUSCH  Mobile Phone: [phone]  Relation: Daughter    Admission Information  Admit Date: 5/1/2025  Patient Status at Discharge: Outpatient  Admitting Diagnosis: Osteoarthritis of right knee [M17.11]  Knee joint replacement status [Z96.659]    Caregiver Information  Caregiver First Name:   Caregiver Last Name:   Caregiver Relationship to Patient:   Caregiver Phone Number:   Caregiver Notes:     HITECH  [employer]  HIGHTECH: HI TECH - FRESH ORTHO  Procedure: right total knee arthroplasty  Date of Procedure: 5.1.25  Precautions: None  Surgeon: Dr. Rolando Newberry      END PATIENT REGISTRATION INFORMATION    Start PACC Summary    Additional Comments: none    END PACC Summary    Discharge Date: Pending    Referral Source: WILFREDO Hernandez    Signed By: Mera Galvin RN, 5/2/2025, 13:59 EDT     Date/Time: 05/02/25 13:59 EDT    End PACC Note

## 2025-05-02 NOTE — DISCHARGE INSTRUCTIONS
Total Knee  Discharge Instructions  Dr. MARILEE Combs” Coni II  [phone]    INCISION CARE  Wash your hands prior to dressing changes  SHANTE Wound VAC: Postoperatively you had a SHANTE Wound Vac placed on the incision. This was placed under sterile conditions in the operating room. It remains in place for 7 days postoperatively. After 7 days, the entire dressing must be removed, including all of the sticky adhesive. The dressing and battery pack provide gentle suction to the incision and provide several benefits over a traditional dressing:  It maintains the sterile environment of the OR and reduces the risk of infection  The suction removes unwanted buildup of blood/hematoma under the skin to reduce swelling  The suction also promotes fresh blood supply to the skin and soft tissue to speed up healing  The postoperative scar is reduced in size  Showering is permitted immediately after surgery, but the battery pack must be protected or removed during the shower.   After 7 days the SHANTE Wound Vac is removed. If there is no drainage, no dressing is required. If there is some scant drainage a dry bandage can be applied and changed daily until seen in the office or until the drainage stops.   No creams or ointments to the incisions until 4 weeks post op.  Do not touch or pick at the incision  Check incision every day and notify surgeon immediately if any of the following signs or symptoms are seen:  Increase in redness  Increase in swelling around the incision and of the entire extremity  Increase in pain  NEW drainage or oozing from the incision  Pulling apart of the edges of the incision  Increase in overall body temperature (greater than 100.4 degrees)  Zip-Line: your incision was closed with a state of the art device.   Is a non-invasive and easy to use wound closure device that replaces sutures, staples and glue for surgical incisions  It minimizes scarring and eliminating “railroad” marks that come with staples or  sutures  It makes removal as atraumatic as peeling off a bandage  Can be removed at home or by a physical therapist or nurse at 14 days postoperatively  Sutures: For the robot pins you will  typically have 4 sutures.  2 of the sutures will be below the incision and 2 of the sutures will be above the incision.  These may either be taken out by home health at 10 to 14 days or if they are still in place when you come to your first postoperative visit, they will be taken out in the office    ACTIVITIES  Exercises:  Physical therapy will begin immediately while in the hospital. Patients going to a nursing home will get therapy as part of their care at the SNU/SNF facility. Patients going home may also have a therapist come to the house to help them mobilize until they can safely get to an outpatient therapy facility.  Elevate the affected leg most of the day during the first week post operatively. Caution must be taken to avoid pillow placement directly under the heel of the leg, as this can cause pressure ulcers even with a soft pillow. All pillows and blankets should be placed underneath of the thigh and calf so that the heel is free-floating.  Use cold packs for 20-30 minutes approximately 5 times per day.  You should perform the daily stretching and strengthening exercises as taught by the therapist as often as possible. This can be done many times a day.  Full weight bearing is allowed after surgery. It will be sore/painful to put weight on the leg, but this will help the bone to heal and prevent complications such as pneumonia, bed sores and blood clots. Mobilization is vital to the recovery process.  Activities of Daily Living:  No tub baths, hot tubs, or swimming pools for 4 weeks.  May shower and let water run over the incision immediately after surgery. The battery pack of the SHANTE Wound Vac must be protected or removed while in the shower. After the SHANTE is removed 7 days after surgery showering is permitted  as long as there is no drainage from the wound.     Restrictions  Weight: It is ok to allow full weight bearing after surgery. Weight on the leg actually quickens the recovery process. While it will be sore/painful to put weight on the leg, it is safe to do so. Hip replacement after hip fracture has a much slower recover process. It can take months to heal fully from a hip fracture and patients even make some slow benefits up to a year afterwards.   Driving: Many patients have questions about when it is safe to return to driving. The answer is that this is extremely variable. It depends on the extent of the surgery, as well as how quickly you heal. Certainly left leg surgeries make returning to driving easier while right leg surgeries require more extensive rehabilitation before driving can be safe. Until you can press down on the brake hard, and are off of all narcotics, driving is not permitted. Your surgeon cannot “clear” you to return to driving, only you can make the decision when you feel it is safe.    Medications  Anticoagulants: After upper extremity surgery most patients do not require an anticoagulant unless you have another injury that will be keeping you from mobilizing. Lower extremity surgery typically does require use of an anticoagulation medicine.   IF YOU HAD LOWER EXTREMITY SURGERY AND ARE NOT DISCHARGED HOME WITH ANY ANTICOAGULANT MEDICINE YOU SHOULD TAKE ASPIRIN 325mg DAILY FOR 30 DAYS POSTOPERATIVELY.  If you are discharged home with an anticoagulant such as Aspirin, Xarelto, Eliquis, Coumadin, or Lovenox, follow these simple instructions:   Notify surgeon immediately if any merly bleeding is noted in the urine, stool, emesis, or from the nose or the incision. Blood in the stool will often appear as black rather than red. Blood in urine may appear as pink. Blood in emesis may appear as brown/black like coffee grounds.  You will need to apply pressure for longer periods of time to any cuts or  abrasions to stop bleeding  Avoid alcohol while taking anticoagulants  Most anticoagulants are to be taken for 30 days postoperatively. After this time, you may stop using them unless instructed otherwise.   If you were already taking an anticoagulant (commonly Aspirin, Coumadin, or Plavix) you will likely be resuming your normal dose postoperatively and will be continuing that medication at the discretion of the prescribing physician.  Stool Softeners: You will be at greater risk of constipation after surgery due to being less mobile and the pain medications.  Take stool softeners as needed. Over the counter Colace 100 mg 1-2 capsules twice daily can be taken.  If stools become too loose or too frequent, please decreases the dosage or stop the stool softener.  If constipation occurs despite use of stool softeners, you are to continue the stool softeners and add a laxative (Milk of Magnesia 1 ounce daily as needed)  Drink plenty of fluids, and eat fruits and vegetables during your recovery time. Getting up and mobilizing will help the bowels to recover their regular function, as will weaning off of all narcotics when the pain becomes tolerable.  Pain Medications: Utilized after surgery are narcotics. This is some general information about these medications.  CLASSIFICATION: Pain medications are called Opioids and are narcotics  LEGALITIES: It is illegal to share narcotics with others  DRIVING: it is illegal to drive while under the influence of narcotics. Doing so is a DUI.  POTENTIAL SIDE EFFECTS: nausea, vomiting, itching, dizziness, drowsiness, dry mouth, constipation, and difficulty urinating.  POTENTIAL ADVERSE EFFECTS:  Opioid tolerance can develop with use of pain medications and this simply means that it requires more and more of the medication to control pain. However, this is seen more in patients that use opioids for longer periods of time.  Opioid dependence can develop with use of Opioids. People with  opioid dependence will experience withdrawal symptoms upon cessation of the medication.  Opioid addiction can develop with use of Opioids. The incidence of this is very unlikely in patients who take the medications as ordered and stop the medications as instructed.  Opioid overdose can be dangerous, but is unlikely when the medication is taken as ordered and stopped when ordered. It is important not to mix opioids with alcohol as this can lead to over sedation and respiratory difficulty.  DOSAGE:  After the initial surgical pain begins to resolve, you may begin to decrease the pain medication. By the end of a few weeks, you should be off of pain medications.  Refills will not be given by the office during evening hours, on weekends, or after 6 weeks post-op. You are responsible for weaning off of pain medication. You can increase the time between narcotic pills, taking one every 4 then 6 then 8 hours and so on.  To seek refills on pain medications during the initial 6-week post-operative period, you must call the office to request the refill. The office will then notify you when to  the prescription. DO NOT wait until you are out of the medication to request a refill. Prescriptions will not be filled over the weekend and depending on the schedule, it may take a couple days for the prescription to be available. Someone will have to pick the prescription in person at the office.    FOLLOW-UP VISITS  You will need to follow up in the office with your surgeon in 3 weeks, or as instructed elsewhere in your discharge paperwork. Please call this number [phone] to schedule this appointment. If you are going to an SNF/SNU facility, they will arrange for you to follow up in the office.  If you have any concerns or suspected complications prior to your follow up visit, please call the office. Do not wait until your appointment time if you suspect complications. These will need to be addressed in the office  promptly.      Rolando Newberry II, MD  Orthopaedic Surgery  [facility]

## 2025-05-02 NOTE — PLAN OF CARE
"Assessment: Abida Becker presents with functional mobility impairments which indicate the need for skilled intervention. Pt's functional mobility severely limited by significant hypotension. Initally hypotensive upon arrival, but able to slightly improve following therex. ~30 point systolic and ~10 point diastolic drop sitting>standing. Only able to ambulate ~5 feet CGA using RW. Pt currently not safe to d/c home secondary to hypotension. RN and MD notified. Will reattempt later this afternoon.     Plan/Recommendations:   If medically appropriate, Low Intensity Therapy recommended post-acute care - This is recommended as therapy feels this patient would require 2-3 visits per week. OP or HH would be the best option depending on patient's home bound status. Consider, if the patient has other  \"skilled\" needs such as wounds, IV antibiotics, etc. Combined with \"low intensity\" could also equate to a SNF. If patient is medically complex, consider LTAC. Pt requires rolling walker at discharge.     Pt desires Home with Home Health at discharge. Pt cooperative; agreeable to therapeutic recommendations and plan of care.     "

## 2025-05-02 NOTE — SIGNIFICANT NOTE
05/02/25 1605   OTHER   Discipline physical therapy assistant   Rehab Time/Intention   Session Not Performed other (see comments)  (RN reports pt still hypotensive this afternoon. Will follow up tomorrow morning for PT.)   Therapy Assessment/Plan (PT)   Criteria for Skilled Interventions Met (PT) yes   Recommendation   PT - Next Appointment 05/03/25

## 2025-05-02 NOTE — DISCHARGE SUMMARY
Orthopaedic Discharge Summary  Dr. HUNT “Luis” Coni II  [phone]    NAME: Abida Becker PCP: Darlene Matute MD   :  MRN: [id number]  [id number] LOS:  ADMIT: 0 days  2025   AGE/SEX: 79 y.o. female DC:  today             Admitting Diagnosis: Osteoarthritis of right knee [M17.11]  Knee joint replacement status [Z96.659]    Surgery Performed: ID ARTHRP KNE CONDYLE&PLATU MEDIAL&LAT COMPARTMENTS [[id number]] (TOTAL KNEE ARTHROPLASTY WITH CORI ROBOT)    Discharge Medications:         Discharge Medications        New Medications        Instructions Start Date   ondansetron 4 MG tablet  Commonly known as: Zofran   4 mg, Oral, Every 6 Hours PRN      oxyCODONE-acetaminophen 5-325 MG per tablet  Commonly known as: PERCOCET   1 tablet, Oral, Every 4 Hours PRN             Continue These Medications        Instructions Start Date   acetaminophen 500 MG tablet  Commonly known as: TYLENOL   1,000 mg, Every 6 Hours PRN      calcium carbonate 600 MG tablet  Commonly known as: OS-STEVEN   600 mg, Daily      cholecalciferol 25 MCG (1000 UT) tablet  Commonly known as: VITAMIN D3   1,000 Units, Daily      CLARITIN PO   10 mg, Daily      Diclofenac Sodium 1 % gel gel  Commonly known as: VOLTAREN   APPLY 4 GRAMS TO AFFECTED AREA(S) FOUR TIMES A DAY AS NEEDED FOR PAIN      Estrogens Conjugated 0.625 MG/GM vaginal cream  Commonly known as: PREMARIN   Daily      ferrous sulfate 325 (65 FE) MG tablet  Commonly known as: FeroSul   325 mg, Oral, Daily With Breakfast      fluconazole 100 MG tablet  Commonly known as: Diflucan   100 mg, Oral, Daily      gabapentin 300 MG capsule  Commonly known as: NEURONTIN   TAKE 1 CAPSULE BY MOUTH TWICE A DAY      magnesium oxide 400 MG tablet  Commonly known as: MAG-OX   400 mg, Daily      meclizine 25 MG tablet  Commonly known as: ANTIVERT   25 mg, Oral, 3 Times Daily PRN      melatonin 3 MG tablet   3 mg, Nightly      METAMUCIL MULTIHEALTH FIBER PO   Take  by mouth.      metoprolol succinate XL 25 MG  24 hr tablet  Commonly known as: TOPROL-XL   25 mg, Oral, Daily      Mirabegron ER 50 MG tablet sustained-release 24 hour 24 hr tablet  Commonly known as: MYRBETRIQ   50 mg, Every 24 Hours      nitrofurantoin 100 MG capsule  Commonly known as: MACRODANTIN   100 mg, 4 Times Daily      pantoprazole 40 MG EC tablet  Commonly known as: PROTONIX   40 mg, Oral, Daily      potassium chloride 10 MEQ CR tablet   10 mEq, Oral, Every 12 Hours Scheduled      rivaroxaban 10 MG tablet  Commonly known as: Xarelto   10 mg, Oral, Daily      saccharomyces boulardii 250 MG capsule  Commonly known as: FLORASTOR   250 mg, Daily      Turmeric 500 MG capsule   Take  by mouth.      vitamin B-12 1000 MCG tablet  Commonly known as: CYANOCOBALAMIN   1,000 mcg, Daily      vitamin C 250 MG tablet  Commonly known as: ASCORBIC ACID   500 mg, Daily               Vitals:     Vitals:    05/01/25 2110 05/02/25 0100 05/02/25 0130 05/02/25 0521   BP: 97/43 (!) 80/49 90/49 91/48   BP Location: Left arm Left arm  Left arm   Patient Position: Sitting Sitting  Lying   Pulse:       Resp: 16 16  16   Temp: 97.4 °F (36.3 °C) 97.5 °F (36.4 °C)  97.4 °F (36.3 °C)   TempSrc: Oral Oral  Oral   SpO2: 93%      Weight:       Height:           Labs:      Admission on 05/01/2025   Component Date Value Ref Range Status    Hemoglobin A1C 04/22/2025 5.57  4.80 - 5.60 % Final    Protime 04/22/2025 16.4 (H)  11.7 - 14.2 Seconds Final    INR 04/22/2025 1.32 (H)  0.90 - 1.10 Final    ABO Type 04/22/2025 A   Final    RH type 04/22/2025 Positive   Final    Antibody Screen 04/22/2025 Negative   Final    T&S Expiration Date 04/22/2025 5/3/2025 11:59:00 PM   Final    Glucose 04/22/2025 99  65 - 99 mg/dL Final    BUN 04/22/2025 19  8 - 23 mg/dL Final    Creatinine 04/22/2025 0.67  0.57 - 1.00 mg/dL Final    Sodium 04/22/2025 140  136 - 145 mmol/L Final    Potassium 04/22/2025 4.2  3.5 - 5.2 mmol/L Final    Chloride 04/22/2025 106  98 - 107 mmol/L Final    CO2 04/22/2025 26.3  22.0  - 29.0 mmol/L Final    Calcium 04/22/2025 9.3  8.6 - 10.5 mg/dL Final    Total Protein 04/22/2025 6.3  6.0 - 8.5 g/dL Final    Albumin 04/22/2025 3.9  3.5 - 5.2 g/dL Final    ALT (SGPT) 04/22/2025 19  1 - 33 U/L Final    AST (SGOT) 04/22/2025 18  1 - 32 U/L Final    Alkaline Phosphatase 04/22/2025 65  39 - 117 U/L Final    Total Bilirubin 04/22/2025 1.1  0.0 - 1.2 mg/dL Final    Globulin 04/22/2025 2.4  gm/dL Final    A/G Ratio 04/22/2025 1.6  g/dL Final    BUN/Creatinine Ratio 04/22/2025 28.4 (H)  7.0 - 25.0 Final    Anion Gap 04/22/2025 7.7  5.0 - 15.0 mmol/L Final    eGFR 04/22/2025 90.2  >60.0 mL/min/1.73 Final    WBC 04/22/2025 6.68  3.40 - 10.80 10*3/mm3 Final    RBC 04/22/2025 4.07  3.77 - 5.28 10*6/mm3 Final    Hemoglobin 04/22/2025 12.3  12.0 - 15.9 g/dL Final    Hematocrit 04/22/2025 39.4  34.0 - 46.6 % Final    MCV 04/22/2025 96.8  79.0 - 97.0 fL Final    MCH 04/22/2025 30.2  26.6 - 33.0 pg Final    MCHC 04/22/2025 31.2 (L)  31.5 - 35.7 g/dL Final    RDW 04/22/2025 13.1  12.3 - 15.4 % Final    RDW-SD 04/22/2025 46.7  37.0 - 54.0 fl Final    MPV 04/22/2025 9.9  6.0 - 12.0 fL Final    Platelets 04/22/2025 323  140 - 450 10*3/mm3 Final    Neutrophil % 04/22/2025 54.8  42.7 - 76.0 % Final    Lymphocyte % 04/22/2025 24.1  19.6 - 45.3 % Final    Monocyte % 04/22/2025 9.9  5.0 - 12.0 % Final    Eosinophil % 04/22/2025 10.2 (H)  0.3 - 6.2 % Final    Basophil % 04/22/2025 0.7  0.0 - 1.5 % Final    Immature Grans % 04/22/2025 0.3  0.0 - 0.5 % Final    Neutrophils, Absolute 04/22/2025 3.66  1.70 - 7.00 10*3/mm3 Final    Lymphocytes, Absolute 04/22/2025 1.61  0.70 - 3.10 10*3/mm3 Final    Monocytes, Absolute 04/22/2025 0.66  0.10 - 0.90 10*3/mm3 Final    Eosinophils, Absolute 04/22/2025 0.68 (H)  0.00 - 0.40 10*3/mm3 Final    Basophils, Absolute 04/22/2025 0.05  0.00 - 0.20 10*3/mm3 Final    Immature Grans, Absolute 04/22/2025 0.02  0.00 - 0.05 10*3/mm3 Final    nRBC 04/22/2025 0.0  0.0 - 0.2 /100 WBC Final        XR  Knee 1 or 2 View Right  Result Date: 5/1/2025  Narrative: XR KNEE 1 OR 2 VW RIGHT Date of Exam: 5/1/2025 2:48 PM EDT Indication: Post-Op Knee Arthoplasty Comparison: April 22, 2025 Findings: There has been interval placement of right total knee arthroplasty with patellar resurfacing. There is expected subcutaneous emphysema and hemarthrosis. No evidence of hardware complication. Bones are anatomically aligned. No fracture.     Impression: Impression: Expected findings post right total knee arthroplasty. No evidence of complication. Electronically Signed: Cheikh Floyd MD  5/1/2025 3:12 PM EDT  Workstation ID: [id number]    Peripheral Block  Result Date: 5/1/2025  Narrative: Maggie Espino MD     5/1/2025 12:30 PM Peripheral Block Performed by: Maggie Espino MD     Peripheral Block  Result Date: 5/1/2025  Narrative: Maggie Espino MD     5/1/2025  2:20 PM Peripheral Block Patient reassessed immediately prior to procedure Patient location during procedure: pre-op Start time: 5/1/2025 12:24 PM Stop time: 5/1/2025 12:27 PM Reason for block: at surgeon's request and post-op pain management Performed by Anesthesiologist: Maggie Espino MD Preanesthetic Checklist Completed: patient identified, IV checked, site marked, risks and benefits discussed, surgical consent, monitors and equipment checked, pre-op evaluation and timeout performed Prep: Pt Position: supine Sterile barriers:alcohol skin prep, cap, gloves, mask and washed/disinfected hands Prep: ChloraPrep Patient monitoring: blood pressure monitoring, continuous pulse oximetry and EKG Procedure Sedation: yes Performed under: local infiltration Guidance:ultrasound guided ULTRASOUND INTERPRETATION.  Using ultrasound guidance a 20 G gauge needle was placed in close proximity to the nerve, at which point, under ultrasound guidance anesthetic was injected in the area of the nerve and spread of the anesthesia was seen on ultrasound in close proximity thereto.   There were no abnormalities seen on ultrasound; a digital image was taken; and the patient tolerated the procedure with no complications. Images:still images obtained, printed/placed on chart Laterality:right Block Type:adductor canal block and iPack Injection Technique:single-shot Needle Type:echogenic Needle Gauge:20 G Sedation medications used: fentaNYL citrate (PF) (SUBLIMAZE) injection - Intravenous  100 mcg - 5/1/2025 12:24:00 PM Medications Used: ropivacaine (NAROPIN) 0.5 % injection - Perineural  50 mL - 5/1/2025 12:27:00 PM dexmedetomidine HCl (PRECEDEX) injection - Perineural  40 mcg - 5/1/2025 12:27:00 PM Post Assessment Injection Assessment: negative aspiration for heme, no paresthesia on injection and incremental injection Patient Tolerance:comfortable throughout block Complications:no Additional Notes Skin anesthetized with 1% Lidocaine.  Using 20 G, 4 inch stimuplex echogenic needle,  20 mL Local was injected with serial aspirations under continuous ultrasound guidance into adductor canal and 30 mL into compartment between posterior capsule of knee and popliteal artery.  Small volume heme aspirated upon first needle insertion.  Needle withdrawn, heme flushed out.  Next attempt completed with negative aspiration for heme.  Needle tip visualized throughout.  Good spread noted.  No complications.  No bleeding noted. Performed by: Maggie Espino MD     XR Knee 1 or 2 View Right  Result Date: 4/27/2025  Narrative: XR KNEE 1 OR 2 VW RIGHT Date of Exam: 4/22/2025 10:00 AM EDT Indication: preop Comparison: 12/9/2022 Findings: Mild joint space narrowing at the medial and lateral compartment. Marginal spurring most pronounced laterally. No fracture or dislocation. Small joint effusion. Patellofemoral spurring.     Impression: Impression: 1. Negative for acute osseous abnormality. 2. Moderate tricompartmental osteoarthritis. 3. Small joint effusion. Electronically Signed: Jonathan Mooney MD  4/27/2025 3:30 PM  EDT  Workstation ID: [id number]    XR Chest PA & Lateral  Result Date: 4/25/2025  Narrative: XR CHEST PA AND LATERAL Date of Exam: 4/22/2025 10:00 AM EDT Indication: preop for right knee surgery. Comparison: 11/20/2024 Findings: Extensive thoracolumbar spinal fixation hardware is noted posteriorly. No focal pulmonary consolidations. Blunting of the left CP angle may be due to scarring or atelectasis or small amount of pleural fluid. No pneumothorax identified. Aortic vascular calcifications noted. Heart size and mediastinal contour appear within normal limits. There are surgical clips in the right upper quadrant abdomen.     Impression: Impression: Blunting of the left CP angle may be due to scarring or atelectasis or small amount of pleural fluid. Electronically Signed: Jose Miner MD  4/25/2025 3:29 PM EDT  Workstation ID: [id number]      Hospital Course:   79 y.o. female was admitted to [facility] to services of Rolando Newberry II, MD with Osteoarthritis of right knee [M17.11]  Knee joint replacement status [Z96.659] on 5/1/2025 and underwent MD ARTHRP KNE CONDYLE&PLATU MEDIAL&LAT COMPARTMENTS [[id number]] (TOTAL KNEE ARTHROPLASTY WITH CORI ROBOT). Post-operatively the patient transferred to the floor where the patient underwent mobilization therapy. Opioids were titrated to achieve appropriate pain management to allow for participation in mobilization exercises. Vital signs and laboratory values are now within safe parameters for discharge. The dressings and/or incision is intact without signs or symptoms of active infection. Operative extremity neurovascular status remains intact as compared to the preoperative exam. Appropriate education re: incision care, activity levels, medications, and follow up visits was completed and all questions were answered. The patient is now deemed stable for discharge.    HOME: The patient progressed well with physical therapy. There were cleared for discharge to home. The  "raj was sent home in good condition}.       R \"Luis\" Coni VALADEZ MD  Orthopaedic Surgery  [facility]  [phone]                                               "

## 2025-05-02 NOTE — CONSULTS
"[facility] Medicine Services  Consult Note    Patient Name: Abida Becker  : 1945  MRN: [id number]  Primary Care Physician:  Darlene Matute MD  Referring Physician: Rolando Newberry*  Date of admission: 2025  Date and Time of Care: 2025 at 1100    Consults      Reason for Consult/ Chief Complaint: Hypotension    Consult Requested By: Rolando Newberry MD    Subjective:     History of Present Illness:   Per the documentation by Rolando Newberry MD, dated 25,  \"Patient is a 79 y.o. Not  or  female who presents with End-stage arthritis of the right knee. They failed conservative treatment of their knee pain and a thorough discussion of the risks and benefits of surgery was had. The patient wishes to continue with elective total knee replacement, they were scheduled and are here for surgery. They did get medical clearance as well as a thorough preoperative workup.\"    Patient developed hypotension with BP 80/49 at 0100 on the morning of discharge, 2025.  Patient received a 1 L normal saline bolus at 0754 per MAR with resultant blood pressure 81/43. Hospitalist service was then consulted for management of hypotension.     Patient seen and examined at bedside.  Patient reported she got food caught in her throat, once yesterday and once today.  Patient reported she did not choke, the food was just not go down.  Patient reported yesterday it happened with fresh fruit and today it happened with n. patient reported she has also been experiencing hiccups with muscle spasms intermittently since her surgery yesterday.  Patient informed of plan for blood pressure.  All questions and concerns addressed.    Review of Systems:   Review of Systems   HENT:  Positive for trouble swallowing.    Respiratory:  Negative for cough, chest tightness and shortness of breath.    Cardiovascular:  Negative for chest pain.   Gastrointestinal:  Negative for nausea and vomiting. "   Genitourinary:  Negative for dysuria and hematuria.   Musculoskeletal:  Positive for arthralgias and myalgias.        RLE status post knee replacement.       Personal History:     Past Medical History:   Diagnosis Date    Dysuria 09/26/2024    Dysuria 09/26/2024    GERD (gastroesophageal reflux disease)     History of recurrent UTIs     Hypertension     IBS (irritable bowel syndrome)     Osteoarthritis     Osteoporosis     on prolia(12/5/22)    Primary osteoarthritis of both knees 01/24/2022    Pulmonary embolism 2011    after scoliosis surgery    Pulmonary embolism 10/2022    Scoliosis     Vitamin D deficiency        Past Surgical History:   Procedure Laterality Date    BACK SURGERY  11/2011    Dr. Olsen, due to scoliosis, earnestine and some fusions    BREAST BIOPSY      CHOLECYSTECTOMY      COLONOSCOPY  01/18/2018    HIATAL HERNIA REPAIR  2013    REIMPLANT URETER IN BLADDER         Family History: family history includes Cancer in her father; Heart failure in her mother. Otherwise pertinent FHx was reviewed and not pertinent to current issue.    Social History:  reports that she has never smoked. She has never been exposed to tobacco smoke. She has never used smokeless tobacco. She reports that she does not drink alcohol and does not use drugs.    Home Medications:   Diclofenac Sodium, Estrogens Conjugated, Loratadine, Mirabegron ER, Psyllium, Turmeric, acetaminophen, calcium carbonate, cholecalciferol, ferrous sulfate, fluconazole, gabapentin, magnesium oxide, meclizine, melatonin, metoprolol succinate XL, nitrofurantoin, ondansetron, oxyCODONE-acetaminophen, pantoprazole, potassium chloride, rivaroxaban, saccharomyces boulardii, vitamin B-12, and vitamin C    Allergies:  Allergies   Allergen Reactions    Amoxicillin Other (See Comments)      unsure of type of reaction - states it was so long ago she can't remember     Penicillins Nausea And Vomiting         Objective:     Vital Signs  Temp:  [97.4 °F (36.3  °C)-98.3 °F (36.8 °C)] 98.3 °F (36.8 °C)  Heart Rate:  [68-85] 76  Resp:  [9-16] 15  BP: ()/(37-68) 76/37  Flow (L/min) (Oxygen Therapy):  [2-5] 2   Body mass index is 29.76 kg/m².    Physical Exam  Physical Exam  Constitutional:       Appearance: Normal appearance.   HENT:      Head: Normocephalic and atraumatic.      Nose: Nose normal.      Mouth/Throat:      Mouth: Mucous membranes are moist.      Pharynx: Oropharynx is clear.   Eyes:      Extraocular Movements: Extraocular movements intact.      Conjunctiva/sclera: Conjunctivae normal.      Pupils: Pupils are equal, round, and reactive to light.   Cardiovascular:      Rate and Rhythm: Normal rate and regular rhythm.      Pulses: Normal pulses.      Heart sounds: Normal heart sounds.   Pulmonary:      Effort: Pulmonary effort is normal.      Breath sounds: Normal breath sounds.   Abdominal:      General: Bowel sounds are normal.      Palpations: Abdomen is soft.   Musculoskeletal:         General: Swelling and tenderness present.      Cervical back: Neck supple.      Comments: Mild swelling and tenderness to RLE.    Skin:     General: Skin is warm and dry.      Comments: Surgical dressing to RLE with elastic wrap intact.   Neurological:      General: No focal deficit present.      Mental Status: She is alert and oriented to person, place, and time.   Psychiatric:         Mood and Affect: Mood normal.         Behavior: Behavior normal.         Thought Content: Thought content normal.         Judgment: Judgment normal.         Scheduled Meds   acetaminophen, 1,000 mg, Oral, Q6H  gabapentin, 300 mg, Oral, BID  meloxicam, 15 mg, Oral, Daily  metoprolol succinate XL, 25 mg, Oral, Daily  rivaroxaban, 10 mg, Oral, Daily       PRN Meds     calcium carbonate    docusate sodium    HYDROmorphone **AND** naloxone    ondansetron ODT **OR** ondansetron    oxyCODONE    oxyCODONE   Infusions         Diagnostic Data          XR Knee 1 or 2 View Right  Result Date:  5/1/2025  Impression: Expected findings post right total knee arthroplasty. No evidence of complication. Electronically Signed: Cheikh Floyd MD  5/1/2025 3:12 PM EDT  Workstation ID: [id number]        I reviewed the patient's new clinical results.    Assessment/Plan:     Active and Resolved Problems  Active Hospital Problems    Diagnosis  POA    **Knee joint replacement status [Z96.659]  Not Applicable      Resolved Hospital Problems   No resolved problems to display.     Primary osteoarthritis of both knees  Status post right knee replacement 5/1/25  - Pain management and post-op care per ortho  - Continue cefazolin per order for surgical prophylaxis  - Restart anticoag per ortho  - Encourage IS every 2 hours while awake and PRN  - Continuous pulse ox  - PT/OT consulted for post-op early ambulation   - Follow AM labs     Hypotension after procedure  History of hypertension  - Hold metoprolol  - NS 1L bolus given this AM  - Will give  mL bolus, midodrine 5 mg x 1 dose  - Monitor BP Q30MIN x 4  - Discharge pending stable blood pressure; if no improvement, may need upgrade for pressor support    Dysphagia, acute  - SLP consulted for swallow evaluation  - Swallow precautions    History of DVT  - On Xarelto  - Restart anticoagulant per ortho    GERD  - Continue PPI    Anxiety  - Continue home medications  - Continue supportive care     VTE Prophylaxis:  Pharmacologic & mechanical VTE prophylaxis orders are present.         Code status is   Code Status and Medical Interventions: CPR (Attempt to Resuscitate); Full   Ordered at: 05/01/25 1520     Code Status (Patient has no pulse and is not breathing):    CPR (Attempt to Resuscitate)     Medical Interventions (Patient has pulse or is breathing):    Full     Level Of Support Discussed With:    Patient       Plan for disposition:Home in 1 day    Time: 30 minutes        Signature: Electronically signed by JW Easley, 05/02/25, 11:00 EDT.  Charlene Hernandez  Hospitalist Team

## 2025-05-02 NOTE — DISCHARGE PLACEMENT REQUEST
"Liza Graves (79 y.o. Female)       Date of Birth   1945    Social Security Number       Address   [address] [place] [facility]    Home Phone   [phone]    MRN   [id number]       Voodoo   Latter day    Marital Status                               Admission Date   5/1/2025    Admission Type   Elective    Admitting Provider   Rolando Newberry II, MD    Attending Provider   Rolando Newberry II, MD    Department, Room/Bed   [facility] SURGICAL INPATIENT, 4108/1       Discharge Date       Discharge Disposition   Home or Self Care    Discharge Destination                                 Attending Provider: Rolando Newberry II, MD    Allergies: Amoxicillin, Penicillins    Isolation: Contact   Infection: ESBL E coli (01/28/24), ESBL Klebsiella (09/29/24)   Code Status: CPR    Ht: 160 cm (63\")   Wt: 76.2 kg (168 lb)    Admission Cmt: None   Principal Problem: Knee joint replacement status [Z96.659]                   Active Insurance as of 5/1/2025       Primary Coverage       Payor Plan Insurance Group Employer/Plan Group    MEDICARE MEDICARE A & B        Payor Plan Address Payor Plan Phone Number Payor Plan Fax Number Effective Dates    [address] [phone]  5/1/2012 - None Entered    [facility] [id number]         Subscriber Name Subscriber Birth Date Member ID       LIZA GRAVES 1945 [id number]               Secondary Coverage       Payor Plan Insurance Group Employer/Plan Group    MISC MC SUP   MISC MC SUP              PLAN G       Coverage Address Coverage Phone Number Coverage Fax Number Effective Dates    [address] [address] [phone]  10/2/2019 - None Entered    [facility] [address]         Subscriber Name Subscriber Birth Date Member ID       LIZA GRAVES 1945 [id number]                     Emergency Contacts        (Rel.) Home Phone Work Phone Mobile Phone    MONISHA GRAVES (Daughter) [phone] -- [phone]    " MARIA ISABEL BUSCH (Daughter) -- -- [phone]    ISRRAEL FAIRBANKS (Neighbor) -- -- [phone]

## 2025-05-02 NOTE — PLAN OF CARE
Goal Outcome Evaluation:         Patient Alert and oriented x 4. Patient able to make needs known. Patient was to discharge, however due to hypotension was not possible today.  Normal Saline is running at 100ml/hr after 2 normal saline boluses.  Patient is able to use the bedside commode. Patient up in chair all shift. Call light within reach, chair alarm on. Plan of care ongoing.

## 2025-05-02 NOTE — THERAPY TREATMENT NOTE
"Subjective: RN reports pt has been hypotensive and is receiving fluid bolus. Pt reclined upon arrival. Pt agreeable to therapeutic plan of care.     Objective:     WB'ing status: R Lower Extremity Weight Bearing As Tolerated    ROM: 5-80 deg AAROM    Therapeutic Exercise: 10 Reps R Lower Extremity AAROM Total Knee: Ankle Pumps, Quad Sets, Heel slides, Hip Abduction, LAQ    Precautions: High falls risk secondary to hypotension.     Bed mobility - N/A or Not attempted.    Transfers - STS Min A using RW.     Ambulation - 5 feet forward and back Min A using RW.     Vitals: Hypotensive  Reclined BP 89/49   BP following reclined therex 92/48  Short sitting /48  Standing BP 76/39  BP reclined 107/47    Pain: 5 VAS Location: R knee  Intervention for pain: Repositioned, Increased Activity, and Therapeutic Presence    Education: Provided education on the importance of mobility in the acute care setting, Verbal/Tactile Cues, Transfer Training, and Gait Training    Assessment: Abida Becker presents with functional mobility impairments which indicate the need for skilled intervention. Pt's functional mobility severely limited by significant hypotension. Initally hypotensive upon arrival, but able to slightly improve following therex. ~30 point systolic and ~10 point diastolic drop sitting>standing. Only able to ambulate ~5 feet CGA using RW. Pt currently not safe to d/c home secondary to hypotension. RN and MD notified. Will reattempt later this afternoon.     Plan/Recommendations:   If medically appropriate, Low Intensity Therapy recommended post-acute care - This is recommended as therapy feels this patient would require 2-3 visits per week. OP or HH would be the best option depending on patient's home bound status. Consider, if the patient has other  \"skilled\" needs such as wounds, IV antibiotics, etc. Combined with \"low intensity\" could also equate to a SNF. If patient is medically complex, consider LTAC. Pt requires " rolling walker at discharge.     Pt desires Home with Home Health at discharge. Pt cooperative; agreeable to therapeutic recommendations and plan of care.     Post-Tx Position: Up in Chair, Alarms activated, and Call light and personal items within reach  PPE: gloves    Therapy Charges for Today       Code Description Service Date Service Provider Modifiers Qty    95564137960 HC PT THERAPEUTIC ACT EA 15 MIN 5/2/2025 Lev Murrieta PTA GP 1    82701253635 HC PT THER PROC EA 15 MIN 5/2/2025 Lev Murrieta PTA GP 1           PT Charges       Row Name 05/02/25 1058             Time Calculation    Start Time 0932  -UN      Stop Time 1002  -UN      Time Calculation (min) 30 min  -UN      PT Received On 05/02/25  -UN      PT - Next Appointment 05/02/25  -UN         Time Calculation- PT    Total Timed Code Minutes- PT 30 minute(s)  -UN                User Key  (r) = Recorded By, (t) = Taken By, (c) = Cosigned By      Initials Name Provider Type    UN Lev Murrieta PTA Physical Therapist Assistant

## 2025-05-02 NOTE — CASE MANAGEMENT/SOCIAL WORK
Discharge Planning Assessment   Dvaid     Patient Name: Abida Becker  MRN: [id number]  Today's Date: 5/2/2025    Admit Date: 5/1/2025    Plan: JACKIE Plan: Home with [facility].  Family will transport at discharge   Discharge Needs Assessment       Row Name 05/02/25 1757       Living Environment    People in Home alone;other (see comments)  Daughter will be staying with though the weekend and sister will with her for a week per patient    Current Living Arrangements home    Potentially Unsafe Housing Conditions none    In the past 12 months has the electric, gas, oil, or water company threatened to shut off services in your home? No    Primary Care Provided by self    Provides Primary Care For no one    Family Caregiver if Needed child(fabio), adult;sibling(s)    Family Caregiver Names daughters and sister    Quality of Family Relationships helpful;involved;supportive    Able to Return to Prior Arrangements yes       Resource/Environmental Concerns    Resource/Environmental Concerns none    Transportation Concerns none       Transportation Needs    In the past 12 months, has lack of transportation kept you from medical appointments or from getting medications? no    In the past 12 months, has lack of transportation kept you from meetings, work, or from getting things needed for daily living? No       Food Insecurity    Within the past 12 months, you worried that your food would run out before you got the money to buy more. Never true    Within the past 12 months, the food you bought just didn't last and you didn't have money to get more. Never true       Transition Planning    Patient/Family Anticipates Transition to home with family;home with help/services    Patient/Family Anticipated Services at Transition home health care    Transportation Anticipated family or friend will provide       Discharge Needs Assessment    Readmission Within the Last 30 Days no previous admission in last 30 days     Equipment Currently Used at Home shower chair;rollator;cane, quad tip    Concerns to be Addressed care coordination/care conferences;discharge planning    Do you want help finding or keeping work or a job? I do not need or want help    Do you want help with school or training? For example, starting or completing job training or getting a high school diploma, GED or equivalent No    Anticipated Changes Related to Illness none    Equipment Needed After Discharge walker, rolling    Outpatient/Agency/Support Group Needs homecare agency    Discharge Facility/Level of Care Needs home with home health    Provided Post Acute Provider List? N/A    N/A Provider List Comment set up with [facility] by Dr Newberry's office prior to surgery                   Discharge Plan       Row Name 05/02/25 3065       Plan    Plan DC Plan: Home with [facility].  Family will transport at discharge    Patient/Family in Agreement with Plan yes    Plan Comments Patient lives at home alone, however patient states her daughter will stay with her this weekend. and her sister is going to stay all next week with her  Patient reports she normally drives. Family  will transport at discharge. Patient performs ADL. PCP and pharmacy confirmed. Denies financial assistance needs for medication and/or food. Patient set up with [facility] by Dr Newberry's office prior to surgery.  Patient has rollator , but PT felt she would be safer with a rolling walker.  Order obtained for rw and RW was delivered to patients room.  DC barriers : hypotension, IV fluids                  Continued Care and Services - Admitted Since 5/1/2025       Durable Medical Equipment       Service Provider Request Status Services Address Phone Fax Patient Preferred    [facility] Accepted -- [address] [place] [address] [phone] --              Home Medical Care       Service Provider Request Status Services Address  Phone Fax Patient Preferred    [facility] Accepted -- [address], [place] [address] [phone] --              Therapy Coordination complete.      Service Provider Request Status Services Address Phone Fax Patient Preferred    [facility] PHYSICAL THERAPY [facility]  Selected Outpatient Rehabilitation [address] [facility] [place] [address] [phone] --                       Demographic Summary       Row Name 05/02/25 1740       General Information    Admission Type other (see comments)    Arrived From home;PACU/recovery room    Referral Source admission list    Reason for Consult discharge planning    Preferred Language English       Contact Information    Permission Granted to Share Info With                    Functional Status       Row Name 05/02/25 1750       Functional Status    Usual Activity Tolerance good    Current Activity Tolerance moderate       Functional Status, IADL    Medications independent    Meal Preparation independent    Housekeeping independent    Laundry independent    Shopping independent    If for any reason you need help with day-to-day activities such as bathing, preparing meals, shopping, managing finances, etc., do you get the help you need? I get all the help I need       Mental Status    General Appearance WDL WDL       Mental Status Summary    Recent Changes in Mental Status/Cognitive Functioning no changes                        Mary Soares RN    SIPS 1  [email or web address]  Office [phone]  Cell [phone]

## 2025-05-02 NOTE — PLAN OF CARE
Operative Note      Patient: Alannah August  YOB: 1991  MRN: [id number]    Date of Procedure: 5/7/2024    Pre-Op Diagnosis Codes:     * Hydradenitis [L73.2]    Post-Op Diagnosis: Same       Procedure(s):  EXCISION AXILLARY HIDRADENITIS, WOUND CLOSURE    Surgeon(s):  Blair Jimenez IV, DO    Assistant:   Resident: Ebony Wood MD; Leah France DO    Anesthesia: General    Estimated Blood Loss (mL): Less than 5cc    Complications: None    Specimens:   ID Type Source Tests Collected by Time Destination   A : RIGHT AXILLA HYDRADENITIS Tissue Axillary SURGICAL PATHOLOGY Blair Jimenez IV, DO 5/7/2024 1354        Goal Outcome Evaluation  Pt in chair with alarm activated, denies pain or discomfort at this time. Pt remains drowsy, easily aroused. Pt hypotensive will continue to monitor and notify provider.

## 2025-05-02 NOTE — DISCHARGE PLACEMENT REQUEST
"Liza Graves (79 y.o. Female)       Date of Birth   1945    Social Security Number       Address   [address] [place] [address] [facility]    Home Phone   [phone]    MRN   [id number]       Oriental orthodox   Yazidi    Marital Status                               Admission Date   5/1/2025    Admission Type   Elective    Admitting Provider   Rolando Newberry II, MD    Attending Provider   Rolando Newberry II, MD    Department, Room/Bed   [facility] SURGICAL INPATIENT, 4108/1       Discharge Date       Discharge Disposition   Home or Self Care    Discharge Destination                                 Attending Provider: Rolando Newberry II, MD    Allergies: Amoxicillin, Penicillins    Isolation: Contact   Infection: ESBL E coli (01/28/24), ESBL Klebsiella (09/29/24)   Code Status: CPR    Ht: 160 cm (63\")   Wt: 76.2 kg (168 lb)    Admission Cmt: None   Principal Problem: Knee joint replacement status [Z96.659]                   Active Insurance as of 5/1/2025       Primary Coverage       Payor Plan Insurance Group Employer/Plan Group    MEDICARE MEDICARE A & B        Payor Plan Address Payor Plan Phone Number Payor Plan Fax Number Effective Dates    [address] [phone]  5/1/2012 - None Entered    [facility] [address]         Subscriber Name Subscriber Birth Date Member ID       LIZA GRAVES 1945 [id number]               Secondary Coverage       Payor Plan Insurance Group Employer/Plan Group    MISC MC SUP   MISC MC SUP              PLAN G       Coverage Address Coverage Phone Number Coverage Fax Number Effective Dates    [address] [address] [phone]  10/2/2019 - None Entered    [facility] [address]         Subscriber Name Subscriber Birth Date Member ID       LIZA GRAVES 1945 [id number]                     Emergency Contacts        (Rel.) Home Phone Work Phone Mobile Phone    MONISHA GRAVES (Daughter) [phone] -- [phone]    " MARIA ISABEL BUSCH (Daughter) -- -- [phone]    ISRRAEL FAIRBANKS (Neighbor) -- -- [phone]

## 2025-05-03 VITALS
SYSTOLIC BLOOD PRESSURE: 100 MMHG | DIASTOLIC BLOOD PRESSURE: 44 MMHG | WEIGHT: 168 LBS | RESPIRATION RATE: 16 BRPM | HEART RATE: 87 BPM | HEIGHT: 63 IN | OXYGEN SATURATION: 93 % | TEMPERATURE: 98.3 F | BODY MASS INDEX: 29.77 KG/M2

## 2025-05-03 PROCEDURE — A9270 NON-COVERED ITEM OR SERVICE: HCPCS | Performed by: ORTHOPAEDIC SURGERY

## 2025-05-03 PROCEDURE — 63710000001 OXYCODONE 5 MG TABLET: Performed by: ORTHOPAEDIC SURGERY

## 2025-05-03 PROCEDURE — 25810000003 SODIUM CHLORIDE 0.9 % SOLUTION

## 2025-05-03 PROCEDURE — 97116 GAIT TRAINING THERAPY: CPT

## 2025-05-03 PROCEDURE — 63710000001 ACETAMINOPHEN EXTRA STRENGTH 500 MG TABLET: Performed by: ORTHOPAEDIC SURGERY

## 2025-05-03 PROCEDURE — 97535 SELF CARE MNGMENT TRAINING: CPT

## 2025-05-03 PROCEDURE — 97110 THERAPEUTIC EXERCISES: CPT

## 2025-05-03 RX ADMIN — SODIUM CHLORIDE 100 ML/HR: 9 INJECTION, SOLUTION INTRAVENOUS at 02:13

## 2025-05-03 RX ADMIN — OXYCODONE 5 MG: 5 TABLET ORAL at 09:30

## 2025-05-03 RX ADMIN — OXYCODONE 10 MG: 5 TABLET ORAL at 02:04

## 2025-05-03 RX ADMIN — ACETAMINOPHEN 1000 MG: 500 TABLET, FILM COATED ORAL at 00:16

## 2025-05-03 NOTE — THERAPY TREATMENT NOTE
"Subjective: Pt agreeable to therapeutic plan of care. Pt had 1 small step to get into home thru garage and wanted to make sure she was doing it right.    Objective:     Precautions - falls, contact, R TKR, WBAT    Bed mobility - SBA  Transfers - CGA and with rolling walker  Ambulation - 12 feet Min-A and with rolling walker    Therapeutic Exercise - 10 Reps B LE AAROM /AROMsupported sitting / chair    Vitals:  BP supine 100/44, seated-141/114, standing-95/46 and once back end chair at end of session-112/54    Pain: 6 VAS Location: R knee  Intervention for pain: Repositioned, RN notified, Increased Activity, and Therapeutic Presence    Education: Provided education on the importance of mobility in the acute care setting, Verbal/Tactile Cues, Transfer Training, Gait Training, WB'ing status, and Post-Op Precautions    Assessment: Abida Becker presents with functional mobility impairments which indicate the need for skilled intervention. Tolerating session today without incident. Pt req vc/s to incr stance and weight bearing thru RLE. Very slowed greyson and guarded, dependent on Ue's. ROM 10-90 degrees and 20 degrees ext lag. Plans on home with assist and HH at [place].Will continue to follow and progress as tolerated.     Plan/Recommendations:   If medically appropriate, Low Intensity Therapy recommended post-acute care - This is recommended as therapy feels this patient would require 2-3 visits per week. OP or HH would be the best option depending on patient's home bound status. Consider, if the patient has other  \"skilled\" needs such as wounds, IV antibiotics, etc. Combined with \"low intensity\" could also equate to a SNF. If patient is medically complex, consider LTAC. Pt requires no DME at discharge.     Pt desires Home with family assist and Home Health at discharge. Pt cooperative; agreeable to therapeutic recommendations and plan of care.         Basic Mobility 6-click:  Rollin = Total, A lot = 2, A " little = 3; 4 = None  Supine>Sit:   1 = Total, A lot = 2, A little = 3; 4 = None   Sit>Stand with arms:  1 = Total, A lot = 2, A little = 3; 4 = None  Bed>Chair:   1 = Total, A lot = 2, A little = 3; 4 = None  Ambulate in room:  1 = Total, A lot = 2, A little = 3; 4 = None  3-5 Steps with railin = Total, A lot = 2, A little = 3; 4 = None  Score: 22    Post-Tx Position: Up in Chair, Alarms activated, and Call light and personal items within reach, polar pack  PPE: gloves and gown    Therapy Charges for Today       Code Description Service Date Service Provider Modifiers Qty    42972112346 HC GAIT TRAINING EA 15 MIN 5/3/2025 Criss Guevara PTA GP 1    [id number]  PT THER PROC EA 15 MIN 5/3/2025 Criss Guevara, PTA GP 1    [id number]  PT SELF CARE/MGMT/TRAIN EA 15 MIN 5/3/2025 Criss Guevara PTA GP 1           PT Charges       Row Name 25 1138             Time Calculation    Start Time 0650  -      Stop Time 0740  -      Time Calculation (min) 50 min  -      PT Received On 25  -         Time Calculation- PT    Total Timed Code Minutes- PT 50 minute(s)  -                User Key  (r) = Recorded By, (t) = Taken By, (c) = Cosigned By      Initials Name Provider Type     Criss Guevara PTA Physical Therapist Assistant

## 2025-05-03 NOTE — PROGRESS NOTES
Patient is postop day 2 total knee.  Plan was discharge yesterday but due to low blood pressure she was kept another day.  Her blood pressure has been better and she has mobilized with no dizziness.  I will wait to see with the medicine team says, likely she can be discharged home today as long as she remains asymptomatic

## 2025-05-03 NOTE — PLAN OF CARE
Assessment: Abida Becker presents with functional mobility impairments which indicate the need for skilled intervention. Tolerating session today without incident. Pt req vc/s to incr stance and weight bearing thru RLE. Very slowed greyson and guarded, dependent on Ue's. ROM 10-90 degrees and 20 degrees ext lag. Plans on home with assist and HH at [place].Will continue to follow and progress as tolerated.

## 2025-05-03 NOTE — PLAN OF CARE
Goal Outcome Evaluation:                 C/o pain on incision site, dressing is dry and intact, moves extremities, skin is warm to touch. Assist x 2 to bedside commode, plan of care is ongoing.

## 2025-05-05 ENCOUNTER — TELEPHONE (OUTPATIENT)
Dept: CARDIOLOGY | Facility: CLINIC | Age: 80
End: 2025-05-05

## 2025-05-05 NOTE — CASE MANAGEMENT/SOCIAL WORK
Case Management Discharge Note      Final Note: HOME WITH [facility]    Provided Post Acute Provider List?: N/A  N/A Provider List Comment: set up with [facility] health by Dr Newberry's office prior to surgery    Selected Continued Care - Discharged on 5/3/2025 Admission date: 5/1/2025 - Discharge disposition: Home or Self Care      Durable Medical Equipment Coordination complete.      Service Provider Services Address Phone Fax Patient Preferred    [facility] [address], [place] [address] [phone] --                      Home Medical Care Coordination complete.      Service Provider Services Address Phone Fax Patient Preferred    [facility] [address] [place] [address] [phone] --              Therapy Coordination complete.      Service Provider Services Address Phone Fax Patient Preferred    [facility] [address] [facility] [place] [address] [phone] --                    Transportation Services  Private: Car    Final Discharge Disposition Code: 06 - home with home health care

## 2025-05-05 NOTE — TELEPHONE ENCOUNTER
Caller: Abida Becker    Relationship: Self    Best call back number: [phone]     What are your concerns: PT HAD A KNEE REPLACEMENT RECENTLY AND DURING PROCEDURE, SHE WAS HAVING BOUTS OF LOW BP AND WAS RECOMMENDED TO CALL CARDIO AND SEE IF MEDICATIONS MAY NEED ADJUSTING - PT HAD TO STAY AN EXTRA NIGHT DUE TO HOW LOW IT HAD GOTTEN, (SHE NOTES 69 BUT DOESN'T REMEMBER THE READINGS) - SHE IS NOW HOME AND FEELING EXTREMELY EXHAUSTED AND HAS TO LIE DOWN CONSTANTLY - PT NOTES SHE FEELS LIGHT HEADED WHEN STANDING OR WALKING TOO FAR - PT DENIES LIGHT DIZZINESS - PLEASE ADVISE -     PT STATES SHE IS ALSO ON OXYCODONE AND SHE IS AWARE ITS VERY ADDICTIVE AND SHE IS ASKING IF THERE IS SOMETHING COMPARABLE FOR HER TO TAKE FOR PAIN - SHE IS ALSO TAKING TWO TYLENOL WITH THAT FOR PAIN BUT WOULD LIKE TO KNOW IF HE SUGGESTS SOMETHING BETTER FOR HER -     PT NOTES IT TAKES A WHILE FOR HER TO GET TO THE PHONE DUE TO CONDITION AND IT WILL RING OUT BEFORE SHE MAKES IT THERE, SO IF POSSIBLE TO LEAVE MESSAGE OR GIVE IT A MOMENT AND RE CALL -

## 2025-05-12 RX ORDER — METOPROLOL SUCCINATE 25 MG/1
25 TABLET, EXTENDED RELEASE ORAL DAILY
Qty: 30 TABLET | Refills: 11 | Status: SHIPPED | OUTPATIENT
Start: 2025-05-12

## 2025-05-13 ENCOUNTER — TRANSCRIBE ORDERS (OUTPATIENT)
Dept: PHYSICAL THERAPY | Facility: CLINIC | Age: 80
End: 2025-05-13
Payer: MEDICARE

## 2025-05-14 ENCOUNTER — TREATMENT (OUTPATIENT)
Dept: PHYSICAL THERAPY | Facility: CLINIC | Age: 80
End: 2025-05-14
Payer: MEDICARE

## 2025-05-14 DIAGNOSIS — M25.561 RIGHT KNEE PAIN, UNSPECIFIED CHRONICITY: ICD-10-CM

## 2025-05-14 DIAGNOSIS — M17.11 ARTHRITIS OF KNEE, RIGHT: Primary | ICD-10-CM

## 2025-05-14 DIAGNOSIS — Z96.651 HISTORY OF TOTAL RIGHT KNEE REPLACEMENT: ICD-10-CM

## 2025-05-14 PROCEDURE — 97530 THERAPEUTIC ACTIVITIES: CPT | Performed by: PHYSICAL THERAPIST

## 2025-05-14 PROCEDURE — 97162 PT EVAL MOD COMPLEX 30 MIN: CPT | Performed by: PHYSICAL THERAPIST

## 2025-05-14 PROCEDURE — 97110 THERAPEUTIC EXERCISES: CPT | Performed by: PHYSICAL THERAPIST

## 2025-05-14 PROCEDURE — 97140 MANUAL THERAPY 1/> REGIONS: CPT | Performed by: PHYSICAL THERAPIST

## 2025-05-14 NOTE — PROGRESS NOTES
"Physical Therapy Initial Evaluation and Plan of Care  [address] [facility] [place] [address]     Patient: Abida Becker   : 1945  Diagnosis/ICD-10 Code:  Arthritis of knee, right [M17.11]  Referring practitioner: Rolando Newberry*  Date of Initial Visit: 2025  Today's Date: 2025  Patient seen for 1 session         Visit Diagnoses:    ICD-10-CM ICD-9-CM   1. Arthritis of knee, right  M17.11 716.96   2. Right knee pain, unspecified chronicity  M25.561 719.46   3. History of total right knee replacement  Z96.651 V43.65         Subjective Questionnaire: Knee outcomes score 23%      Subjective 78 yo female s/p R TKA 25. Pt reports a long history of knee pain with progressive worsening with time. Pt reports no post op complications. Pt had HHPT, which went well. Prior to TKA pt was using a rolling wx or straight cane. 6/10 pain now and 8/10 at worst. Symptoms are worst in the AM, standing, walking. Pt can walk a few minutes before needing to stop. She notes a long history of L wrist pain, associated with dx OA. This makes it difficult to transfer. Pt reports she does have a \"skin tear\" just inferior to the incision, relates this to HHPT removing surgical tape at home this week.   Social hx: Lives alone, retired teacher  MD follow up: 25    Objective          Active Range of Motion   Left Knee   Normal active range of motion    Right Knee   Flexion: 90 degrees with pain  Extension: Right knee active extension: -20. with pain    Strength/Myotome Testing     Left Knee   Normal strength    Right Knee   Flexion: 3+  Extension: 3-  Quadriceps contraction: poor     Significant swelling and ecchymosis noted.   Ambulating with rolling wx, antalgic gait.  Mod A transfer sit to stand.  Unable to complete further testing due to c/o pain           Assessment & Plan       Assessment  Impairments: abnormal gait, abnormal muscle firing, abnormal or restricted ROM, activity intolerance, " impaired balance, impaired physical strength, lacks appropriate home exercise program, pain with function, safety issue and weight-bearing intolerance   Functional limitations: carrying objects, lifting, sleeping, walking, pulling, pushing, uncomfortable because of pain, sitting, standing and unable to perform repetitive tasks   Assessment details: 80 yo female s/p R TKA 5/1/25. Pt's tolerance toward evaluation this date is limited due to c/o pain. Pt is with significant bloody drainage from her inferior wound. Per phone conversation with LOC NP, oral antibiotics ordered for pt and her zipper and sutures will be removed at pt follow up. Pt with fair tolerance toward low level exercise and very light PF mobilization. Will plan to continue with post op POC as pt's symptoms allow.   Prognosis: good    Goals  Plan Goals: Short term goals, 1 week: Tolerate HEP progression.  Voice compliance with activity modification.  Report improvement in symptoms.     LTGs, 6 weeks: Score at 85% or better on knee outcomes survey.  Ambulate 20 min or more without needing assistive device.  5/5 knee ext and flexion strength to allow squatting and safe stair climbing.  AROM to be 0-110 deg to allow safe ambulation.  Standing balance to be equal to L LE.  Independent with HEP.    Plan  Therapy options: will be seen for skilled therapy services  Other planned modality interventions: modalities prn  Planned therapy interventions: balance/weight-bearing training, flexibility, functional ROM exercises, gait training, home exercise program, manual therapy, neuromuscular re-education, strengthening, stretching and therapeutic activities  Frequency: 3x week  Duration in weeks: 6  Treatment plan discussed with: patient  Plan details: 30 visits per POC, 90 day certification period         History # of Personal Factors and/or Comorbidities: HIGH (3+)  Examination of Body System(s): # of elements: MODERATE (3)  Clinical Presentation: UNSTABLE    Clinical Decision Making: MODERATE      Timed:         Manual Therapy:    10     mins  97338;     Therapeutic Exercise:    15     mins  36663;     Neuromuscular Luiza:        mins  78855;    Therapeutic Activity:     15     mins  28808;     Gait Training:           mins  03514;     Ultrasound:          mins  84467;    Ionto                                   mins   16022  Self Care                            mins   03222      Un-Timed:  Electrical Stimulation:         mins  70805 ( );  Dry Needling          mins self-pay  Traction          mins 66307  Low Eval          Mins 52688  Mod Eval     25     Mins 83688  High Eval                            Mins 47128  Re-Eval          Mins 76921  Canalith Repos         mins 45817      Timed Treatment:   35   mins   Total Treatment:     60   mins          PT: Alf Betancourt PT     [place] license: [id number]  Electronically signed by Alf Betancourt PT, 05/14/25, 9:00 AM EDT    Certification Period: 5/14/2025 thru 8/11/2025  I certify that the therapy services are furnished while this patient is under my care.  The services outlined above are required by this patient, and will be reviewed every 90 days.         Physician Signature:__________________________________________________    PHYSICIAN: Rolando Newberry II, MD  NPI: [id number]                                      DATE:      Please sign and return via fax to [email or web address] . Thank you, [facility].

## 2025-05-15 ENCOUNTER — TELEPHONE (OUTPATIENT)
Dept: PHYSICAL THERAPY | Facility: CLINIC | Age: 80
End: 2025-05-15
Payer: MEDICARE

## 2025-05-15 NOTE — TELEPHONE ENCOUNTER
Caller: Abida Becker    Relationship: Self    Best call back number: [phone]   What was the call regarding: PATIENT'S DOCTOR SUGGESTED HER BEING SEEN THREE TIMES A WEEK. IF HER PHYSICAL THERAPIST IS OKAY WITH THIS PLEASE CALL PATIENT BACK TO SCHEDULE MORE APPTS

## 2025-05-19 ENCOUNTER — TREATMENT (OUTPATIENT)
Dept: PHYSICAL THERAPY | Facility: CLINIC | Age: 80
End: 2025-05-19
Payer: MEDICARE

## 2025-05-19 DIAGNOSIS — Z96.651 HISTORY OF TOTAL RIGHT KNEE REPLACEMENT: ICD-10-CM

## 2025-05-19 DIAGNOSIS — M25.561 RIGHT KNEE PAIN, UNSPECIFIED CHRONICITY: ICD-10-CM

## 2025-05-19 DIAGNOSIS — M17.11 ARTHRITIS OF KNEE, RIGHT: Primary | ICD-10-CM

## 2025-05-19 PROCEDURE — 97110 THERAPEUTIC EXERCISES: CPT | Performed by: PHYSICAL THERAPIST

## 2025-05-19 PROCEDURE — 97140 MANUAL THERAPY 1/> REGIONS: CPT | Performed by: PHYSICAL THERAPIST

## 2025-05-19 PROCEDURE — 97530 THERAPEUTIC ACTIVITIES: CPT | Performed by: PHYSICAL THERAPIST

## 2025-05-19 PROCEDURE — 97112 NEUROMUSCULAR REEDUCATION: CPT | Performed by: PHYSICAL THERAPIST

## 2025-05-19 NOTE — PROGRESS NOTES
Physical Therapy Daily Treatment Note      Patient: Abida Becker   : 1945  Referring practitioner: Rolando Newberry*  Date of Initial Visit: Type: THERAPY  Noted: 2025  Today's Date: 2025  Patient seen for 2 sessions       Visit Diagnoses:    ICD-10-CM ICD-9-CM   1. Arthritis of knee, right  M17.11 716.96   2. Right knee pain, unspecified chronicity  M25.561 719.46   3. History of total right knee replacement  Z96.651 V43.65       Subjective Pt on antibiotics. 5/10 pain this AM. Had follow up with NP last week, zipper and sutures removed.    Objective   See Exercise, Manual, and Modality Logs for complete treatment.   AROM 0-5-97 deg    Assessment/Plan Incisions dressed today. Some drainage noted inferior to the incision but dressing was maintained. ROM and general swelling improved vs IE date.      Timed:         Manual Therapy:    15     mins  15608;     Therapeutic Exercise:  15    mins  75496;     Neuromuscular Luiza:    15    mins  33476;    Therapeutic Activity:      8    mins  44268;     Gait Training:           mins  77559;     Ultrasound:          mins  88172;    Ionto                                   mins   13091  Self Care                            mins   60160      Un-Timed:  Electrical Stimulation:         mins  47341 ( );  Dry Needling          mins self-pay  Traction          mins 64908  Canalith Repos         mins 20815    Timed Treatment:   53   mins   Total Treatment:     53   mins      Alf Betancourt, PT, PT, DPT, OCS  [place] license: [id number]

## 2025-05-21 ENCOUNTER — TREATMENT (OUTPATIENT)
Dept: PHYSICAL THERAPY | Facility: CLINIC | Age: 80
End: 2025-05-21
Payer: MEDICARE

## 2025-05-21 DIAGNOSIS — Z96.651 HISTORY OF TOTAL RIGHT KNEE REPLACEMENT: ICD-10-CM

## 2025-05-21 DIAGNOSIS — M25.561 RIGHT KNEE PAIN, UNSPECIFIED CHRONICITY: ICD-10-CM

## 2025-05-21 DIAGNOSIS — M17.11 ARTHRITIS OF KNEE, RIGHT: Primary | ICD-10-CM

## 2025-05-21 PROCEDURE — 97110 THERAPEUTIC EXERCISES: CPT | Performed by: PHYSICAL THERAPIST

## 2025-05-21 PROCEDURE — 97140 MANUAL THERAPY 1/> REGIONS: CPT | Performed by: PHYSICAL THERAPIST

## 2025-05-21 PROCEDURE — 97530 THERAPEUTIC ACTIVITIES: CPT | Performed by: PHYSICAL THERAPIST

## 2025-05-21 PROCEDURE — 97112 NEUROMUSCULAR REEDUCATION: CPT | Performed by: PHYSICAL THERAPIST

## 2025-05-21 NOTE — PROGRESS NOTES
Physical Therapy Daily Treatment Note      Patient: Abida Becker   : 1945  Referring practitioner: Rolando Newberry*  Date of Initial Visit: Type: THERAPY  Noted: 2025  Today's Date: 2025  Patient seen for 3 sessions       Visit Diagnoses:    ICD-10-CM ICD-9-CM   1. Arthritis of knee, right  M17.11 716.96   2. Right knee pain, unspecified chronicity  M25.561 719.46   3. History of total right knee replacement  Z96.651 V43.65       Subjective 5/10 pain at worst. Pt reports difficulty transferring sit to stand, relates this to general LE weakness and fear of falling.  Pt today states she has been unable to transfer sit to stand without being on a riser, requiring UE assist, or needing help since . She notes ongoing fear of falling with transfers or when standing. Denies recently falling.    Objective   AROM 0- deg  PROM 0-5-108 deg, limited by guarding  Quad control is fair to poor  Ambulates with rolling wx with significant forward flexion  Requires moderate assist x 1 to transfer sit to/from stand when not using UE assist  See Exercise, Manual, and Modality Logs for complete treatment.       Assessment/Plan  Pt's ROM has improved significantly vs her IE date; however, it is still very limited. PROM is limited by significant guarding. Pt is very limited with standing tolerance and with transfers. Pt needs significant encouragement to perform exercises but willing to do so.    Pending her ortho visit this week, recommend continuing with PT Evaluation and treatment TIW    Timed:         Manual Therapy:    15     mins  82957;     Therapeutic Exercise:    15     mins  63535;     Neuromuscular Luiza:    15    mins  17867;    Therapeutic Activity:     15     mins  67075;     Gait Training:           mins  33303;     Ultrasound:          mins  69696;    Ionto                                   mins   64261  Self Care                            mins   19459      Un-Timed:  Electrical  Stimulation:         mins  98899 ( );  Dry Needling          mins self-pay  Traction          mins 98198  Canalith Repos         mins 78438    Timed Treatment:   60   mins   Total Treatment:     60   mins      Alf Betancourt PT, PT, DPT, OCS  [place] license: [id number]

## 2025-05-21 NOTE — LETTER
Physical Therapy Daily Treatment Note      Patient: Abida Becker   : 1945  Referring practitioner: Rolando Newberry*  Date of Initial Visit: Type: THERAPY  Noted: 2025  Today's Date: 2025  Patient seen for 3 sessions       Visit Diagnoses:    ICD-10-CM ICD-9-CM   1. Arthritis of knee, right  M17.11 716.96   2. Right knee pain, unspecified chronicity  M25.561 719.46   3. History of total right knee replacement  Z96.651 V43.65       Subjective 5/10 pain at worst. Pt reports difficulty transferring sit to stand, relates this to general LE weakness and fear of falling.  Pt today states she has been unable to transfer sit to stand without being on a riser, requiring UE assist, or needing help since . She notes ongoing fear of falling with transfers or when standing. Denies recently falling.    Objective   AROM 0- deg  PROM 0-5-108 deg, limited by guarding  Quad control is fair to poor  Ambulates with rolling wx with significant forward flexion  Requires moderate assist x 1 to transfer sit to/from stand when not using UE assist  See Exercise, Manual, and Modality Logs for complete treatment.       Assessment/Plan  Pt's ROM has improved significantly vs her IE date; however, it is still very limited. PROM is limited by significant guarding. Pt is very limited with standing tolerance and with transfers. Pt needs significant encouragement to perform exercises but willing to do so.    Pending her ortho visit this week, recommend continuing with PT Evaluation and treatment TIW    Timed:         Manual Therapy:    15     mins  97971;     Therapeutic Exercise:    15     mins  19803;     Neuromuscular Luiza:    15    mins  64374;    Therapeutic Activity:     15     mins  51717;     Gait Training:           mins  72751;     Ultrasound:          mins  83202;    Ionto                                   mins   71172  Self Care                            mins   32811      Un-Timed:  Electrical  Stimulation:         mins  38739 ( );  Dry Needling          mins self-pay  Traction          mins 83302  Canalith Repos         mins 95852    Timed Treatment:   60   mins   Total Treatment:     60   mins      Alf Betancourt PT, PT, DPT, OCS  [place] license: [id number]

## 2025-05-23 ENCOUNTER — TREATMENT (OUTPATIENT)
Dept: PHYSICAL THERAPY | Facility: CLINIC | Age: 80
End: 2025-05-23
Payer: MEDICARE

## 2025-05-23 DIAGNOSIS — M17.11 ARTHRITIS OF KNEE, RIGHT: Primary | ICD-10-CM

## 2025-05-23 DIAGNOSIS — M25.561 RIGHT KNEE PAIN, UNSPECIFIED CHRONICITY: ICD-10-CM

## 2025-05-23 DIAGNOSIS — K21.9 GASTROESOPHAGEAL REFLUX DISEASE WITHOUT ESOPHAGITIS: ICD-10-CM

## 2025-05-23 DIAGNOSIS — Z96.651 HISTORY OF TOTAL RIGHT KNEE REPLACEMENT: ICD-10-CM

## 2025-05-23 PROCEDURE — 97530 THERAPEUTIC ACTIVITIES: CPT | Performed by: PHYSICAL THERAPIST

## 2025-05-23 PROCEDURE — 97110 THERAPEUTIC EXERCISES: CPT | Performed by: PHYSICAL THERAPIST

## 2025-05-23 PROCEDURE — 97140 MANUAL THERAPY 1/> REGIONS: CPT | Performed by: PHYSICAL THERAPIST

## 2025-05-23 PROCEDURE — 97112 NEUROMUSCULAR REEDUCATION: CPT | Performed by: PHYSICAL THERAPIST

## 2025-05-23 RX ORDER — PANTOPRAZOLE SODIUM 40 MG/1
40 TABLET, DELAYED RELEASE ORAL DAILY
Qty: 90 TABLET | Refills: 1 | Status: SHIPPED | OUTPATIENT
Start: 2025-05-23

## 2025-05-23 NOTE — PROGRESS NOTES
Physical Therapy Daily Treatment Note      Patient: Abida Becker   : 1945  Referring practitioner: Rolando Newberry*  Date of Initial Visit: Type: THERAPY  Noted: 2025  Today's Date: 2025  Patient seen for 4 sessions       Visit Diagnoses:    ICD-10-CM ICD-9-CM   1. Arthritis of knee, right  M17.11 716.96   2. Right knee pain, unspecified chronicity  M25.561 719.46   3. History of total right knee replacement  Z96.651 V43.65       Subjective Pt resumed oral antibiotics and wound was dressed at MD visit. Limited by knee pain again today. HEP going well.     Objective AROM 0-8-115 deg    See Exercise, Manual, and Modality Logs for complete treatment.       Assessment/Plan Inferior wound appears to be with less drainage. Superficial bandaging is coming off of the non stick bandage so it was redressed today, which went well. Pt needs significant encouragement today.       Timed:         Manual Therapy:    8     mins  35035;     Therapeutic Exercise:    15     mins  46246;     Neuromuscular Luiza:    15    mins  34667;    Therapeutic Activity:     15     mins  88769;     Gait Training:           mins  56649;     Ultrasound:          mins  78036;    Ionto                                   mins   15838  Self Care                            mins   33154      Un-Timed:  Electrical Stimulation:         mins  01207 ( );  Dry Needling          mins self-pay  Traction          mins 92516  Canalith Repos         mins 09310    Timed Treatment:   53   mins   Total Treatment:     53   mins      Alf Betancourt, PT, PT, DPT, OCS  [place] license: [id number]

## 2025-05-27 RX ORDER — GABAPENTIN 300 MG/1
300 CAPSULE ORAL EVERY 12 HOURS SCHEDULED
Qty: 180 CAPSULE | Refills: 0 | Status: SHIPPED | OUTPATIENT
Start: 2025-05-27

## 2025-05-28 ENCOUNTER — TREATMENT (OUTPATIENT)
Dept: PHYSICAL THERAPY | Facility: CLINIC | Age: 80
End: 2025-05-28
Payer: MEDICARE

## 2025-05-28 DIAGNOSIS — Z96.651 HISTORY OF TOTAL RIGHT KNEE REPLACEMENT: ICD-10-CM

## 2025-05-28 DIAGNOSIS — M25.561 RIGHT KNEE PAIN, UNSPECIFIED CHRONICITY: ICD-10-CM

## 2025-05-28 DIAGNOSIS — M17.11 ARTHRITIS OF KNEE, RIGHT: Primary | ICD-10-CM

## 2025-05-28 PROCEDURE — 97112 NEUROMUSCULAR REEDUCATION: CPT | Performed by: PHYSICAL THERAPIST

## 2025-05-28 PROCEDURE — 97140 MANUAL THERAPY 1/> REGIONS: CPT | Performed by: PHYSICAL THERAPIST

## 2025-05-28 PROCEDURE — 97110 THERAPEUTIC EXERCISES: CPT | Performed by: PHYSICAL THERAPIST

## 2025-05-28 NOTE — PROGRESS NOTES
Physical Therapy Daily Treatment Note      Patient: Abida Becker   : 1945  Referring practitioner: Rolando Newberry*  Date of Initial Visit: Type: THERAPY  Noted: 2025  Today's Date: 2025  Patient seen for 5 sessions       Visit Diagnoses:    ICD-10-CM ICD-9-CM   1. Arthritis of knee, right  M17.11 716.96   2. Right knee pain, unspecified chronicity  M25.561 719.46   3. History of total right knee replacement  Z96.651 V43.65       Subjective Pt having difficulty sleeping. Continues to be limited by soreness and pain. HEP going well.    Objective AROM 0- deg  See Exercise, Manual, and Modality Logs for complete treatment.       Assessment/Plan PROM ext limited by guarding. LE fatigued post visit, requires assist with sit to stand transfer from normal seat level.      Timed:         Manual Therapy:    10     mins  92824;     Therapeutic Exercise:    15     mins  04622;     Neuromuscular Luiza:    15    mins  80491;    Therapeutic Activity:          mins  65731;     Gait Training:           mins  14555;     Ultrasound:          mins  83485;    Ionto                                   mins   20106  Self Care                            mins   25857      Un-Timed:  Electrical Stimulation:         mins  48100 ( );  Dry Needling          mins self-pay  Traction          mins 29491  Canalith Repos         mins 91542    Timed Treatment:   40   mins   Total Treatment:     40   mins      Alf Betancourt PT, PT, DPT, OCS  [place] license: [id number]

## 2025-05-30 ENCOUNTER — TREATMENT (OUTPATIENT)
Dept: PHYSICAL THERAPY | Facility: CLINIC | Age: 80
End: 2025-05-30
Payer: MEDICARE

## 2025-05-30 DIAGNOSIS — M25.561 RIGHT KNEE PAIN, UNSPECIFIED CHRONICITY: ICD-10-CM

## 2025-05-30 DIAGNOSIS — M17.11 ARTHRITIS OF KNEE, RIGHT: Primary | ICD-10-CM

## 2025-05-30 DIAGNOSIS — Z96.651 HISTORY OF TOTAL RIGHT KNEE REPLACEMENT: ICD-10-CM

## 2025-05-30 PROCEDURE — 97110 THERAPEUTIC EXERCISES: CPT | Performed by: PHYSICAL THERAPIST

## 2025-05-30 PROCEDURE — 97140 MANUAL THERAPY 1/> REGIONS: CPT | Performed by: PHYSICAL THERAPIST

## 2025-05-30 PROCEDURE — 97530 THERAPEUTIC ACTIVITIES: CPT | Performed by: PHYSICAL THERAPIST

## 2025-05-30 PROCEDURE — 97112 NEUROMUSCULAR REEDUCATION: CPT | Performed by: PHYSICAL THERAPIST

## 2025-05-30 NOTE — PROGRESS NOTES
Physical Therapy Daily Progress Note      Patient: Abida Becker   : 1945  Diagnosis/ICD-10 Code:  Arthritis of knee, right [M17.11]  Referring practitioner: Rolando Newberry*  Date of Initial Visit: Type: THERAPY  Noted: 2025  Today's Date: 2025  Patient seen for 6 sessions             Subjective Knee is less swollen but still hurts trying to fulling straighten her knee.    Objective   See Exercise, Manual, and Modality Logs for complete treatment.       Assessment/Plan  Functional knee ROM and strength deficits still persist.  Good effort with exercises and fairly good tolerance with manual techniques    Progress per Plan of Care           Timed:         Manual Therapy:    8     mins  59549;     Therapeutic Exercise:    15     mins  19341;     Neuromuscular Luiza:    15    mins  44674;    Therapeutic Activity:     15     mins  24825;         Timed Treatment:   53   mins   Total Treatment:     53   mins        Claudio Good PTA  Physical Therapist Assistant

## 2025-06-02 ENCOUNTER — TREATMENT (OUTPATIENT)
Dept: PHYSICAL THERAPY | Facility: CLINIC | Age: 80
End: 2025-06-02
Payer: MEDICARE

## 2025-06-02 DIAGNOSIS — Z96.651 HISTORY OF TOTAL RIGHT KNEE REPLACEMENT: ICD-10-CM

## 2025-06-02 DIAGNOSIS — M17.11 ARTHRITIS OF KNEE, RIGHT: Primary | ICD-10-CM

## 2025-06-02 DIAGNOSIS — M25.561 RIGHT KNEE PAIN, UNSPECIFIED CHRONICITY: ICD-10-CM

## 2025-06-02 PROCEDURE — 97140 MANUAL THERAPY 1/> REGIONS: CPT | Performed by: PHYSICAL THERAPIST

## 2025-06-02 PROCEDURE — 97110 THERAPEUTIC EXERCISES: CPT | Performed by: PHYSICAL THERAPIST

## 2025-06-02 PROCEDURE — 97112 NEUROMUSCULAR REEDUCATION: CPT | Performed by: PHYSICAL THERAPIST

## 2025-06-02 NOTE — PROGRESS NOTES
Physical Therapy Daily Treatment Note      Patient: Abida Becker   : 1945  Referring practitioner: Rolando Newberry*  Date of Initial Visit: Type: THERAPY  Noted: 2025  Today's Date: 2025  Patient seen for 7 sessions       Visit Diagnoses:    ICD-10-CM ICD-9-CM   1. Arthritis of knee, right  M17.11 716.96   2. Right knee pain, unspecified chronicity  M25.561 719.46   3. History of total right knee replacement  Z96.651 V43.65       Subjective Pt feels her HEP is going well. ROM and strength remain limited but improving per pt.    Objective AROM 0-5-120 deg  See Exercise, Manual, and Modality Logs for complete treatment.       Assessment/Plan Making good progress with ROM.      Timed:         Manual Therapy:    15     mins  18777;     Therapeutic Exercise:    20     mins  18817;     Neuromuscular Luiza:    10    mins  10727;    Therapeutic Activity:          mins  09547;     Gait Training:           mins  45796;     Ultrasound:          mins  45706;    Ionto                                   mins   88468  Self Care                            mins   08814      Un-Timed:  Electrical Stimulation:         mins  33103 ( );  Dry Needling          mins self-pay  Traction          mins 79514  Canalith Repos         mins 37287    Timed Treatment:   45   mins   Total Treatment:     45   mins      Alf Betancourt, PT, PT, DPT, OCS  [place] license: [id number]

## 2025-06-04 ENCOUNTER — TREATMENT (OUTPATIENT)
Dept: PHYSICAL THERAPY | Facility: CLINIC | Age: 80
End: 2025-06-04
Payer: MEDICARE

## 2025-06-04 DIAGNOSIS — M17.11 ARTHRITIS OF KNEE, RIGHT: Primary | ICD-10-CM

## 2025-06-04 DIAGNOSIS — Z96.651 HISTORY OF TOTAL RIGHT KNEE REPLACEMENT: ICD-10-CM

## 2025-06-04 DIAGNOSIS — M25.561 RIGHT KNEE PAIN, UNSPECIFIED CHRONICITY: ICD-10-CM

## 2025-06-04 PROCEDURE — 97530 THERAPEUTIC ACTIVITIES: CPT | Performed by: PHYSICAL THERAPIST

## 2025-06-04 PROCEDURE — 97110 THERAPEUTIC EXERCISES: CPT | Performed by: PHYSICAL THERAPIST

## 2025-06-04 PROCEDURE — 97140 MANUAL THERAPY 1/> REGIONS: CPT | Performed by: PHYSICAL THERAPIST

## 2025-06-04 NOTE — PROGRESS NOTES
Physical Therapy Daily Treatment Note      Patient: Abida Becker   : 1945  Referring practitioner: Rolando Newberry*  Date of Initial Visit: Type: THERAPY  Noted: 2025  Today's Date: 2025  Patient seen for 8 sessions       Visit Diagnoses:    ICD-10-CM ICD-9-CM   1. Arthritis of knee, right  M17.11 716.96   2. Right knee pain, unspecified chronicity  M25.561 719.46   3. History of total right knee replacement  Z96.651 V43.65       Subjective No new complaints vs last visit. HEP going well.     Objective AROM 0-5-120 deg  See Exercise, Manual, and Modality Logs for complete treatment.       Assessment/Plan Doing well re: ROM. Continues to have difficulty with sit to stand transfers.      Timed:         Manual Therapy:    15     mins  50317;     Therapeutic Exercise:    15    mins  85806;     Neuromuscular Luiza:        mins  93074;    Therapeutic Activity:         10 mins  07315;     Gait Training:           mins  19699;     Ultrasound:          mins  45969;    Ionto                                   mins   93749  Self Care                            mins   26985      Un-Timed:  Electrical Stimulation:         mins  84454 ( );  Dry Needling          mins self-pay  Traction          mins 73500  Canalith Repos         mins 07080    Timed Treatment:   40   mins   Total Treatment:     40   mins      Alf Betancourt PT, PT, DPT, OCS  [place] license: [id number]

## 2025-06-06 ENCOUNTER — TREATMENT (OUTPATIENT)
Dept: PHYSICAL THERAPY | Facility: CLINIC | Age: 80
End: 2025-06-06
Payer: MEDICARE

## 2025-06-06 DIAGNOSIS — Z96.651 HISTORY OF TOTAL RIGHT KNEE REPLACEMENT: ICD-10-CM

## 2025-06-06 DIAGNOSIS — M25.561 RIGHT KNEE PAIN, UNSPECIFIED CHRONICITY: ICD-10-CM

## 2025-06-06 DIAGNOSIS — M17.11 ARTHRITIS OF KNEE, RIGHT: Primary | ICD-10-CM

## 2025-06-06 PROCEDURE — 97140 MANUAL THERAPY 1/> REGIONS: CPT | Performed by: PHYSICAL THERAPIST

## 2025-06-06 PROCEDURE — 97110 THERAPEUTIC EXERCISES: CPT | Performed by: PHYSICAL THERAPIST

## 2025-06-06 PROCEDURE — 97112 NEUROMUSCULAR REEDUCATION: CPT | Performed by: PHYSICAL THERAPIST

## 2025-06-06 NOTE — PROGRESS NOTES
Physical Therapy Daily Treatment Note      Patient: Abida Becker   : 1945  Referring practitioner: Rolando Newberry*  Date of Initial Visit: Type: THERAPY  Noted: 2025  Today's Date: 2025  Patient seen for 9 sessions       Visit Diagnoses:    ICD-10-CM ICD-9-CM   1. Arthritis of knee, right  M17.11 716.96   2. Right knee pain, unspecified chronicity  M25.561 719.46   3. History of total right knee replacement  Z96.651 V43.65       Subjective No new complaints vs last visit. Less difficulty with standing from seated position. HEP going well.    Objective AROM 0-2-110 deg pre rx  See Exercise, Manual, and Modality Logs for complete treatment.       Assessment/Plan ROM improving. Pt progressing well.      Timed:         Manual Therapy:    10     mins  30967;     Therapeutic Exercise:    15     mins  65367;     Neuromuscular Luiza:    15    mins  11284;    Therapeutic Activity:          mins  11044;     Gait Training:           mins  92805;     Ultrasound:          mins  93167;    Ionto                                   mins   33884  Self Care                            mins   68431      Un-Timed:  Electrical Stimulation:         mins  18161 ( );  Dry Needling          mins self-pay  Traction          mins 53080  Canalith Repos         mins 12273    Timed Treatment:   40   mins   Total Treatment:     40   mins      Alf Betancourt PT, PT, DPT, OCS  [place] license: [id number]

## 2025-06-09 ENCOUNTER — TREATMENT (OUTPATIENT)
Dept: PHYSICAL THERAPY | Facility: CLINIC | Age: 80
End: 2025-06-09
Payer: MEDICARE

## 2025-06-09 DIAGNOSIS — M17.11 ARTHRITIS OF KNEE, RIGHT: Primary | ICD-10-CM

## 2025-06-09 DIAGNOSIS — M25.561 RIGHT KNEE PAIN, UNSPECIFIED CHRONICITY: ICD-10-CM

## 2025-06-09 DIAGNOSIS — Z96.651 HISTORY OF TOTAL RIGHT KNEE REPLACEMENT: ICD-10-CM

## 2025-06-09 PROCEDURE — 97112 NEUROMUSCULAR REEDUCATION: CPT | Performed by: PHYSICAL THERAPIST

## 2025-06-09 PROCEDURE — 97140 MANUAL THERAPY 1/> REGIONS: CPT | Performed by: PHYSICAL THERAPIST

## 2025-06-09 PROCEDURE — 97110 THERAPEUTIC EXERCISES: CPT | Performed by: PHYSICAL THERAPIST

## 2025-06-09 NOTE — PROGRESS NOTES
Physical Therapy Daily Treatment Note      Patient: Abida Becker   : 1945  Referring practitioner: Rolando Newberry*  Date of Initial Visit: Type: THERAPY  Noted: 2025  Today's Date: 2025  Patient seen for 10 sessions       Visit Diagnoses:    ICD-10-CM ICD-9-CM   1. Arthritis of knee, right  M17.11 716.96   2. Right knee pain, unspecified chronicity  M25.561 719.46   3. History of total right knee replacement  Z96.651 V43.65       Subjective Feeling lethargic this AM, pt relates this to taking melatonin late last night. HEP going well.    Objective AROM ext -2 deg  See Exercise, Manual, and Modality Logs for complete treatment.       Assessment/Plan Ext ROM improving. Pt generally weak with transfers.      Timed:         Manual Therapy:    10     mins  39616;     Therapeutic Exercise:    15     mins  08495;     Neuromuscular Luiza:    15    mins  41296;    Therapeutic Activity:          mins  55030;     Gait Training:           mins  80473;     Ultrasound:          mins  15345;    Ionto                                   mins   23001  Self Care                            mins   83503      Un-Timed:  Electrical Stimulation:         mins  00163 ( );  Dry Needling          mins self-pay  Traction          mins 87258  Canalith Repos         mins 98316    Timed Treatment:   40   mins   Total Treatment:     40   mins      Alf Betancourt PT, PT, DPT, OCS  [place] license: [id number]

## 2025-06-11 ENCOUNTER — TREATMENT (OUTPATIENT)
Dept: PHYSICAL THERAPY | Facility: CLINIC | Age: 80
End: 2025-06-11
Payer: MEDICARE

## 2025-06-11 DIAGNOSIS — Z96.651 HISTORY OF TOTAL RIGHT KNEE REPLACEMENT: ICD-10-CM

## 2025-06-11 DIAGNOSIS — M25.561 RIGHT KNEE PAIN, UNSPECIFIED CHRONICITY: ICD-10-CM

## 2025-06-11 DIAGNOSIS — M17.11 ARTHRITIS OF KNEE, RIGHT: Primary | ICD-10-CM

## 2025-06-11 PROCEDURE — 97140 MANUAL THERAPY 1/> REGIONS: CPT | Performed by: PHYSICAL THERAPIST

## 2025-06-11 PROCEDURE — 97112 NEUROMUSCULAR REEDUCATION: CPT | Performed by: PHYSICAL THERAPIST

## 2025-06-11 PROCEDURE — 97110 THERAPEUTIC EXERCISES: CPT | Performed by: PHYSICAL THERAPIST

## 2025-06-11 NOTE — PROGRESS NOTES
Physical Therapy Daily Treatment Note      Patient: Abida Becker   : 1945  Referring practitioner: Rolando Newberry*  Date of Initial Visit: Type: THERAPY  Noted: 2025  Today's Date: 2025  Patient seen for 11 sessions       Visit Diagnoses:    ICD-10-CM ICD-9-CM   1. Arthritis of knee, right  M17.11 716.96   2. Right knee pain, unspecified chronicity  M25.561 719.46   3. History of total right knee replacement  Z96.651 V43.65       Subjective No new complaints. Pt feels she is having less difficulty transferring to standing. HEP going well.     Objective   See Exercise, Manual, and Modality Logs for complete treatment.       Assessment/Plan Ext ROM remains limited but improving.       Timed:         Manual Therapy:    15     mins  86854;     Therapeutic Exercise:    15     mins  31604;     Neuromuscular Luiza:    10    mins  95448;    Therapeutic Activity:          mins  71059;     Gait Training:           mins  98902;     Ultrasound:          mins  20191;    Ionto                                   mins   27619  Self Care                            mins   72413      Un-Timed:  Electrical Stimulation:         mins  66098 ( );  Dry Needling          mins self-pay  Traction          mins 11749  Canalith Repos         mins 35399    Timed Treatment:   40   mins   Total Treatment:     40   mins      Alf Betancourt, PT, PT, DPT, OCS  [place] license: [id number]

## 2025-06-13 ENCOUNTER — TREATMENT (OUTPATIENT)
Dept: PHYSICAL THERAPY | Facility: CLINIC | Age: 80
End: 2025-06-13
Payer: MEDICARE

## 2025-06-13 ENCOUNTER — TRANSCRIBE ORDERS (OUTPATIENT)
Dept: PHYSICAL THERAPY | Facility: CLINIC | Age: 80
End: 2025-06-13
Payer: MEDICARE

## 2025-06-13 DIAGNOSIS — M25.561 RIGHT KNEE PAIN, UNSPECIFIED CHRONICITY: ICD-10-CM

## 2025-06-13 DIAGNOSIS — G89.29 OTHER CHRONIC PAIN: ICD-10-CM

## 2025-06-13 DIAGNOSIS — M54.2 CERVICALGIA: Primary | ICD-10-CM

## 2025-06-13 DIAGNOSIS — M17.11 ARTHRITIS OF KNEE, RIGHT: Primary | ICD-10-CM

## 2025-06-13 DIAGNOSIS — Z96.651 HISTORY OF TOTAL RIGHT KNEE REPLACEMENT: ICD-10-CM

## 2025-06-13 PROCEDURE — 97112 NEUROMUSCULAR REEDUCATION: CPT | Performed by: PHYSICAL THERAPIST

## 2025-06-13 PROCEDURE — 97110 THERAPEUTIC EXERCISES: CPT | Performed by: PHYSICAL THERAPIST

## 2025-06-13 PROCEDURE — 97140 MANUAL THERAPY 1/> REGIONS: CPT | Performed by: PHYSICAL THERAPIST

## 2025-06-13 NOTE — PROGRESS NOTES
Physical Therapy Daily Treatment Note      Patient: Abida Becker   : 1945  Referring practitioner: Rolando Newberry*  Date of Initial Visit: Type: THERAPY  Noted: 2025  Today's Date: 2025  Patient seen for 12 sessions       Visit Diagnoses:    ICD-10-CM ICD-9-CM   1. Arthritis of knee, right  M17.11 716.96   2. Right knee pain, unspecified chronicity  M25.561 719.46   3. History of total right knee replacement  Z96.651 V43.65       Subjective Pain and mobility improving per pt. Pt with new referral for c/o neck pain. HEP going well.     Objective PROM 0-120 deg post visit  AROM 0-2-120 deg  4/5 knee ext and flexion strength  Ambulating with rolling wx, flexed posture  See Exercise, Manual, and Modality Logs for complete treatment.       Assessment/Plan  Pt is making good progress re: her ROM, mobility, and strength. Will plan to evaluate c/o neck pain next visit.    Short term goals, 1 week: Tolerate HEP progression. met  Voice compliance with activity modification. met  Report improvement in symptoms.  met     LTGs, 6 weeks: Score at 85% or better on knee outcomes survey. progressing  Ambulate 20 min or more without needing assistive device. Not met  5/5 knee ext and flexion strength to allow squatting and safe stair climbing. progressing  AROM to be 0-110 deg to allow safe ambulation. Partially met  Standing balance to be equal to L LE. progressing  Independent with HEP. Progressing    Continue 2-3 times per week for 3 weeks pending neck evaluation. POC will be updated at that point to reflect those new impairments.    Timed:         Manual Therapy:    10     mins  33182;     Therapeutic Exercise:    15     mins  12637;     Neuromuscular Luiza:    15    mins  01074;    Therapeutic Activity:          mins  31802;     Gait Training:           mins  31088;     Ultrasound:          mins  77078;    Ionto                                   mins   84181  Self Care                            mins    29613      Un-Timed:  Electrical Stimulation:         mins  36007 ( );  Dry Needling          mins self-pay  Traction          mins 64270  Canalith Repos         mins 99694    Timed Treatment:   40   mins   Total Treatment:     40   mins      Alf Betancourt PT, PT, DPT, OCS  [place] license: [id number]

## 2025-06-16 ENCOUNTER — TREATMENT (OUTPATIENT)
Dept: PHYSICAL THERAPY | Facility: CLINIC | Age: 80
End: 2025-06-16
Payer: MEDICARE

## 2025-06-16 DIAGNOSIS — Z96.651 HISTORY OF TOTAL RIGHT KNEE REPLACEMENT: ICD-10-CM

## 2025-06-16 DIAGNOSIS — M25.561 RIGHT KNEE PAIN, UNSPECIFIED CHRONICITY: ICD-10-CM

## 2025-06-16 DIAGNOSIS — M17.11 ARTHRITIS OF KNEE, RIGHT: Primary | ICD-10-CM

## 2025-06-16 DIAGNOSIS — M54.2 PAIN, NECK: ICD-10-CM

## 2025-06-16 PROCEDURE — 97140 MANUAL THERAPY 1/> REGIONS: CPT | Performed by: PHYSICAL THERAPIST

## 2025-06-16 PROCEDURE — 97110 THERAPEUTIC EXERCISES: CPT | Performed by: PHYSICAL THERAPIST

## 2025-06-16 PROCEDURE — 97112 NEUROMUSCULAR REEDUCATION: CPT | Performed by: PHYSICAL THERAPIST

## 2025-06-16 PROCEDURE — 97164 PT RE-EVAL EST PLAN CARE: CPT | Performed by: PHYSICAL THERAPIST

## 2025-06-16 NOTE — PROGRESS NOTES
"Physical Therapy Initial Evaluation and Plan of Care  [address] [facility] [place] [address]     Patient: Abida Becker   : 1945  Diagnosis/ICD-10 Code:  Arthritis of knee, right [M17.11]  Referring practitioner: JW Reardon  Date of Initial Visit: 2025  Today's Date: 2025  Patient seen for 13 session         Visit Diagnoses:    ICD-10-CM ICD-9-CM   1. Arthritis of knee, right  M17.11 716.96   2. Pain, neck  M54.2 723.1   3. Right knee pain, unspecified chronicity  M25.561 719.46   4. History of total right knee replacement  Z96.651 V43.65         Subjective Questionnaire: NDI:36%  Knee outcomes score 55%      Subjective 79 yo female with c/o neck pain. Pt currently being treated here for s/p R TKA by Dr. Newberry as well. Pt with new referral for c/o neck pain. Reports multi level thoracic spine fusion in , pt feels her neck pain began around that time but she is unsure. She states she feels her \"tendons are pulling apart and about to pop.\" Symptoms worsened after her neck pain. Pt has had difficulty with transferring from sitting to standing since before her 2011 surgery but this has recently improved with her TKA rehab. Pt feels her neck pain is secondary to her posture, which has been impaired before . Primary neck pain is between her scapula, her posterior neck, and into her suboccipital area. Denies HA. She does have pain along the lateral UEs, denies numbness/tingling. Symptoms worsen with standing or when trying to hold her head up. Symptoms improve with reclining and \"massage.\" She reports she recently started an oral steroid as well, which has helped. 2/10 pain now and 8/10 at worst. She has more difficulty with cooking or baking due to neck pain, she was able to do this prior to her TKA in 25. Denies dizziness. She reports she has had some hardware complications related to \"a clamp that sprung open.\" She reports there is no plan to correct this due to possible " complications. She is no longer being followed by a spine specialist.   NP follow up: prn      Objective          Active Range of Motion   Cervical/Thoracic Spine   Cervical    Flexion: WFL  Extension: 20 degrees with pain  Left lateral flexion: with pain  Right lateral flexion: 10 degrees with pain  Left rotation: WFL  Right rotation: 20 degrees       Knee PROM 0-120 deg post visit  Knee AROM 0-2-120 deg  4/5 knee ext and flexion strength  Hip ext strength 3/5 bilaterally  Abduction strength 3/5 bilaterally  Ambulating with rolling wx, flexed posture  Bulging deformity on the right side of the superior thoracic spine.  Posture is very flexed and guarded. Head is rotated to the right and cervical spine extended to accommodate flexed posture.     Assessment & Plan       Assessment  Impairments: abnormal gait, abnormal or restricted ROM, activity intolerance, impaired balance, impaired physical strength, lacks appropriate home exercise program and pain with function   Functional limitations: carrying objects, lifting, walking, pulling, pushing, uncomfortable because of pain, standing, stooping, reaching behind back, reaching overhead and unable to perform repetitive tasks   Assessment details: 79 yo female with c/o neck pain. Pt also being treated here s/p R TKA as well. Pt is with a good response to treatment this date and would benefit from further evaluation and treatment to address the above impairments. Pt with significant and long term neck impairments that were apparently exacerbated after the TKA per her understanding. Underlying cervical spine pain and impairment exacerbation likely secondary to worsening in LE weakness and subsequent difficulty maintaining upright posture. Pt with significant cervical spine extension compensation due to flexed posture.  Prognosis: fair  Prognosis details: Pt with fair prognosis to return to prior level of function, which includes significant neck impairment    Goals  Plan  Goals: Short term goals, 1 week: Tolerate HEP progression.  Voice compliance with activity modification.  Report improvement in symptoms.    New LTGs, 5 weeks: Improve NDI to 24%.  Cspine AROM to approach wfl and with moderate pain provocation with testing.  Rate worst pain at 5/10 with daily activities.  Hip extension strength to be 4/5 to allow upright standing.  Independent with HEP.    Score at 85% or better on knee outcomes survey. progressing  Ambulate 20 min or more without needing assistive device. Not met  5/5 knee ext and flexion strength to allow squatting and safe stair climbing. progressing  AROM to be 0-110 deg to allow safe ambulation. Partially met  Standing balance to be equal to L LE. progressing  Independent with HEP. Progressing      Plan  Therapy options: will be seen for skilled therapy services  Other planned modality interventions: modalities prn  Planned therapy interventions: flexibility, body mechanics training, functional ROM exercises, home exercise program, manual therapy, neuromuscular re-education, postural training, strengthening, stretching and therapeutic activities  Frequency: 3x week  Duration in weeks: 5  Treatment plan discussed with: patient  Plan details: 30 visits per POC, 90 day certification period         History # of Personal Factors and/or Comorbidities: HIGH (3+)  Examination of Body System(s): # of elements: HIGH (4+)  Clinical Presentation: EVOLVING  Clinical Decision Making: HIGH      Timed:         Manual Therapy:    15     mins  63222;     Therapeutic Exercise:    15     mins  84569;     Neuromuscular Luiza:    10    mins  01234;    Therapeutic Activity:          mins  58373;     Gait Training:           mins  80258;     Ultrasound:          mins  84887;    Ionto                                   mins   25009  Self Care                            mins   66555      Un-Timed:  Electrical Stimulation:         mins  95762 ( );  Dry Needling          mins  self-pay  Traction          mins 69460  Low Eval          Mins 77943  Mod Eval          Mins 51100  High Eval                            Mins 75974  Re-Eval     20     Mins 60123  Canalith Repos         mins 23060      Timed Treatment:   40   mins   Total Treatment:     60   mins          PT: Alf Betancourt PT     [place] license: [id number]  Electronically signed by Alf Betancourt, PT, 06/16/25, 7:36 AM EDT    Certification Period: 6/16/2025 thru 9/13/2025  I certify that the therapy services are furnished while this patient is under my care.  The services outlined above are required by this patient, and will be reviewed every 90 days.         Physician Signature:__________________________________________________    PHYSICIAN: Thelma Kilgore APRN  NPI: [id number]                                      DATE:      Please sign and return via fax to [email or web address] . Thank you, [facility].

## 2025-06-18 ENCOUNTER — TREATMENT (OUTPATIENT)
Dept: PHYSICAL THERAPY | Facility: CLINIC | Age: 80
End: 2025-06-18
Payer: MEDICARE

## 2025-06-18 DIAGNOSIS — Z96.651 HISTORY OF TOTAL RIGHT KNEE REPLACEMENT: ICD-10-CM

## 2025-06-18 DIAGNOSIS — M17.11 ARTHRITIS OF KNEE, RIGHT: Primary | ICD-10-CM

## 2025-06-18 DIAGNOSIS — M54.2 PAIN, NECK: ICD-10-CM

## 2025-06-18 DIAGNOSIS — M25.561 RIGHT KNEE PAIN, UNSPECIFIED CHRONICITY: ICD-10-CM

## 2025-06-18 PROCEDURE — 97140 MANUAL THERAPY 1/> REGIONS: CPT | Performed by: PHYSICAL THERAPIST

## 2025-06-18 PROCEDURE — 97110 THERAPEUTIC EXERCISES: CPT | Performed by: PHYSICAL THERAPIST

## 2025-06-18 PROCEDURE — 97112 NEUROMUSCULAR REEDUCATION: CPT | Performed by: PHYSICAL THERAPIST

## 2025-06-18 NOTE — PROGRESS NOTES
Physical Therapy Daily Treatment Note      Patient: Abida Becker   : 1945  Referring practitioner: Rolando Newberry*  Date of Initial Visit: Type: THERAPY  Noted: 2025    Type: THERAPY  Noted: 2025  Today's Date: 2025  Patient seen for 14 sessions       Visit Diagnoses:    ICD-10-CM ICD-9-CM   1. Arthritis of knee, right  M17.11 716.96   2. Pain, neck  M54.2 723.1   3. Right knee pain, unspecified chronicity  M25.561 719.46   4. History of total right knee replacement  Z96.651 V43.65       Subjective Neck pain improving. HEP going well. Feeling stronger.    Objective   See Exercise, Manual, and Modality Logs for complete treatment.       Assessment/Plan Good response to treatment today.      Timed:         Manual Therapy:    15     mins  39550;     Therapeutic Exercise:    23     mins  04598;     Neuromuscular Luiza:    15    mins  53755;    Therapeutic Activity:          mins  56914;     Gait Training:           mins  17109;     Ultrasound:          mins  17944;    Ionto                                   mins   09165  Self Care                            mins   71420      Un-Timed:  Electrical Stimulation:         mins  68016 ( );  Dry Needling          mins self-pay  Traction          mins 10271  Canalith Repos         mins 42182    Timed Treatment:   53   mins   Total Treatment:     53   mins      Alf Betancourt PT, PT, DPT, OCS  [place] license: [id number]

## 2025-06-20 ENCOUNTER — TREATMENT (OUTPATIENT)
Dept: PHYSICAL THERAPY | Facility: CLINIC | Age: 80
End: 2025-06-20
Payer: MEDICARE

## 2025-06-20 DIAGNOSIS — M17.11 ARTHRITIS OF KNEE, RIGHT: Primary | ICD-10-CM

## 2025-06-20 DIAGNOSIS — M54.2 PAIN, NECK: ICD-10-CM

## 2025-06-20 DIAGNOSIS — Z96.651 HISTORY OF TOTAL RIGHT KNEE REPLACEMENT: ICD-10-CM

## 2025-06-20 DIAGNOSIS — M25.561 RIGHT KNEE PAIN, UNSPECIFIED CHRONICITY: ICD-10-CM

## 2025-06-20 PROCEDURE — 97110 THERAPEUTIC EXERCISES: CPT | Performed by: PHYSICAL THERAPIST

## 2025-06-20 PROCEDURE — 97140 MANUAL THERAPY 1/> REGIONS: CPT | Performed by: PHYSICAL THERAPIST

## 2025-06-20 PROCEDURE — 97112 NEUROMUSCULAR REEDUCATION: CPT | Performed by: PHYSICAL THERAPIST

## 2025-06-20 NOTE — PROGRESS NOTES
Physical Therapy Daily Treatment Note      Patient: Abida Becker   : 1945  Referring practitioner: Rolando Newberry*  Date of Initial Visit: Type: THERAPY  Noted: 2025    Type: THERAPY  Noted: 2025  Today's Date: 2025  Patient seen for 15 sessions       Visit Diagnoses:    ICD-10-CM ICD-9-CM   1. Arthritis of knee, right  M17.11 716.96   2. Pain, neck  M54.2 723.1   3. Right knee pain, unspecified chronicity  M25.561 719.46   4. History of total right knee replacement  Z96.651 V43.65       Subjective Primarily limited by neck pain today. HEP going well.    Objective   See Exercise, Manual, and Modality Logs for complete treatment.       Assessment/Plan Gait is improving. Neck pain improved post visit.      Timed:         Manual Therapy:    15     mins  22589;     Therapeutic Exercise:    25     mins  01269;     Neuromuscular Luiza:    15    mins  09894;    Therapeutic Activity:          mins  48883;     Gait Training:           mins  54373;     Ultrasound:          mins  39023;    Ionto                                   mins   71986  Self Care                            mins   93214      Un-Timed:  Electrical Stimulation:         mins  81711 ( );  Dry Needling          mins self-pay  Traction          mins 13424  Canalith Repos         mins 39156    Timed Treatment:   55   mins   Total Treatment:     55   mins      Alf Betancourt PT, PT, DPT, OCS  [place] license: [id number]

## 2025-06-25 ENCOUNTER — OFFICE (OUTPATIENT)
Dept: URBAN - METROPOLITAN AREA CLINIC 64 | Facility: CLINIC | Age: 80
End: 2025-06-25
Payer: MEDICARE

## 2025-06-25 ENCOUNTER — TREATMENT (OUTPATIENT)
Dept: PHYSICAL THERAPY | Facility: CLINIC | Age: 80
End: 2025-06-25
Payer: MEDICARE

## 2025-06-25 VITALS
WEIGHT: 161 LBS | HEIGHT: 67 IN | HEART RATE: 75 BPM | DIASTOLIC BLOOD PRESSURE: 53 MMHG | SYSTOLIC BLOOD PRESSURE: 100 MMHG

## 2025-06-25 DIAGNOSIS — Z96.651 HISTORY OF TOTAL RIGHT KNEE REPLACEMENT: ICD-10-CM

## 2025-06-25 DIAGNOSIS — M17.11 ARTHRITIS OF KNEE, RIGHT: Primary | ICD-10-CM

## 2025-06-25 DIAGNOSIS — R10.9 UNSPECIFIED ABDOMINAL PAIN: ICD-10-CM

## 2025-06-25 DIAGNOSIS — M54.2 PAIN, NECK: ICD-10-CM

## 2025-06-25 DIAGNOSIS — M25.561 RIGHT KNEE PAIN, UNSPECIFIED CHRONICITY: ICD-10-CM

## 2025-06-25 DIAGNOSIS — R19.4 CHANGE IN BOWEL HABIT: ICD-10-CM

## 2025-06-25 PROCEDURE — 97110 THERAPEUTIC EXERCISES: CPT | Performed by: PHYSICAL THERAPIST

## 2025-06-25 PROCEDURE — 97112 NEUROMUSCULAR REEDUCATION: CPT | Performed by: PHYSICAL THERAPIST

## 2025-06-25 PROCEDURE — 99213 OFFICE O/P EST LOW 20 MIN: CPT | Performed by: NURSE PRACTITIONER

## 2025-06-25 PROCEDURE — 97140 MANUAL THERAPY 1/> REGIONS: CPT | Performed by: PHYSICAL THERAPIST

## 2025-06-25 NOTE — PROGRESS NOTES
Physical Therapy Daily Treatment Note      Patient: Abida Becker   : 1945  Referring practitioner: Rolando Newberry*  Date of Initial Visit: Type: THERAPY  Noted: 2025    Type: THERAPY  Noted: 2025  Today's Date: 2025  Patient seen for 16 sessions       Visit Diagnoses:    ICD-10-CM ICD-9-CM   1. Arthritis of knee, right  M17.11 716.96   2. Pain, neck  M54.2 723.1   3. Right knee pain, unspecified chronicity  M25.561 719.46   4. History of total right knee replacement  Z96.651 V43.65       Subjective No new complaints. Knee pain and stiffness is improving. HEP going well.    Objective   See Exercise, Manual, and Modality Logs for complete treatment.   AROM 0-3-130 deg    Assessment/Plan Good response to treatment today. Less difficulty with transfers.      Timed:         Manual Therapy:    15     mins  55067;     Therapeutic Exercise:    30     mins  86489;     Neuromuscular Luiza:    8    mins  63068;    Therapeutic Activity:          mins  17173;     Gait Training:           mins  01548;     Ultrasound:          mins  67689;    Ionto                                   mins   48646  Self Care                            mins   64901      Un-Timed:  Electrical Stimulation:         mins  76055 ( );  Dry Needling          mins self-pay  Traction          mins 43668  Canalith Repos         mins 83198    Timed Treatment:   53   mins   Total Treatment:     53   mins      Alf Betancourt, PT, PT, DPT, OCS  [place] license: [id number]

## 2025-06-27 ENCOUNTER — TREATMENT (OUTPATIENT)
Dept: PHYSICAL THERAPY | Facility: CLINIC | Age: 80
End: 2025-06-27
Payer: MEDICARE

## 2025-06-27 DIAGNOSIS — Z96.651 HISTORY OF TOTAL RIGHT KNEE REPLACEMENT: ICD-10-CM

## 2025-06-27 DIAGNOSIS — M25.561 RIGHT KNEE PAIN, UNSPECIFIED CHRONICITY: ICD-10-CM

## 2025-06-27 DIAGNOSIS — M54.2 PAIN, NECK: ICD-10-CM

## 2025-06-27 DIAGNOSIS — M17.11 ARTHRITIS OF KNEE, RIGHT: Primary | ICD-10-CM

## 2025-06-27 PROCEDURE — 97110 THERAPEUTIC EXERCISES: CPT | Performed by: PHYSICAL THERAPIST

## 2025-06-27 PROCEDURE — 97140 MANUAL THERAPY 1/> REGIONS: CPT | Performed by: PHYSICAL THERAPIST

## 2025-06-27 PROCEDURE — 97112 NEUROMUSCULAR REEDUCATION: CPT | Performed by: PHYSICAL THERAPIST

## 2025-06-27 NOTE — PROGRESS NOTES
Physical Therapy Daily Treatment Note      Patient: Abida Becker   : 1945  Referring practitioner: Rolando Newberry*  Date of Initial Visit: Type: THERAPY  Noted: 2025    Type: THERAPY  Noted: 2025  Today's Date: 2025  Patient seen for 17 sessions       Visit Diagnoses:    ICD-10-CM ICD-9-CM   1. Arthritis of knee, right  M17.11 716.96   2. Pain, neck  M54.2 723.1   3. Right knee pain, unspecified chronicity  M25.561 719.46   4. History of total right knee replacement  Z96.651 V43.65       Subjective Still limited by neck pain but pt feels this is improving. HEP going well.     Objective   See Exercise, Manual, and Modality Logs for complete treatment.       Assessment/Plan Good response to treatment today. Mobility is improving.      Timed:         Manual Therapy:    23     mins  72683;     Therapeutic Exercise:    15     mins  06147;     Neuromuscular Luiza:    15    mins  03252;    Therapeutic Activity:          mins  25195;     Gait Training:           mins  39443;     Ultrasound:          mins  36943;    Ionto                                   mins   93096  Self Care                            mins   67012      Un-Timed:  Electrical Stimulation:         mins  54765 ( );  Dry Needling          mins self-pay  Traction          mins 36604  Canalith Repos         mins 76265    Timed Treatment:   53   mins   Total Treatment:     53   mins      Alf Betancourt PT, PT, DPT, OCS  [place] license: [id number]

## 2025-07-02 ENCOUNTER — TREATMENT (OUTPATIENT)
Dept: PHYSICAL THERAPY | Facility: CLINIC | Age: 80
End: 2025-07-02
Payer: MEDICARE

## 2025-07-02 DIAGNOSIS — M17.11 ARTHRITIS OF KNEE, RIGHT: Primary | ICD-10-CM

## 2025-07-02 DIAGNOSIS — Z96.651 HISTORY OF TOTAL RIGHT KNEE REPLACEMENT: ICD-10-CM

## 2025-07-02 DIAGNOSIS — M25.561 RIGHT KNEE PAIN, UNSPECIFIED CHRONICITY: ICD-10-CM

## 2025-07-02 DIAGNOSIS — M54.2 PAIN, NECK: ICD-10-CM

## 2025-07-02 PROCEDURE — 97110 THERAPEUTIC EXERCISES: CPT | Performed by: PHYSICAL THERAPIST

## 2025-07-02 PROCEDURE — 97112 NEUROMUSCULAR REEDUCATION: CPT | Performed by: PHYSICAL THERAPIST

## 2025-07-02 PROCEDURE — 97140 MANUAL THERAPY 1/> REGIONS: CPT | Performed by: PHYSICAL THERAPIST

## 2025-07-02 NOTE — PROGRESS NOTES
Physical Therapy Daily Progress Note      Patient: Abida Becker   : 1945  Diagnosis/ICD-10 Code:  Arthritis of knee, right [M17.11]  Referring practitioner: Rolando Newberry*  Date of Initial Visit: Type: THERAPY  Noted: 2025    Type: THERAPY  Noted: 2025  Today's Date: 2025  Patient seen for 18 sessions             Subjective Knee is only hurting half as bad as her neck.      Objective   See Exercise, Manual, and Modality Logs for complete treatment.       Assessment/Plan  Pt responded very well to manual techniques to her cervical/suboccipital region, stating the STM hurt but felt at the same time.  Pt demonstrates good understanding and tolerance with exercises.    Progress per Plan of Care           Timed:         Manual Therapy:    25     mins  44265;     Therapeutic Exercise:    15     mins  26250;     Neuromuscular Luiza:    15    mins  99765;        Timed Treatment:   55   mins   Total Treatment:     55   mins        Claudio Good PTA  Physical Therapist Assistant

## 2025-07-09 ENCOUNTER — TREATMENT (OUTPATIENT)
Dept: PHYSICAL THERAPY | Facility: CLINIC | Age: 80
End: 2025-07-09
Payer: MEDICARE

## 2025-07-09 DIAGNOSIS — Z96.651 HISTORY OF TOTAL RIGHT KNEE REPLACEMENT: ICD-10-CM

## 2025-07-09 DIAGNOSIS — M54.2 PAIN, NECK: ICD-10-CM

## 2025-07-09 DIAGNOSIS — M17.11 ARTHRITIS OF KNEE, RIGHT: Primary | ICD-10-CM

## 2025-07-09 DIAGNOSIS — M25.561 RIGHT KNEE PAIN, UNSPECIFIED CHRONICITY: ICD-10-CM

## 2025-07-09 NOTE — PROGRESS NOTES
Physical Therapy Daily Progress Note      Patient: Abida Becker   : 1945  Diagnosis/ICD-10 Code:  Arthritis of knee, right [M17.11]  Referring practitioner: Rolando Newberry*  Date of Initial Visit: Type: THERAPY  Noted: 2025  Today's Date: 2025  Patient seen for 7 sessions             Subjective Pt had x-ray of c-spine yesterday.  She says MD is wanting to give her an injection in her c-spine but she is not sure when this will happen    Objective   See Exercise, Manual, and Modality Logs for complete treatment.       Assessment/Plan  Responded well to exercises and manual techniques.  Held t-band UE exercises per pt request as she says her hands hurt to  the band.    Progress per Plan of Care           Timed:         Manual Therapy:    23     mins  81380;     Therapeutic Exercise:    15     mins  47474;     Neuromuscular Luiza:    15    mins  15422;        Timed Treatment:   53   mins   Total Treatment:     53   mins        Claudio Good PTA  Physical Therapist Assistant

## 2025-07-11 ENCOUNTER — TREATMENT (OUTPATIENT)
Dept: PHYSICAL THERAPY | Facility: CLINIC | Age: 80
End: 2025-07-11
Payer: MEDICARE

## 2025-07-11 DIAGNOSIS — Z96.651 HISTORY OF TOTAL RIGHT KNEE REPLACEMENT: ICD-10-CM

## 2025-07-11 DIAGNOSIS — M25.561 RIGHT KNEE PAIN, UNSPECIFIED CHRONICITY: ICD-10-CM

## 2025-07-11 DIAGNOSIS — M17.11 ARTHRITIS OF KNEE, RIGHT: ICD-10-CM

## 2025-07-11 DIAGNOSIS — M54.2 PAIN, NECK: Primary | ICD-10-CM

## 2025-07-11 NOTE — PROGRESS NOTES
Physical Therapy Daily Progress Note      Patient: Abida Becker   : 1945  Diagnosis/ICD-10 Code:  Pain, neck [M54.2]  Referring practitioner: Rolando Newberry*  Date of Initial Visit: Type: THERAPY  Noted: 2025    Type: THERAPY  Noted: 2025  Today's Date: 2025  Patient seen for 19 sessions             Subjective Continues to have pain in neck and has been having more pain in her left knee, thinking that she is ready to have surgery on that one soon.  Pt needs to leave PT early today.    Objective   See Exercise, Manual, and Modality Logs for complete treatment.       Assessment/Plan  Pt responds well to suboccipital release.  However, she still presents with forward posture and shifting of cervical spine.    Progress per Plan of Care           Timed:         Manual Therapy:    26     mins  03779;     Therapeutic Exercise:    12     mins  10209;       Timed Treatment:   38   mins   Total Treatment:     38   mins        Claudio Good PTA  Physical Therapist Assistant

## 2025-07-16 ENCOUNTER — TREATMENT (OUTPATIENT)
Dept: PHYSICAL THERAPY | Facility: CLINIC | Age: 80
End: 2025-07-16
Payer: MEDICARE

## 2025-07-16 DIAGNOSIS — Z96.651 HISTORY OF TOTAL RIGHT KNEE REPLACEMENT: ICD-10-CM

## 2025-07-16 DIAGNOSIS — M17.11 ARTHRITIS OF KNEE, RIGHT: ICD-10-CM

## 2025-07-16 DIAGNOSIS — M25.561 RIGHT KNEE PAIN, UNSPECIFIED CHRONICITY: ICD-10-CM

## 2025-07-16 DIAGNOSIS — M54.2 PAIN, NECK: Primary | ICD-10-CM

## 2025-07-16 PROCEDURE — 97110 THERAPEUTIC EXERCISES: CPT | Performed by: PHYSICAL THERAPIST

## 2025-07-16 PROCEDURE — 97140 MANUAL THERAPY 1/> REGIONS: CPT | Performed by: PHYSICAL THERAPIST

## 2025-07-16 PROCEDURE — 97112 NEUROMUSCULAR REEDUCATION: CPT | Performed by: PHYSICAL THERAPIST

## 2025-07-16 NOTE — PROGRESS NOTES
Physical Therapy Daily Treatment Note      Patient: Abida Becker   : 1945  Referring practitioner: Rolando Newberry*  Date of Initial Visit: Type: THERAPY  Noted: 2025    Type: THERAPY  Noted: 2025  Today's Date: 2025  Patient seen for 20 sessions       Visit Diagnoses:    ICD-10-CM ICD-9-CM   1. Pain, neck  M54.2 723.1   2. Arthritis of knee, right  M17.11 716.96   3. Right knee pain, unspecified chronicity  M25.561 719.46   4. History of total right knee replacement  Z96.651 V43.65       Subjective Primarily limited by neck pain. No new complaints or changes in exacerbating factors. HEP going well.     Objective   See Exercise, Manual, and Modality Logs for complete treatment.       Assessment/Plan Neck pain improved post visit. Fatigued post visit.      Timed:         Manual Therapy:    15     mins  16464;     Therapeutic Exercise:    23     mins  99287;     Neuromuscular Luiza:    15    mins  54060;    Therapeutic Activity:          mins  16349;     Gait Training:           mins  04806;     Ultrasound:          mins  24270;    Ionto                                   mins   51848  Self Care                            mins   08148      Un-Timed:  Electrical Stimulation:         mins  62468 ( );  Dry Needling          mins self-pay  Traction          mins 52595  Canalith Repos         mins 82168    Timed Treatment:   53   mins   Total Treatment:     53   mins      Alf Betancourt, PT, PT, DPT, OCS  [place] license: [id number]

## 2025-07-21 ENCOUNTER — TREATMENT (OUTPATIENT)
Dept: PHYSICAL THERAPY | Facility: CLINIC | Age: 80
End: 2025-07-21
Payer: MEDICARE

## 2025-07-21 DIAGNOSIS — M17.11 ARTHRITIS OF KNEE, RIGHT: ICD-10-CM

## 2025-07-21 DIAGNOSIS — M25.561 RIGHT KNEE PAIN, UNSPECIFIED CHRONICITY: ICD-10-CM

## 2025-07-21 DIAGNOSIS — M54.2 PAIN, NECK: Primary | ICD-10-CM

## 2025-07-21 DIAGNOSIS — Z96.651 HISTORY OF TOTAL RIGHT KNEE REPLACEMENT: ICD-10-CM

## 2025-07-21 PROCEDURE — 97140 MANUAL THERAPY 1/> REGIONS: CPT | Performed by: PHYSICAL THERAPIST

## 2025-07-21 PROCEDURE — 97112 NEUROMUSCULAR REEDUCATION: CPT | Performed by: PHYSICAL THERAPIST

## 2025-07-21 PROCEDURE — 97110 THERAPEUTIC EXERCISES: CPT | Performed by: PHYSICAL THERAPIST

## 2025-07-21 NOTE — PROGRESS NOTES
Physical Therapy Daily Treatment Note      Patient: Abida Becker   : 1945  Referring practitioner: Rolando Newberry*  Date of Initial Visit: Type: THERAPY  Noted: 2025    Type: THERAPY  Noted: 2025  Today's Date: 2025  Patient seen for 21 sessions       Visit Diagnoses:    ICD-10-CM ICD-9-CM   1. Pain, neck  M54.2 723.1   2. Arthritis of knee, right  M17.11 716.96   3. Right knee pain, unspecified chronicity  M25.561 719.46   4. History of total right knee replacement  Z96.651 V43.65       Subjective Neck symptoms improve post visit. Feels HEP is going well.    Objective   See Exercise, Manual, and Modality Logs for complete treatment.       Assessment/Plan Good response to treatment. Fatigue post visit.      Timed:         Manual Therapy:    23     mins  01134;     Therapeutic Exercise:    15     mins  99226;     Neuromuscular Luiza:    15    mins  77543;    Therapeutic Activity:          mins  57273;     Gait Training:           mins  84849;     Ultrasound:          mins  99791;    Ionto                                   mins   87799  Self Care                            mins   93344      Un-Timed:  Electrical Stimulation:         mins  82564 ( );  Dry Needling          mins self-pay  Traction          mins 12730  Canalith Repos         mins 04954    Timed Treatment:   53   mins   Total Treatment:     53   mins      Alf Betancourt PT, PT, DPT, OCS  [place] license: [id number]

## 2025-07-23 ENCOUNTER — TREATMENT (OUTPATIENT)
Dept: PHYSICAL THERAPY | Facility: CLINIC | Age: 80
End: 2025-07-23
Payer: MEDICARE

## 2025-07-23 DIAGNOSIS — M17.11 ARTHRITIS OF KNEE, RIGHT: ICD-10-CM

## 2025-07-23 DIAGNOSIS — Z96.651 HISTORY OF TOTAL RIGHT KNEE REPLACEMENT: ICD-10-CM

## 2025-07-23 DIAGNOSIS — M54.2 PAIN, NECK: Primary | ICD-10-CM

## 2025-07-23 DIAGNOSIS — M25.561 RIGHT KNEE PAIN, UNSPECIFIED CHRONICITY: ICD-10-CM

## 2025-07-23 PROCEDURE — 97110 THERAPEUTIC EXERCISES: CPT | Performed by: PHYSICAL THERAPIST

## 2025-07-23 PROCEDURE — 97140 MANUAL THERAPY 1/> REGIONS: CPT | Performed by: PHYSICAL THERAPIST

## 2025-07-23 PROCEDURE — 97112 NEUROMUSCULAR REEDUCATION: CPT | Performed by: PHYSICAL THERAPIST

## 2025-07-23 NOTE — PROGRESS NOTES
Physical Therapy Daily Treatment Note      Patient: Abida Becker   : 1945  Referring practitioner: Rolando Newberry*  Date of Initial Visit: Type: THERAPY  Noted: 2025  Today's Date: 2025  Patient seen for 11 sessions       Visit Diagnoses:    ICD-10-CM ICD-9-CM   1. Pain, neck  M54.2 723.1   2. Arthritis of knee, right  M17.11 716.96   3. Right knee pain, unspecified chronicity  M25.561 719.46   4. History of total right knee replacement  Z96.651 V43.65       Subjective Less difficulty with mobility. Primarily limited by neck pain per pt. HEP going well.    Objective   See Exercise, Manual, and Modality Logs for complete treatment.       Assessment/Plan Good response to treatment today.      Timed:         Manual Therapy:    15     mins  27618;     Therapeutic Exercise:    30     mins  12028;     Neuromuscular Luiza:    8    mins  20603;    Therapeutic Activity:          mins  29826;     Gait Training:           mins  36740;     Ultrasound:          mins  62847;    Ionto                                   mins   69467  Self Care                            mins   60125      Un-Timed:  Electrical Stimulation:         mins  89008 ( );  Dry Needling          mins self-pay  Traction          mins 80729  Canalith Repos         mins 36459    Timed Treatment:   53   mins   Total Treatment:     53   mins      Alf Betancourt PT, PT, DPT, OCS  [place] license: [id number]

## 2025-07-25 ENCOUNTER — TREATMENT (OUTPATIENT)
Dept: PHYSICAL THERAPY | Facility: CLINIC | Age: 80
End: 2025-07-25
Payer: MEDICARE

## 2025-07-25 DIAGNOSIS — M25.561 RIGHT KNEE PAIN, UNSPECIFIED CHRONICITY: ICD-10-CM

## 2025-07-25 DIAGNOSIS — Z96.651 HISTORY OF TOTAL RIGHT KNEE REPLACEMENT: ICD-10-CM

## 2025-07-25 DIAGNOSIS — M17.11 ARTHRITIS OF KNEE, RIGHT: ICD-10-CM

## 2025-07-25 DIAGNOSIS — M54.2 PAIN, NECK: Primary | ICD-10-CM

## 2025-07-25 PROCEDURE — 97112 NEUROMUSCULAR REEDUCATION: CPT | Performed by: PHYSICAL THERAPIST

## 2025-07-25 PROCEDURE — 97110 THERAPEUTIC EXERCISES: CPT | Performed by: PHYSICAL THERAPIST

## 2025-07-25 PROCEDURE — 97140 MANUAL THERAPY 1/> REGIONS: CPT | Performed by: PHYSICAL THERAPIST

## 2025-07-25 NOTE — PROGRESS NOTES
Physical Therapy Daily Treatment Note      Patient: Abida Becker   : 1945  Referring practitioner: Rolando Newberry*  Date of Initial Visit: Type: THERAPY  Noted: 2025    Type: THERAPY  Noted: 2025  Today's Date: 2025  Patient seen for 22 sessions       Visit Diagnoses:    ICD-10-CM ICD-9-CM   1. Pain, neck  M54.2 723.1   2. Arthritis of knee, right  M17.11 716.96   3. Right knee pain, unspecified chronicity  M25.561 719.46   4. History of total right knee replacement  Z96.651 V43.65       Subjective Pt feels her mobility and pain symptoms are improving. HEP going well.     Objective   See Exercise, Manual, and Modality Logs for complete treatment.       Assessment/Plan Good response to treatment today. Pt with less difficulty with transfers. Reassess next week.      Timed:         Manual Therapy:    15     mins  23068;     Therapeutic Exercise:    25     mins  69101;     Neuromuscular Luiza:    15    mins  23532;    Therapeutic Activity:          mins  12480;     Gait Training:           mins  65657;     Ultrasound:          mins  24261;    Ionto                                   mins   80254  Self Care                            mins   57142      Un-Timed:  Electrical Stimulation:         mins  25032 ( );  Dry Needling          mins self-pay  Traction          mins 44889  Canalith Repos         mins 54024    Timed Treatment:   40   mins   Total Treatment:     40   mins      Alf Betancourt PT, PT, DPT, OCS  [place] license: [id number]

## 2025-07-28 ENCOUNTER — TREATMENT (OUTPATIENT)
Dept: PHYSICAL THERAPY | Facility: CLINIC | Age: 80
End: 2025-07-28
Payer: MEDICARE

## 2025-07-28 DIAGNOSIS — M17.11 ARTHRITIS OF KNEE, RIGHT: ICD-10-CM

## 2025-07-28 DIAGNOSIS — M54.2 PAIN, NECK: Primary | ICD-10-CM

## 2025-07-28 DIAGNOSIS — Z96.651 HISTORY OF TOTAL RIGHT KNEE REPLACEMENT: ICD-10-CM

## 2025-07-28 DIAGNOSIS — M25.561 RIGHT KNEE PAIN, UNSPECIFIED CHRONICITY: ICD-10-CM

## 2025-07-28 PROCEDURE — 97140 MANUAL THERAPY 1/> REGIONS: CPT | Performed by: PHYSICAL THERAPIST

## 2025-07-28 PROCEDURE — 97112 NEUROMUSCULAR REEDUCATION: CPT | Performed by: PHYSICAL THERAPIST

## 2025-07-28 PROCEDURE — 97110 THERAPEUTIC EXERCISES: CPT | Performed by: PHYSICAL THERAPIST

## 2025-07-28 NOTE — PROGRESS NOTES
Physical Therapy Daily Treatment Note      Patient: Abida Becker   : 1945  Referring practitioner: Rolando Newberry*  Date of Initial Visit: Type: THERAPY  Noted: 2025  Today's Date: 2025  Patient seen for 13 sessions       Visit Diagnoses:    ICD-10-CM ICD-9-CM   1. Pain, neck  M54.2 723.1   2. Right knee pain, unspecified chronicity  M25.561 719.46   3. History of total right knee replacement  Z96.651 V43.65   4. Arthritis of knee, right  M17.11 716.96       Subjective Neck pain improves with treatment, seeing Dr. Ji this week re: neck pain. HEP going well.     Objective   See Exercise, Manual, and Modality Logs for complete treatment.       Assessment/Plan Good response to treatment. Continue pending spine visit.      Timed:         Manual Therapy:    15     mins  82889;     Therapeutic Exercise:    30     mins  75074;     Neuromuscular Luiza:    10    mins  63942;    Therapeutic Activity:          mins  88416;     Gait Training:           mins  57523;     Ultrasound:          mins  34814;    Ionto                                   mins   28452  Self Care                            mins   29120      Un-Timed:  Electrical Stimulation:         mins  39466 ( );  Dry Needling          mins self-pay  Traction          mins 32013  Canalith Repos         mins 63674    Timed Treatment:   40   mins   Total Treatment:     40   mins      Alf Betancourt PT, PT, DPT, OCS  [place] license: [id number]

## 2025-07-30 ENCOUNTER — TREATMENT (OUTPATIENT)
Dept: PHYSICAL THERAPY | Facility: CLINIC | Age: 80
End: 2025-07-30
Payer: MEDICARE

## 2025-07-30 DIAGNOSIS — I10 ESSENTIAL HYPERTENSION: ICD-10-CM

## 2025-07-30 DIAGNOSIS — M17.11 ARTHRITIS OF KNEE, RIGHT: ICD-10-CM

## 2025-07-30 DIAGNOSIS — M25.561 RIGHT KNEE PAIN, UNSPECIFIED CHRONICITY: ICD-10-CM

## 2025-07-30 DIAGNOSIS — Z96.651 HISTORY OF TOTAL RIGHT KNEE REPLACEMENT: ICD-10-CM

## 2025-07-30 DIAGNOSIS — M54.2 PAIN, NECK: Primary | ICD-10-CM

## 2025-07-30 PROCEDURE — 97110 THERAPEUTIC EXERCISES: CPT | Performed by: PHYSICAL THERAPIST

## 2025-07-30 PROCEDURE — 97140 MANUAL THERAPY 1/> REGIONS: CPT | Performed by: PHYSICAL THERAPIST

## 2025-07-30 RX ORDER — POTASSIUM CHLORIDE 750 MG/1
10 TABLET, EXTENDED RELEASE ORAL EVERY 12 HOURS SCHEDULED
Qty: 180 TABLET | Refills: 0 | Status: SHIPPED | OUTPATIENT
Start: 2025-07-30

## 2025-07-30 NOTE — PROGRESS NOTES
Physical Therapy Daily Treatment Note      Patient: Abida Becker   : 1945  Referring practitioner: Rolando Newberry*  Date of Initial Visit: Type: THERAPY  Noted: 2025  Today's Date: 2025  Patient seen for 14 sessions       Visit Diagnoses:    ICD-10-CM ICD-9-CM   1. Pain, neck  M54.2 723.1   2. Right knee pain, unspecified chronicity  M25.561 719.46   3. History of total right knee replacement  Z96.651 V43.65   4. Arthritis of knee, right  M17.11 716.96       Subjective Pt is referred for cervical injections, this has not been scheduled. No new complaints today. HEP going well.     Objective   See Exercise, Manual, and Modality Logs for complete treatment.       Assessment/Plan Good response to treatment today.      Timed:         Manual Therapy:    30     mins  77721;     Therapeutic Exercise:    23     mins  84006;     Neuromuscular Luiza:        mins  12138;    Therapeutic Activity:          mins  65264;     Gait Training:           mins  08106;     Ultrasound:          mins  30441;    Ionto                                   mins   12219  Self Care                            mins   43861      Un-Timed:  Electrical Stimulation:         mins  40498 ( );  Dry Needling          mins self-pay  Traction          mins 64781  Canalith Repos         mins 11149    Timed Treatment:   53   mins   Total Treatment:     53   mins      Alf Betancourt PT, PT, DPT, OCS  [place] license: [id number]

## 2025-08-01 ENCOUNTER — TREATMENT (OUTPATIENT)
Dept: PHYSICAL THERAPY | Facility: CLINIC | Age: 80
End: 2025-08-01
Payer: MEDICARE

## 2025-08-01 DIAGNOSIS — M54.2 PAIN, NECK: Primary | ICD-10-CM

## 2025-08-01 DIAGNOSIS — M25.561 RIGHT KNEE PAIN, UNSPECIFIED CHRONICITY: ICD-10-CM

## 2025-08-01 DIAGNOSIS — M17.11 ARTHRITIS OF KNEE, RIGHT: ICD-10-CM

## 2025-08-01 DIAGNOSIS — Z96.651 HISTORY OF TOTAL RIGHT KNEE REPLACEMENT: ICD-10-CM

## 2025-08-01 NOTE — PROGRESS NOTES
Physical Therapy Daily Treatment Note      Patient: Abida Becker   : 1945  Referring practitioner: Rolando Newberry*  Date of Initial Visit: Type: THERAPY  Noted: 2025    Type: THERAPY  Noted: 2025  Today's Date: 2025  Patient seen for 23 sessions       Visit Diagnoses:    ICD-10-CM ICD-9-CM   1. Pain, neck  M54.2 723.1   2. Right knee pain, unspecified chronicity  M25.561 719.46   3. History of total right knee replacement  Z96.651 V43.65   4. Arthritis of knee, right  M17.11 716.96       Subjective Doing well re: knee impairments, pt feels therapy is helping to manage neck pain. She is to follow up with pain mgmt re: injections.    Objective   See Exercise, Manual, and Modality Logs for complete treatment.       Assessment/Plan Good response to treatment today.      Timed:         Manual Therapy:    23     mins  20785;     Therapeutic Exercise:    15     mins  69216;     Neuromuscular Luiza:    15    mins  68598;    Therapeutic Activity:          mins  86338;     Gait Training:           mins  74915;     Ultrasound:          mins  91928;    Ionto                                   mins   15608  Self Care                            mins   89781      Un-Timed:  Electrical Stimulation:         mins  07686 ( );  Dry Needling          mins self-pay  Traction          mins 77959  Canalith Repos         mins 97813    Timed Treatment:   53   mins   Total Treatment:     53   mins      Alf Betancourt PT, PT, DPT, OCS  [place] license: [id number]

## 2025-08-04 ENCOUNTER — OFFICE VISIT (OUTPATIENT)
Dept: FAMILY MEDICINE CLINIC | Facility: CLINIC | Age: 80
End: 2025-08-04
Payer: MEDICARE

## 2025-08-04 VITALS
SYSTOLIC BLOOD PRESSURE: 104 MMHG | HEART RATE: 82 BPM | WEIGHT: 164.2 LBS | DIASTOLIC BLOOD PRESSURE: 70 MMHG | HEIGHT: 63 IN | RESPIRATION RATE: 15 BRPM | BODY MASS INDEX: 29.09 KG/M2 | TEMPERATURE: 98.4 F | OXYGEN SATURATION: 97 %

## 2025-08-04 DIAGNOSIS — S80.812A ABRASION OF LEFT LOWER EXTREMITY, INITIAL ENCOUNTER: Primary | ICD-10-CM

## 2025-08-04 PROBLEM — S81.802A WOUND OF LEFT LEG: Status: ACTIVE | Noted: 2025-08-04

## 2025-08-04 PROCEDURE — 1159F MED LIST DOCD IN RCRD: CPT | Performed by: FAMILY MEDICINE

## 2025-08-04 PROCEDURE — 3074F SYST BP LT 130 MM HG: CPT | Performed by: FAMILY MEDICINE

## 2025-08-04 PROCEDURE — 99212 OFFICE O/P EST SF 10 MIN: CPT | Performed by: FAMILY MEDICINE

## 2025-08-04 PROCEDURE — 3078F DIAST BP <80 MM HG: CPT | Performed by: FAMILY MEDICINE

## 2025-08-04 PROCEDURE — 1126F AMNT PAIN NOTED NONE PRSNT: CPT | Performed by: FAMILY MEDICINE

## 2025-08-04 PROCEDURE — 1160F RVW MEDS BY RX/DR IN RCRD: CPT | Performed by: FAMILY MEDICINE

## 2025-08-04 RX ORDER — FLUCONAZOLE 150 MG/1
150 TABLET ORAL DAILY
COMMUNITY
End: 2025-08-04

## 2025-08-05 ENCOUNTER — TREATMENT (OUTPATIENT)
Dept: PHYSICAL THERAPY | Facility: CLINIC | Age: 80
End: 2025-08-05
Payer: MEDICARE

## 2025-08-05 DIAGNOSIS — M25.561 RIGHT KNEE PAIN, UNSPECIFIED CHRONICITY: ICD-10-CM

## 2025-08-05 DIAGNOSIS — I26.94 MULTIPLE SUBSEGMENTAL PULMONARY EMBOLI WITHOUT ACUTE COR PULMONALE: ICD-10-CM

## 2025-08-05 DIAGNOSIS — M17.11 ARTHRITIS OF KNEE, RIGHT: ICD-10-CM

## 2025-08-05 DIAGNOSIS — Z96.651 HISTORY OF TOTAL RIGHT KNEE REPLACEMENT: ICD-10-CM

## 2025-08-05 DIAGNOSIS — M54.2 PAIN, NECK: Primary | ICD-10-CM

## 2025-08-05 PROCEDURE — 97112 NEUROMUSCULAR REEDUCATION: CPT | Performed by: PHYSICAL THERAPIST

## 2025-08-05 PROCEDURE — 97140 MANUAL THERAPY 1/> REGIONS: CPT | Performed by: PHYSICAL THERAPIST

## 2025-08-05 PROCEDURE — 97110 THERAPEUTIC EXERCISES: CPT | Performed by: PHYSICAL THERAPIST

## 2025-08-07 ENCOUNTER — TREATMENT (OUTPATIENT)
Dept: PHYSICAL THERAPY | Facility: CLINIC | Age: 80
End: 2025-08-07
Payer: MEDICARE

## 2025-08-07 DIAGNOSIS — M54.2 PAIN, NECK: Primary | ICD-10-CM

## 2025-08-07 DIAGNOSIS — M17.11 ARTHRITIS OF KNEE, RIGHT: ICD-10-CM

## 2025-08-07 DIAGNOSIS — Z96.651 HISTORY OF TOTAL RIGHT KNEE REPLACEMENT: ICD-10-CM

## 2025-08-07 DIAGNOSIS — M25.561 RIGHT KNEE PAIN, UNSPECIFIED CHRONICITY: ICD-10-CM

## 2025-08-07 PROCEDURE — 97112 NEUROMUSCULAR REEDUCATION: CPT | Performed by: PHYSICAL THERAPIST

## 2025-08-07 PROCEDURE — 97140 MANUAL THERAPY 1/> REGIONS: CPT | Performed by: PHYSICAL THERAPIST

## 2025-08-07 PROCEDURE — 97110 THERAPEUTIC EXERCISES: CPT | Performed by: PHYSICAL THERAPIST

## 2025-08-11 DIAGNOSIS — I26.94 MULTIPLE SUBSEGMENTAL PULMONARY EMBOLI WITHOUT ACUTE COR PULMONALE: ICD-10-CM

## 2025-08-12 ENCOUNTER — TREATMENT (OUTPATIENT)
Dept: PHYSICAL THERAPY | Facility: CLINIC | Age: 80
End: 2025-08-12
Payer: MEDICARE

## 2025-08-12 DIAGNOSIS — M54.2 PAIN, NECK: Primary | ICD-10-CM

## 2025-08-12 DIAGNOSIS — M25.561 RIGHT KNEE PAIN, UNSPECIFIED CHRONICITY: ICD-10-CM

## 2025-08-12 DIAGNOSIS — Z96.651 HISTORY OF TOTAL RIGHT KNEE REPLACEMENT: ICD-10-CM

## 2025-08-12 DIAGNOSIS — M17.11 ARTHRITIS OF KNEE, RIGHT: ICD-10-CM

## 2025-08-12 PROCEDURE — 97110 THERAPEUTIC EXERCISES: CPT | Performed by: PHYSICAL THERAPIST

## 2025-08-12 PROCEDURE — 97112 NEUROMUSCULAR REEDUCATION: CPT | Performed by: PHYSICAL THERAPIST

## 2025-08-12 PROCEDURE — 97140 MANUAL THERAPY 1/> REGIONS: CPT | Performed by: PHYSICAL THERAPIST

## 2025-08-13 ENCOUNTER — OFFICE VISIT (OUTPATIENT)
Dept: ONCOLOGY | Facility: CLINIC | Age: 80
End: 2025-08-13
Payer: MEDICARE

## 2025-08-13 ENCOUNTER — LAB (OUTPATIENT)
Dept: LAB | Facility: HOSPITAL | Age: 80
End: 2025-08-13
Payer: MEDICARE

## 2025-08-13 VITALS
DIASTOLIC BLOOD PRESSURE: 66 MMHG | WEIGHT: 163 LBS | BODY MASS INDEX: 28.88 KG/M2 | OXYGEN SATURATION: 95 % | SYSTOLIC BLOOD PRESSURE: 112 MMHG | TEMPERATURE: 96.8 F | HEIGHT: 63 IN | HEART RATE: 81 BPM

## 2025-08-13 DIAGNOSIS — D64.9 ANEMIA, UNSPECIFIED TYPE: ICD-10-CM

## 2025-08-13 DIAGNOSIS — I26.94 MULTIPLE SUBSEGMENTAL PULMONARY EMBOLI WITHOUT ACUTE COR PULMONALE: ICD-10-CM

## 2025-08-13 DIAGNOSIS — N91.2 AMENIA: Primary | ICD-10-CM

## 2025-08-13 DIAGNOSIS — I26.94 MULTIPLE SUBSEGMENTAL PULMONARY EMBOLI WITHOUT ACUTE COR PULMONALE: Primary | ICD-10-CM

## 2025-08-13 DIAGNOSIS — D50.9 IRON DEFICIENCY ANEMIA, UNSPECIFIED IRON DEFICIENCY ANEMIA TYPE: ICD-10-CM

## 2025-08-13 DIAGNOSIS — N91.2 AMENIA: ICD-10-CM

## 2025-08-13 LAB
ALBUMIN SERPL-MCNC: 4.3 G/DL (ref 3.5–5.2)
ALBUMIN/GLOB SERPL: 1.7 G/DL
ALP SERPL-CCNC: 82 U/L (ref 39–117)
ALT SERPL W P-5'-P-CCNC: 17 U/L (ref 1–33)
ANION GAP SERPL CALCULATED.3IONS-SCNC: 11.9 MMOL/L (ref 5–15)
AST SERPL-CCNC: 19 U/L (ref 1–32)
BASOPHILS # BLD AUTO: 0.03 10*3/MM3 (ref 0–0.2)
BASOPHILS NFR BLD AUTO: 0.5 % (ref 0–1.5)
BILIRUB SERPL-MCNC: 0.8 MG/DL (ref 0–1.2)
BUN SERPL-MCNC: 13.7 MG/DL (ref 8–23)
BUN/CREAT SERPL: 23.2 (ref 7–25)
CALCIUM SPEC-SCNC: 9.4 MG/DL (ref 8.6–10.5)
CHLORIDE SERPL-SCNC: 103 MMOL/L (ref 98–107)
CO2 SERPL-SCNC: 26.1 MMOL/L (ref 22–29)
CREAT SERPL-MCNC: 0.59 MG/DL (ref 0.57–1)
DEPRECATED RDW RBC AUTO: 50.9 FL (ref 37–54)
EGFRCR SERPLBLD CKD-EPI 2021: 92.4 ML/MIN/1.73
EOSINOPHIL # BLD AUTO: 0.18 10*3/MM3 (ref 0–0.4)
EOSINOPHIL NFR BLD AUTO: 3.1 % (ref 0.3–6.2)
ERYTHROCYTE [DISTWIDTH] IN BLOOD BY AUTOMATED COUNT: 14.8 % (ref 12.3–15.4)
FERRITIN SERPL-MCNC: 310 NG/ML (ref 13–150)
GLOBULIN UR ELPH-MCNC: 2.6 GM/DL
GLUCOSE SERPL-MCNC: 99 MG/DL (ref 65–99)
HCT VFR BLD AUTO: 41.6 % (ref 34–46.6)
HGB BLD-MCNC: 13.3 G/DL (ref 12–15.9)
HOLD SPECIMEN: NORMAL
IMM GRANULOCYTES # BLD AUTO: 0.02 10*3/MM3 (ref 0–0.05)
IMM GRANULOCYTES NFR BLD AUTO: 0.3 % (ref 0–0.5)
IRON 24H UR-MRATE: 94 MCG/DL (ref 37–145)
IRON SATN MFR SERPL: 27 % (ref 20–50)
LYMPHOCYTES # BLD AUTO: 1.51 10*3/MM3 (ref 0.7–3.1)
LYMPHOCYTES NFR BLD AUTO: 25.7 % (ref 19.6–45.3)
MCH RBC QN AUTO: 29.4 PG (ref 26.6–33)
MCHC RBC AUTO-ENTMCNC: 32 G/DL (ref 31.5–35.7)
MCV RBC AUTO: 92 FL (ref 79–97)
MONOCYTES # BLD AUTO: 0.66 10*3/MM3 (ref 0.1–0.9)
MONOCYTES NFR BLD AUTO: 11.2 % (ref 5–12)
NEUTROPHILS NFR BLD AUTO: 3.48 10*3/MM3 (ref 1.7–7)
NEUTROPHILS NFR BLD AUTO: 59.2 % (ref 42.7–76)
PLATELET # BLD AUTO: 394 10*3/MM3 (ref 140–450)
PMV BLD AUTO: 9.6 FL (ref 6–12)
POTASSIUM SERPL-SCNC: 4.5 MMOL/L (ref 3.5–5.2)
PROT SERPL-MCNC: 6.9 G/DL (ref 6–8.5)
RBC # BLD AUTO: 4.52 10*6/MM3 (ref 3.77–5.28)
SODIUM SERPL-SCNC: 141 MMOL/L (ref 136–145)
TIBC SERPL-MCNC: 350 MCG/DL (ref 298–536)
TRANSFERRIN SERPL-MCNC: 235 MG/DL (ref 200–360)
WBC NRBC COR # BLD AUTO: 5.88 10*3/MM3 (ref 3.4–10.8)
WHOLE BLOOD HOLD COAG: NORMAL
WHOLE BLOOD HOLD SPECIMEN: NORMAL

## 2025-08-13 PROCEDURE — 85025 COMPLETE CBC W/AUTO DIFF WBC: CPT

## 2025-08-13 PROCEDURE — 84466 ASSAY OF TRANSFERRIN: CPT | Performed by: NURSE PRACTITIONER

## 2025-08-13 PROCEDURE — 80053 COMPREHEN METABOLIC PANEL: CPT | Performed by: NURSE PRACTITIONER

## 2025-08-13 PROCEDURE — 36415 COLL VENOUS BLD VENIPUNCTURE: CPT

## 2025-08-13 PROCEDURE — 82728 ASSAY OF FERRITIN: CPT | Performed by: NURSE PRACTITIONER

## 2025-08-13 PROCEDURE — 83540 ASSAY OF IRON: CPT | Performed by: NURSE PRACTITIONER

## 2025-08-13 RX ORDER — NITROFURANTOIN 25; 75 MG/1; MG/1
CAPSULE ORAL
COMMUNITY
Start: 2025-08-05

## 2025-08-14 ENCOUNTER — TREATMENT (OUTPATIENT)
Dept: PHYSICAL THERAPY | Facility: CLINIC | Age: 80
End: 2025-08-14
Payer: MEDICARE

## 2025-08-14 DIAGNOSIS — Z96.651 HISTORY OF TOTAL RIGHT KNEE REPLACEMENT: ICD-10-CM

## 2025-08-14 DIAGNOSIS — M17.11 ARTHRITIS OF KNEE, RIGHT: ICD-10-CM

## 2025-08-14 DIAGNOSIS — M25.561 RIGHT KNEE PAIN, UNSPECIFIED CHRONICITY: ICD-10-CM

## 2025-08-14 DIAGNOSIS — M54.2 PAIN, NECK: Primary | ICD-10-CM

## 2025-08-14 PROCEDURE — 97140 MANUAL THERAPY 1/> REGIONS: CPT | Performed by: PHYSICAL THERAPIST

## 2025-08-14 PROCEDURE — 97112 NEUROMUSCULAR REEDUCATION: CPT | Performed by: PHYSICAL THERAPIST

## 2025-08-14 PROCEDURE — 97110 THERAPEUTIC EXERCISES: CPT | Performed by: PHYSICAL THERAPIST

## 2025-08-19 ENCOUNTER — TREATMENT (OUTPATIENT)
Dept: PHYSICAL THERAPY | Facility: CLINIC | Age: 80
End: 2025-08-19
Payer: MEDICARE

## 2025-08-19 DIAGNOSIS — M17.11 ARTHRITIS OF KNEE, RIGHT: ICD-10-CM

## 2025-08-19 DIAGNOSIS — M54.2 PAIN, NECK: Primary | ICD-10-CM

## 2025-08-19 DIAGNOSIS — M25.561 RIGHT KNEE PAIN, UNSPECIFIED CHRONICITY: ICD-10-CM

## 2025-08-19 DIAGNOSIS — Z96.651 HISTORY OF TOTAL RIGHT KNEE REPLACEMENT: ICD-10-CM

## 2025-08-21 ENCOUNTER — TELEPHONE (OUTPATIENT)
Dept: FAMILY MEDICINE CLINIC | Facility: CLINIC | Age: 80
End: 2025-08-21
Payer: MEDICARE

## 2025-08-23 ENCOUNTER — LAB (OUTPATIENT)
Dept: LAB | Facility: HOSPITAL | Age: 80
End: 2025-08-23
Payer: MEDICARE

## 2025-08-23 DIAGNOSIS — R21 RASH: ICD-10-CM

## 2025-08-23 LAB
BASOPHILS # BLD AUTO: 0.04 10*3/MM3 (ref 0–0.2)
BASOPHILS NFR BLD AUTO: 0.4 % (ref 0–1.5)
CHROMATIN AB SERPL-ACNC: <10 IU/ML (ref 0–14)
DEPRECATED RDW RBC AUTO: 50.4 FL (ref 37–54)
EOSINOPHIL # BLD AUTO: 0.04 10*3/MM3 (ref 0–0.4)
EOSINOPHIL NFR BLD AUTO: 0.4 % (ref 0.3–6.2)
ERYTHROCYTE [DISTWIDTH] IN BLOOD BY AUTOMATED COUNT: 15 % (ref 12.3–15.4)
HCT VFR BLD AUTO: 43.2 % (ref 34–46.6)
HGB BLD-MCNC: 13.5 G/DL (ref 12–15.9)
IMM GRANULOCYTES # BLD AUTO: 0.09 10*3/MM3 (ref 0–0.05)
IMM GRANULOCYTES NFR BLD AUTO: 0.9 % (ref 0–0.5)
LYMPHOCYTES # BLD AUTO: 0.91 10*3/MM3 (ref 0.7–3.1)
LYMPHOCYTES NFR BLD AUTO: 8.6 % (ref 19.6–45.3)
MCH RBC QN AUTO: 28.8 PG (ref 26.6–33)
MCHC RBC AUTO-ENTMCNC: 31.3 G/DL (ref 31.5–35.7)
MCV RBC AUTO: 92.1 FL (ref 79–97)
MONOCYTES # BLD AUTO: 0.78 10*3/MM3 (ref 0.1–0.9)
MONOCYTES NFR BLD AUTO: 7.4 % (ref 5–12)
NEUTROPHILS NFR BLD AUTO: 8.7 10*3/MM3 (ref 1.7–7)
NEUTROPHILS NFR BLD AUTO: 82.3 % (ref 42.7–76)
NRBC BLD AUTO-RTO: 0 /100 WBC (ref 0–0.2)
PLATELET # BLD AUTO: 454 10*3/MM3 (ref 140–450)
PMV BLD AUTO: 9.6 FL (ref 6–12)
RBC # BLD AUTO: 4.69 10*6/MM3 (ref 3.77–5.28)
WBC NRBC COR # BLD AUTO: 10.56 10*3/MM3 (ref 3.4–10.8)

## 2025-08-23 PROCEDURE — 86235 NUCLEAR ANTIGEN ANTIBODY: CPT

## 2025-08-23 PROCEDURE — 83516 IMMUNOASSAY NONANTIBODY: CPT

## 2025-08-23 PROCEDURE — 80053 COMPREHEN METABOLIC PANEL: CPT | Performed by: FAMILY MEDICINE

## 2025-08-23 PROCEDURE — 86140 C-REACTIVE PROTEIN: CPT | Performed by: FAMILY MEDICINE

## 2025-08-23 PROCEDURE — 86618 LYME DISEASE ANTIBODY: CPT

## 2025-08-23 PROCEDURE — 87468 ANAPLSMA PHGCYTOPHLM AMP PRB: CPT

## 2025-08-23 PROCEDURE — 85025 COMPLETE CBC W/AUTO DIFF WBC: CPT | Performed by: INTERNAL MEDICINE

## 2025-08-23 PROCEDURE — 86038 ANTINUCLEAR ANTIBODIES: CPT

## 2025-08-23 PROCEDURE — 87798 DETECT AGENT NOS DNA AMP: CPT

## 2025-08-23 PROCEDURE — 86225 DNA ANTIBODY NATIVE: CPT

## 2025-08-23 PROCEDURE — 86431 RHEUMATOID FACTOR QUANT: CPT

## 2025-08-23 PROCEDURE — 85652 RBC SED RATE AUTOMATED: CPT | Performed by: FAMILY MEDICINE

## 2025-08-23 PROCEDURE — 87484 EHRLICHA CHAFFEENSIS AMP PRB: CPT

## 2025-08-24 LAB — B BURGDOR IGG+IGM SER QL IA: NEGATIVE

## 2025-08-25 ENCOUNTER — TELEPHONE (OUTPATIENT)
Dept: FAMILY MEDICINE CLINIC | Facility: CLINIC | Age: 80
End: 2025-08-25
Payer: MEDICARE

## 2025-08-25 LAB — ANA SER QL: POSITIVE

## 2025-08-27 DIAGNOSIS — M81.0 AGE-RELATED OSTEOPOROSIS WITHOUT CURRENT PATHOLOGICAL FRACTURE: Primary | ICD-10-CM

## 2025-08-27 LAB
A PHAGOCYTOPH DNA BLD QL NAA+PROBE: NEGATIVE
EHRLICHIA DNA SPEC QL NAA+PROBE: NEGATIVE

## 2025-08-27 RX ORDER — GABAPENTIN 300 MG/1
300 CAPSULE ORAL EVERY 12 HOURS SCHEDULED
Qty: 180 CAPSULE | Refills: 0 | Status: SHIPPED | OUTPATIENT
Start: 2025-08-27

## 2025-08-28 LAB
CENTROMERE B AB SER-ACNC: <0.2 AI (ref 0–0.9)
CHROMATIN AB SERPL-ACNC: <0.2 AI (ref 0–0.9)
DSDNA AB SER-ACNC: <1 IU/ML (ref 0–9)
ENA JO1 AB SER-ACNC: <0.2 AI (ref 0–0.9)
ENA RNP AB SER-ACNC: <0.2 AI (ref 0–0.9)
ENA SCL70 AB SER-ACNC: <0.2 AI (ref 0–0.9)
ENA SM AB SER-ACNC: <0.2 AI (ref 0–0.9)
ENA SM+RNP AB SER-ACNC: <0.2 AI (ref 0–0.9)
ENA SS-A AB SER-ACNC: <0.2 AI (ref 0–0.9)
ENA SS-B AB SER-ACNC: <0.2 AI (ref 0–0.9)
Lab: NORMAL
RIBOSOMAL P AB SER-ACNC: <0.2 AI (ref 0–0.9)

## 2025-08-29 LAB — RICKETTSIA RICKETTSII DNA, RT: NOT DETECTED

## (undated) DEVICE — 450 ML BOTTLE OF 0.05% CHLORHEXIDINE GLUCONATE IN 99.95% STERILE WATER FOR IRRIGATION, USP AND APPLICATOR.: Brand: IRRISEPT ANTIMICROBIAL WOUND LAVAGE

## (undated) DEVICE — SOL IRR H2O BO 1000ML STRL

## (undated) DEVICE — MICRO HVTSA, 0.5G AND HVTSA SOURCEMARK PRODUCT CODE M1206 AND M1206-01: Brand: EXOFIN MICRO HVTSA, 0.5G

## (undated) DEVICE — UNDERGLV SURG BIOGEL/PI PF SYNTH SURG SZ8.5 BLU 50/BX

## (undated) DEVICE — PENCL SMOKE/EVAC MEGADYNE TELESCP 15FT

## (undated) DEVICE — SOL NACL 0.9PCT 1000ML

## (undated) DEVICE — Device

## (undated) DEVICE — ANTIBACTERIAL UNDYED BRAIDED (POLYGLACTIN 910), SYNTHETIC ABSORBABLE SUTURE: Brand: COATED VICRYL

## (undated) DEVICE — SYR LUERLOK 20CC

## (undated) DEVICE — COVER,MAYO STAND,STERILE: Brand: MEDLINE

## (undated) DEVICE — SYR LUERLOK 30CC

## (undated) DEVICE — KT SURG TURNOVER 050

## (undated) DEVICE — ZIPPERED TOGA, 2X LARGE: Brand: FLYTE

## (undated) DEVICE — WRAP KNEE COLD THERAPY

## (undated) DEVICE — DISPOSABLE TOURNIQUET CUFF SINGLE BLADDER, SINGLE PORT AND QUICK CONNECT CONNECTOR: Brand: COLOR CUFF

## (undated) DEVICE — SUT ETHLN 2/0 PS 18IN 585H

## (undated) DEVICE — THE STERILE LIGHT HANDLE COVER IS USED WITH STERIS SURGICAL LIGHTING AND VISUALIZATION SYSTEMS.

## (undated) DEVICE — THE STERILE CAMERA HANDLE COVER IS FOR USE WITH THE STERIS SURGICAL LIGHTING AND VISUALIZATION SYSTEMS.

## (undated) DEVICE — SUT ETHIB 2 CV V37 MS/4 30IN MX69G

## (undated) DEVICE — ZIP 24 SURGICAL SKIN CLOSURE DEVICE, PSA: Brand: ZIP 24 SURGICAL SKIN CLOSURE DEVICE

## (undated) DEVICE — INTENDED TO SUPPORT AND MAINTAIN THE POSITION OF AN ANESTHETIZED PATIENT DURING SURGERY: Brand: HERMANTOR XL PINK KNEE POSITIONING PAD

## (undated) DEVICE — CEMENT MIXING SYSTEM WITH FEMORAL BREAKWAY NOZZLE: Brand: REVOLUTION

## (undated) DEVICE — PK TOTL KN 50

## (undated) DEVICE — GLV SURG SENSICARE PI ORTHO SZ8.5 LF STRL

## (undated) DEVICE — SOL ISO/ALC RUB 70PCT 4OZ

## (undated) DEVICE — TOWEL,OR,DSP,ST,WHITE,DLX,4/PK,20PK/CS: Brand: MEDLINE

## (undated) DEVICE — NEEDLE, QUINCKE, 20GX3.5": Brand: MEDLINE

## (undated) DEVICE — APPL CHLORAPREP HI/LITE 26ML ORNG

## (undated) DEVICE — ADHS LIQ MASTISOL 2/3ML

## (undated) DEVICE — DUAL CUT SAGITTAL BLADE